# Patient Record
Sex: MALE | Race: WHITE | NOT HISPANIC OR LATINO | Employment: OTHER | URBAN - METROPOLITAN AREA
[De-identification: names, ages, dates, MRNs, and addresses within clinical notes are randomized per-mention and may not be internally consistent; named-entity substitution may affect disease eponyms.]

---

## 2017-06-23 ENCOUNTER — TRANSCRIBE ORDERS (OUTPATIENT)
Dept: PHYSICAL THERAPY | Facility: HOSPITAL | Age: 62
End: 2017-06-23

## 2017-06-23 DIAGNOSIS — L89.200: ICD-10-CM

## 2017-06-23 DIAGNOSIS — S91.309D OPEN WOUND OF HEEL, UNSPECIFIED LATERALITY, SUBSEQUENT ENCOUNTER: ICD-10-CM

## 2017-06-23 DIAGNOSIS — L89.309: Primary | ICD-10-CM

## 2017-06-28 ENCOUNTER — HOSPITAL ENCOUNTER (OUTPATIENT)
Dept: PHYSICAL THERAPY | Facility: HOSPITAL | Age: 62
Setting detail: THERAPIES SERIES
Discharge: HOME | End: 2017-06-28

## 2017-06-28 ENCOUNTER — DOCUMENTATION (OUTPATIENT)
Dept: PHYSICAL THERAPY | Facility: HOSPITAL | Age: 62
End: 2017-06-28

## 2017-06-28 NOTE — TREATMENT PLAN
Pt presented for PT wound care evaluation. Brought pt back to treatment room and discussed reason for visit, etiology of wounds in question, and current treatment. Pt reported he is receiving home health services for wound care, and the wound care nurses order his home supplies for daily dressing changes. Informed the patient that he cannot receive outpatient and home health services at the same time d/t insurance restrictions, so PT could not perform any interventions today. Pt voiced his understanding and declined offer for PT to visualize the wounds and discuss a potential plan with his PCP. Of note, pt reports that he does not really need the home health nurses except for them to order him supplies. Gave patient information about [employer] home delivery system, along with an order form that would need to be completed and signed by a physician in order for the patient to receive supplies at home that would be billed to his insurance. Encouraged pt to discuss this plan with his MD and call back if he wants to utilize his referral after stopping home health services. Pt voiced his understanding. Left a message with Dr. Rankin' nurse about the situation. No PT evaluation or interventions performed. Therefore, no charges will be billed for this encounter. Corrina Bowen, PT 6/28/2017 4:22 PM

## 2017-06-28 NOTE — TREATMENT PLAN
Pt presented for PT wound care evaluation. Brought pt back to treatment room and discussed reason for visit, etiology of wounds in question, and current treatment. Pt reported he is receiving home health services for wound care, and the wound care nurses order his home supplies for daily dressing changes. Informed the patient that he cannot receive outpatient and home health services at the same time d/t insurance restrictions, so PT could not perform any interventions today. Pt voiced his understanding and declined offer for PT to visualize the wounds and discuss a potential plan with his PCP. Of note, pt reports that he does not really need the home health nurses except for them to order him supplies. Gave patient information about [employer] home delivery system, along with an order form that would need to be completed and signed by a physician in order for the patient to receive supplies at home that would be billed to his insurance. Encouraged pt to discuss this plan with his MD and call back if he wants to utilize his referral after stopping home health services. Pt voiced his understanding. Left a message with Dr. Rankin' nurse about the situation. No PT evaluation or interventions performed. Therefore, no charges will be billed for this encounter. Corrina Bowen, PT 6/28/2017 4:22 PM

## 2022-11-18 ENCOUNTER — OFFICE VISIT (OUTPATIENT)
Dept: CARDIAC SURGERY | Facility: CLINIC | Age: 67
End: 2022-11-18

## 2022-11-18 VITALS
SYSTOLIC BLOOD PRESSURE: 158 MMHG | TEMPERATURE: 97.1 F | OXYGEN SATURATION: 98 % | BODY MASS INDEX: 23.03 KG/M2 | WEIGHT: 170 LBS | HEART RATE: 85 BPM | HEIGHT: 72 IN | DIASTOLIC BLOOD PRESSURE: 77 MMHG

## 2022-11-18 DIAGNOSIS — I96 GANGRENE OF RIGHT FOOT: Primary | ICD-10-CM

## 2022-11-18 DIAGNOSIS — E78.5 HYPERLIPIDEMIA, UNSPECIFIED HYPERLIPIDEMIA TYPE: ICD-10-CM

## 2022-11-18 DIAGNOSIS — E11.52 TYPE 2 DIABETES MELLITUS WITH FOOT ULCER AND GANGRENE: ICD-10-CM

## 2022-11-18 DIAGNOSIS — E11.621 TYPE 2 DIABETES MELLITUS WITH FOOT ULCER AND GANGRENE: ICD-10-CM

## 2022-11-18 DIAGNOSIS — I10 ESSENTIAL HYPERTENSION: ICD-10-CM

## 2022-11-18 DIAGNOSIS — I73.9 PERIPHERAL ARTERIAL DISEASE: ICD-10-CM

## 2022-11-18 DIAGNOSIS — I96 GANGRENE OF LEFT FOOT: ICD-10-CM

## 2022-11-18 DIAGNOSIS — L97.509 TYPE 2 DIABETES MELLITUS WITH FOOT ULCER AND GANGRENE: ICD-10-CM

## 2022-11-18 DIAGNOSIS — G83.9 PARALYSIS: ICD-10-CM

## 2022-11-18 DIAGNOSIS — Z72.0 TOBACCO ABUSE: ICD-10-CM

## 2022-11-18 PROCEDURE — 99204 OFFICE O/P NEW MOD 45 MIN: CPT | Performed by: STUDENT IN AN ORGANIZED HEALTH CARE EDUCATION/TRAINING PROGRAM

## 2022-11-18 PROCEDURE — 93922 UPR/L XTREMITY ART 2 LEVELS: CPT | Performed by: STUDENT IN AN ORGANIZED HEALTH CARE EDUCATION/TRAINING PROGRAM

## 2022-11-18 RX ORDER — ZOLPIDEM TARTRATE 10 MG/1
10 TABLET ORAL
COMMUNITY
Start: 2022-11-09 | End: 2023-02-21 | Stop reason: HOSPADM

## 2022-11-18 RX ORDER — ROSUVASTATIN CALCIUM 10 MG/1
10 TABLET, COATED ORAL DAILY
COMMUNITY
Start: 2022-09-06 | End: 2022-12-05 | Stop reason: HOSPADM

## 2022-11-18 RX ORDER — GABAPENTIN 800 MG/1
800 TABLET ORAL 4 TIMES DAILY
Status: ON HOLD | COMMUNITY
Start: 2022-10-21 | End: 2023-02-20 | Stop reason: SDUPTHER

## 2022-11-18 RX ORDER — SITAGLIPTIN 100 MG/1
100 TABLET, FILM COATED ORAL DAILY
COMMUNITY
Start: 2022-11-01

## 2022-11-18 RX ORDER — QUINAPRIL 40 MG/1
40 TABLET ORAL DAILY
COMMUNITY
Start: 2022-09-07 | End: 2023-03-14

## 2022-11-18 RX ORDER — ATORVASTATIN CALCIUM 40 MG/1
40 TABLET, FILM COATED ORAL DAILY
COMMUNITY
Start: 2022-11-02 | End: 2022-12-05 | Stop reason: HOSPADM

## 2022-11-18 RX ORDER — CILOSTAZOL 50 MG/1
50 TABLET ORAL DAILY
COMMUNITY
Start: 2022-11-02 | End: 2022-11-22

## 2022-11-18 RX ORDER — OXYCODONE AND ACETAMINOPHEN 10; 325 MG/1; MG/1
1 TABLET ORAL
Status: ON HOLD | COMMUNITY
Start: 2014-07-24 | End: 2023-02-21 | Stop reason: SDUPTHER

## 2022-11-18 RX ORDER — PANTOPRAZOLE SODIUM 40 MG/1
40 TABLET, DELAYED RELEASE ORAL DAILY
COMMUNITY
Start: 2022-11-02 | End: 2023-01-05

## 2022-11-18 RX ORDER — CLOPIDOGREL BISULFATE 75 MG/1
75 TABLET ORAL DAILY
COMMUNITY
Start: 2022-10-21

## 2022-11-18 NOTE — PROGRESS NOTES
11/18/2022  Patient Information  Severino Vera                                                                                          [address] [address]   1955  'PCP/Referring Physician'  Anuel Rankin MD  [phone]  Trever Ford MD  [phone]  Chief Complaint   Patient presents with   • Consult     Np referred for bilateral foot ulcers.       History of Present Illness: 66-year-old man with history of diabetes, hypertension, hyperlipidemia, peripheral arterial disease status post stent 25 years ago, status post shoulder surgery and chronic arthritis, paraplegia with complete loss of sensation and function below the waist with bladder incontinence requiring chronic bladder catheterization who presents to the cardiovascular surgery clinic today for evaluation of wounds in the bilateral feet.  He reports having these wounds present and worsening over the last several years, and now these have progressed to dry gangrene bilaterally.  He has MRI evidence of osteomyelitis.  He reports that he does not stand or walk at all, and is completely wheelchair-bound.  He also reports that he is completely insensate, and cannot feel any sensation including a burn on his foot which she sustained years ago requiring partial amputations of his right toes.  He also reports chronic and progressively worsening upper extremity weakness and numbness in the fingertips that he believes is due to his neck and/or shoulders.  He reports a family history of a mother with a stroke and breast cancer, and brothers and sisters who are in good health.  He currently lives in [place] with multiple family members and smokes less than 1 pack/day for decades, drinks 4 beers per day, and denies illicit drug use.    Past medical history as above    Past surgical history as above    Family history as above    Social history as above      Current Outpatient Medications:   •  atorvastatin (LIPITOR) 40 MG  tablet, Take 40 mg by mouth Daily., Disp: , Rfl:   •  cilostazol (PLETAL) 50 MG tablet, Take 50 mg by mouth Daily., Disp: , Rfl:   •  clopidogrel (PLAVIX) 75 MG tablet, Take 75 mg by mouth Daily., Disp: , Rfl:   •  gabapentin (NEURONTIN) 800 MG tablet, Take 800 mg by mouth 4 (Four) Times a Day., Disp: , Rfl:   •  Januvia 100 MG tablet, Take 100 mg by mouth Daily., Disp: , Rfl:   •  oxyCODONE-acetaminophen (PERCOCET)  MG per tablet, Take 1 tablet by mouth 5 (Five) Times a Day., Disp: , Rfl:   •  pantoprazole (PROTONIX) 40 MG EC tablet, Take 40 mg by mouth Daily., Disp: , Rfl:   •  quinapril (ACCUPRIL) 40 MG tablet, Take 40 mg by mouth Daily., Disp: , Rfl:   •  rosuvastatin (CRESTOR) 10 MG tablet, Take 10 mg by mouth Daily., Disp: , Rfl:   •  zolpidem (AMBIEN) 10 MG tablet, Take 10 mg by mouth every night at bedtime., Disp: , Rfl:   No Known Allergies  Social History     Socioeconomic History   • Marital status: Unknown     History reviewed. No pertinent family history.  Review of Systems   Constitutional: Negative. Negative for chills, fever, malaise/fatigue, night sweats and weight loss.   HENT: Negative.  Negative for hearing loss, odynophagia and sore throat.    Cardiovascular: Positive for leg swelling. Negative for chest pain, dyspnea on exertion, orthopnea and palpitations.   Respiratory: Negative for cough and hemoptysis.    Endocrine: Negative.  Negative for cold intolerance, heat intolerance, polydipsia, polyphagia and polyuria.   Hematologic/Lymphatic: Negative.  Does not bruise/bleed easily.   Skin: Positive for color change and poor wound healing. Negative for itching and rash.   Musculoskeletal: Positive for arthritis. Negative for joint pain, joint swelling and myalgias.   Gastrointestinal: Positive for diarrhea. Negative for abdominal pain, constipation, hematemesis, hematochezia, melena, nausea and vomiting.   Genitourinary: Negative.  Negative for dysuria, frequency and hematuria.  "  Neurological: Positive for numbness. Negative for focal weakness, headaches and seizures.   Psychiatric/Behavioral: Negative.  Negative for suicidal ideas.   Allergic/Immunologic: Negative.    All other systems reviewed and are negative.    Vitals:    11/18/22 1413   BP: 158/77   BP Location: Left arm   Patient Position: Sitting   Pulse: 85   Temp: 97.1 °F (36.2 °C)   SpO2: 98%   Weight: 77.1 kg (170 lb)   Height: 182.9 cm (72\")      Physical Exam     General no acute distress, pleasant, interactive  Head normocephalic, atraumatic  Eyes clear sclera  ENT no discharge, neck supple  Mouth mucous membranes moist  Cardiac regular rate rhythm, no murmurs rubs gallops  Vascular no carotid bruits bilaterally, palpable brachial and radial pulses bilaterally when arms extended but not when flexed, nonpalpable femoral, popliteal, and pedal pulses.  Pulmonary lungs clear to auscultation bilaterally  Abdomen soft nontender nondistended  Lymphatic no edema bilateral lower extremities  Neurological insensate bilateral lower extremities  Psychological appropriate  Dermatological extensive gangrene of the bilateral feet and lower leg  Musculoskeletal atrophy of disuse at the bilateral lower extremities, no tenderness over the posterior cervical neck, tenderness with palpation of the bilateral shoulders and upper arms    The ROS, past medical history, surgical history, family history, social history and vitals were reviewed by myself and corrected as needed.      Labs/Imaging:  I reviewed his imaging which is notable for osteomyelitis.  I reviewed his segmental pressures and PVRs which are notable for low thigh pressure of 91 on the right side, 107 on the left side, and calf pressures of 94 on the right side and 109 the left side.    Assessment/Plan: 66-year-old man with multiple medical comorbidities including diabetes, peripheral arterial disease, active tobacco abuse, and paraplegia (insensate below the waist) who presents " today with bilateral foot and ankle dry gangrene.  I explained to him and his 2 family members who accompanied him today that his legs can only hurt him and will never help him given the fact that he cannot feel or use them, and thus in this particular setting, it does make sense to proceed with primary above-knee amputations.  The patient and family expressed great gratitude for this, and would like to proceed with surgery soon as possible.  I explained that given the logistics of the timing of this clinic visit as well as impending holiday that I would like to proceed as soon as possible but we would be best served to obtain basic preoperative investigations with labs and EKG.  We do not require anesthesia for simultaneous bilateral above-knee amputations given the fact that he is completely insensate.  He also will require evaluation for his neck and/or shoulders given his progressively worsening upper extremity weakness and numbness of his fingertips, and I will refer him to a orthopedic spine surgeon suggest to his primary care to evaluate for cervical spinal stenosis and or shoulder etiology.    Participated in smoking cessation counseling for greater than 10 minutes with the patient.    I would like to thank you very much for this consultation.    Nemesio Haji M.D., R.P.V.I.  Cardiothoracic and Vascular Surgeon  [facility]

## 2022-11-21 DIAGNOSIS — I96 GANGRENE OF RIGHT FOOT: ICD-10-CM

## 2022-11-21 DIAGNOSIS — I96 GANGRENE OF LEFT FOOT: ICD-10-CM

## 2022-11-21 DIAGNOSIS — I73.9 PERIPHERAL ARTERIAL DISEASE: Primary | ICD-10-CM

## 2022-11-22 ENCOUNTER — PRE-ADMISSION TESTING (OUTPATIENT)
Dept: PREADMISSION TESTING | Facility: HOSPITAL | Age: 67
End: 2022-11-22

## 2022-11-22 ENCOUNTER — PREP FOR SURGERY (OUTPATIENT)
Dept: OTHER | Facility: HOSPITAL | Age: 67
End: 2022-11-22

## 2022-11-22 VITALS — WEIGHT: 170 LBS | HEIGHT: 72 IN | BODY MASS INDEX: 23.03 KG/M2

## 2022-11-22 DIAGNOSIS — I73.9 PERIPHERAL ARTERIAL DISEASE: ICD-10-CM

## 2022-11-22 DIAGNOSIS — I73.9 PERIPHERAL ARTERIAL DISEASE: Primary | ICD-10-CM

## 2022-11-22 LAB
ABO GROUP BLD: NORMAL
ANION GAP SERPL CALCULATED.3IONS-SCNC: 8 MMOL/L (ref 5–15)
BLD GP AB SCN SERPL QL: NEGATIVE
BUN SERPL-MCNC: 9 MG/DL (ref 8–23)
BUN/CREAT SERPL: 20.9 (ref 7–25)
CALCIUM SPEC-SCNC: 9.1 MG/DL (ref 8.6–10.5)
CHLORIDE SERPL-SCNC: 95 MMOL/L (ref 98–107)
CO2 SERPL-SCNC: 30 MMOL/L (ref 22–29)
CREAT SERPL-MCNC: 0.43 MG/DL (ref 0.76–1.27)
DEPRECATED RDW RBC AUTO: 47 FL (ref 37–54)
EGFRCR SERPLBLD CKD-EPI 2021: 117 ML/MIN/1.73
ERYTHROCYTE [DISTWIDTH] IN BLOOD BY AUTOMATED COUNT: 14.6 % (ref 12.3–15.4)
GLUCOSE SERPL-MCNC: 106 MG/DL (ref 65–99)
HBA1C MFR BLD: 5.7 % (ref 4.8–5.6)
HCT VFR BLD AUTO: 38.8 % (ref 37.5–51)
HGB BLD-MCNC: 12.6 G/DL (ref 13–17.7)
INR PPP: 1.01 (ref 0.84–1.13)
MCH RBC QN AUTO: 28.2 PG (ref 26.6–33)
MCHC RBC AUTO-ENTMCNC: 32.5 G/DL (ref 31.5–35.7)
MCV RBC AUTO: 86.8 FL (ref 79–97)
PLATELET # BLD AUTO: 284 10*3/MM3 (ref 140–450)
PMV BLD AUTO: 9.8 FL (ref 6–12)
POTASSIUM SERPL-SCNC: 4 MMOL/L (ref 3.5–5.2)
PROTHROMBIN TIME: 13.2 SECONDS (ref 11.4–14.4)
QT INTERVAL: 390 MS
QTC INTERVAL: 469 MS
RBC # BLD AUTO: 4.47 10*6/MM3 (ref 4.14–5.8)
RH BLD: POSITIVE
SODIUM SERPL-SCNC: 133 MMOL/L (ref 136–145)
T&S EXPIRATION DATE: NORMAL
WBC NRBC COR # BLD: 11.63 10*3/MM3 (ref 3.4–10.8)

## 2022-11-22 PROCEDURE — 85027 COMPLETE CBC AUTOMATED: CPT

## 2022-11-22 PROCEDURE — 86900 BLOOD TYPING SEROLOGIC ABO: CPT

## 2022-11-22 PROCEDURE — 36415 COLL VENOUS BLD VENIPUNCTURE: CPT

## 2022-11-22 PROCEDURE — 93010 ELECTROCARDIOGRAM REPORT: CPT | Performed by: INTERNAL MEDICINE

## 2022-11-22 PROCEDURE — 85610 PROTHROMBIN TIME: CPT

## 2022-11-22 PROCEDURE — 86901 BLOOD TYPING SEROLOGIC RH(D): CPT

## 2022-11-22 PROCEDURE — 86850 RBC ANTIBODY SCREEN: CPT

## 2022-11-22 PROCEDURE — 83036 HEMOGLOBIN GLYCOSYLATED A1C: CPT

## 2022-11-22 PROCEDURE — 93005 ELECTROCARDIOGRAM TRACING: CPT

## 2022-11-22 PROCEDURE — 80048 BASIC METABOLIC PNL TOTAL CA: CPT

## 2022-11-22 RX ORDER — CEFAZOLIN SODIUM 2 G/100ML
2 INJECTION, SOLUTION INTRAVENOUS ONCE
Status: CANCELLED | OUTPATIENT
Start: 2022-11-22 | End: 2022-11-22

## 2022-11-22 RX ORDER — LEVOFLOXACIN 750 MG/1
750 TABLET ORAL DAILY
COMMUNITY
Start: 2022-10-07 | End: 2022-12-05 | Stop reason: HOSPADM

## 2022-11-23 ENCOUNTER — ANESTHESIA EVENT (OUTPATIENT)
Dept: PERIOP | Facility: HOSPITAL | Age: 67
End: 2022-11-23

## 2022-11-24 RX ORDER — SODIUM CHLORIDE 0.9 % (FLUSH) 0.9 %
10 SYRINGE (ML) INJECTION EVERY 12 HOURS SCHEDULED
Status: CANCELLED | OUTPATIENT
Start: 2022-11-24

## 2022-11-24 RX ORDER — FAMOTIDINE 20 MG/1
20 TABLET, FILM COATED ORAL ONCE
Status: CANCELLED | OUTPATIENT
Start: 2022-11-24 | End: 2022-11-24

## 2022-11-24 RX ORDER — FAMOTIDINE 10 MG/ML
20 INJECTION, SOLUTION INTRAVENOUS ONCE
Status: CANCELLED | OUTPATIENT
Start: 2022-11-24 | End: 2022-11-24

## 2022-11-25 ENCOUNTER — ANESTHESIA (OUTPATIENT)
Dept: PERIOP | Facility: HOSPITAL | Age: 67
End: 2022-11-25

## 2022-11-25 ENCOUNTER — HOSPITAL ENCOUNTER (INPATIENT)
Facility: HOSPITAL | Age: 67
LOS: 10 days | Discharge: HOME HEALTH | End: 2022-12-05
Attending: STUDENT IN AN ORGANIZED HEALTH CARE EDUCATION/TRAINING PROGRAM | Admitting: STUDENT IN AN ORGANIZED HEALTH CARE EDUCATION/TRAINING PROGRAM

## 2022-11-25 ENCOUNTER — APPOINTMENT (OUTPATIENT)
Dept: CARDIOLOGY | Facility: HOSPITAL | Age: 67
End: 2022-11-25

## 2022-11-25 DIAGNOSIS — I96 GANGRENE OF RIGHT FOOT: ICD-10-CM

## 2022-11-25 DIAGNOSIS — E43 SEVERE MALNUTRITION: ICD-10-CM

## 2022-11-25 DIAGNOSIS — I96 GANGRENE OF RIGHT FOOT: Primary | ICD-10-CM

## 2022-11-25 DIAGNOSIS — I96 GANGRENE OF LEFT FOOT: ICD-10-CM

## 2022-11-25 DIAGNOSIS — E78.5 HYPERLIPIDEMIA LDL GOAL <70: ICD-10-CM

## 2022-11-25 DIAGNOSIS — I73.9 PERIPHERAL ARTERIAL DISEASE: ICD-10-CM

## 2022-11-25 PROBLEM — M86.9 OSTEOMYELITIS: Status: ACTIVE | Noted: 2022-11-25

## 2022-11-25 LAB
ABO GROUP BLD: NORMAL
ALBUMIN SERPL-MCNC: 2.6 G/DL (ref 3.5–5.2)
ALBUMIN/GLOB SERPL: 0.8 G/DL
ALP SERPL-CCNC: 56 U/L (ref 39–117)
ALT SERPL W P-5'-P-CCNC: 6 U/L (ref 1–41)
ANION GAP SERPL CALCULATED.3IONS-SCNC: 9 MMOL/L (ref 5–15)
AST SERPL-CCNC: 11 U/L (ref 1–40)
BACTERIA UR QL AUTO: ABNORMAL /HPF
BASOPHILS # BLD AUTO: 0.12 10*3/MM3 (ref 0–0.2)
BASOPHILS NFR BLD AUTO: 0.8 % (ref 0–1.5)
BH CV ECHO MEAS - AO MAX PG: 6 MMHG
BH CV ECHO MEAS - AO MEAN PG: 3 MMHG
BH CV ECHO MEAS - AO ROOT DIAM: 3 CM
BH CV ECHO MEAS - AO V2 MAX: 122 CM/SEC
BH CV ECHO MEAS - AO V2 VTI: 29.3 CM
BH CV ECHO MEAS - AVA(I,D): 2.1 CM2
BH CV ECHO MEAS - EDV(CUBED): 97.3 ML
BH CV ECHO MEAS - EDV(MOD-SP2): 66.2 ML
BH CV ECHO MEAS - EDV(MOD-SP4): 98.9 ML
BH CV ECHO MEAS - EF(MOD-BP): 56.6 %
BH CV ECHO MEAS - EF(MOD-SP2): 55.3 %
BH CV ECHO MEAS - EF(MOD-SP4): 56.3 %
BH CV ECHO MEAS - ESV(CUBED): 27 ML
BH CV ECHO MEAS - ESV(MOD-SP2): 29.6 ML
BH CV ECHO MEAS - ESV(MOD-SP4): 43.2 ML
BH CV ECHO MEAS - FS: 34.8 %
BH CV ECHO MEAS - IVS/LVPW: 0.82 CM
BH CV ECHO MEAS - IVSD: 0.9 CM
BH CV ECHO MEAS - LA DIMENSION: 3.5 CM
BH CV ECHO MEAS - LAT PEAK E' VEL: 6.9 CM/SEC
BH CV ECHO MEAS - LV DIASTOLIC VOL/BSA (35-75): 49.7 CM2
BH CV ECHO MEAS - LV MASS(C)D: 158.8 GRAMS
BH CV ECHO MEAS - LV MAX PG: 2.9 MMHG
BH CV ECHO MEAS - LV MEAN PG: 2 MMHG
BH CV ECHO MEAS - LV SYSTOLIC VOL/BSA (12-30): 21.7 CM2
BH CV ECHO MEAS - LV V1 MAX: 85.4 CM/SEC
BH CV ECHO MEAS - LV V1 VTI: 19.6 CM
BH CV ECHO MEAS - LVIDD: 4.6 CM
BH CV ECHO MEAS - LVIDS: 3 CM
BH CV ECHO MEAS - LVOT AREA: 3.1 CM2
BH CV ECHO MEAS - LVOT DIAM: 2 CM
BH CV ECHO MEAS - LVPWD: 1.1 CM
BH CV ECHO MEAS - MED PEAK E' VEL: 5.1 CM/SEC
BH CV ECHO MEAS - MV A MAX VEL: 69.7 CM/SEC
BH CV ECHO MEAS - MV DEC SLOPE: 702 CM/SEC2
BH CV ECHO MEAS - MV DEC TIME: 0.14 MSEC
BH CV ECHO MEAS - MV E MAX VEL: 117 CM/SEC
BH CV ECHO MEAS - MV E/A: 1.68
BH CV ECHO MEAS - MV MAX PG: 6.2 MMHG
BH CV ECHO MEAS - MV MEAN PG: 3 MMHG
BH CV ECHO MEAS - MV P1/2T: 52.2 MSEC
BH CV ECHO MEAS - MV V2 VTI: 23.2 CM
BH CV ECHO MEAS - MVA(P1/2T): 4.2 CM2
BH CV ECHO MEAS - MVA(VTI): 2.7 CM2
BH CV ECHO MEAS - RAP SYSTOLE: 3 MMHG
BH CV ECHO MEAS - RVSP: 25 MMHG
BH CV ECHO MEAS - SI(MOD-SP2): 18.4 ML/M2
BH CV ECHO MEAS - SI(MOD-SP4): 28 ML/M2
BH CV ECHO MEAS - SV(LVOT): 61.6 ML
BH CV ECHO MEAS - SV(MOD-SP2): 36.6 ML
BH CV ECHO MEAS - SV(MOD-SP4): 55.7 ML
BH CV ECHO MEAS - TR MAX PG: 22.3 MMHG
BH CV ECHO MEAS - TR MAX VEL: 236 CM/SEC
BH CV ECHO MEASUREMENTS AVERAGE E/E' RATIO: 19.5
BH CV VAS BP RIGHT ARM: NORMAL MMHG
BH CV XLRA - TDI S': 12.1 CM/SEC
BILIRUB SERPL-MCNC: 0.3 MG/DL (ref 0–1.2)
BILIRUB UR QL STRIP: NEGATIVE
BLD GP AB SCN SERPL QL: NEGATIVE
BUN SERPL-MCNC: 8 MG/DL (ref 8–23)
BUN/CREAT SERPL: 24.2 (ref 7–25)
CALCIUM SPEC-SCNC: 8.3 MG/DL (ref 8.6–10.5)
CHLORIDE SERPL-SCNC: 96 MMOL/L (ref 98–107)
CLARITY UR: CLEAR
CO2 SERPL-SCNC: 28 MMOL/L (ref 22–29)
COLOR UR: YELLOW
CREAT SERPL-MCNC: 0.33 MG/DL (ref 0.76–1.27)
DEPRECATED RDW RBC AUTO: 44.5 FL (ref 37–54)
EGFRCR SERPLBLD CKD-EPI 2021: 126.7 ML/MIN/1.73
EOSINOPHIL # BLD AUTO: 0.76 10*3/MM3 (ref 0–0.4)
EOSINOPHIL NFR BLD AUTO: 5.2 % (ref 0.3–6.2)
ERYTHROCYTE [DISTWIDTH] IN BLOOD BY AUTOMATED COUNT: 14.4 % (ref 12.3–15.4)
GLOBULIN UR ELPH-MCNC: 3.3 GM/DL
GLUCOSE BLDC GLUCOMTR-MCNC: 108 MG/DL (ref 70–130)
GLUCOSE BLDC GLUCOMTR-MCNC: 113 MG/DL (ref 70–130)
GLUCOSE SERPL-MCNC: 119 MG/DL (ref 65–99)
GLUCOSE UR STRIP-MCNC: NEGATIVE MG/DL
HCT VFR BLD AUTO: 32.8 % (ref 37.5–51)
HGB BLD-MCNC: 10.9 G/DL (ref 13–17.7)
HGB UR QL STRIP.AUTO: ABNORMAL
HYALINE CASTS UR QL AUTO: ABNORMAL /LPF
IMM GRANULOCYTES # BLD AUTO: 0.07 10*3/MM3 (ref 0–0.05)
IMM GRANULOCYTES NFR BLD AUTO: 0.5 % (ref 0–0.5)
KETONES UR QL STRIP: ABNORMAL
LEUKOCYTE ESTERASE UR QL STRIP.AUTO: NEGATIVE
LV EF 2D ECHO EST: 55 %
LYMPHOCYTES # BLD AUTO: 1.08 10*3/MM3 (ref 0.7–3.1)
LYMPHOCYTES NFR BLD AUTO: 7.4 % (ref 19.6–45.3)
MAGNESIUM SERPL-MCNC: 1.1 MG/DL (ref 1.6–2.4)
MAXIMAL PREDICTED HEART RATE: 153 BPM
MCH RBC QN AUTO: 28.4 PG (ref 26.6–33)
MCHC RBC AUTO-ENTMCNC: 33.2 G/DL (ref 31.5–35.7)
MCV RBC AUTO: 85.4 FL (ref 79–97)
MONOCYTES # BLD AUTO: 0.71 10*3/MM3 (ref 0.1–0.9)
MONOCYTES NFR BLD AUTO: 4.9 % (ref 5–12)
NEUTROPHILS NFR BLD AUTO: 11.77 10*3/MM3 (ref 1.7–7)
NEUTROPHILS NFR BLD AUTO: 81.2 % (ref 42.7–76)
NITRITE UR QL STRIP: NEGATIVE
NRBC BLD AUTO-RTO: 0 /100 WBC (ref 0–0.2)
PH UR STRIP.AUTO: 6 [PH] (ref 5–8)
PLATELET # BLD AUTO: 264 10*3/MM3 (ref 140–450)
PMV BLD AUTO: 9.9 FL (ref 6–12)
POTASSIUM SERPL-SCNC: 3.9 MMOL/L (ref 3.5–5.2)
PROT SERPL-MCNC: 5.9 G/DL (ref 6–8.5)
PROT UR QL STRIP: ABNORMAL
RBC # BLD AUTO: 3.84 10*6/MM3 (ref 4.14–5.8)
RBC # UR STRIP: ABNORMAL /HPF
REF LAB TEST METHOD: ABNORMAL
RH BLD: POSITIVE
SODIUM SERPL-SCNC: 133 MMOL/L (ref 136–145)
SP GR UR STRIP: 1.02 (ref 1–1.03)
SQUAMOUS #/AREA URNS HPF: ABNORMAL /HPF
STRESS TARGET HR: 130 BPM
T&S EXPIRATION DATE: NORMAL
UROBILINOGEN UR QL STRIP: ABNORMAL
WBC # UR STRIP: ABNORMAL /HPF
WBC NRBC COR # BLD: 14.51 10*3/MM3 (ref 3.4–10.8)

## 2022-11-25 PROCEDURE — 84134 ASSAY OF PREALBUMIN: CPT | Performed by: PHYSICIAN ASSISTANT

## 2022-11-25 PROCEDURE — 25010000002 PHENYLEPHRINE 10 MG/ML SOLUTION 1 ML VIAL

## 2022-11-25 PROCEDURE — 25010000002 CEFAZOLIN IN DEXTROSE 2-4 GM/100ML-% SOLUTION: Performed by: PHYSICIAN ASSISTANT

## 2022-11-25 PROCEDURE — 99222 1ST HOSP IP/OBS MODERATE 55: CPT | Performed by: INTERNAL MEDICINE

## 2022-11-25 PROCEDURE — 25010000002 MAGNESIUM SULFATE 2 GM/50ML SOLUTION: Performed by: INTERNAL MEDICINE

## 2022-11-25 PROCEDURE — 85025 COMPLETE CBC W/AUTO DIFF WBC: CPT | Performed by: PHYSICIAN ASSISTANT

## 2022-11-25 PROCEDURE — 86850 RBC ANTIBODY SCREEN: CPT | Performed by: STUDENT IN AN ORGANIZED HEALTH CARE EDUCATION/TRAINING PROGRAM

## 2022-11-25 PROCEDURE — 86923 COMPATIBILITY TEST ELECTRIC: CPT

## 2022-11-25 PROCEDURE — 93306 TTE W/DOPPLER COMPLETE: CPT

## 2022-11-25 PROCEDURE — 25010000002 PROPOFOL 10 MG/ML EMULSION

## 2022-11-25 PROCEDURE — 86900 BLOOD TYPING SEROLOGIC ABO: CPT | Performed by: STUDENT IN AN ORGANIZED HEALTH CARE EDUCATION/TRAINING PROGRAM

## 2022-11-25 PROCEDURE — 86140 C-REACTIVE PROTEIN: CPT | Performed by: INTERNAL MEDICINE

## 2022-11-25 PROCEDURE — 27590 AMPUTATE LEG AT THIGH: CPT | Performed by: STUDENT IN AN ORGANIZED HEALTH CARE EDUCATION/TRAINING PROGRAM

## 2022-11-25 PROCEDURE — 81001 URINALYSIS AUTO W/SCOPE: CPT | Performed by: PHYSICIAN ASSISTANT

## 2022-11-25 PROCEDURE — 88311 DECALCIFY TISSUE: CPT | Performed by: STUDENT IN AN ORGANIZED HEALTH CARE EDUCATION/TRAINING PROGRAM

## 2022-11-25 PROCEDURE — 25010000002 MAGNESIUM SULFATE PER 500 MG OF MAGNESIUM

## 2022-11-25 PROCEDURE — 82962 GLUCOSE BLOOD TEST: CPT

## 2022-11-25 PROCEDURE — 0Y6C0Z3 DETACHMENT AT RIGHT UPPER LEG, LOW, OPEN APPROACH: ICD-10-PCS | Performed by: STUDENT IN AN ORGANIZED HEALTH CARE EDUCATION/TRAINING PROGRAM

## 2022-11-25 PROCEDURE — 25010000002 PHENYLEPHRINE 10 MG/ML SOLUTION

## 2022-11-25 PROCEDURE — 86901 BLOOD TYPING SEROLOGIC RH(D): CPT | Performed by: STUDENT IN AN ORGANIZED HEALTH CARE EDUCATION/TRAINING PROGRAM

## 2022-11-25 PROCEDURE — 80053 COMPREHEN METABOLIC PANEL: CPT | Performed by: PHYSICIAN ASSISTANT

## 2022-11-25 PROCEDURE — 84100 ASSAY OF PHOSPHORUS: CPT | Performed by: INTERNAL MEDICINE

## 2022-11-25 PROCEDURE — S0260 H&P FOR SURGERY: HCPCS | Performed by: STUDENT IN AN ORGANIZED HEALTH CARE EDUCATION/TRAINING PROGRAM

## 2022-11-25 PROCEDURE — 83735 ASSAY OF MAGNESIUM: CPT

## 2022-11-25 PROCEDURE — 25010000002 SULFUR HEXAFLUORIDE MICROSPH 60.7-25 MG RECONSTITUTED SUSPENSION: Performed by: PHYSICIAN ASSISTANT

## 2022-11-25 PROCEDURE — 93306 TTE W/DOPPLER COMPLETE: CPT | Performed by: INTERNAL MEDICINE

## 2022-11-25 PROCEDURE — L1830 KO IMMOB CANVAS LONG PRE OTS: HCPCS | Performed by: STUDENT IN AN ORGANIZED HEALTH CARE EDUCATION/TRAINING PROGRAM

## 2022-11-25 PROCEDURE — 25010000002 MIDAZOLAM PER 1 MG

## 2022-11-25 PROCEDURE — 88307 TISSUE EXAM BY PATHOLOGIST: CPT | Performed by: STUDENT IN AN ORGANIZED HEALTH CARE EDUCATION/TRAINING PROGRAM

## 2022-11-25 RX ORDER — CEFAZOLIN SODIUM 2 G/100ML
2 INJECTION, SOLUTION INTRAVENOUS EVERY 8 HOURS
Status: COMPLETED | OUTPATIENT
Start: 2022-11-26 | End: 2022-11-26

## 2022-11-25 RX ORDER — HYDROMORPHONE HYDROCHLORIDE 1 MG/ML
0.5 INJECTION, SOLUTION INTRAMUSCULAR; INTRAVENOUS; SUBCUTANEOUS
Status: DISCONTINUED | OUTPATIENT
Start: 2022-11-25 | End: 2022-11-25 | Stop reason: HOSPADM

## 2022-11-25 RX ORDER — MIDAZOLAM HYDROCHLORIDE 1 MG/ML
0.5 INJECTION INTRAMUSCULAR; INTRAVENOUS
Status: DISCONTINUED | OUTPATIENT
Start: 2022-11-25 | End: 2022-11-25 | Stop reason: HOSPADM

## 2022-11-25 RX ORDER — ATORVASTATIN CALCIUM 40 MG/1
40 TABLET, FILM COATED ORAL DAILY
Status: DISCONTINUED | OUTPATIENT
Start: 2022-11-25 | End: 2022-11-26

## 2022-11-25 RX ORDER — ZOLPIDEM TARTRATE 5 MG/1
10 TABLET ORAL NIGHTLY PRN
Status: DISCONTINUED | OUTPATIENT
Start: 2022-11-25 | End: 2022-12-05 | Stop reason: HOSPADM

## 2022-11-25 RX ORDER — MAGNESIUM SULFATE HEPTAHYDRATE 40 MG/ML
2 INJECTION, SOLUTION INTRAVENOUS AS NEEDED
Status: DISCONTINUED | OUTPATIENT
Start: 2022-11-25 | End: 2022-12-05 | Stop reason: HOSPADM

## 2022-11-25 RX ORDER — POTASSIUM CHLORIDE 750 MG/1
40 CAPSULE, EXTENDED RELEASE ORAL AS NEEDED
Status: DISCONTINUED | OUTPATIENT
Start: 2022-11-25 | End: 2022-12-05 | Stop reason: HOSPADM

## 2022-11-25 RX ORDER — ONDANSETRON 2 MG/ML
4 INJECTION INTRAMUSCULAR; INTRAVENOUS EVERY 6 HOURS PRN
Status: DISCONTINUED | OUTPATIENT
Start: 2022-11-25 | End: 2022-12-05 | Stop reason: HOSPADM

## 2022-11-25 RX ORDER — MORPHINE SULFATE 1 MG/ML
2 INJECTION, SOLUTION EPIDURAL; INTRATHECAL; INTRAVENOUS
Status: DISCONTINUED | OUTPATIENT
Start: 2022-11-25 | End: 2022-11-25

## 2022-11-25 RX ORDER — PHENYLEPHRINE HYDROCHLORIDE 10 MG/ML
INJECTION INTRAVENOUS AS NEEDED
Status: DISCONTINUED | OUTPATIENT
Start: 2022-11-25 | End: 2022-11-25 | Stop reason: SURG

## 2022-11-25 RX ORDER — POTASSIUM CHLORIDE 1.5 G/1.77G
40 POWDER, FOR SOLUTION ORAL AS NEEDED
Status: DISCONTINUED | OUTPATIENT
Start: 2022-11-25 | End: 2022-12-05 | Stop reason: HOSPADM

## 2022-11-25 RX ORDER — CEFAZOLIN SODIUM 2 G/100ML
2 INJECTION, SOLUTION INTRAVENOUS ONCE
Status: COMPLETED | OUTPATIENT
Start: 2022-11-25 | End: 2022-11-25

## 2022-11-25 RX ORDER — FENTANYL CITRATE 50 UG/ML
50 INJECTION, SOLUTION INTRAMUSCULAR; INTRAVENOUS
Status: DISCONTINUED | OUTPATIENT
Start: 2022-11-25 | End: 2022-11-25 | Stop reason: HOSPADM

## 2022-11-25 RX ORDER — HEPARIN SODIUM 5000 [USP'U]/ML
5000 INJECTION, SOLUTION INTRAVENOUS; SUBCUTANEOUS EVERY 8 HOURS SCHEDULED
Status: DISCONTINUED | OUTPATIENT
Start: 2022-11-26 | End: 2022-12-05 | Stop reason: HOSPADM

## 2022-11-25 RX ORDER — ACETAMINOPHEN 325 MG/1
650 TABLET ORAL EVERY 4 HOURS PRN
Status: DISCONTINUED | OUTPATIENT
Start: 2022-11-25 | End: 2022-12-05 | Stop reason: HOSPADM

## 2022-11-25 RX ORDER — SODIUM CHLORIDE 9 MG/ML
40 INJECTION, SOLUTION INTRAVENOUS AS NEEDED
Status: DISCONTINUED | OUTPATIENT
Start: 2022-11-25 | End: 2022-11-25 | Stop reason: HOSPADM

## 2022-11-25 RX ORDER — OXYCODONE AND ACETAMINOPHEN 10; 325 MG/1; MG/1
1 TABLET ORAL
Status: DISCONTINUED | OUTPATIENT
Start: 2022-11-25 | End: 2022-12-05 | Stop reason: HOSPADM

## 2022-11-25 RX ORDER — SODIUM CHLORIDE 0.9 % (FLUSH) 0.9 %
10 SYRINGE (ML) INJECTION AS NEEDED
Status: DISCONTINUED | OUTPATIENT
Start: 2022-11-25 | End: 2022-11-25 | Stop reason: HOSPADM

## 2022-11-25 RX ORDER — ONDANSETRON 4 MG/1
4 TABLET, FILM COATED ORAL EVERY 6 HOURS PRN
Status: DISCONTINUED | OUTPATIENT
Start: 2022-11-25 | End: 2022-12-05 | Stop reason: HOSPADM

## 2022-11-25 RX ORDER — AMOXICILLIN 250 MG
2 CAPSULE ORAL 2 TIMES DAILY PRN
Status: DISCONTINUED | OUTPATIENT
Start: 2022-11-25 | End: 2022-12-05 | Stop reason: HOSPADM

## 2022-11-25 RX ORDER — LIDOCAINE HYDROCHLORIDE 10 MG/ML
0.5 INJECTION, SOLUTION EPIDURAL; INFILTRATION; INTRACAUDAL; PERINEURAL ONCE AS NEEDED
Status: DISCONTINUED | OUTPATIENT
Start: 2022-11-25 | End: 2022-11-25 | Stop reason: HOSPADM

## 2022-11-25 RX ORDER — MAGNESIUM SULFATE HEPTAHYDRATE 500 MG/ML
INJECTION, SOLUTION INTRAMUSCULAR; INTRAVENOUS AS NEEDED
Status: DISCONTINUED | OUTPATIENT
Start: 2022-11-25 | End: 2022-11-25 | Stop reason: SURG

## 2022-11-25 RX ORDER — LISINOPRIL 20 MG/1
40 TABLET ORAL
Status: DISCONTINUED | OUTPATIENT
Start: 2022-11-25 | End: 2022-12-05 | Stop reason: HOSPADM

## 2022-11-25 RX ORDER — MIDAZOLAM HYDROCHLORIDE 1 MG/ML
INJECTION INTRAMUSCULAR; INTRAVENOUS AS NEEDED
Status: DISCONTINUED | OUTPATIENT
Start: 2022-11-25 | End: 2022-11-25 | Stop reason: SURG

## 2022-11-25 RX ORDER — MAGNESIUM SULFATE HEPTAHYDRATE 40 MG/ML
4 INJECTION, SOLUTION INTRAVENOUS AS NEEDED
Status: DISCONTINUED | OUTPATIENT
Start: 2022-11-25 | End: 2022-12-05 | Stop reason: HOSPADM

## 2022-11-25 RX ORDER — SODIUM CHLORIDE, SODIUM LACTATE, POTASSIUM CHLORIDE, CALCIUM CHLORIDE 600; 310; 30; 20 MG/100ML; MG/100ML; MG/100ML; MG/100ML
9 INJECTION, SOLUTION INTRAVENOUS CONTINUOUS
Status: DISCONTINUED | OUTPATIENT
Start: 2022-11-25 | End: 2022-12-05 | Stop reason: HOSPADM

## 2022-11-25 RX ORDER — GABAPENTIN 400 MG/1
400 CAPSULE ORAL EVERY 8 HOURS SCHEDULED
Status: DISCONTINUED | OUTPATIENT
Start: 2022-11-25 | End: 2022-12-05 | Stop reason: HOSPADM

## 2022-11-25 RX ORDER — PROPOFOL 10 MG/ML
VIAL (ML) INTRAVENOUS AS NEEDED
Status: DISCONTINUED | OUTPATIENT
Start: 2022-11-25 | End: 2022-11-25 | Stop reason: SURG

## 2022-11-25 RX ORDER — PANTOPRAZOLE SODIUM 40 MG/1
40 TABLET, DELAYED RELEASE ORAL DAILY
Status: DISCONTINUED | OUTPATIENT
Start: 2022-11-25 | End: 2022-12-05 | Stop reason: HOSPADM

## 2022-11-25 RX ORDER — MORPHINE SULFATE 2 MG/ML
2 INJECTION, SOLUTION INTRAMUSCULAR; INTRAVENOUS
Status: DISCONTINUED | OUTPATIENT
Start: 2022-11-25 | End: 2022-12-05 | Stop reason: HOSPADM

## 2022-11-25 RX ADMIN — PHENYLEPHRINE HYDROCHLORIDE 0.2 MCG/KG/MIN: 10 INJECTION INTRAVENOUS at 15:40

## 2022-11-25 RX ADMIN — MAGNESIUM SULFATE HEPTAHYDRATE 2 G: 2 INJECTION, SOLUTION INTRAVENOUS at 20:30

## 2022-11-25 RX ADMIN — PANTOPRAZOLE SODIUM 40 MG: 40 TABLET, DELAYED RELEASE ORAL at 11:29

## 2022-11-25 RX ADMIN — MIDAZOLAM 2 MG: 1 INJECTION INTRAMUSCULAR; INTRAVENOUS at 15:26

## 2022-11-25 RX ADMIN — PROPOFOL 50 MG: 10 INJECTION, EMULSION INTRAVENOUS at 15:33

## 2022-11-25 RX ADMIN — OXYCODONE HYDROCHLORIDE AND ACETAMINOPHEN 1 TABLET: 10; 325 TABLET ORAL at 20:29

## 2022-11-25 RX ADMIN — LISINOPRIL 40 MG: 20 TABLET ORAL at 11:29

## 2022-11-25 RX ADMIN — MAGNESIUM SULFATE HEPTAHYDRATE 2 G: 2 INJECTION, SOLUTION INTRAVENOUS at 22:10

## 2022-11-25 RX ADMIN — PHENYLEPHRINE HYDROCHLORIDE 100 MCG: 10 INJECTION INTRAVENOUS at 15:45

## 2022-11-25 RX ADMIN — PHENYLEPHRINE HYDROCHLORIDE 200 MCG: 10 INJECTION INTRAVENOUS at 15:52

## 2022-11-25 RX ADMIN — CEFAZOLIN SODIUM 2 G: 2 INJECTION, SOLUTION INTRAVENOUS at 15:26

## 2022-11-25 RX ADMIN — PROPOFOL 100 MCG/KG/MIN: 10 INJECTION, EMULSION INTRAVENOUS at 15:26

## 2022-11-25 RX ADMIN — SULFUR HEXAFLUORIDE 5 ML: KIT at 11:34

## 2022-11-25 RX ADMIN — OXYCODONE HYDROCHLORIDE AND ACETAMINOPHEN 1 TABLET: 10; 325 TABLET ORAL at 18:08

## 2022-11-25 RX ADMIN — SODIUM CHLORIDE, POTASSIUM CHLORIDE, SODIUM LACTATE AND CALCIUM CHLORIDE: 600; 310; 30; 20 INJECTION, SOLUTION INTRAVENOUS at 15:18

## 2022-11-25 RX ADMIN — MAGNESIUM SULFATE HEPTAHYDRATE 2 G: 2 INJECTION, SOLUTION INTRAVENOUS at 18:56

## 2022-11-25 RX ADMIN — ATORVASTATIN CALCIUM 40 MG: 40 TABLET, FILM COATED ORAL at 11:29

## 2022-11-25 RX ADMIN — METOPROLOL TARTRATE 12.5 MG: 25 TABLET, FILM COATED ORAL at 13:51

## 2022-11-25 RX ADMIN — CEFAZOLIN SODIUM 2 G: 2 INJECTION, SOLUTION INTRAVENOUS at 23:57

## 2022-11-25 RX ADMIN — MAGNESIUM SULFATE HEPTAHYDRATE 1 G: 500 INJECTION, SOLUTION INTRAMUSCULAR; INTRAVENOUS at 16:15

## 2022-11-25 RX ADMIN — GABAPENTIN 400 MG: 400 CAPSULE ORAL at 20:29

## 2022-11-25 NOTE — ANESTHESIA PREPROCEDURE EVALUATION
Anesthesia Evaluation     Patient summary reviewed and Nursing notes reviewed   no history of anesthetic complications:  NPO Solid Status: > 8 hours  NPO Liquid Status: > 2 hours           Airway   Mallampati: III  TM distance: >3 FB  Neck ROM: full  No difficulty expected  Dental - normal exam     Pulmonary - normal exam   (+) a smoker Current,   Cardiovascular - normal exam    (+) hypertension, PVD, hyperlipidemia,   (-) CHF      Neuro/Psych  (-) seizures, CVA  GI/Hepatic/Renal/Endo    (+)  GERD,  diabetes mellitus,     Musculoskeletal     Abdominal    Substance History      OB/GYN          Other   arthritis,                      Anesthesia Plan    ASA 3     MAC     intravenous induction     Anesthetic plan, risks, benefits, and alternatives have been provided, discussed and informed consent has been obtained with: patient.    Plan discussed with CRNA.        CODE STATUS:

## 2022-11-26 PROBLEM — E78.5 HYPERLIPIDEMIA LDL GOAL <70: Status: ACTIVE | Noted: 2022-11-26

## 2022-11-26 LAB
ANION GAP SERPL CALCULATED.3IONS-SCNC: 6 MMOL/L (ref 5–15)
BASOPHILS # BLD AUTO: 0.07 10*3/MM3 (ref 0–0.2)
BASOPHILS NFR BLD AUTO: 0.5 % (ref 0–1.5)
BUN SERPL-MCNC: 11 MG/DL (ref 8–23)
BUN/CREAT SERPL: 25 (ref 7–25)
CALCIUM SPEC-SCNC: 7.9 MG/DL (ref 8.6–10.5)
CHLORIDE SERPL-SCNC: 97 MMOL/L (ref 98–107)
CO2 SERPL-SCNC: 29 MMOL/L (ref 22–29)
CREAT SERPL-MCNC: 0.44 MG/DL (ref 0.76–1.27)
CRP SERPL-MCNC: 5.23 MG/DL (ref 0–0.5)
DEPRECATED RDW RBC AUTO: 47.4 FL (ref 37–54)
EGFRCR SERPLBLD CKD-EPI 2021: 116.2 ML/MIN/1.73
EOSINOPHIL # BLD AUTO: 0.84 10*3/MM3 (ref 0–0.4)
EOSINOPHIL NFR BLD AUTO: 5.9 % (ref 0.3–6.2)
ERYTHROCYTE [DISTWIDTH] IN BLOOD BY AUTOMATED COUNT: 14.6 % (ref 12.3–15.4)
GLUCOSE SERPL-MCNC: 214 MG/DL (ref 65–99)
HCT VFR BLD AUTO: 32.1 % (ref 37.5–51)
HGB BLD-MCNC: 10.3 G/DL (ref 13–17.7)
IMM GRANULOCYTES # BLD AUTO: 0.08 10*3/MM3 (ref 0–0.05)
IMM GRANULOCYTES NFR BLD AUTO: 0.6 % (ref 0–0.5)
LYMPHOCYTES # BLD AUTO: 0.77 10*3/MM3 (ref 0.7–3.1)
LYMPHOCYTES NFR BLD AUTO: 5.4 % (ref 19.6–45.3)
MAGNESIUM SERPL-MCNC: 1.6 MG/DL (ref 1.6–2.4)
MCH RBC QN AUTO: 28.4 PG (ref 26.6–33)
MCHC RBC AUTO-ENTMCNC: 32.1 G/DL (ref 31.5–35.7)
MCV RBC AUTO: 88.4 FL (ref 79–97)
MONOCYTES # BLD AUTO: 0.79 10*3/MM3 (ref 0.1–0.9)
MONOCYTES NFR BLD AUTO: 5.6 % (ref 5–12)
NEUTROPHILS NFR BLD AUTO: 11.68 10*3/MM3 (ref 1.7–7)
NEUTROPHILS NFR BLD AUTO: 82 % (ref 42.7–76)
NRBC BLD AUTO-RTO: 0 /100 WBC (ref 0–0.2)
PHOSPHATE SERPL-MCNC: 3.5 MG/DL (ref 2.5–4.5)
PLATELET # BLD AUTO: 237 10*3/MM3 (ref 140–450)
PMV BLD AUTO: 10.1 FL (ref 6–12)
POTASSIUM SERPL-SCNC: 4.5 MMOL/L (ref 3.5–5.2)
PREALB SERPL-MCNC: 7 MG/DL (ref 20–40)
RBC # BLD AUTO: 3.63 10*6/MM3 (ref 4.14–5.8)
SODIUM SERPL-SCNC: 132 MMOL/L (ref 136–145)
WBC NRBC COR # BLD: 14.23 10*3/MM3 (ref 3.4–10.8)

## 2022-11-26 PROCEDURE — 99232 SBSQ HOSP IP/OBS MODERATE 35: CPT | Performed by: INTERNAL MEDICINE

## 2022-11-26 PROCEDURE — 25010000002 HEPARIN (PORCINE) PER 1000 UNITS: Performed by: PHYSICIAN ASSISTANT

## 2022-11-26 PROCEDURE — 83735 ASSAY OF MAGNESIUM: CPT | Performed by: STUDENT IN AN ORGANIZED HEALTH CARE EDUCATION/TRAINING PROGRAM

## 2022-11-26 PROCEDURE — 85025 COMPLETE CBC W/AUTO DIFF WBC: CPT | Performed by: PHYSICIAN ASSISTANT

## 2022-11-26 PROCEDURE — 99024 POSTOP FOLLOW-UP VISIT: CPT | Performed by: PHYSICIAN ASSISTANT

## 2022-11-26 PROCEDURE — 0 MAGNESIUM SULFATE 4 GM/100ML SOLUTION: Performed by: INTERNAL MEDICINE

## 2022-11-26 PROCEDURE — 25010000002 CEFAZOLIN IN DEXTROSE 2-4 GM/100ML-% SOLUTION: Performed by: PHYSICIAN ASSISTANT

## 2022-11-26 PROCEDURE — 80048 BASIC METABOLIC PNL TOTAL CA: CPT | Performed by: PHYSICIAN ASSISTANT

## 2022-11-26 RX ORDER — ASPIRIN 81 MG/1
81 TABLET ORAL DAILY
Status: DISCONTINUED | OUTPATIENT
Start: 2022-11-26 | End: 2022-12-05 | Stop reason: HOSPADM

## 2022-11-26 RX ORDER — ROSUVASTATIN CALCIUM 20 MG/1
20 TABLET, COATED ORAL NIGHTLY
Status: DISCONTINUED | OUTPATIENT
Start: 2022-11-26 | End: 2022-12-05 | Stop reason: HOSPADM

## 2022-11-26 RX ADMIN — LINAGLIPTIN 5 MG: 5 TABLET, FILM COATED ORAL at 09:01

## 2022-11-26 RX ADMIN — METOPROLOL TARTRATE 12.5 MG: 25 TABLET, FILM COATED ORAL at 09:01

## 2022-11-26 RX ADMIN — LISINOPRIL 40 MG: 20 TABLET ORAL at 09:01

## 2022-11-26 RX ADMIN — ATORVASTATIN CALCIUM 40 MG: 40 TABLET, FILM COATED ORAL at 09:01

## 2022-11-26 RX ADMIN — MAGNESIUM SULFATE HEPTAHYDRATE 4 G: 40 INJECTION, SOLUTION INTRAVENOUS at 19:55

## 2022-11-26 RX ADMIN — OXYCODONE HYDROCHLORIDE AND ACETAMINOPHEN 1 TABLET: 10; 325 TABLET ORAL at 06:24

## 2022-11-26 RX ADMIN — GABAPENTIN 400 MG: 400 CAPSULE ORAL at 20:01

## 2022-11-26 RX ADMIN — GABAPENTIN 400 MG: 400 CAPSULE ORAL at 05:03

## 2022-11-26 RX ADMIN — GABAPENTIN 400 MG: 400 CAPSULE ORAL at 15:11

## 2022-11-26 RX ADMIN — OXYCODONE HYDROCHLORIDE AND ACETAMINOPHEN 1 TABLET: 10; 325 TABLET ORAL at 20:02

## 2022-11-26 RX ADMIN — ASPIRIN 81 MG: 81 TABLET, COATED ORAL at 11:36

## 2022-11-26 RX ADMIN — HEPARIN SODIUM 5000 UNITS: 5000 INJECTION INTRAVENOUS; SUBCUTANEOUS at 20:01

## 2022-11-26 RX ADMIN — OXYCODONE HYDROCHLORIDE AND ACETAMINOPHEN 1 TABLET: 10; 325 TABLET ORAL at 17:54

## 2022-11-26 RX ADMIN — OXYCODONE HYDROCHLORIDE AND ACETAMINOPHEN 1 TABLET: 10; 325 TABLET ORAL at 15:11

## 2022-11-26 RX ADMIN — CEFAZOLIN SODIUM 2 G: 2 INJECTION, SOLUTION INTRAVENOUS at 09:00

## 2022-11-26 RX ADMIN — HEPARIN SODIUM 5000 UNITS: 5000 INJECTION INTRAVENOUS; SUBCUTANEOUS at 05:03

## 2022-11-26 RX ADMIN — HEPARIN SODIUM 5000 UNITS: 5000 INJECTION INTRAVENOUS; SUBCUTANEOUS at 15:11

## 2022-11-26 RX ADMIN — ROSUVASTATIN 20 MG: 20 TABLET, FILM COATED ORAL at 20:01

## 2022-11-26 RX ADMIN — OXYCODONE HYDROCHLORIDE AND ACETAMINOPHEN 1 TABLET: 10; 325 TABLET ORAL at 11:36

## 2022-11-26 RX ADMIN — PANTOPRAZOLE SODIUM 40 MG: 40 TABLET, DELAYED RELEASE ORAL at 05:03

## 2022-11-26 RX ADMIN — METOPROLOL TARTRATE 12.5 MG: 25 TABLET, FILM COATED ORAL at 20:01

## 2022-11-27 LAB — MAGNESIUM SERPL-MCNC: 2.2 MG/DL (ref 1.6–2.4)

## 2022-11-27 PROCEDURE — 99024 POSTOP FOLLOW-UP VISIT: CPT | Performed by: PHYSICIAN ASSISTANT

## 2022-11-27 PROCEDURE — 25010000002 HEPARIN (PORCINE) PER 1000 UNITS: Performed by: PHYSICIAN ASSISTANT

## 2022-11-27 PROCEDURE — 97162 PT EVAL MOD COMPLEX 30 MIN: CPT

## 2022-11-27 PROCEDURE — 97535 SELF CARE MNGMENT TRAINING: CPT

## 2022-11-27 PROCEDURE — 83735 ASSAY OF MAGNESIUM: CPT | Performed by: STUDENT IN AN ORGANIZED HEALTH CARE EDUCATION/TRAINING PROGRAM

## 2022-11-27 RX ADMIN — METOPROLOL TARTRATE 12.5 MG: 25 TABLET, FILM COATED ORAL at 08:27

## 2022-11-27 RX ADMIN — ASPIRIN 81 MG: 81 TABLET, COATED ORAL at 08:23

## 2022-11-27 RX ADMIN — HEPARIN SODIUM 5000 UNITS: 5000 INJECTION INTRAVENOUS; SUBCUTANEOUS at 20:29

## 2022-11-27 RX ADMIN — OXYCODONE HYDROCHLORIDE AND ACETAMINOPHEN 1 TABLET: 10; 325 TABLET ORAL at 17:22

## 2022-11-27 RX ADMIN — OXYCODONE HYDROCHLORIDE AND ACETAMINOPHEN 1 TABLET: 10; 325 TABLET ORAL at 06:01

## 2022-11-27 RX ADMIN — ROSUVASTATIN 20 MG: 20 TABLET, FILM COATED ORAL at 20:29

## 2022-11-27 RX ADMIN — GABAPENTIN 400 MG: 400 CAPSULE ORAL at 14:15

## 2022-11-27 RX ADMIN — LINAGLIPTIN 5 MG: 5 TABLET, FILM COATED ORAL at 08:24

## 2022-11-27 RX ADMIN — PANTOPRAZOLE SODIUM 40 MG: 40 TABLET, DELAYED RELEASE ORAL at 06:05

## 2022-11-27 RX ADMIN — OXYCODONE HYDROCHLORIDE AND ACETAMINOPHEN 1 TABLET: 10; 325 TABLET ORAL at 12:22

## 2022-11-27 RX ADMIN — HEPARIN SODIUM 5000 UNITS: 5000 INJECTION INTRAVENOUS; SUBCUTANEOUS at 14:15

## 2022-11-27 RX ADMIN — OXYCODONE HYDROCHLORIDE AND ACETAMINOPHEN 1 TABLET: 10; 325 TABLET ORAL at 20:29

## 2022-11-27 RX ADMIN — HEPARIN SODIUM 5000 UNITS: 5000 INJECTION INTRAVENOUS; SUBCUTANEOUS at 06:00

## 2022-11-27 RX ADMIN — LISINOPRIL 40 MG: 20 TABLET ORAL at 08:27

## 2022-11-27 RX ADMIN — GABAPENTIN 400 MG: 400 CAPSULE ORAL at 06:01

## 2022-11-27 RX ADMIN — GABAPENTIN 400 MG: 400 CAPSULE ORAL at 20:29

## 2022-11-28 PROBLEM — E43 SEVERE MALNUTRITION: Status: ACTIVE | Noted: 2022-11-28

## 2022-11-28 LAB
ANION GAP SERPL CALCULATED.3IONS-SCNC: 6 MMOL/L (ref 5–15)
BUN SERPL-MCNC: 23 MG/DL (ref 8–23)
BUN/CREAT SERPL: 46.9 (ref 7–25)
CALCIUM SPEC-SCNC: 8.5 MG/DL (ref 8.6–10.5)
CHLORIDE SERPL-SCNC: 93 MMOL/L (ref 98–107)
CO2 SERPL-SCNC: 30 MMOL/L (ref 22–29)
CREAT SERPL-MCNC: 0.49 MG/DL (ref 0.76–1.27)
DEPRECATED RDW RBC AUTO: 46.3 FL (ref 37–54)
EGFRCR SERPLBLD CKD-EPI 2021: 112.5 ML/MIN/1.73
ERYTHROCYTE [DISTWIDTH] IN BLOOD BY AUTOMATED COUNT: 14.6 % (ref 12.3–15.4)
GLUCOSE SERPL-MCNC: 205 MG/DL (ref 65–99)
HCT VFR BLD AUTO: 29.8 % (ref 37.5–51)
HGB BLD-MCNC: 9.7 G/DL (ref 13–17.7)
MCH RBC QN AUTO: 28.3 PG (ref 26.6–33)
MCHC RBC AUTO-ENTMCNC: 32.6 G/DL (ref 31.5–35.7)
MCV RBC AUTO: 86.9 FL (ref 79–97)
PLATELET # BLD AUTO: 202 10*3/MM3 (ref 140–450)
PMV BLD AUTO: 10.3 FL (ref 6–12)
POTASSIUM SERPL-SCNC: 4.9 MMOL/L (ref 3.5–5.2)
RBC # BLD AUTO: 3.43 10*6/MM3 (ref 4.14–5.8)
SODIUM SERPL-SCNC: 129 MMOL/L (ref 136–145)
WBC NRBC COR # BLD: 13.31 10*3/MM3 (ref 3.4–10.8)

## 2022-11-28 PROCEDURE — 80048 BASIC METABOLIC PNL TOTAL CA: CPT

## 2022-11-28 PROCEDURE — 25010000002 HEPARIN (PORCINE) PER 1000 UNITS: Performed by: PHYSICIAN ASSISTANT

## 2022-11-28 PROCEDURE — 99232 SBSQ HOSP IP/OBS MODERATE 35: CPT | Performed by: INTERNAL MEDICINE

## 2022-11-28 PROCEDURE — 99024 POSTOP FOLLOW-UP VISIT: CPT

## 2022-11-28 PROCEDURE — 85027 COMPLETE CBC AUTOMATED: CPT

## 2022-11-28 PROCEDURE — 97166 OT EVAL MOD COMPLEX 45 MIN: CPT

## 2022-11-28 RX ORDER — METOPROLOL SUCCINATE 25 MG/1
25 TABLET, EXTENDED RELEASE ORAL
Status: DISCONTINUED | OUTPATIENT
Start: 2022-11-28 | End: 2022-11-28

## 2022-11-28 RX ORDER — METOPROLOL SUCCINATE 25 MG/1
25 TABLET, EXTENDED RELEASE ORAL
Qty: 30 TABLET | Refills: 3 | Status: SHIPPED | OUTPATIENT
Start: 2022-11-28

## 2022-11-28 RX ORDER — ROSUVASTATIN CALCIUM 20 MG/1
20 TABLET, COATED ORAL NIGHTLY
Qty: 90 TABLET | Refills: 3 | Status: SHIPPED | OUTPATIENT
Start: 2022-11-28

## 2022-11-28 RX ORDER — ASPIRIN 81 MG/1
81 TABLET ORAL DAILY
Qty: 30 TABLET | Refills: 5 | Status: SHIPPED | OUTPATIENT
Start: 2022-11-29

## 2022-11-28 RX ORDER — POVIDONE-IODINE 10 MG/ML
1 SOLUTION TOPICAL 2 TIMES DAILY
Status: DISCONTINUED | OUTPATIENT
Start: 2022-11-28 | End: 2022-12-05 | Stop reason: HOSPADM

## 2022-11-28 RX ORDER — METOPROLOL SUCCINATE 25 MG/1
25 TABLET, EXTENDED RELEASE ORAL
Status: DISCONTINUED | OUTPATIENT
Start: 2022-11-28 | End: 2022-12-05 | Stop reason: HOSPADM

## 2022-11-28 RX ADMIN — GABAPENTIN 400 MG: 400 CAPSULE ORAL at 22:22

## 2022-11-28 RX ADMIN — OXYCODONE HYDROCHLORIDE AND ACETAMINOPHEN 1 TABLET: 10; 325 TABLET ORAL at 03:10

## 2022-11-28 RX ADMIN — HEPARIN SODIUM 5000 UNITS: 5000 INJECTION INTRAVENOUS; SUBCUTANEOUS at 05:54

## 2022-11-28 RX ADMIN — ROSUVASTATIN 20 MG: 20 TABLET, FILM COATED ORAL at 22:22

## 2022-11-28 RX ADMIN — ASPIRIN 81 MG: 81 TABLET, COATED ORAL at 08:09

## 2022-11-28 RX ADMIN — SILVER SULFADIAZINE 1 APPLICATION: 10 CREAM TOPICAL at 08:11

## 2022-11-28 RX ADMIN — OXYCODONE HYDROCHLORIDE AND ACETAMINOPHEN 1 TABLET: 10; 325 TABLET ORAL at 05:55

## 2022-11-28 RX ADMIN — PANTOPRAZOLE SODIUM 40 MG: 40 TABLET, DELAYED RELEASE ORAL at 05:55

## 2022-11-28 RX ADMIN — POVIDONE-IODINE 1 EACH: 10 SOLUTION TOPICAL at 22:26

## 2022-11-28 RX ADMIN — GABAPENTIN 400 MG: 400 CAPSULE ORAL at 05:55

## 2022-11-28 RX ADMIN — GABAPENTIN 400 MG: 400 CAPSULE ORAL at 16:07

## 2022-11-28 RX ADMIN — OXYCODONE HYDROCHLORIDE AND ACETAMINOPHEN 1 TABLET: 10; 325 TABLET ORAL at 11:10

## 2022-11-28 RX ADMIN — LINAGLIPTIN 5 MG: 5 TABLET, FILM COATED ORAL at 08:09

## 2022-11-28 RX ADMIN — LISINOPRIL 40 MG: 20 TABLET ORAL at 08:09

## 2022-11-28 RX ADMIN — HEPARIN SODIUM 5000 UNITS: 5000 INJECTION INTRAVENOUS; SUBCUTANEOUS at 16:07

## 2022-11-28 RX ADMIN — OXYCODONE HYDROCHLORIDE AND ACETAMINOPHEN 1 TABLET: 10; 325 TABLET ORAL at 22:22

## 2022-11-28 RX ADMIN — METOPROLOL SUCCINATE 25 MG: 25 TABLET, EXTENDED RELEASE ORAL at 22:22

## 2022-11-28 RX ADMIN — METOPROLOL TARTRATE 12.5 MG: 25 TABLET, FILM COATED ORAL at 08:09

## 2022-11-28 RX ADMIN — OXYCODONE HYDROCHLORIDE AND ACETAMINOPHEN 1 TABLET: 10; 325 TABLET ORAL at 16:07

## 2022-11-28 RX ADMIN — HEPARIN SODIUM 5000 UNITS: 5000 INJECTION INTRAVENOUS; SUBCUTANEOUS at 22:21

## 2022-11-29 LAB
ANION GAP SERPL CALCULATED.3IONS-SCNC: 7 MMOL/L (ref 5–15)
BH BB BLOOD EXPIRATION DATE: NORMAL
BH BB BLOOD TYPE BARCODE: 6200
BH BB DISPENSE STATUS: NORMAL
BH BB PRODUCT CODE: NORMAL
BH BB UNIT NUMBER: NORMAL
BUN SERPL-MCNC: 33 MG/DL (ref 8–23)
BUN/CREAT SERPL: 64.7 (ref 7–25)
CALCIUM SPEC-SCNC: 8.3 MG/DL (ref 8.6–10.5)
CHLORIDE SERPL-SCNC: 92 MMOL/L (ref 98–107)
CO2 SERPL-SCNC: 28 MMOL/L (ref 22–29)
CREAT SERPL-MCNC: 0.51 MG/DL (ref 0.76–1.27)
CROSSMATCH INTERPRETATION: NORMAL
CYTO UR: NORMAL
DEPRECATED RDW RBC AUTO: 45.1 FL (ref 37–54)
EGFRCR SERPLBLD CKD-EPI 2021: 111.1 ML/MIN/1.73
ERYTHROCYTE [DISTWIDTH] IN BLOOD BY AUTOMATED COUNT: 14.2 % (ref 12.3–15.4)
GLUCOSE SERPL-MCNC: 193 MG/DL (ref 65–99)
HCT VFR BLD AUTO: 27.5 % (ref 37.5–51)
HGB BLD-MCNC: 8.9 G/DL (ref 13–17.7)
LAB AP CASE REPORT: NORMAL
LAB AP CLINICAL INFORMATION: NORMAL
MCH RBC QN AUTO: 28.1 PG (ref 26.6–33)
MCHC RBC AUTO-ENTMCNC: 32.4 G/DL (ref 31.5–35.7)
MCV RBC AUTO: 86.8 FL (ref 79–97)
PATH REPORT.FINAL DX SPEC: NORMAL
PATH REPORT.GROSS SPEC: NORMAL
PLATELET # BLD AUTO: 209 10*3/MM3 (ref 140–450)
PMV BLD AUTO: 11 FL (ref 6–12)
POTASSIUM SERPL-SCNC: 4.5 MMOL/L (ref 3.5–5.2)
RBC # BLD AUTO: 3.17 10*6/MM3 (ref 4.14–5.8)
SODIUM SERPL-SCNC: 127 MMOL/L (ref 136–145)
UNIT  ABO: NORMAL
UNIT  RH: NORMAL
WBC NRBC COR # BLD: 12.8 10*3/MM3 (ref 3.4–10.8)

## 2022-11-29 PROCEDURE — 85027 COMPLETE CBC AUTOMATED: CPT

## 2022-11-29 PROCEDURE — 80048 BASIC METABOLIC PNL TOTAL CA: CPT

## 2022-11-29 PROCEDURE — 25010000002 HEPARIN (PORCINE) PER 1000 UNITS: Performed by: PHYSICIAN ASSISTANT

## 2022-11-29 PROCEDURE — 97164 PT RE-EVAL EST PLAN CARE: CPT

## 2022-11-29 PROCEDURE — 99024 POSTOP FOLLOW-UP VISIT: CPT

## 2022-11-29 PROCEDURE — 97530 THERAPEUTIC ACTIVITIES: CPT

## 2022-11-29 RX ADMIN — HEPARIN SODIUM 5000 UNITS: 5000 INJECTION INTRAVENOUS; SUBCUTANEOUS at 22:00

## 2022-11-29 RX ADMIN — GABAPENTIN 400 MG: 400 CAPSULE ORAL at 06:05

## 2022-11-29 RX ADMIN — GABAPENTIN 400 MG: 400 CAPSULE ORAL at 16:12

## 2022-11-29 RX ADMIN — OXYCODONE HYDROCHLORIDE AND ACETAMINOPHEN 1 TABLET: 10; 325 TABLET ORAL at 03:22

## 2022-11-29 RX ADMIN — PANTOPRAZOLE SODIUM 40 MG: 40 TABLET, DELAYED RELEASE ORAL at 06:05

## 2022-11-29 RX ADMIN — ROSUVASTATIN 20 MG: 20 TABLET, FILM COATED ORAL at 22:00

## 2022-11-29 RX ADMIN — POVIDONE-IODINE 1 EACH: 10 SOLUTION TOPICAL at 10:04

## 2022-11-29 RX ADMIN — POVIDONE-IODINE 1 EACH: 10 SOLUTION TOPICAL at 22:13

## 2022-11-29 RX ADMIN — HEPARIN SODIUM 5000 UNITS: 5000 INJECTION INTRAVENOUS; SUBCUTANEOUS at 16:12

## 2022-11-29 RX ADMIN — GABAPENTIN 400 MG: 400 CAPSULE ORAL at 22:00

## 2022-11-29 RX ADMIN — OXYCODONE HYDROCHLORIDE AND ACETAMINOPHEN 1 TABLET: 10; 325 TABLET ORAL at 22:00

## 2022-11-29 RX ADMIN — ASPIRIN 81 MG: 81 TABLET, COATED ORAL at 10:02

## 2022-11-29 RX ADMIN — OXYCODONE HYDROCHLORIDE AND ACETAMINOPHEN 1 TABLET: 10; 325 TABLET ORAL at 06:05

## 2022-11-29 RX ADMIN — HEPARIN SODIUM 5000 UNITS: 5000 INJECTION INTRAVENOUS; SUBCUTANEOUS at 06:03

## 2022-11-29 RX ADMIN — OXYCODONE HYDROCHLORIDE AND ACETAMINOPHEN 1 TABLET: 10; 325 TABLET ORAL at 10:02

## 2022-11-29 RX ADMIN — SILVER SULFADIAZINE 1 APPLICATION: 10 CREAM TOPICAL at 12:18

## 2022-11-29 RX ADMIN — LINAGLIPTIN 5 MG: 5 TABLET, FILM COATED ORAL at 10:02

## 2022-11-29 RX ADMIN — OXYCODONE HYDROCHLORIDE AND ACETAMINOPHEN 1 TABLET: 10; 325 TABLET ORAL at 16:11

## 2022-11-30 LAB
ANION GAP SERPL CALCULATED.3IONS-SCNC: 6 MMOL/L (ref 5–15)
BUN SERPL-MCNC: 33 MG/DL (ref 8–23)
BUN/CREAT SERPL: 53.2 (ref 7–25)
CALCIUM SPEC-SCNC: 8.6 MG/DL (ref 8.6–10.5)
CHLORIDE SERPL-SCNC: 92 MMOL/L (ref 98–107)
CO2 SERPL-SCNC: 29 MMOL/L (ref 22–29)
CREAT SERPL-MCNC: 0.62 MG/DL (ref 0.76–1.27)
DEPRECATED RDW RBC AUTO: 45.4 FL (ref 37–54)
EGFRCR SERPLBLD CKD-EPI 2021: 104.8 ML/MIN/1.73
ERYTHROCYTE [DISTWIDTH] IN BLOOD BY AUTOMATED COUNT: 14.3 % (ref 12.3–15.4)
GLUCOSE SERPL-MCNC: 185 MG/DL (ref 65–99)
HCT VFR BLD AUTO: 27.3 % (ref 37.5–51)
HGB BLD-MCNC: 9 G/DL (ref 13–17.7)
MCH RBC QN AUTO: 28.5 PG (ref 26.6–33)
MCHC RBC AUTO-ENTMCNC: 33 G/DL (ref 31.5–35.7)
MCV RBC AUTO: 86.4 FL (ref 79–97)
PLATELET # BLD AUTO: 202 10*3/MM3 (ref 140–450)
PMV BLD AUTO: 10.8 FL (ref 6–12)
POTASSIUM SERPL-SCNC: 5 MMOL/L (ref 3.5–5.2)
RBC # BLD AUTO: 3.16 10*6/MM3 (ref 4.14–5.8)
SODIUM SERPL-SCNC: 127 MMOL/L (ref 136–145)
WBC NRBC COR # BLD: 10.17 10*3/MM3 (ref 3.4–10.8)

## 2022-11-30 PROCEDURE — 97535 SELF CARE MNGMENT TRAINING: CPT

## 2022-11-30 PROCEDURE — 80048 BASIC METABOLIC PNL TOTAL CA: CPT

## 2022-11-30 PROCEDURE — 99024 POSTOP FOLLOW-UP VISIT: CPT

## 2022-11-30 PROCEDURE — 25010000002 HEPARIN (PORCINE) PER 1000 UNITS: Performed by: PHYSICIAN ASSISTANT

## 2022-11-30 PROCEDURE — 85027 COMPLETE CBC AUTOMATED: CPT

## 2022-11-30 RX ADMIN — HEPARIN SODIUM 5000 UNITS: 5000 INJECTION INTRAVENOUS; SUBCUTANEOUS at 13:44

## 2022-11-30 RX ADMIN — HEPARIN SODIUM 5000 UNITS: 5000 INJECTION INTRAVENOUS; SUBCUTANEOUS at 05:57

## 2022-11-30 RX ADMIN — HEPARIN SODIUM 5000 UNITS: 5000 INJECTION INTRAVENOUS; SUBCUTANEOUS at 20:03

## 2022-11-30 RX ADMIN — GABAPENTIN 400 MG: 400 CAPSULE ORAL at 13:44

## 2022-11-30 RX ADMIN — SILVER SULFADIAZINE 1 APPLICATION: 10 CREAM TOPICAL at 08:43

## 2022-11-30 RX ADMIN — OXYCODONE HYDROCHLORIDE AND ACETAMINOPHEN 1 TABLET: 10; 325 TABLET ORAL at 20:04

## 2022-11-30 RX ADMIN — ASPIRIN 81 MG: 81 TABLET, COATED ORAL at 08:44

## 2022-11-30 RX ADMIN — OXYCODONE HYDROCHLORIDE AND ACETAMINOPHEN 1 TABLET: 10; 325 TABLET ORAL at 11:37

## 2022-11-30 RX ADMIN — LINAGLIPTIN 5 MG: 5 TABLET, FILM COATED ORAL at 08:44

## 2022-11-30 RX ADMIN — PANTOPRAZOLE SODIUM 40 MG: 40 TABLET, DELAYED RELEASE ORAL at 05:56

## 2022-11-30 RX ADMIN — GABAPENTIN 400 MG: 400 CAPSULE ORAL at 20:03

## 2022-11-30 RX ADMIN — OXYCODONE HYDROCHLORIDE AND ACETAMINOPHEN 1 TABLET: 10; 325 TABLET ORAL at 03:21

## 2022-11-30 RX ADMIN — POVIDONE-IODINE 1 EACH: 10 SOLUTION TOPICAL at 11:37

## 2022-11-30 RX ADMIN — POVIDONE-IODINE 1 EACH: 10 SOLUTION TOPICAL at 20:09

## 2022-11-30 RX ADMIN — OXYCODONE HYDROCHLORIDE AND ACETAMINOPHEN 1 TABLET: 10; 325 TABLET ORAL at 08:44

## 2022-11-30 RX ADMIN — OXYCODONE HYDROCHLORIDE AND ACETAMINOPHEN 1 TABLET: 10; 325 TABLET ORAL at 16:53

## 2022-11-30 RX ADMIN — ROSUVASTATIN 20 MG: 20 TABLET, FILM COATED ORAL at 20:03

## 2022-11-30 RX ADMIN — GABAPENTIN 400 MG: 400 CAPSULE ORAL at 05:56

## 2022-11-30 NOTE — PROGRESS NOTES
Procedure   Noninvasive vascular studies - Segmental blood pressures and pulse volume recordings of bilateral lower extremities    Date/Time: 2022 1:20 PM  Performed by: Nemesio Haji MD  Authorized by: Nemesio Haji MD               [facility] NONINVASIVE LAB  [address] [place], [place] [address] [phone]            Procedure Performed: Segmental Blood Pressures and Pulse Volume Recordings    Reading Physician:   Nemesio Haji M.D., R.P.V.I.   Ordering physician:   Nemesio Haji MD Study date:   2022       Patient Information    Patient Name    Severino Vera MRN    [id number]  (Age)    1955 (66 y.o.)     Clinical Indication    Chronic paralysis, insensate below the waist, nonambulatory and wheelchair-bound     Interpretation Summary    • Right lower extremity: Blood pressure at the low thigh is 91 mmHg. Waveforms consistent with moderate peripheral arterial disease.  • Left lower extremity: Blood pressure at the low thigh is 107 mmHg. Waveforms consistent with moderate peripheral arterial disease.         Right Lower Extremity Pulse Volume Recordings    Right Measurements Amplitude Waveform Characteristics   Right low thigh   Could not obtain  Monophasic   Right below knee calf  Could not obtain  Monophasic         Left Lower Extremity Pulse Volume Recordings    Left Measurements Amplitude Waveform Characteristics   Left low thigh  Could not obtain Monophasic   Left below knee calf  0.22 Monophasic        Pressure Measurements    Pressures Right (mmHg) Left (mmHg)   Brachial  Not obtained  150   Low thigh  91  107   Below knee calf  94  109     Right low thigh/brachial index = 0.66    Left low thigh/brachial index = 0.71                    Nemesio Haji M.D., R.P.V.I.  Cardiothoracic and Vascular Surgeon  [facility]

## 2022-12-01 LAB
ANION GAP SERPL CALCULATED.3IONS-SCNC: 4 MMOL/L (ref 5–15)
BUN SERPL-MCNC: 26 MG/DL (ref 8–23)
BUN/CREAT SERPL: 59.1 (ref 7–25)
CALCIUM SPEC-SCNC: 8.9 MG/DL (ref 8.6–10.5)
CHLORIDE SERPL-SCNC: 96 MMOL/L (ref 98–107)
CO2 SERPL-SCNC: 33 MMOL/L (ref 22–29)
CREAT SERPL-MCNC: 0.44 MG/DL (ref 0.76–1.27)
DEPRECATED RDW RBC AUTO: 45 FL (ref 37–54)
EGFRCR SERPLBLD CKD-EPI 2021: 116.2 ML/MIN/1.73
ERYTHROCYTE [DISTWIDTH] IN BLOOD BY AUTOMATED COUNT: 14 % (ref 12.3–15.4)
GLUCOSE SERPL-MCNC: 162 MG/DL (ref 65–99)
HCT VFR BLD AUTO: 28.1 % (ref 37.5–51)
HGB BLD-MCNC: 9 G/DL (ref 13–17.7)
MCH RBC QN AUTO: 28 PG (ref 26.6–33)
MCHC RBC AUTO-ENTMCNC: 32 G/DL (ref 31.5–35.7)
MCV RBC AUTO: 87.3 FL (ref 79–97)
PLATELET # BLD AUTO: 261 10*3/MM3 (ref 140–450)
PMV BLD AUTO: 10.9 FL (ref 6–12)
POTASSIUM SERPL-SCNC: 4.6 MMOL/L (ref 3.5–5.2)
RBC # BLD AUTO: 3.22 10*6/MM3 (ref 4.14–5.8)
SODIUM SERPL-SCNC: 133 MMOL/L (ref 136–145)
WBC NRBC COR # BLD: 8.08 10*3/MM3 (ref 3.4–10.8)

## 2022-12-01 PROCEDURE — 85027 COMPLETE CBC AUTOMATED: CPT

## 2022-12-01 PROCEDURE — 25010000002 HEPARIN (PORCINE) PER 1000 UNITS: Performed by: PHYSICIAN ASSISTANT

## 2022-12-01 PROCEDURE — 99024 POSTOP FOLLOW-UP VISIT: CPT

## 2022-12-01 PROCEDURE — 97530 THERAPEUTIC ACTIVITIES: CPT

## 2022-12-01 PROCEDURE — 80048 BASIC METABOLIC PNL TOTAL CA: CPT

## 2022-12-01 RX ADMIN — HEPARIN SODIUM 5000 UNITS: 5000 INJECTION INTRAVENOUS; SUBCUTANEOUS at 21:00

## 2022-12-01 RX ADMIN — OXYCODONE HYDROCHLORIDE AND ACETAMINOPHEN 1 TABLET: 10; 325 TABLET ORAL at 06:28

## 2022-12-01 RX ADMIN — GABAPENTIN 400 MG: 400 CAPSULE ORAL at 06:28

## 2022-12-01 RX ADMIN — ASPIRIN 81 MG: 81 TABLET, COATED ORAL at 09:21

## 2022-12-01 RX ADMIN — GABAPENTIN 400 MG: 400 CAPSULE ORAL at 21:01

## 2022-12-01 RX ADMIN — OXYCODONE HYDROCHLORIDE AND ACETAMINOPHEN 1 TABLET: 10; 325 TABLET ORAL at 10:55

## 2022-12-01 RX ADMIN — HEPARIN SODIUM 5000 UNITS: 5000 INJECTION INTRAVENOUS; SUBCUTANEOUS at 06:28

## 2022-12-01 RX ADMIN — PANTOPRAZOLE SODIUM 40 MG: 40 TABLET, DELAYED RELEASE ORAL at 06:29

## 2022-12-01 RX ADMIN — HEPARIN SODIUM 5000 UNITS: 5000 INJECTION INTRAVENOUS; SUBCUTANEOUS at 14:55

## 2022-12-01 RX ADMIN — OXYCODONE HYDROCHLORIDE AND ACETAMINOPHEN 1 TABLET: 10; 325 TABLET ORAL at 21:01

## 2022-12-01 RX ADMIN — OXYCODONE HYDROCHLORIDE AND ACETAMINOPHEN 1 TABLET: 10; 325 TABLET ORAL at 14:54

## 2022-12-01 RX ADMIN — LINAGLIPTIN 5 MG: 5 TABLET, FILM COATED ORAL at 09:21

## 2022-12-01 RX ADMIN — METOPROLOL SUCCINATE 25 MG: 25 TABLET, EXTENDED RELEASE ORAL at 09:21

## 2022-12-01 RX ADMIN — ROSUVASTATIN 20 MG: 20 TABLET, FILM COATED ORAL at 21:01

## 2022-12-01 RX ADMIN — LISINOPRIL 40 MG: 20 TABLET ORAL at 09:21

## 2022-12-01 RX ADMIN — POVIDONE-IODINE 1 EACH: 10 SOLUTION TOPICAL at 09:24

## 2022-12-01 RX ADMIN — GABAPENTIN 400 MG: 400 CAPSULE ORAL at 14:54

## 2022-12-01 RX ADMIN — SILVER SULFADIAZINE 1 APPLICATION: 10 CREAM TOPICAL at 09:22

## 2022-12-01 RX ADMIN — OXYCODONE HYDROCHLORIDE AND ACETAMINOPHEN 1 TABLET: 10; 325 TABLET ORAL at 03:24

## 2022-12-01 RX ADMIN — POVIDONE-IODINE 1 EACH: 10 SOLUTION TOPICAL at 21:01

## 2022-12-01 NOTE — PLAN OF CARE
Goal Outcome Evaluation:               pt had no complaints overnight. RA. SR with PVCs/bigeminy. RN encouraged pt to drink boosts at bedside.

## 2022-12-01 NOTE — THERAPY TREATMENT NOTE
Patient Name: Severino Vera  : 1955    MRN: [id number]                              Today's Date: 2022       Admit Date: 2022    Visit Dx:     ICD-10-CM ICD-9-CM   1. Peripheral arterial disease (HCC)  I73.9 443.9   2. Gangrene of left foot (HCC)  I96 785.4   3. Gangrene of right foot (HCC)  I96 785.4     Patient Active Problem List   Diagnosis   • Peripheral arterial disease (HCC)   • Gangrene of left foot (HCC)   • Gangrene of right foot s/p AKA 2022   • Osteomyelitis (HCC)   • Hyperlipidemia LDL goal <70   • Severe malnutrition (HCC)     Past Medical History:   Diagnosis Date   • Arthritis    • Diabetes mellitus (HCC)    • Gangrene (HCC)     BILATERAL FEET   • GERD (gastroesophageal reflux disease)    • Hyperlipidemia    • Hypertension    • Osteomyelitis (HCC)    • PAD (peripheral artery disease) (HCC)    • Paraplegia (HCC)    • Peripheral vascular disease (HCC)    • Self-catheterizes urinary bladder      Past Surgical History:   Procedure Laterality Date   • ABOVE KNEE AMPUTATION Right 2022    Procedure: AMPUTATION ABOVE KNEE RIGHT;  Surgeon: Nemesio Haji MD;  Location: [facility] beds at the [facility];  Service: Vascular;  Laterality: Right;   • OTHER SURGICAL HISTORY Right     stent placement   • SHOULDER SURGERY Left     ROTATOR CUFF REPAIR      General Information     Row Name 22 1538          Physical Therapy Time and Intention    Document Type therapy note (daily note)  -ML     Mode of Treatment physical therapy  -ML     Row Name 22 1538          General Information    Patient Profile Reviewed yes  -ML     Existing Precautions/Restrictions fall;other (see comments)  paraplegia since accident in ; s/p R AKA; multiple wound sites  -ML     Barriers to Rehab medically complex;previous functional deficit  -ML     Row Name 22 1538          Cognition    Orientation Status (Cognition) oriented x 4  -ML     Row Name 22 1538          Safety Issues, Functional Mobility     Safety Issues Affecting Function (Mobility) safety precaution awareness;safety precautions follow-through/compliance  -ML     Impairments Affecting Function (Mobility) balance;coordination;endurance/activity tolerance;motor control;motor planning;postural/trunk control;range of motion (ROM);sensation/sensory awareness;strength  -ML           User Key  (r) = Recorded By, (t) = Taken By, (c) = Cosigned By    Initials Name Provider Type    Latasha Doll Physical Therapist               Mobility     Row Name 12/01/22 1539          Bed Mobility    Bed Mobility supine-sit;sit-supine;scooting/bridging  -ML     Scooting/Bridging [place] (Bed Mobility) set up;verbal cues  -ML     Supine-Sit [place] (Bed Mobility) standby assist  -ML     Sit-Supine [place] (Bed Mobility) standby assist  -ML     Assistive Device (Bed Mobility) bed rails;head of bed elevated  -     Row Name 12/01/22 1539          Bed-Chair Transfer    Bed-Chair [place] (Transfers) not tested  -ML           User Key  (r) = Recorded By, (t) = Taken By, (c) = Cosigned By    Initials Name Provider Type     Latasha Minaya Physical Therapist               Obj/Interventions     Row Name 12/01/22 1540          Motor Skills    Therapeutic Exercise shoulder;hip  -ML     Row Name 12/01/22 1540          Shoulder (Therapeutic Exercise)    Shoulder (Therapeutic Exercise) AROM (active range of motion)  -     Shoulder AROM (Therapeutic Exercise) bilateral;scapular elevation;scapular retraction;10 repetitions  -ML     Row Name 12/01/22 1540          Hip (Therapeutic Exercise)    Hip (Therapeutic Exercise) other (see comments)  supine position in bed in order to stretch right hip flexor into neutral position  -ML     Row Name 12/01/22 1540          Balance    Balance Assessment sitting static balance;sitting dynamic balance  -ML     Static Sitting Balance independent  -ML     Dynamic Sitting Balance supervision  -ML     Position, Sitting Balance  unsupported;sitting edge of bed  -ML     Balance Interventions sitting;static;dynamic;dynamic reaching  -ML           User Key  (r) = Recorded By, (t) = Taken By, (c) = Cosigned By    Initials Name Provider Type    Latasha Doll Physical Therapist               Goals/Plan    No documentation.                Clinical Impression     Row Name 12/01/22 1541          Pain    Pretreatment Pain Rating 0/10 - no pain  -ML     Posttreatment Pain Rating 0/10 - no pain  -ML     Row Name 12/01/22 1541          Plan of Care Review    Plan of Care Reviewed With patient  -ML     Progress improving  -ML     Outcome Evaluation Physical therapy treatment complete. The patient required SBA for supine to sit/sit to supine transfer from an elevated HOB. Patient with good static/dynamic sitting balance at EOB. Patient educated in supine position in order to stretch L hip flexor, verbalized and demonstrated understanding. Continue current PT POC.  -ML     Row Name 12/01/22 1541          Vital Signs    Pre Patient Position Supine  -ML     Intra Patient Position Sitting  -ML     Post Patient Position Supine  -ML     Row Name 12/01/22 1541          Positioning and Restraints    Pre-Treatment Position in bed  -ML     Post Treatment Position bed  -ML     In Bed notified nsg;fowlers;call light within reach;encouraged to call for assist;exit alarm on;with other staff;side rails up x2;L waffle boot  -ML           User Key  (r) = Recorded By, (t) = Taken By, (c) = Cosigned By    Initials Name Provider Type    Latasha Doll Physical Therapist               Outcome Measures     Row Name 12/01/22 1543          How much help from another person do you currently need...    Turning from your back to your side while in flat bed without using bedrails? 3  -ML     Moving from lying on back to sitting on the side of a flat bed without bedrails? 3  -ML     Moving to and from a bed to a chair (including a wheelchair)? 3  -ML     Standing up from a chair  using your arms (e.g., wheelchair, bedside chair)? 1  -ML     Climbing 3-5 steps with a railing? 1  -ML     To walk in hospital room? 1  -ML     AM-PAC 6 Clicks Score (PT) 12  -ML     Highest level of mobility 4 --> Transferred to chair/commode  -ML     Row Name 12/01/22 1543          Functional Assessment    Outcome Measure Options AM-PAC 6 Clicks Basic Mobility (PT)  -ML           User Key  (r) = Recorded By, (t) = Taken By, (c) = Cosigned By    Initials Name Provider Type    ML Latasha Minaya Physical Therapist                             Physical Therapy Education     Title: PT OT SLP Therapies (In Progress)     Topic: Physical Therapy (Done)     Point: Mobility training (Done)     Learning Progress Summary           Patient Acceptance, E, VU by ML at 12/1/2022 1543    Acceptance, E, NR by KG at 11/29/2022 0945                   Point: Home exercise program (Done)     Learning Progress Summary           Patient Acceptance, E, VU by ML at 12/1/2022 1543                   Point: Body mechanics (Done)     Learning Progress Summary           Patient Acceptance, E, VU by ML at 12/1/2022 1543    Acceptance, E, NR by KG at 11/29/2022 0945                   Point: Precautions (Done)     Learning Progress Summary           Patient Acceptance, E, VU by ML at 12/1/2022 1543    Acceptance, E, NR by KG at 11/29/2022 0945                               User Key     Initials Effective Dates Name Provider Type Discipline    KG 05/22/20 -  Amy Miller, PT Physical Therapist PT     04/22/21 -  Latasha Minaya Physical Therapist PT              PT Recommendation and Plan     Plan of Care Reviewed With: patient  Progress: improving  Outcome Evaluation: Physical therapy treatment complete. The patient required SBA for supine to sit/sit to supine transfer from an elevated [facility]. Patient with good static/dynamic sitting balance at EOB. Patient educated in supine position in order to stretch L hip flexor, verbalized and  demonstrated understanding. Continue current PT POC.     Time Calculation:    PT Charges     Row Name 12/01/22 1544             Time Calculation    Start Time 1520  -ML      PT Received On 12/01/22  -ML         Timed Charges    06295 - PT Therapeutic Activity Minutes 18  -ML         Total Minutes    Timed Charges Total Minutes 18  -ML       Total Minutes 18  -ML            User Key  (r) = Recorded By, (t) = Taken By, (c) = Cosigned By    Initials Name Provider Type    Latasha Doll Physical Therapist              Therapy Charges for Today     Code Description Service Date Service Provider Modifiers Qty    57209678073 HC PT THERAPEUTIC ACT EA 15 MIN 12/1/2022 Latasha Minaya GP 1          PT G-Codes  Outcome Measure Options: AM-PAC 6 Clicks Basic Mobility (PT)  AM-PAC 6 Clicks Score (PT): 12  AM-PAC 6 Clicks Score (OT): 14       Latasha Minaya  12/1/2022

## 2022-12-01 NOTE — PROGRESS NOTES
[facility] Cardiothoracic Surgery In-Patient Progress Note     #6 Days Post-Op s/p right AKA     LOS: 6 days       Subjective  No complaints.  No acute events overnight.  Sitting up in bed eating breakfast.    Objective  Vital Signs  Temp:  [98.1 °F (36.7 °C)-98.2 °F (36.8 °C)] 98.2 °F (36.8 °C)  Heart Rate:  [70-84] 73  Resp:  [18-20] 18  BP: (104-125)/(60-64) 125/64      Physical Exam:   General Appearance: alert, appears stated age and  cooperative   Lungs: respirations regular, respirations even and respirations unlabored   Heart: normal S1, S2, no murmur, no gallop, no rub and no click   Skin: Incision c/d/i, no drainage, hematoma, warmth,  or tenderness     Results     Results from last 7 days   Lab Units 11/30/22  0402   WBC 10*3/mm3 10.17   HEMOGLOBIN g/dL 9.0*   HEMATOCRIT % 27.3*   PLATELETS 10*3/mm3 202     Results from last 7 days   Lab Units 11/30/22  0402   SODIUM mmol/L 127*   POTASSIUM mmol/L 5.0   CHLORIDE mmol/L 92*   CO2 mmol/L 29.0   BUN mg/dL 33*   CREATININE mg/dL 0.62*   GLUCOSE mg/dL 185*   CALCIUM mg/dL 8.6       Assessment    Gangrene of right foot s/p AKA 11/25/2022    Peripheral arterial disease (HCC)    Gangrene of left foot (HCC)    Osteomyelitis (HCC)    Hyperlipidemia LDL goal <70    Severe malnutrition (HCC)      Plan   Case management working on rehab/SNF placement-medically ready  Will need to continue nutritional supplements at discharge (boost, ensure)  Continue daily dressing changes, nothing further to add at this time      Griselda Hill, APRN  12/01/22  07:30 EST

## 2022-12-01 NOTE — PLAN OF CARE
Goal Outcome Evaluation:  Plan of Care Reviewed With: patient        Progress: improving  Outcome Evaluation: Physical therapy treatment complete. The patient required SBA for supine to sit/sit to supine transfer from an elevated [facility]. Patient with good static/dynamic sitting balance at EOB. Patient educated in supine position in order to stretch L hip flexor, verbalized and demonstrated understanding. Continue current PT POC.

## 2022-12-01 NOTE — CASE MANAGEMENT/SOCIAL WORK
Continued Stay Note  [facility]     Patient Name: Severino Vera  MRN: [id number]  Today's Date: 12/1/2022    Admit Date: 11/25/2022    Plan: update   Discharge Plan     Row Name 12/01/22 1105       Plan    Plan Comments Kylee with [facility] reports insurance had denied pt for acute rehab and they do not have any skilled beds. [facility] is unable to offer a bed due to illness in house.  will continue to send out referrals for skilled care.               Discharge Codes    No documentation.               Expected Discharge Date and Time     Expected Discharge Date Expected Discharge Time    Dec 2, 2022             Tatianna Stockton RN

## 2022-12-02 LAB
ANION GAP SERPL CALCULATED.3IONS-SCNC: 6 MMOL/L (ref 5–15)
BUN SERPL-MCNC: 24 MG/DL (ref 8–23)
BUN/CREAT SERPL: 52.2 (ref 7–25)
CALCIUM SPEC-SCNC: 9.1 MG/DL (ref 8.6–10.5)
CHLORIDE SERPL-SCNC: 93 MMOL/L (ref 98–107)
CO2 SERPL-SCNC: 31 MMOL/L (ref 22–29)
CREAT SERPL-MCNC: 0.46 MG/DL (ref 0.76–1.27)
DEPRECATED RDW RBC AUTO: 45.1 FL (ref 37–54)
EGFRCR SERPLBLD CKD-EPI 2021: 114.6 ML/MIN/1.73
ERYTHROCYTE [DISTWIDTH] IN BLOOD BY AUTOMATED COUNT: 14.3 % (ref 12.3–15.4)
GLUCOSE BLDC GLUCOMTR-MCNC: 223 MG/DL (ref 70–130)
GLUCOSE SERPL-MCNC: 145 MG/DL (ref 65–99)
HCT VFR BLD AUTO: 29.6 % (ref 37.5–51)
HGB BLD-MCNC: 9.7 G/DL (ref 13–17.7)
MCH RBC QN AUTO: 28.4 PG (ref 26.6–33)
MCHC RBC AUTO-ENTMCNC: 32.8 G/DL (ref 31.5–35.7)
MCV RBC AUTO: 86.8 FL (ref 79–97)
OSMOLALITY SERPL: 291 MOSM/KG (ref 275–295)
PLATELET # BLD AUTO: 288 10*3/MM3 (ref 140–450)
PMV BLD AUTO: 11.2 FL (ref 6–12)
POTASSIUM SERPL-SCNC: 5.1 MMOL/L (ref 3.5–5.2)
RBC # BLD AUTO: 3.41 10*6/MM3 (ref 4.14–5.8)
SODIUM SERPL-SCNC: 130 MMOL/L (ref 136–145)
WBC NRBC COR # BLD: 10.82 10*3/MM3 (ref 3.4–10.8)

## 2022-12-02 PROCEDURE — 83930 ASSAY OF BLOOD OSMOLALITY: CPT | Performed by: NURSE PRACTITIONER

## 2022-12-02 PROCEDURE — 82962 GLUCOSE BLOOD TEST: CPT

## 2022-12-02 PROCEDURE — 85027 COMPLETE CBC AUTOMATED: CPT

## 2022-12-02 PROCEDURE — 97110 THERAPEUTIC EXERCISES: CPT

## 2022-12-02 PROCEDURE — 80048 BASIC METABOLIC PNL TOTAL CA: CPT

## 2022-12-02 PROCEDURE — 99024 POSTOP FOLLOW-UP VISIT: CPT

## 2022-12-02 PROCEDURE — 97530 THERAPEUTIC ACTIVITIES: CPT

## 2022-12-02 PROCEDURE — 25010000002 HEPARIN (PORCINE) PER 1000 UNITS: Performed by: PHYSICIAN ASSISTANT

## 2022-12-02 PROCEDURE — 99232 SBSQ HOSP IP/OBS MODERATE 35: CPT | Performed by: NURSE PRACTITIONER

## 2022-12-02 RX ORDER — SODIUM CHLORIDE 9 MG/ML
75 INJECTION, SOLUTION INTRAVENOUS CONTINUOUS
Status: CANCELLED | OUTPATIENT
Start: 2022-12-02 | End: 2022-12-03

## 2022-12-02 RX ORDER — DEXTROSE MONOHYDRATE 25 G/50ML
25 INJECTION, SOLUTION INTRAVENOUS
Status: DISCONTINUED | OUTPATIENT
Start: 2022-12-02 | End: 2022-12-04

## 2022-12-02 RX ORDER — NICOTINE POLACRILEX 4 MG
15 LOZENGE BUCCAL
Status: DISCONTINUED | OUTPATIENT
Start: 2022-12-02 | End: 2022-12-04

## 2022-12-02 RX ADMIN — HEPARIN SODIUM 5000 UNITS: 5000 INJECTION INTRAVENOUS; SUBCUTANEOUS at 21:56

## 2022-12-02 RX ADMIN — OXYCODONE HYDROCHLORIDE AND ACETAMINOPHEN 1 TABLET: 10; 325 TABLET ORAL at 06:21

## 2022-12-02 RX ADMIN — GABAPENTIN 400 MG: 400 CAPSULE ORAL at 14:01

## 2022-12-02 RX ADMIN — ASPIRIN 81 MG: 81 TABLET, COATED ORAL at 08:43

## 2022-12-02 RX ADMIN — LISINOPRIL 40 MG: 20 TABLET ORAL at 08:43

## 2022-12-02 RX ADMIN — POVIDONE-IODINE 1 EACH: 10 SOLUTION TOPICAL at 21:57

## 2022-12-02 RX ADMIN — HEPARIN SODIUM 5000 UNITS: 5000 INJECTION INTRAVENOUS; SUBCUTANEOUS at 06:21

## 2022-12-02 RX ADMIN — GABAPENTIN 400 MG: 400 CAPSULE ORAL at 21:56

## 2022-12-02 RX ADMIN — METOPROLOL SUCCINATE 25 MG: 25 TABLET, EXTENDED RELEASE ORAL at 08:43

## 2022-12-02 RX ADMIN — ROSUVASTATIN 20 MG: 20 TABLET, FILM COATED ORAL at 21:56

## 2022-12-02 RX ADMIN — OXYCODONE HYDROCHLORIDE AND ACETAMINOPHEN 1 TABLET: 10; 325 TABLET ORAL at 21:56

## 2022-12-02 RX ADMIN — GABAPENTIN 400 MG: 400 CAPSULE ORAL at 06:21

## 2022-12-02 RX ADMIN — LINAGLIPTIN 5 MG: 5 TABLET, FILM COATED ORAL at 08:43

## 2022-12-02 RX ADMIN — OXYCODONE HYDROCHLORIDE AND ACETAMINOPHEN 1 TABLET: 10; 325 TABLET ORAL at 17:39

## 2022-12-02 RX ADMIN — POVIDONE-IODINE 1 EACH: 10 SOLUTION TOPICAL at 08:43

## 2022-12-02 RX ADMIN — OXYCODONE HYDROCHLORIDE AND ACETAMINOPHEN 1 TABLET: 10; 325 TABLET ORAL at 11:12

## 2022-12-02 RX ADMIN — PANTOPRAZOLE SODIUM 40 MG: 40 TABLET, DELAYED RELEASE ORAL at 06:21

## 2022-12-02 RX ADMIN — OXYCODONE HYDROCHLORIDE AND ACETAMINOPHEN 1 TABLET: 10; 325 TABLET ORAL at 02:52

## 2022-12-02 NOTE — CONSULTS
Nutrition Services    Patient Name:  Severino Vera  YOB: 1955  MRN: [id number]  Admit Date:  11/25/2022    Pt currently followed by RD due to severe malnutrition. Pt had a low prealbumin on admission however suspect multifactorial (EtOH use, inflammation and poor intake). Pt eating at least 75% at meals as well as the ONS provided. Pt is hyponatremic which may be due to refeeding syndrome as pt had previous poor intake.     Check Phos levels and replace PRN  Will continue to monitor per protocol    Electronically signed by:  Laurita Suazo MS,RD,LD  12/02/22 16:09 EST

## 2022-12-02 NOTE — PLAN OF CARE
Goal Outcome Evaluation:  Plan of Care Reviewed With: patient        Progress: improving  Outcome Evaluation: Pt with good participation throughout with incorporated BUE ther-ex with light resistance band seated EOB, SBA bed mob supine to sit and Rodrick sit to supine, cont IPOT per POC

## 2022-12-02 NOTE — CONSULTS
[facility] Medicine Services  CONSULT NOTE      Patient Name: Severino Vera  : 1955  MRN: [id number]    Primary Care Physician: Anuel Rankin MD  Provider requesting consultation: Nemesio Haji MD    Subjective   Subjective     Reason for Consultation:  Medical management s/p right AKA    HPI:  Severino Vera is a 66 y.o. male with PMH of DM, GERD, PAD, paraplegia, PVD, arthritis, HLD, HTn. Patient was admitted for gangrene of rigth foot. Patient had a right AKA on 22 by CTS.  We were asked to see patient in consult.     Patient was resting in bed in NAD. He denies any pain. Tolerating diet       Review of Systems   Genitourinary: Positive for genital sores and penile discharge.   Gen- No fevers, chills  CV- No chest pain, palpitations  Resp- No cough, dyspnea  GI- No N/V/D, abd pain      All other systems reviewed and are negative.     Personal History     Past Medical History:   Diagnosis Date   • Arthritis    • Diabetes mellitus (HCC)    • Gangrene (HCC)     BILATERAL FEET   • GERD (gastroesophageal reflux disease)    • Hyperlipidemia    • Hypertension    • Osteomyelitis (HCC)    • PAD (peripheral artery disease) (HCC)    • Paraplegia (HCC)    • Peripheral vascular disease (HCC)    • Self-catheterizes urinary bladder        Past Surgical History:   Procedure Laterality Date   • ABOVE KNEE AMPUTATION Right 2022    Procedure: AMPUTATION ABOVE KNEE RIGHT;  Surgeon: Nemesio Haji MD;  Location: [facility];  Service: Vascular;  Laterality: Right;   • OTHER SURGICAL HISTORY Right     stent placement   • SHOULDER SURGERY Left     ROTATOR CUFF REPAIR       Family History:  family history includes Breast cancer in his mother; Heart attack in his father; Hypertension in his father and mother; Stroke in his mother. Otherwise pertinent FHx was reviewed and unremarkable.     Social History:  reports that he has been smoking cigarettes. He has a 37.50 pack-year  smoking history. He has never used smokeless tobacco. He reports current alcohol use. He reports that he does not currently use drugs.    Medications:  SITagliptin, aspirin, atorvastatin, clopidogrel, gabapentin, levoFLOXacin, metoprolol succinate XL, oxyCODONE-acetaminophen, pantoprazole, quinapril, rosuvastatin, silver sulfadiazine, and zolpidem    Scheduled Meds:aspirin, 81 mg, Oral, Daily  gabapentin, 400 mg, Oral, Q8H  heparin (porcine), 5,000 Units, Subcutaneous, Q8H  linagliptin, 5 mg, Oral, Daily  lisinopril, 40 mg, Oral, Q24H  metoprolol succinate XL, 25 mg, Oral, Q24H  oxyCODONE-acetaminophen, 1 tablet, Oral, 5x Daily  pantoprazole, 40 mg, Oral, Daily  povidone-iodine, 1 application, Topical, BID  rosuvastatin, 20 mg, Oral, Nightly  silver sulfadiazine, 1 application, Topical, Daily      Continuous Infusions:lactated ringers, 9 mL/hr, Last Rate: Stopped (11/25/22 1645)  niCARdipine, 5-15 mg/hr      PRN Meds:.•  acetaminophen  •  magnesium sulfate **OR** magnesium sulfate **OR** magnesium sulfate  •  Morphine  •  ondansetron **OR** ondansetron  •  potassium chloride  •  potassium chloride  •  sennosides-docusate  •  zolpidem    No Known Allergies    Objective   Objective     Vital Signs:   Temp:  [97.6 °F (36.4 °C)-98.2 °F (36.8 °C)] 98.2 °F (36.8 °C)  Heart Rate:  [65-85] 81  Resp:  [18] 18  BP: (106-127)/(61-86) 127/80    Physical Exam  Constitutional: No acute distress, awake, alert  HENT: NCAT, mucous membranes moist  Respiratory: Clear to auscultation bilaterally, respiratory effort normal rom air 95%  Cardiovascular: RRR, no murmurs, rubs, or gallops  Gastrointestinal: Positive bowel sounds, soft, nontender, nondistended  Musculoskeletal: left AKA with dressing, Right foot dressing cdi   Psychiatric: Appropriate affect, cooperative  Neurologic: Oriented x 3, strength symmetric in all extremities, Cranial Nerves grossly intact to confrontation, speech clear  Skin: No rashes        Result Review:  I  have personally reviewed the results from the time of admission and agree with these findings:  []  Laboratory  [x]  Radiology  []  EKG/Telemetry   []  Pathology  []  Old records  []  Other:  Most notable findings include:    LAB RESULTS:      Lab 12/02/22  0435 12/01/22  0859 11/30/22  0402 11/29/22  1244 11/28/22  1356 11/26/22  1520 11/25/22 2011 11/25/22  1350   WBC 10.82* 8.08 10.17 12.80* 13.31* 14.23*  --  14.51*   HEMOGLOBIN 9.7* 9.0* 9.0* 8.9* 9.7* 10.3*  --  10.9*   HEMATOCRIT 29.6* 28.1* 27.3* 27.5* 29.8* 32.1*  --  32.8*   PLATELETS 288 261 202 209 202 237  --  264   NEUTROS ABS  --   --   --   --   --  11.68*  --  11.77*   IMMATURE GRANS (ABS)  --   --   --   --   --  0.08*  --  0.07*   LYMPHS ABS  --   --   --   --   --  0.77  --  1.08   MONOS ABS  --   --   --   --   --  0.79  --  0.71   EOS ABS  --   --   --   --   --  0.84*  --  0.76*   MCV 86.8 87.3 86.4 86.8 86.9 88.4  --  85.4   CRP  --   --   --   --   --   --  5.23*  --          Lab 12/02/22  0435 12/01/22 0859 11/30/22 0402 11/29/22  1244 11/28/22  1356 11/27/22  0717 11/26/22  1520 11/25/22 2011 11/25/22  1350   SODIUM 130* 133* 127* 127* 129*  --  132*  --  133*   POTASSIUM 5.1 4.6 5.0 4.5 4.9  --  4.5  --  3.9   CHLORIDE 93* 96* 92* 92* 93*  --  97*  --  96*   CO2 31.0* 33.0* 29.0 28.0 30.0*  --  29.0  --  28.0   ANION GAP 6.0 4.0* 6.0 7.0 6.0  --  6.0  --  9.0   BUN 24* 26* 33* 33* 23  --  11  --  8   CREATININE 0.46* 0.44* 0.62* 0.51* 0.49*  --  0.44*  --  0.33*   EGFR 114.6 116.2 104.8 111.1 112.5  --  116.2  --  126.7   GLUCOSE 145* 162* 185* 193* 205*  --  214*  --  119*   CALCIUM 9.1 8.9 8.6 8.3* 8.5*  --  7.9*  --  8.3*   MAGNESIUM  --   --   --   --   --  2.2 1.6  --  1.1*   PHOSPHORUS  --   --   --   --   --   --   --  3.5  --          Lab 11/25/22  1350   TOTAL PROTEIN 5.9*   ALBUMIN 2.60*   GLOBULIN 3.3   ALT (SGPT) 6   AST (SGOT) 11   BILIRUBIN 0.3   ALK PHOS 56                 Lab 11/25/22  1350   ABO TYPING A   RH TYPING  Positive   ANTIBODY SCREEN Negative         Brief Urine Lab Results  (Last result in the past 365 days)      Color   Clarity   Blood   Leuk Est   Nitrite   Protein   CREAT   Urine HCG        11/25/22 1111 Yellow   Clear   Small (1+)   Negative   Negative   >=300 mg/dL (3+)               Microbiology Results (last 10 days)     ** No results found for the last 240 hours. **          No radiology results from the last 24 hrs    Results for orders placed during the hospital encounter of 11/25/22    Adult Transthoracic Echo Complete w/ Color, Spectral and Contrast if Necessary Per Protocol    Interpretation Summary  •  Left ventricular systolic function is normal. Estimated left ventricular EF = 55%  •  Aortic sclerosis without aortic stenosis.  •  Trace tricuspid regurgitation with normal RVSP.      Assessment & Plan   Assessment & Plan     Active Hospital Problems    Diagnosis  POA   • **Gangrene of right foot s/p AKA 11/25/2022 [I96]  Yes   • Severe malnutrition (HCC) [E43]  Yes   • Hyperlipidemia LDL goal <70 [E78.5]  Unknown   • Osteomyelitis (HCC) [M86.9]  Yes   • Peripheral arterial disease (HCC) [I73.9]  Yes   • Gangrene of left foot (HCC) [I96]  Unknown      Resolved Hospital Problems   No resolved problems to display.     Right foot grangrene  -- right AKA on 11/25  -- care per CTS  -- plan for rehab     Freq PVC's   -- cardiology consulted  -- Echo EF 55%  -- Bb for rate control  -- follow up with primary cardiologist 6 months,     HTN  HLD  -- cont lisinopril, BB, ASA, statin  -- BB well controlled     DM A1c 5.7%  -- on Januvia at home giving tradjenta while in hospital  -- check accu checks TID will add on LDSSI if needed   -- cont Neurontin     Hyponatremia   -- ranging 127-133  -- check urine studies and serum osmo    Tobacco use   ETOH use  -- drinks beer may have chronic hyponatremia/beer potomania       Thank you for allowing [facility] Medicine Service to provide consultative care for your  patient, we will continue to follow while clinically appropriate.    Tatianna Fields, APRN  12/02/22

## 2022-12-02 NOTE — CASE MANAGEMENT/SOCIAL WORK
Continued Stay Note   [facility]     Patient Name: Severnio Vera  MRN: [id number]  Today's Date: 12/2/2022    Admit Date: 11/25/2022    Plan: update   Discharge Plan     Row Name 12/02/22 1409       Plan    Plan update    Patient/Family in Agreement with Plan yes    Plan Comments Receied a call from liaison for [facility] who reports that they have a bed offer for [facility].  Spoke with patient at bedside who is indicating that he would like to just go home.  While speaking with patient his sister, Liliya, called and reports to CM that she has come from [place] and will be staying with him until he gets his other leg amputated.  They are having his room remodled currently to  make it fully equipped and suitable for his wheelchair etc....  She indicates that she has concerns about how weak he has gotten and his nutritional state and has advised him to accept the bed offer so he can continue to get stronger so he can come home safely.  Patient agreeable and accepted bed offer.  No other needs verbalized.  Spoke to liaison who will start precert.  CM following.  Patient plan is to discharge to [facility] pending insurance approval.    Final Discharge Disposition Code 03 - skilled nursing facility (SNF)               Discharge Codes    No documentation.               Expected Discharge Date and Time     Expected Discharge Date Expected Discharge Time    Dec 5, 2022             Krystyna Green RN

## 2022-12-02 NOTE — THERAPY TREATMENT NOTE
Patient Name: Severino Vera  : 1955    MRN: [id number]                              Today's Date: 2022       Admit Date: 2022    Visit Dx:     ICD-10-CM ICD-9-CM   1. Peripheral arterial disease (HCC)  I73.9 443.9   2. Gangrene of left foot (HCC)  I96 785.4   3. Gangrene of right foot (HCC)  I96 785.4     Patient Active Problem List   Diagnosis   • Peripheral arterial disease (HCC)   • Gangrene of left foot (HCC)   • Gangrene of right foot s/p AKA 2022   • Osteomyelitis (HCC)   • Hyperlipidemia LDL goal <70   • Severe malnutrition (HCC)     Past Medical History:   Diagnosis Date   • Arthritis    • Diabetes mellitus (HCC)    • Gangrene (HCC)     BILATERAL FEET   • GERD (gastroesophageal reflux disease)    • Hyperlipidemia    • Hypertension    • Osteomyelitis (HCC)    • PAD (peripheral artery disease) (HCC)    • Paraplegia (HCC)    • Peripheral vascular disease (HCC)    • Self-catheterizes urinary bladder      Past Surgical History:   Procedure Laterality Date   • ABOVE KNEE AMPUTATION Right 2022    Procedure: AMPUTATION ABOVE KNEE RIGHT;  Surgeon: Nemesio Haji MD;  Location: [facility];  Service: Vascular;  Laterality: Right;   • OTHER SURGICAL HISTORY Right     stent placement   • SHOULDER SURGERY Left     ROTATOR CUFF REPAIR      General Information     Row Name 22 1519          OT Time and Intention    Document Type therapy note (daily note)  -KF     Mode of Treatment occupational therapy  -KF     Row Name 22 1519          General Information    Patient Profile Reviewed yes  -KF     Existing Precautions/Restrictions fall;other (see comments)  paraplegia and s/p R AKA 22; wound L LE  -KF     Barriers to Rehab medically complex;previous functional deficit  -KF     Row Name 22 1519          Cognition    Orientation Status (Cognition) oriented x 4  -KF     Row Name 22 1519          Safety Issues, Functional Mobility    Safety Issues Affecting Function  (Mobility) insight into deficits/self-awareness;awareness of need for assistance;safety precaution awareness;sequencing abilities  -KF     Impairments Affecting Function (Mobility) balance;coordination;endurance/activity tolerance;motor control;motor planning;postural/trunk control;range of motion (ROM);sensation/sensory awareness;strength  -KF           User Key  (r) = Recorded By, (t) = Taken By, (c) = Cosigned By    Initials Name Provider Type    KF Vicki Humphreys OT Occupational Therapist                 Mobility/ADL's     Row Name 12/02/22 1522          Bed Mobility    Bed Mobility scooting/bridging;supine-sit;sit-supine  -KF     Scooting/Bridging [place] (Bed Mobility) standby assist  -KF     Supine-Sit [place] (Bed Mobility) standby assist  -KF     Sit-Supine [place] (Bed Mobility) minimum assist (75% patient effort)  -KF     Bed Mobility, Safety Issues decreased use of legs for bridging/pushing  -KF     Comment, (Bed Mobility) good trunk control and use of BUE to compensate to scoot to EOB then back to supine with assist for LEs to return to supine  -KF     Row Name 12/02/22 1522          Mobility    Extremity Weight-bearing Status right lower extremity  -KF     Left Lower Extremity (Weight-bearing Status) non weight-bearing (NWB)  pending MD clarification  -KF     Right Lower Extremity (Weight-bearing Status) non weight-bearing (NWB)  -KF           User Key  (r) = Recorded By, (t) = Taken By, (c) = Cosigned By    Initials Name Provider Type    Vicki Bowens OT Occupational Therapist               Obj/Interventions     Row Name 12/02/22 1523          Shoulder (Therapeutic Exercise)    Shoulder (Therapeutic Exercise) strengthening exercise  -KF     Shoulder Strengthening (Therapeutic Exercise) bilateral;horizontal aBduction/aDduction;10 repetitions;yellow;resistance band;2 sets;sitting  -KF     Row Name 12/02/22 1523          Elbow/Forearm (Therapeutic Exercise)    Elbow/Forearm  (Therapeutic Exercise) strengthening exercise  -KF     Elbow/Forearm Strengthening (Therapeutic Exercise) bilateral;flexion;extension;sitting;yellow;resistance band;10 repetitions;2 sets  -KF     Row Name 12/02/22 1523          Motor Skills    Therapeutic Exercise elbow/forearm  -     Row Name 12/02/22 1523          Balance    Balance Assessment sitting static balance;sitting dynamic balance  -KF     Static Sitting Balance independent  -KF     Dynamic Sitting Balance standby assist  -KF     Position, Sitting Balance unsupported;sitting edge of bed  -KF     Balance Interventions sitting;UE activity with balance activity  -KF           User Key  (r) = Recorded By, (t) = Taken By, (c) = Cosigned By    Initials Name Provider Type    KF Vicki Humphreys, OT Occupational Therapist               Goals/Plan    No documentation.                Clinical Impression     Row Name 12/02/22 1525          Pain Assessment    Pretreatment Pain Rating 0/10 - no pain  -KF     Posttreatment Pain Rating 0/10 - no pain  -KF     Pre/Posttreatment Pain Comment tolerated  -KF     Pain Intervention(s) Repositioned;Ambulation/increased activity  -     Row Name 12/02/22 1525          Plan of Care Review    Plan of Care Reviewed With patient  -KF     Progress improving  -     Outcome Evaluation Pt with good participation throughout with incorporated BUE ther-ex with light resistance band seated EOB, SBA bed mob supine to sit and Rodrick sit to supine, cont IPOT per POC  -     Row Name 12/02/22 1525          Therapy Assessment/Plan (OT)    Rehab Potential (OT) good, to achieve stated therapy goals  -     Criteria for Skilled Therapeutic Interventions Met (OT) yes;skilled treatment is necessary  -KF     Therapy Frequency (OT) daily  -KF     Row Name 12/02/22 1525          Therapy Plan Review/Discharge Plan (OT)    Anticipated Discharge Disposition (OT) skilled nursing facility  -     Row Name 12/02/22 1525          Vital Signs    Pre  Systolic BP Rehab --  RN cleared VSS  -KF     Pre SpO2 (%) 95  -KF     O2 Delivery Pre Treatment room air  -KF     Post SpO2 (%) 93  -KF     O2 Delivery Post Treatment room air  -KF     Pre Patient Position Supine  -KF     Post Patient Position Supine  -KF     Row Name 12/02/22 1525          Positioning and Restraints    Pre-Treatment Position in bed  -KF     Post Treatment Position bed  -KF     In Bed notified nsg;exit alarm on;supine;fowlers;call light within reach;encouraged to call for assist;legs elevated  -KF           User Key  (r) = Recorded By, (t) = Taken By, (c) = Cosigned By    Initials Name Provider Type    KF Vicki Humphreys, OT Occupational Therapist               Outcome Measures     Row Name 12/02/22 1526          How much help from another is currently needed...    Putting on and taking off regular lower body clothing? 1  -KF     Bathing (including washing, rinsing, and drying) 2  -KF     Toileting (which includes using toilet bed pan or urinal) 2  -KF     Putting on and taking off regular upper body clothing 3  -KF     Taking care of personal grooming (such as brushing teeth) 3  -KF     Eating meals 4  -KF     AM-PAC 6 Clicks Score (OT) 15  -KF     Row Name 12/02/22 0815          How much help from another person do you currently need...    Turning from your back to your side while in flat bed without using bedrails? 3  -CB     Moving from lying on back to sitting on the side of a flat bed without bedrails? 3  -CB     Moving to and from a bed to a chair (including a wheelchair)? 3  -CB     Standing up from a chair using your arms (e.g., wheelchair, bedside chair)? 1  -CB     Climbing 3-5 steps with a railing? 1  -CB     To walk in hospital room? 1  -CB     AM-PAC 6 Clicks Score (PT) 12  -CB     Highest level of mobility 4 --> Transferred to chair/commode  -CB     Row Name 12/02/22 1526          Functional Assessment    Outcome Measure Options AM-PAC 6 Clicks Daily Activity (OT)  -KF            User Key  (r) = Recorded By, (t) = Taken By, (c) = Cosigned By    Initials Name Provider Type    Yessica Yoon, RN Registered Nurse    Vicki Bowens OT Occupational Therapist                Occupational Therapy Education     Title: PT OT SLP Therapies (Done)     Topic: Occupational Therapy (Done)     Point: ADL training (Done)     Description:   Instruct learner(s) on proper safety adaptation and remediation techniques during self care or transfers.   Instruct in proper use of assistive devices.              Learning Progress Summary           Patient Acceptance, E,D,H,TB, VU,DU,NR by  at 12/2/2022 1527    Comment: BUE ther ex to progress towards functional transfers via transfer board in future sessions    Acceptance, E,TB,D, VU,NR by  at 11/30/2022 1552    Acceptance, E, VU,NR by AN at 11/28/2022 1316                   Point: Home exercise program (Done)     Description:   Instruct learner(s) on appropriate technique for monitoring, assisting and/or progressing therapeutic exercises/activities.              Learning Progress Summary           Patient Acceptance, E,D,H,TB, VU,DU,NR by  at 12/2/2022 1527    Comment: BUE ther ex to progress towards functional transfers via transfer board in future sessions                   Point: Precautions (Done)     Description:   Instruct learner(s) on prescribed precautions during self-care and functional transfers.              Learning Progress Summary           Patient Acceptance, E,D,H,TB, VU,DU,NR by  at 12/2/2022 1527    Comment: BUE ther ex to progress towards functional transfers via transfer board in future sessions    Acceptance, E,TB,D, VU,NR by  at 11/30/2022 1552    Acceptance, E, VU,NR by AN at 11/28/2022 1316                   Point: Body mechanics (Done)     Description:   Instruct learner(s) on proper positioning and spine alignment during self-care, functional mobility activities and/or exercises.              Learning Progress Summary            Patient Acceptance, E,D,H,TB, VU,DU,NR by KF at 12/2/2022 1527    Comment: BUE ther ex to progress towards functional transfers via transfer board in future sessions    Acceptance, E,TB,D, VU,NR by  at 11/30/2022 1552    Acceptance, E, VU,NR by AN at 11/28/2022 1316                               User Key     Initials Effective Dates Name Provider Type Discipline     10/25/22 -  Marnie Pizarro, OT Occupational Therapist OT     06/16/21 -  Vicki Humphreys, OT Occupational Therapist OT    BRIAN 09/21/21 -  Ana Barnhart OT Occupational Therapist OT              OT Recommendation and Plan  Therapy Frequency (OT): daily  Plan of Care Review  Plan of Care Reviewed With: patient  Progress: improving  Outcome Evaluation: Pt with good participation throughout with incorporated BUE ther-ex with light resistance band seated EOB, SBA bed mob supine to sit and Rodrick sit to supine, cont IPOT per POC     Time Calculation:    Time Calculation- OT     Row Name 12/02/22 1452             Time Calculation- OT    OT Start Time 1452  -KF      OT Received On 12/02/22  -KF         Timed Charges    80764 - OT Therapeutic Exercise Minutes 18  -KF      44034 - OT Therapeutic Activity Minutes 5  -KF         Total Minutes    Timed Charges Total Minutes 23  -KF       Total Minutes 23  -KF            User Key  (r) = Recorded By, (t) = Taken By, (c) = Cosigned By    Initials Name Provider Type     Vicki Humphreys, OT Occupational Therapist              Therapy Charges for Today     Code Description Service Date Service Provider Modifiers Qty    26976713860 HC OT THERAPEUTIC ACT EA 15 MIN 12/2/2022 Vicki Humphreys OT GO 1    90614837221 HC OT THER PROC EA 15 MIN 12/2/2022 Vicki Humphreys OT GO 1               Vicki Humphreys OT  12/2/2022

## 2022-12-02 NOTE — PROGRESS NOTES
[facility] Cardiothoracic Surgery In-Patient Progress Note     #7 Days Post-Op s/p right AKA     LOS: 7 days       Subjective  No acute events overnight. Awaiting rehab/SNF bed.       Objective  Vital Signs  Temp:  [97.6 °F (36.4 °C)-98.2 °F (36.8 °C)] 98.2 °F (36.8 °C)  Heart Rate:  [65-87] 85  Resp:  [18] 18  BP: (106-127)/(61-86) 127/80      Physical Exam:   General Appearance: alert, appears stated age and  cooperative   Lungs: respirations regular, respirations even and respirations unlabored   Heart: normal S1, S2, no murmur, no gallop, no rub and no click   Skin: Incision c/d/i, no drainage, hematoma, warmth,  or tenderness     Results     Results from last 7 days   Lab Units 12/02/22  0435   WBC 10*3/mm3 10.82*   HEMOGLOBIN g/dL 9.7*   HEMATOCRIT % 29.6*   PLATELETS 10*3/mm3 288     Results from last 7 days   Lab Units 12/02/22  0435   SODIUM mmol/L 130*   POTASSIUM mmol/L 5.1   CHLORIDE mmol/L 93*   CO2 mmol/L 31.0*   BUN mg/dL 24*   CREATININE mg/dL 0.46*   GLUCOSE mg/dL 145*   CALCIUM mg/dL 9.1       Assessment    Gangrene of right foot s/p AKA 11/25/2022    Peripheral arterial disease (HCC)    Gangrene of left foot (HCC)    Osteomyelitis (HCC)    Hyperlipidemia LDL goal <70    Severe malnutrition (HCC)      Plan   Case management working on rehab/SNF placement-medically ready. Asking to go home with sister (Liliya) states she can take care of him at home  Will need to continue nutritional supplements at discharge (boost, ensure)  Continue daily dressing changes, nothing further to add at this time      Griselda Hill, JOSÉ MIGUEL  12/02/22  07:29 EST

## 2022-12-03 LAB
ANION GAP SERPL CALCULATED.3IONS-SCNC: 9 MMOL/L (ref 5–15)
BUN SERPL-MCNC: 23 MG/DL (ref 8–23)
BUN/CREAT SERPL: 56.1 (ref 7–25)
CALCIUM SPEC-SCNC: 8.3 MG/DL (ref 8.6–10.5)
CHLORIDE SERPL-SCNC: 92 MMOL/L (ref 98–107)
CO2 SERPL-SCNC: 27 MMOL/L (ref 22–29)
CREAT SERPL-MCNC: 0.41 MG/DL (ref 0.76–1.27)
DEPRECATED RDW RBC AUTO: 45 FL (ref 37–54)
EGFRCR SERPLBLD CKD-EPI 2021: 118.7 ML/MIN/1.73
ERYTHROCYTE [DISTWIDTH] IN BLOOD BY AUTOMATED COUNT: 14.2 % (ref 12.3–15.4)
GLUCOSE BLDC GLUCOMTR-MCNC: 176 MG/DL (ref 70–130)
GLUCOSE BLDC GLUCOMTR-MCNC: 187 MG/DL (ref 70–130)
GLUCOSE BLDC GLUCOMTR-MCNC: 230 MG/DL (ref 70–130)
GLUCOSE SERPL-MCNC: 188 MG/DL (ref 65–99)
HCT VFR BLD AUTO: 28.6 % (ref 37.5–51)
HGB BLD-MCNC: 9.2 G/DL (ref 13–17.7)
MCH RBC QN AUTO: 28 PG (ref 26.6–33)
MCHC RBC AUTO-ENTMCNC: 32.2 G/DL (ref 31.5–35.7)
MCV RBC AUTO: 86.9 FL (ref 79–97)
PHOSPHATE SERPL-MCNC: 4.1 MG/DL (ref 2.5–4.5)
PLATELET # BLD AUTO: 284 10*3/MM3 (ref 140–450)
PMV BLD AUTO: 10.5 FL (ref 6–12)
POTASSIUM SERPL-SCNC: 4.6 MMOL/L (ref 3.5–5.2)
RBC # BLD AUTO: 3.29 10*6/MM3 (ref 4.14–5.8)
SODIUM SERPL-SCNC: 128 MMOL/L (ref 136–145)
WBC NRBC COR # BLD: 11.82 10*3/MM3 (ref 3.4–10.8)

## 2022-12-03 PROCEDURE — 97530 THERAPEUTIC ACTIVITIES: CPT | Performed by: PHYSICAL THERAPIST

## 2022-12-03 PROCEDURE — 82962 GLUCOSE BLOOD TEST: CPT

## 2022-12-03 PROCEDURE — 85027 COMPLETE CBC AUTOMATED: CPT

## 2022-12-03 PROCEDURE — 97110 THERAPEUTIC EXERCISES: CPT

## 2022-12-03 PROCEDURE — 84100 ASSAY OF PHOSPHORUS: CPT

## 2022-12-03 PROCEDURE — 97535 SELF CARE MNGMENT TRAINING: CPT

## 2022-12-03 PROCEDURE — 80048 BASIC METABOLIC PNL TOTAL CA: CPT

## 2022-12-03 PROCEDURE — 25010000002 HEPARIN (PORCINE) PER 1000 UNITS: Performed by: PHYSICIAN ASSISTANT

## 2022-12-03 PROCEDURE — 99024 POSTOP FOLLOW-UP VISIT: CPT | Performed by: PHYSICIAN ASSISTANT

## 2022-12-03 RX ADMIN — GABAPENTIN 400 MG: 400 CAPSULE ORAL at 06:22

## 2022-12-03 RX ADMIN — ROSUVASTATIN 20 MG: 20 TABLET, FILM COATED ORAL at 22:47

## 2022-12-03 RX ADMIN — GABAPENTIN 400 MG: 400 CAPSULE ORAL at 22:47

## 2022-12-03 RX ADMIN — HEPARIN SODIUM 5000 UNITS: 5000 INJECTION INTRAVENOUS; SUBCUTANEOUS at 22:47

## 2022-12-03 RX ADMIN — ASPIRIN 81 MG: 81 TABLET, COATED ORAL at 08:14

## 2022-12-03 RX ADMIN — GABAPENTIN 400 MG: 400 CAPSULE ORAL at 13:55

## 2022-12-03 RX ADMIN — PANTOPRAZOLE SODIUM 40 MG: 40 TABLET, DELAYED RELEASE ORAL at 06:23

## 2022-12-03 RX ADMIN — METOPROLOL SUCCINATE 25 MG: 25 TABLET, EXTENDED RELEASE ORAL at 08:14

## 2022-12-03 RX ADMIN — SILVER SULFADIAZINE 1 APPLICATION: 10 CREAM TOPICAL at 08:14

## 2022-12-03 RX ADMIN — HEPARIN SODIUM 5000 UNITS: 5000 INJECTION INTRAVENOUS; SUBCUTANEOUS at 06:22

## 2022-12-03 RX ADMIN — OXYCODONE HYDROCHLORIDE AND ACETAMINOPHEN 1 TABLET: 10; 325 TABLET ORAL at 02:40

## 2022-12-03 RX ADMIN — OXYCODONE HYDROCHLORIDE AND ACETAMINOPHEN 1 TABLET: 10; 325 TABLET ORAL at 06:22

## 2022-12-03 RX ADMIN — POVIDONE-IODINE 1 EACH: 10 SOLUTION TOPICAL at 08:15

## 2022-12-03 RX ADMIN — OXYCODONE HYDROCHLORIDE AND ACETAMINOPHEN 1 TABLET: 10; 325 TABLET ORAL at 11:25

## 2022-12-03 RX ADMIN — POVIDONE-IODINE 1 EACH: 10 SOLUTION TOPICAL at 22:48

## 2022-12-03 RX ADMIN — LINAGLIPTIN 5 MG: 5 TABLET, FILM COATED ORAL at 08:14

## 2022-12-03 RX ADMIN — OXYCODONE HYDROCHLORIDE AND ACETAMINOPHEN 1 TABLET: 10; 325 TABLET ORAL at 22:47

## 2022-12-03 RX ADMIN — HEPARIN SODIUM 5000 UNITS: 5000 INJECTION INTRAVENOUS; SUBCUTANEOUS at 13:56

## 2022-12-03 RX ADMIN — OXYCODONE HYDROCHLORIDE AND ACETAMINOPHEN 1 TABLET: 10; 325 TABLET ORAL at 16:39

## 2022-12-03 RX ADMIN — LISINOPRIL 40 MG: 20 TABLET ORAL at 08:14

## 2022-12-03 NOTE — THERAPY TREATMENT NOTE
Patient Name: Severino Vera  : 1955    MRN: [id number]                              Today's Date: 12/3/2022       Admit Date: 2022    Visit Dx:     ICD-10-CM ICD-9-CM   1. Peripheral arterial disease (HCC)  I73.9 443.9   2. Gangrene of left foot (HCC)  I96 785.4   3. Gangrene of right foot (HCC)  I96 785.4     Patient Active Problem List   Diagnosis   • Peripheral arterial disease (HCC)   • Gangrene of left foot (HCC)   • Gangrene of right foot s/p AKA 2022   • Osteomyelitis (HCC)   • Hyperlipidemia LDL goal <70   • Severe malnutrition (HCC)     Past Medical History:   Diagnosis Date   • Arthritis    • Diabetes mellitus (HCC)    • Gangrene (HCC)     BILATERAL FEET   • GERD (gastroesophageal reflux disease)    • Hyperlipidemia    • Hypertension    • Osteomyelitis (HCC)    • PAD (peripheral artery disease) (HCC)    • Paraplegia (HCC)    • Peripheral vascular disease (HCC)    • Self-catheterizes urinary bladder      Past Surgical History:   Procedure Laterality Date   • ABOVE KNEE AMPUTATION Right 2022    Procedure: AMPUTATION ABOVE KNEE RIGHT;  Surgeon: Nemesio Haji MD;  Location: [facility];  Service: Vascular;  Laterality: Right;   • OTHER SURGICAL HISTORY Right     stent placement   • SHOULDER SURGERY Left     ROTATOR CUFF REPAIR      General Information     Row Name 2233          OT Time and Intention    Document Type therapy note (daily note)  -TA     Mode of Treatment occupational therapy  -TA     Row Name 22          General Information    Patient Profile Reviewed yes  -TA     Existing Precautions/Restrictions fall  R AKA, L foot wound, paraplegia  -TA     Barriers to Rehab medically complex;previous functional deficit  -TA     Row Name 22          Occupational Profile    Reason for Services/Referral (Occupational Profile) fxl decline from PLOF  -TA     Row Name 22          Cognition    Orientation Status (Cognition) oriented x 4  -TA      Row Name 12/03/22 0933          Safety Issues, Functional Mobility    Safety Issues Affecting Function (Mobility) safety precautions follow-through/compliance  -TA     Impairments Affecting Function (Mobility) endurance/activity tolerance;strength;balance  -TA           User Key  (r) = Recorded By, (t) = Taken By, (c) = Cosigned By    Initials Name Provider Type    Anders Stewart, OT Occupational Therapist                 Mobility/ADL's     Row Name 12/03/22 0933          Bed Mobility    Bed Mobility supine-sit;sit-supine  -TA     Scooting/Bridging Silva (Bed Mobility) standby assist;verbal cues  to scoot up in bed  -TA     Supine-Sit Silva (Bed Mobility) standby assist  -TA     Sit-Supine Silva (Bed Mobility) standby assist  -TA     Assistive Device (Bed Mobility) bed rails  -TA     Row Name 12/03/22 0933          Sit-Stand Transfer    Sit-Stand Silva (Transfers) not tested  -TA     Row Name 12/03/22 0933          Activities of Daily Living    BADL Assessment/Intervention grooming;upper body dressing  -TA     Row Name 12/03/22 0933          Mobility    Extremity Weight-bearing Status right lower extremity  -TA     Right Lower Extremity (Weight-bearing Status) non weight-bearing (NWB)  -TA     Row Name 12/03/22 0933          Grooming Assessment/Training    Silva Level (Grooming) wash face, hands;oral care regimen;hair care, combing/brushing;set up;supervision  -TA     Position (Grooming) edge of bed sitting  -TA     Row Name 12/03/22 0933          Upper Body Dressing Assessment/Training    Silva Level (Upper Body Dressing) doff;[facility]  hospital gown  -TA     Position (Upper Body Dressing) sitting up in bed  -TA           User Key  (r) = Recorded By, (t) = Taken By, (c) = Cosigned By    Initials Name Provider Type    Anders Stewart, OT Occupational Therapist               Obj/Interventions     Row Name 12/03/22 0933          Sensory Assessment (Somatosensory)     Sensory Assessment (Somatosensory) bilateral UE;sensation intact  -TA     Row Name 12/03/22 0933          Vision Assessment/Intervention    Visual Impairment/Limitations WFL  -TA     Row Name 12/03/22 0933          Shoulder (Therapeutic Exercise)    Shoulder AROM (Therapeutic Exercise) bilateral;flexion;extension;horizontal aBduction/aDduction;scapular retraction;sitting;15 repititions  -TA     Shoulder Strengthening (Therapeutic Exercise) resistance band;yellow  yellow Tband  -TA     Row Name 12/03/22 0933          Elbow/Forearm (Therapeutic Exercise)    Elbow/Forearm (Therapeutic Exercise) AROM (active range of motion)  -TA     Elbow/Forearm AROM (Therapeutic Exercise) bilateral;flexion;extension;15 repititions;sitting  -TA     Elbow/Forearm Strengthening (Therapeutic Exercise) resistance band;yellow  -TA     Row Name 12/03/22 0933          Hand (Therapeutic Exercise)    Hand (Therapeutic Exercise) AROM (active range of motion)  -TA     Hand AROM/AAROM (Therapeutic Exercise) bilateral;AROM (active range of motion);finger flexion;finger extension;15 repititions  overhead  squeezes  -TA     Row Name 12/03/22 0933          Motor Skills    Therapeutic Exercise shoulder;elbow/forearm;hand  -TA     Row Name 12/03/22 0933          Balance    Balance Assessment sitting dynamic balance  -TA     Dynamic Sitting Balance supervision  -TA     Position, Sitting Balance sitting edge of bed  -TA     Balance Interventions sitting;dynamic;weight shifting activity;occupation based/functional task;UE activity with balance activity  -TA           User Key  (r) = Recorded By, (t) = Taken By, (c) = Cosigned By    Initials Name Provider Type    TA Anders Santiago OT Occupational Therapist               Goals/Plan     Row Name 12/03/22 0933          Therapy Assessment/Plan (OT)    Planned Therapy Interventions (OT) activity tolerance training;BADL retraining;functional balance retraining;occupation/activity based  interventions;patient/caregiver education/training;ROM/therapeutic exercise;strengthening exercise;transfer/mobility retraining  -TA           User Key  (r) = Recorded By, (t) = Taken By, (c) = Cosigned By    Initials Name Provider Type    Anders Stewart, OT Occupational Therapist               Clinical Impression     Row Name 12/03/22 0933          Pain Assessment    Pretreatment Pain Rating 3/10  -TA     Posttreatment Pain Rating 3/10  -TA     Pain Location - Side/Orientation Right  -TA     Pain Location lower  -TA     Pain Location - extremity  -TA     Pre/Posttreatment Pain Comment Pt tolerated  -TA     Pain Intervention(s) Ambulation/increased activity;Repositioned  -TA     Row Name 12/03/22 0933          Plan of Care Review    Plan of Care Reviewed With patient  -TA     Progress improving  -TA     Outcome Evaluation VSS; Pt completed supine<>sit at EOB with SBA; at EOB pt completed wash face/hands, oral care, comb hair. Also completed 15 reps BUE strengthening ex's 15 reps all ex's with yellow Tband to support fxl I with ADLs. Continue per OT POC. Recommend SNF for rehab at discharge.  -TA     Row Name 12/03/22 0933          Therapy Assessment/Plan (OT)    Patient/Family Therapy Goal Statement (OT) rehab  -TA     Rehab Potential (OT) good, to achieve stated therapy goals  -TA     Criteria for Skilled Therapeutic Interventions Met (OT) yes;skilled treatment is necessary  -TA     Therapy Frequency (OT) daily  -TA     Predicted Duration of Therapy Intervention (OT) 5 days  -TA     Row Name 12/03/22 0933          Therapy Plan Review/Discharge Plan (OT)    Anticipated Discharge Disposition (OT) skilled nursing facility  -TA     Row Name 12/03/22 0933          Vital Signs    Pre Systolic BP Rehab --  VSS; RN cleared pt for tx  -TA     Pre Patient Position Supine  -TA     Intra Patient Position Sitting  -TA     Post Patient Position Supine  -TA     Row Name 12/03/22 0933          Positioning and Restraints     Pre-Treatment Position in bed  -TA     Post Treatment Position bed  -TA     In Bed notified nsg;fowlers;encouraged to call for assist;call light within reach;exit alarm on;L waffle boot;side rails up x2  -TA           User Key  (r) = Recorded By, (t) = Taken By, (c) = Cosigned By    Initials Name Provider Type    Anders Stewart OT Occupational Therapist               Outcome Measures     Row Name 12/03/22 0933          How much help from another is currently needed...    Putting on and taking off regular lower body clothing? 3  -TA     Bathing (including washing, rinsing, and drying) 3  -TA     Toileting (which includes using toilet bed pan or urinal) 3  -TA     Putting on and taking off regular upper body clothing 3  -TA     Taking care of personal grooming (such as brushing teeth) 3  -TA     Eating meals 4  -TA     AM-PAC 6 Clicks Score (OT) 19  -TA     Row Name 12/03/22 0933          Functional Assessment    Outcome Measure Options AM-PAC 6 Clicks Daily Activity (OT)  -TA           User Key  (r) = Recorded By, (t) = Taken By, (c) = Cosigned By    Initials Name Provider Type    Anders Stewart OT Occupational Therapist                Occupational Therapy Education     Title: PT OT SLP Therapies (Done)     Topic: Occupational Therapy (Done)     Point: ADL training (Done)     Description:   Instruct learner(s) on proper safety adaptation and remediation techniques during self care or transfers.   Instruct in proper use of assistive devices.              Learning Progress Summary           Patient Acceptance, E,D,H,TB, VU,DU,NR by KF at 12/2/2022 1527    Comment: BUE ther ex to progress towards functional transfers via transfer board in future sessions    Acceptance, E,TB,D, VU,NR by HM at 11/30/2022 1552    Acceptance, E, VU,NR by AN at 11/28/2022 1316                   Point: Home exercise program (Done)     Description:   Instruct learner(s) on appropriate technique for monitoring, assisting  and/or progressing therapeutic exercises/activities.              Learning Progress Summary           Patient Acceptance, E,D,H,TB, VU,DU,NR by KF at 12/2/2022 1527    Comment: BUE ther ex to progress towards functional transfers via transfer board in future sessions                   Point: Precautions (Done)     Description:   Instruct learner(s) on prescribed precautions during self-care and functional transfers.              Learning Progress Summary           Patient Acceptance, E,D,H,TB, VU,DU,NR by KF at 12/2/2022 1527    Comment: BUE ther ex to progress towards functional transfers via transfer board in future sessions    Acceptance, E,TB,D, VU,NR by  at 11/30/2022 1552    Acceptance, E, VU,NR by AN at 11/28/2022 1316                   Point: Body mechanics (Done)     Description:   Instruct learner(s) on proper positioning and spine alignment during self-care, functional mobility activities and/or exercises.              Learning Progress Summary           Patient Acceptance, E,D,H,TB, VU,DU,NR by KF at 12/2/2022 1527    Comment: BUE ther ex to progress towards functional transfers via transfer board in future sessions    Acceptance, E,TB,D, VU,NR by  at 11/30/2022 1552    Acceptance, E, VU,NR by AN at 11/28/2022 1316                               User Key     Initials Effective Dates Name Provider Type Discipline     10/25/22 -  Marnie Pizarro, OT Occupational Therapist OT    KF 06/16/21 -  Vicki Humphreys, OT Occupational Therapist OT    AN 09/21/21 -  Ana Barnhart OT Occupational Therapist OT              OT Recommendation and Plan  Planned Therapy Interventions (OT): activity tolerance training, BADL retraining, functional balance retraining, occupation/activity based interventions, patient/caregiver education/training, ROM/therapeutic exercise, strengthening exercise, transfer/mobility retraining  Therapy Frequency (OT): daily  Plan of Care Review  Plan of Care Reviewed With:  patient  Progress: improving  Outcome Evaluation: VSS; Pt completed supine<>sit at EOB with SBA; at EOB pt completed wash face/hands, oral care, comb hair. Also completed 15 reps BUE strengthening ex's 15 reps all ex's with yellow Tband to support fxl I with ADLs. Continue per OT POC. Recommend SNF for rehab at discharge.     Time Calculation:    Time Calculation- OT     Row Name 12/03/22 0933             Time Calculation- OT    OT Start Time 0933  ttc 32 minutes  -TA      Total Timed Code Minutes- OT 32 minute(s)  -TA      OT Received On 12/03/22  -TA      OT Goal Re-Cert Due Date 12/08/22  -TA            User Key  (r) = Recorded By, (t) = Taken By, (c) = Cosigned By    Initials Name Provider Type    TA Anders Santiago OT Occupational Therapist              Therapy Charges for Today     Code Description Service Date Service Provider Modifiers Qty    83273654365 HC OT THER PROC EA 15 MIN 12/3/2022 Anders Santiago OT GO 1    [id number] HC OT SELF CARE/MGMT/TRAIN EA 15 MIN 12/3/2022 Anders Santiago OT GO 1               Anders Santiago OT  12/3/2022

## 2022-12-03 NOTE — PLAN OF CARE
Problem: Adult Inpatient Plan of Care  Goal: Plan of Care Review  Recent Flowsheet Documentation  Taken 12/3/2022 0933 by Anders Santiago, OT  Progress: improving  Plan of Care Reviewed With: patient  Outcome Evaluation:   VSS   Pt completed supine<>sit at EOB with SBA   at EOB pt completed wash face/hands, oral care, comb hair. Also completed 15 reps BUE strengthening ex's 15 reps all ex's with yellow Tband to support fxl I with ADLs. Continue per OT POC. Recommend SNF for rehab at discharge.   Goal Outcome Evaluation:  Plan of Care Reviewed With: patient        Progress: improving  Outcome Evaluation: VSS; Pt completed supine<>sit at EOB with SBA; at EOB pt completed wash face/hands, oral care, comb hair. Also completed 15 reps BUE strengthening ex's 15 reps all ex's with yellow Tband to support fxl I with ADLs. Continue per OT POC. Recommend SNF for rehab at discharge.

## 2022-12-03 NOTE — PROGRESS NOTES
"    [facility]  CLINICAL REVIEW NOTE    Patient Name: Severino Vera  : 1955  MRN: [id number]    Date of Admission: 2022  Length of Stay: 8  Attending Service:  CTS    Reviewed hospital course. Labs reviewed and note his slightly lower sodium. 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 Will start fluid restriction and monitor labs.    Patient's labs, charting, and events reviewed.  No active medical management issues to address today, the Hospitalist team will continue to follow daily, be available for any needs, and see or bill for services as needed.      if \"pending\" use the bhesig process      "

## 2022-12-03 NOTE — PROGRESS NOTES
[facility] Cardiothoracic Surgery In-Patient Progress Note     #8 Days Post-Op s/p right AKA     LOS: 8 days       Subjective  No acute events overnight. Awaiting rehab/SNF bed.       Objective  Vital Signs  Temp:  [97 °F (36.1 °C)-98.3 °F (36.8 °C)] 98.3 °F (36.8 °C)  Heart Rate:  [68-81] 81  Resp:  [18] 18  BP: (104-138)/(66-83) 125/71      Physical Exam:   General Appearance: alert, appears stated age and  cooperative   Lungs: respirations regular, respirations even and respirations unlabored   Heart: normal S1, S2, no murmur, no gallop, no rub and no click   Skin: Incision c/d/i, no drainage, hematoma, warmth,  or tenderness     Results     Results from last 7 days   Lab Units 12/03/22  0602   WBC 10*3/mm3 11.82*   HEMOGLOBIN g/dL 9.2*   HEMATOCRIT % 28.6*   PLATELETS 10*3/mm3 284     Results from last 7 days   Lab Units 12/02/22  0435   SODIUM mmol/L 130*   POTASSIUM mmol/L 5.1   CHLORIDE mmol/L 93*   CO2 mmol/L 31.0*   BUN mg/dL 24*   CREATININE mg/dL 0.46*   GLUCOSE mg/dL 145*   CALCIUM mg/dL 9.1       Assessment    Gangrene of right foot s/p AKA 11/25/2022    Peripheral arterial disease (HCC)    Gangrene of left foot (HCC)    Osteomyelitis (HCC)    Hyperlipidemia LDL goal <70    Severe malnutrition (HCC)  Continue  nutrition for severe malnutrition  Wheelchair-bound  Plan   Continue  protection of the right AKA  Case management working on rehab/SNF placement-medically ready. Asking to go home with sister (Liliya) states she can take care of him at home  Will need to continue nutritional supplements at discharge (boost, ensure)  Continue daily dressing changes, nothing further to add at this time      Neville Smith PA-C  12/03/22  07:36 EST

## 2022-12-03 NOTE — PLAN OF CARE
Goal Outcome Evaluation:   Pt alert and oriented x4, vs stable, and on room air. Pt waiting for d/c to SNF. Call light within reach.

## 2022-12-03 NOTE — THERAPY TREATMENT NOTE
Patient Name: Severino Vera  : 1955    MRN: [id number]                              Today's Date: 12/3/2022       Admit Date: 2022    Visit Dx:     ICD-10-CM ICD-9-CM   1. Peripheral arterial disease (HCC)  I73.9 443.9   2. Gangrene of left foot (HCC)  I96 785.4   3. Gangrene of right foot (HCC)  I96 785.4     Patient Active Problem List   Diagnosis   • Peripheral arterial disease (HCC)   • Gangrene of left foot (HCC)   • Gangrene of right foot s/p AKA 2022   • Osteomyelitis (HCC)   • Hyperlipidemia LDL goal <70   • Severe malnutrition (HCC)     Past Medical History:   Diagnosis Date   • Arthritis    • Diabetes mellitus (HCC)    • Gangrene (HCC)     BILATERAL FEET   • GERD (gastroesophageal reflux disease)    • Hyperlipidemia    • Hypertension    • Osteomyelitis (HCC)    • PAD (peripheral artery disease) (HCC)    • Paraplegia (HCC)    • Peripheral vascular disease (HCC)    • Self-catheterizes urinary bladder      Past Surgical History:   Procedure Laterality Date   • ABOVE KNEE AMPUTATION Right 2022    Procedure: AMPUTATION ABOVE KNEE RIGHT;  Surgeon: Nemesio Haji MD;  Location: [facility];  Service: Vascular;  Laterality: Right;   • OTHER SURGICAL HISTORY Right     stent placement   • SHOULDER SURGERY Left     ROTATOR CUFF REPAIR      General Information     Row Name 22 1447          Physical Therapy Time and Intention    Document Type therapy note (daily note)  -LM     Mode of Treatment individual therapy;physical therapy  -LM     Row Name 22 1447          General Information    Patient Profile Reviewed yes  -LM     Existing Precautions/Restrictions fall;other (see comments)  R AKA - ; L Foot Wounds; Paraplegic  -LM     Row Name 22 1447          Cognition    Orientation Status (Cognition) oriented x 4  -LM     Row Name 22 1447          Safety Issues, Functional Mobility    Safety Issues Affecting Function (Mobility) safety precautions  follow-through/compliance  -LM     Impairments Affecting Function (Mobility) endurance/activity tolerance;strength;balance  -LM           User Key  (r) = Recorded By, (t) = Taken By, (c) = Cosigned By    Initials Name Provider Type    Sharon Colin PT Physical Therapist               Mobility     Row Name 12/03/22 1449          Bed Mobility    Rolling Right [place] (Bed Mobility) modified independence  -LM     Scooting/Bridging [place] (Bed Mobility) modified independence  -LM     Supine-Sit [place] (Bed Mobility) modified independence  -LM     Sit-Supine [place] (Bed Mobility) modified independence  -LM     Assistive Device (Bed Mobility) bed rails  -LM     Comment, (Bed Mobility) Pt able to complete a full sit up with trunk from supine.  Pt then used his UE's to bring LEs off the side of the bed.  Pt did well with scooting.  No issues or LOB noted throughout.  Had long discussion with pt re: positioning and lying in sidelying at times to fully offload buttocks.  Pt turned to the R side prior to PT exiting.  PT placed pillow between LEs and made sure RLE was positioned well.  Pillows placed behind pt's back for support.  -     Row Name 12/03/22 1446          Bed-Chair Transfer    Bed-Chair [place] (Transfers) not tested  -LM     Comment, (Bed-Chair Transfer) I offered to bring pt's electric w/c up beside bed and work on transfers.  Despite max encouragement, pt declined stating he won't have a problem doing it so theres no point in doing it right now.  -     Row Name 12/03/22 1448          Gait/Stairs (Locomotion)    Comment, (Gait/Stairs) Non-ambulatory at baseline.  Pt is a paraplegic and now a R above knee amputee.  -LM           User Key  (r) = Recorded By, (t) = Taken By, (c) = Cosigned By    Initials Name Provider Type    Sharon Colin PT Physical Therapist               Obj/Interventions     Row Name 12/03/22 7076          Hip (Therapeutic Exercise)    Hip (Therapeutic  Exercise) PROM (passive range of motion)  -LM     Hip PROM (Therapeutic Exercise) bilateral;flexion;aBduction;aDduction;supine;10 repetitions  Discussed importance of lying completely in supine or in prone at times to stretch hip flexor  -LM     Row Name 12/03/22 1454          Balance    Static Sitting Balance independent  -LM     Position, Sitting Balance unsupported;sitting edge of bed  -LM     Comment, Balance Pt sat EOB ~10 minutes independently.  -LM           User Key  (r) = Recorded By, (t) = Taken By, (c) = Cosigned By    Initials Name Provider Type    LM Sharon Nath, PT Physical Therapist               Goals/Plan    No documentation.                Clinical Impression     Row Name 12/03/22 1455          Pain    Pretreatment Pain Rating 5/10  -LM     Posttreatment Pain Rating 5/10  -LM     Pain Location - neck  -LM     Pain Intervention(s) Repositioned;Ambulation/increased activity  -LM     Row Name 12/03/22 1457          Plan of Care Review    Plan of Care Reviewed With patient  -LM     Progress improving  -LM     Outcome Evaluation Pt progressing well with PT goals.  Pt transferred supine<-->sit modified independently and sat EOB ~10 minutes without issue.  I offered to bring pt's electric w/c up to bedside to work on transfers, but pt declined at this time stating he won't have any issues.  Discussed importance of working on transfers now that pt is an amputee as center of gravity will have changed.  PROM of B hips completed once back in supine.  Also discussed importance of positioning while in bed.  Continue to recommend SNF at d/c.  -LM     Row Name 12/03/22 1452          Vital Signs    Pre Systolic BP Rehab 110  -LM     Pre Treatment Diastolic BP 51  -LM     Pretreatment Heart Rate (beats/min) 80  -LM     Posttreatment Heart Rate (beats/min) 78  -LM     Pre SpO2 (%) 97  -LM     O2 Delivery Pre Treatment room air  -LM     Post SpO2 (%) 97  -LM     O2 Delivery Post Treatment room air  -LM     Pre  Patient Position Supine  -LM     Post Patient Position Supine  -LM     Row Name 12/03/22 1455          Positioning and Restraints    Pre-Treatment Position in bed  -LM     Post Treatment Position bed  -LM     In Bed side lying right;call light within reach;encouraged to call for assist;exit alarm on;pillow between legs;notified nsg  -LM           User Key  (r) = Recorded By, (t) = Taken By, (c) = Cosigned By    Initials Name Provider Type    Sharon Colin, SUSHIL Physical Therapist               Outcome Measures     Row Name 12/03/22 1500 12/03/22 0815       How much help from another person do you currently need...    Turning from your back to your side while in flat bed without using bedrails? 3  -LM 3  -JS    Moving from lying on back to sitting on the side of a flat bed without bedrails? 3  -LM 3  -JS    Moving to and from a bed to a chair (including a wheelchair)? 3  -LM 3  -JS    Standing up from a chair using your arms (e.g., wheelchair, bedside chair)? 1  -LM 1  -JS    Climbing 3-5 steps with a railing? 1  -LM 1  -JS    To walk in hospital room? 1  -LM 1  -JS    AM-PAC 6 Clicks Score (PT) 12  -LM 12  -JS    Highest level of mobility 4 --> Transferred to chair/commode  -LM 4 --> Transferred to chair/commode  -JS    Row Name 12/03/22 1500 12/03/22 0933       Functional Assessment    Outcome Measure Options AM-PAC 6 Clicks Basic Mobility (PT)  -LM AM-PAC 6 Clicks Daily Activity (OT)  -TA          User Key  (r) = Recorded By, (t) = Taken By, (c) = Cosigned By    Initials Name Provider Type    Sharon Colin, PT Physical Therapist    Anders Stewart OT Occupational Therapist    Claudette Skinner, RN Registered Nurse                             Physical Therapy Education     Title: PT OT SLP Therapies (Done)     Topic: Physical Therapy (Done)     Point: Mobility training (Done)     Learning Progress Summary           Patient Acceptance, E, VU by ML at 12/1/2022 1543    Acceptance, E, NR by KG at 11/29/2022  0945                   Point: Home exercise program (Done)     Learning Progress Summary           Patient Acceptance, E, VU by ML at 12/1/2022 1543                   Point: Body mechanics (Done)     Learning Progress Summary           Patient Acceptance, E, VU by ML at 12/1/2022 1543    Acceptance, E, NR by KG at 11/29/2022 0945                   Point: Precautions (Done)     Learning Progress Summary           Patient Acceptance, E, VU by ML at 12/1/2022 1543    Acceptance, E, NR by KG at 11/29/2022 0945                               User Key     Initials Effective Dates Name Provider Type Discipline    KG 05/22/20 -  Amy Miller, PT Physical Therapist PT    ML 04/22/21 -  Latasha Minaya Physical Therapist PT              PT Recommendation and Plan     Plan of Care Reviewed With: patient  Progress: improving  Outcome Evaluation: Pt progressing well with PT goals.  Pt transferred supine<-->sit modified independently and sat EOB ~10 minutes without issue.  I offered to bring pt's electric w/c up to bedside to work on transfers, but pt declined at this time stating he won't have any issues.  Discussed importance of working on transfers now that pt is an amputee as center of gravity will have changed.  PROM of B hips completed once back in supine.  Also discussed importance of positioning while in bed.  Continue to recommend SNF at d/c.     Time Calculation:    PT Charges     Row Name 12/03/22 1501             Time Calculation    Start Time 1420  -LM      PT Received On 12/03/22  -LM      PT Goal Re-Cert Due Date 12/09/22  -LM         Timed Charges    46983 - PT Therapeutic Activity Minutes 24  -LM         Total Minutes    Timed Charges Total Minutes 24  -LM       Total Minutes 24  -LM            User Key  (r) = Recorded By, (t) = Taken By, (c) = Cosigned By    Initials Name Provider Type    Sharon Colin, PT Physical Therapist              Therapy Charges for Today     Code Description Service Date Service  Provider Modifiers Qty    09062578903 HC PT THERAPEUTIC ACT EA 15 MIN 12/3/2022 Sharon Nath, PT GP 2          PT G-Codes  Outcome Measure Options: AM-PAC 6 Clicks Basic Mobility (PT)  AM-PAC 6 Clicks Score (PT): 12  AM-PAC 6 Clicks Score (OT): 19       Sharon Nath PT  12/3/2022

## 2022-12-03 NOTE — PLAN OF CARE
Goal Outcome Evaluation:  Plan of Care Reviewed With: patient        Progress: improving  Outcome Evaluation: Pt progressing well with PT goals.  Pt transferred supine<-->sit modified independently and sat EOB ~10 minutes without issue.  I offered to bring pt's electric w/c up to bedside to work on transfers, but pt declined at this time stating he won't have any issues.  Discussed importance of working on transfers now that pt is an amputee as center of gravity will have changed.  PROM of B hips completed once back in supine.  Also discussed importance of positioning while in bed.  Continue to recommend SNF at d/c.

## 2022-12-04 LAB
ANION GAP SERPL CALCULATED.3IONS-SCNC: 7 MMOL/L (ref 5–15)
BUN SERPL-MCNC: 23 MG/DL (ref 8–23)
BUN/CREAT SERPL: 44.2 (ref 7–25)
CALCIUM SPEC-SCNC: 8.6 MG/DL (ref 8.6–10.5)
CHLORIDE SERPL-SCNC: 96 MMOL/L (ref 98–107)
CO2 SERPL-SCNC: 30 MMOL/L (ref 22–29)
CREAT SERPL-MCNC: 0.52 MG/DL (ref 0.76–1.27)
DEPRECATED RDW RBC AUTO: 46.3 FL (ref 37–54)
EGFRCR SERPLBLD CKD-EPI 2021: 110.5 ML/MIN/1.73
ERYTHROCYTE [DISTWIDTH] IN BLOOD BY AUTOMATED COUNT: 14.5 % (ref 12.3–15.4)
GLUCOSE BLDC GLUCOMTR-MCNC: 154 MG/DL (ref 70–130)
GLUCOSE BLDC GLUCOMTR-MCNC: 223 MG/DL (ref 70–130)
GLUCOSE BLDC GLUCOMTR-MCNC: 227 MG/DL (ref 70–130)
GLUCOSE SERPL-MCNC: 142 MG/DL (ref 65–99)
HCT VFR BLD AUTO: 28.9 % (ref 37.5–51)
HGB BLD-MCNC: 9.3 G/DL (ref 13–17.7)
MCH RBC QN AUTO: 28.2 PG (ref 26.6–33)
MCHC RBC AUTO-ENTMCNC: 32.2 G/DL (ref 31.5–35.7)
MCV RBC AUTO: 87.6 FL (ref 79–97)
PLATELET # BLD AUTO: 317 10*3/MM3 (ref 140–450)
PMV BLD AUTO: 10 FL (ref 6–12)
POTASSIUM SERPL-SCNC: 4.2 MMOL/L (ref 3.5–5.2)
RBC # BLD AUTO: 3.3 10*6/MM3 (ref 4.14–5.8)
SODIUM SERPL-SCNC: 133 MMOL/L (ref 136–145)
WBC NRBC COR # BLD: 11.25 10*3/MM3 (ref 3.4–10.8)

## 2022-12-04 PROCEDURE — 99024 POSTOP FOLLOW-UP VISIT: CPT | Performed by: PHYSICIAN ASSISTANT

## 2022-12-04 PROCEDURE — 99232 SBSQ HOSP IP/OBS MODERATE 35: CPT | Performed by: INTERNAL MEDICINE

## 2022-12-04 PROCEDURE — 25010000002 HEPARIN (PORCINE) PER 1000 UNITS: Performed by: PHYSICIAN ASSISTANT

## 2022-12-04 PROCEDURE — 85027 COMPLETE CBC AUTOMATED: CPT

## 2022-12-04 PROCEDURE — 80048 BASIC METABOLIC PNL TOTAL CA: CPT

## 2022-12-04 PROCEDURE — 63710000001 INSULIN LISPRO (HUMAN) PER 5 UNITS: Performed by: INTERNAL MEDICINE

## 2022-12-04 PROCEDURE — 82962 GLUCOSE BLOOD TEST: CPT

## 2022-12-04 RX ORDER — DEXTROSE MONOHYDRATE 25 G/50ML
25 INJECTION, SOLUTION INTRAVENOUS
Status: DISCONTINUED | OUTPATIENT
Start: 2022-12-04 | End: 2022-12-05 | Stop reason: HOSPADM

## 2022-12-04 RX ORDER — INSULIN LISPRO 100 [IU]/ML
0-7 INJECTION, SOLUTION INTRAVENOUS; SUBCUTANEOUS
Status: DISCONTINUED | OUTPATIENT
Start: 2022-12-04 | End: 2022-12-05 | Stop reason: HOSPADM

## 2022-12-04 RX ORDER — NICOTINE POLACRILEX 4 MG
15 LOZENGE BUCCAL
Status: DISCONTINUED | OUTPATIENT
Start: 2022-12-04 | End: 2022-12-05 | Stop reason: HOSPADM

## 2022-12-04 RX ADMIN — HEPARIN SODIUM 5000 UNITS: 5000 INJECTION INTRAVENOUS; SUBCUTANEOUS at 15:54

## 2022-12-04 RX ADMIN — METOPROLOL SUCCINATE 25 MG: 25 TABLET, EXTENDED RELEASE ORAL at 09:40

## 2022-12-04 RX ADMIN — SILVER SULFADIAZINE 1 APPLICATION: 10 CREAM TOPICAL at 09:40

## 2022-12-04 RX ADMIN — POVIDONE-IODINE 1 EACH: 10 SOLUTION TOPICAL at 22:40

## 2022-12-04 RX ADMIN — HEPARIN SODIUM 5000 UNITS: 5000 INJECTION INTRAVENOUS; SUBCUTANEOUS at 06:34

## 2022-12-04 RX ADMIN — INSULIN LISPRO 3 UNITS: 100 INJECTION, SOLUTION INTRAVENOUS; SUBCUTANEOUS at 17:19

## 2022-12-04 RX ADMIN — OXYCODONE HYDROCHLORIDE AND ACETAMINOPHEN 1 TABLET: 10; 325 TABLET ORAL at 02:45

## 2022-12-04 RX ADMIN — POVIDONE-IODINE 1 EACH: 10 SOLUTION TOPICAL at 09:40

## 2022-12-04 RX ADMIN — ASPIRIN 81 MG: 81 TABLET, COATED ORAL at 09:40

## 2022-12-04 RX ADMIN — GABAPENTIN 400 MG: 400 CAPSULE ORAL at 06:34

## 2022-12-04 RX ADMIN — ZOLPIDEM TARTRATE 10 MG: 5 TABLET ORAL at 22:38

## 2022-12-04 RX ADMIN — GABAPENTIN 400 MG: 400 CAPSULE ORAL at 22:38

## 2022-12-04 RX ADMIN — OXYCODONE HYDROCHLORIDE AND ACETAMINOPHEN 1 TABLET: 10; 325 TABLET ORAL at 06:34

## 2022-12-04 RX ADMIN — PANTOPRAZOLE SODIUM 40 MG: 40 TABLET, DELAYED RELEASE ORAL at 06:34

## 2022-12-04 RX ADMIN — OXYCODONE HYDROCHLORIDE AND ACETAMINOPHEN 1 TABLET: 10; 325 TABLET ORAL at 12:48

## 2022-12-04 RX ADMIN — HEPARIN SODIUM 5000 UNITS: 5000 INJECTION INTRAVENOUS; SUBCUTANEOUS at 22:38

## 2022-12-04 RX ADMIN — LINAGLIPTIN 5 MG: 5 TABLET, FILM COATED ORAL at 09:39

## 2022-12-04 RX ADMIN — GABAPENTIN 400 MG: 400 CAPSULE ORAL at 15:55

## 2022-12-04 RX ADMIN — LISINOPRIL 40 MG: 20 TABLET ORAL at 09:39

## 2022-12-04 RX ADMIN — ROSUVASTATIN 20 MG: 20 TABLET, FILM COATED ORAL at 22:39

## 2022-12-04 RX ADMIN — OXYCODONE HYDROCHLORIDE AND ACETAMINOPHEN 1 TABLET: 10; 325 TABLET ORAL at 22:38

## 2022-12-04 RX ADMIN — OXYCODONE HYDROCHLORIDE AND ACETAMINOPHEN 1 TABLET: 10; 325 TABLET ORAL at 15:55

## 2022-12-04 NOTE — PROGRESS NOTES
[facility] Medicine Services  PROGRESS NOTE    Patient Name: Severino Vera  : 1955  MRN: [id number]    Date of Admission: 2022  Primary Care Physician: Anuel Rankin MD    Subjective   Subjective     CC:  Follow-up for right AKA    HPI:  I have seen and evaluated the patient this morning.  Feeling all right.  Pain is controlled.  No shortness of breath or chest pain    ROS:  General : no fevers, chills  CVS: No chest pain, palpitations  Respiratory: No cough, dyspnea  GI: No N/V/D, abd pain  10 point review of systems is negative except for what is mentioned in HPI    Objective   Objective     Vital Signs:   Temp:  [97.9 °F (36.6 °C)-99 °F (37.2 °C)] 99 °F (37.2 °C)  Heart Rate:  [70-88] 88  Resp:  [16-18] 18  BP: (105-116)/(60-68) 116/68     Physical Exam:  General: Comfortable, not in distress, conversant and cooperative  Head: Atraumatic and normocephalic  Eyes: No Icterus. No pallor  Ears:  Ears appear intact with no abnormalities noted  Throat: No oral lesions, no thrush  Neck: Supple, trachea midline  Lungs: Clear to auscultation bilaterally, equal air entry, no wheezing or crackles  Heart:  Normal S1 and S2, no murmur, no gallop, No JVD, no lower extremity swelling  Abdomen:  Soft, no tenderness, no organomegaly, normal bowel sounds, no organomegaly  Extremities: Right AKA.  Wound is covered with dressing.  No erythema noted or discharge  Skin: No bleeding, bruising or rash, normal skin turgor and elasticity  Neurologic: Cranial nerves appear intact with no evidence of facial asymmetry, normal motor and sensory functions in all 4 extremities  Psych: Alert and oriented x 3, normal mood    Results Reviewed:  LAB RESULTS:      Lab 22  0920 22  0602 22  0435 22  0859 22  0402   WBC 11.25* 11.82* 10.82* 8.08 10.17   HEMOGLOBIN 9.3* 9.2* 9.7* 9.0* 9.0*   HEMATOCRIT 28.9* 28.6* 29.6* 28.1* 27.3*   PLATELETS 317 284 288 261 202   MCV 87.6  Mother informed of d/c instructions, verbalizes understanding.       Husam Doan RN  10/14/22 4267 86.9 86.8 87.3 86.4         Lab 12/04/22  0920 12/03/22  0602 12/02/22  0435 12/01/22  0859 11/30/22  0402   SODIUM 133* 128* 130* 133* 127*   POTASSIUM 4.2 4.6 5.1 4.6 5.0   CHLORIDE 96* 92* 93* 96* 92*   CO2 30.0* 27.0 31.0* 33.0* 29.0   ANION GAP 7.0 9.0 6.0 4.0* 6.0   BUN 23 23 24* 26* 33*   CREATININE 0.52* 0.41* 0.46* 0.44* 0.62*   EGFR 110.5 118.7 114.6 116.2 104.8   GLUCOSE 142* 188* 145* 162* 185*   CALCIUM 8.6 8.3* 9.1 8.9 8.6   PHOSPHORUS  --  4.1  --   --   --                          Brief Urine Lab Results  (Last result in the past 365 days)      Color   Clarity   Blood   Leuk Est   Nitrite   Protein   CREAT   Urine HCG        11/25/22 1111 Yellow   Clear   Small (1+)   Negative   Negative   >=300 mg/dL (3+)                 Microbiology Results Abnormal     None          No radiology results from the last 24 hrs    Results for orders placed during the hospital encounter of 11/25/22    Adult Transthoracic Echo Complete w/ Color, Spectral and Contrast if Necessary Per Protocol    Interpretation Summary  •  Left ventricular systolic function is normal. Estimated left ventricular EF = 55%  •  Aortic sclerosis without aortic stenosis.  •  Trace tricuspid regurgitation with normal RVSP.      I have reviewed the medications:  Scheduled Meds:aspirin, 81 mg, Oral, Daily  gabapentin, 400 mg, Oral, Q8H  heparin (porcine), 5,000 Units, Subcutaneous, Q8H  linagliptin, 5 mg, Oral, Daily  lisinopril, 40 mg, Oral, Q24H  metoprolol succinate XL, 25 mg, Oral, Q24H  oxyCODONE-acetaminophen, 1 tablet, Oral, 5x Daily  pantoprazole, 40 mg, Oral, Daily  povidone-iodine, 1 application, Topical, BID  rosuvastatin, 20 mg, Oral, Nightly  silver sulfadiazine, 1 application, Topical, Daily      Continuous Infusions:lactated ringers, 9 mL/hr, Last Rate: Stopped (11/25/22 1645)  niCARdipine, 5-15 mg/hr      PRN Meds:.•  acetaminophen  •  dextrose  •  dextrose  •  glucagon (human recombinant)  •  magnesium sulfate **OR** magnesium  sulfate **OR** magnesium sulfate  •  Morphine  •  ondansetron **OR** ondansetron  •  potassium chloride  •  potassium chloride  •  sennosides-docusate  •  zolpidem    Assessment & Plan   Assessment & Plan     Active Hospital Problems    Diagnosis  POA   • **Gangrene of right foot s/p AKA 11/25/2022 [I96]  Yes   • Severe malnutrition (HCC) [E43]  Yes   • Hyperlipidemia LDL goal <70 [E78.5]  Unknown   • Osteomyelitis (HCC) [M86.9]  Yes   • Peripheral arterial disease (HCC) [I73.9]  Yes   • Gangrene of left foot (HCC) [I96]  Unknown      Resolved Hospital Problems   No resolved problems to display.        Brief Hospital Course to date:  Severino Vera is a 66 y.o. male with past medical history of severe peripheral arterial disease, essential hypertension, type 2 diabetes, dyslipidemia, paraplegia with urinary incontinence, tobacco use and alcohol use disorders, who presented to the hospital with bilateral foot wounds, and gangrene of the right foot status post above-knee amputation on 11/25/2022    Assessment and plan:  Right foot gangrene status post AKA 11/25/2022  Severe peripheral arterial disease  · Postoperative care per cardiothoracic surgery team  · Continue aspirin  · Awaiting acute rehab placement    Left foot gangrene  · Needs amputation at a later date    Essential hypertension  · Controlled, continue lisinopril and metoprolol    Type 2 diabetes  · A1c 5.7%  · Insulin sliding scale    Dyslipidemia  · Continue rosuvastatin    Paraplegia  Wheelchair-bound  Urine incontinence    Expected Discharge Location and Transportation: Acute rehab  Expected Discharge Date: Per primary team    DVT prophylaxis:  Medical DVT prophylaxis orders are present.     AM-PAC 6 Clicks Score (PT): 12 (12/03/22 1500)    CODE STATUS:   Code Status and Medical Interventions:   Ordered at: 11/25/22 6943     Code Status (Patient has no pulse and is not breathing):    CPR (Attempt to Resuscitate)     Medical Interventions (Patient has  pulse or is breathing):    Full Support     Release to patient:    Routine Release       Gricelda Hernandez MD  12/04/22

## 2022-12-04 NOTE — PROGRESS NOTES
[facility] Cardiothoracic Surgery In-Patient Progress Note     #9 Days Post-Op s/p right AKA     LOS: 9 days       Subjective  No acute events overnight. Awaiting rehab/SNF bed.       Objective  Vital Signs  Temp:  [98 °F (36.7 °C)-98.8 °F (37.1 °C)] 98 °F (36.7 °C)  Heart Rate:  [70-81] 71  Resp:  [16-18] 16  BP: (105-116)/(51-66) 114/63      Physical Exam:   General Appearance: alert, appears stated age and  cooperative   Lungs: respirations regular, respirations even and respirations unlabored   Heart: normal S1, S2, no murmur, no gallop, no rub and no click   Skin: Incision c/d/i, no drainage, hematoma, warmth,  or tenderness     Results     Results from last 7 days   Lab Units 12/03/22  0602   WBC 10*3/mm3 11.82*   HEMOGLOBIN g/dL 9.2*   HEMATOCRIT % 28.6*   PLATELETS 10*3/mm3 284     Results from last 7 days   Lab Units 12/03/22  0602   SODIUM mmol/L 128*   POTASSIUM mmol/L 4.6   CHLORIDE mmol/L 92*   CO2 mmol/L 27.0   BUN mg/dL 23   CREATININE mg/dL 0.41*   GLUCOSE mg/dL 188*   CALCIUM mg/dL 8.3*       Assessment    Gangrene of right foot s/p AKA 11/25/2022    Peripheral arterial disease (HCC)    Gangrene of left foot (HCC)    Osteomyelitis (HCC)    Hyperlipidemia LDL goal <70    Severe malnutrition (HCC)  Continue  nutrition for severe malnutrition  Wheelchair-bound does not want to go to rehab wants to go home  Plan   Continue  protection of the right AKA  Case management working on rehab/SNF placement-medically ready. Asking to go home with sister (Liliya) states she can take care of him at home  Will need to continue nutritional supplements at discharge (boost, ensure)  Continue daily dressing changes, nothing further to add at this time      Neville Smith PA-C  12/04/22  06:28 EST

## 2022-12-05 ENCOUNTER — READMISSION MANAGEMENT (OUTPATIENT)
Dept: CALL CENTER | Facility: HOSPITAL | Age: 67
End: 2022-12-05

## 2022-12-05 VITALS
DIASTOLIC BLOOD PRESSURE: 60 MMHG | RESPIRATION RATE: 18 BRPM | SYSTOLIC BLOOD PRESSURE: 104 MMHG | TEMPERATURE: 99.1 F | OXYGEN SATURATION: 94 % | BODY MASS INDEX: 20.72 KG/M2 | WEIGHT: 153 LBS | HEIGHT: 72 IN | HEART RATE: 86 BPM

## 2022-12-05 LAB
ANION GAP SERPL CALCULATED.3IONS-SCNC: 8 MMOL/L (ref 5–15)
BASOPHILS # BLD AUTO: 0.08 10*3/MM3 (ref 0–0.2)
BASOPHILS NFR BLD AUTO: 0.7 % (ref 0–1.5)
BUN SERPL-MCNC: 24 MG/DL (ref 8–23)
BUN/CREAT SERPL: 63.2 (ref 7–25)
CALCIUM SPEC-SCNC: 8.9 MG/DL (ref 8.6–10.5)
CHLORIDE SERPL-SCNC: 96 MMOL/L (ref 98–107)
CO2 SERPL-SCNC: 30 MMOL/L (ref 22–29)
CREAT SERPL-MCNC: 0.38 MG/DL (ref 0.76–1.27)
DEPRECATED RDW RBC AUTO: 46.1 FL (ref 37–54)
EGFRCR SERPLBLD CKD-EPI 2021: 121.5 ML/MIN/1.73
EOSINOPHIL # BLD AUTO: 1.1 10*3/MM3 (ref 0–0.4)
EOSINOPHIL NFR BLD AUTO: 9 % (ref 0.3–6.2)
ERYTHROCYTE [DISTWIDTH] IN BLOOD BY AUTOMATED COUNT: 14.5 % (ref 12.3–15.4)
GLUCOSE BLDC GLUCOMTR-MCNC: 147 MG/DL (ref 70–130)
GLUCOSE BLDC GLUCOMTR-MCNC: 183 MG/DL (ref 70–130)
GLUCOSE SERPL-MCNC: 145 MG/DL (ref 65–99)
HCT VFR BLD AUTO: 29.1 % (ref 37.5–51)
HGB BLD-MCNC: 9.4 G/DL (ref 13–17.7)
IMM GRANULOCYTES # BLD AUTO: 0.05 10*3/MM3 (ref 0–0.05)
IMM GRANULOCYTES NFR BLD AUTO: 0.4 % (ref 0–0.5)
LYMPHOCYTES # BLD AUTO: 1.29 10*3/MM3 (ref 0.7–3.1)
LYMPHOCYTES NFR BLD AUTO: 10.6 % (ref 19.6–45.3)
MCH RBC QN AUTO: 28 PG (ref 26.6–33)
MCHC RBC AUTO-ENTMCNC: 32.3 G/DL (ref 31.5–35.7)
MCV RBC AUTO: 86.6 FL (ref 79–97)
MONOCYTES # BLD AUTO: 0.81 10*3/MM3 (ref 0.1–0.9)
MONOCYTES NFR BLD AUTO: 6.7 % (ref 5–12)
NEUTROPHILS NFR BLD AUTO: 72.6 % (ref 42.7–76)
NEUTROPHILS NFR BLD AUTO: 8.85 10*3/MM3 (ref 1.7–7)
NRBC BLD AUTO-RTO: 0 /100 WBC (ref 0–0.2)
PLATELET # BLD AUTO: 338 10*3/MM3 (ref 140–450)
PMV BLD AUTO: 9.9 FL (ref 6–12)
POTASSIUM SERPL-SCNC: 4.7 MMOL/L (ref 3.5–5.2)
RBC # BLD AUTO: 3.36 10*6/MM3 (ref 4.14–5.8)
SODIUM SERPL-SCNC: 134 MMOL/L (ref 136–145)
WBC NRBC COR # BLD: 12.18 10*3/MM3 (ref 3.4–10.8)

## 2022-12-05 PROCEDURE — 80048 BASIC METABOLIC PNL TOTAL CA: CPT

## 2022-12-05 PROCEDURE — 85025 COMPLETE CBC W/AUTO DIFF WBC: CPT | Performed by: INTERNAL MEDICINE

## 2022-12-05 PROCEDURE — 25010000002 HEPARIN (PORCINE) PER 1000 UNITS: Performed by: PHYSICIAN ASSISTANT

## 2022-12-05 PROCEDURE — 63710000001 INSULIN LISPRO (HUMAN) PER 5 UNITS: Performed by: INTERNAL MEDICINE

## 2022-12-05 PROCEDURE — 99231 SBSQ HOSP IP/OBS SF/LOW 25: CPT | Performed by: INTERNAL MEDICINE

## 2022-12-05 PROCEDURE — 82962 GLUCOSE BLOOD TEST: CPT

## 2022-12-05 PROCEDURE — 99024 POSTOP FOLLOW-UP VISIT: CPT

## 2022-12-05 PROCEDURE — 97530 THERAPEUTIC ACTIVITIES: CPT

## 2022-12-05 RX ADMIN — INSULIN LISPRO 2 UNITS: 100 INJECTION, SOLUTION INTRAVENOUS; SUBCUTANEOUS at 11:55

## 2022-12-05 RX ADMIN — SILVER SULFADIAZINE 1 APPLICATION: 10 CREAM TOPICAL at 08:29

## 2022-12-05 RX ADMIN — GABAPENTIN 400 MG: 400 CAPSULE ORAL at 06:23

## 2022-12-05 RX ADMIN — POVIDONE-IODINE 1 EACH: 10 SOLUTION TOPICAL at 08:29

## 2022-12-05 RX ADMIN — ASPIRIN 81 MG: 81 TABLET, COATED ORAL at 08:28

## 2022-12-05 RX ADMIN — OXYCODONE HYDROCHLORIDE AND ACETAMINOPHEN 1 TABLET: 10; 325 TABLET ORAL at 06:23

## 2022-12-05 RX ADMIN — HEPARIN SODIUM 5000 UNITS: 5000 INJECTION INTRAVENOUS; SUBCUTANEOUS at 06:23

## 2022-12-05 RX ADMIN — METOPROLOL SUCCINATE 25 MG: 25 TABLET, EXTENDED RELEASE ORAL at 08:28

## 2022-12-05 RX ADMIN — LINAGLIPTIN 5 MG: 5 TABLET, FILM COATED ORAL at 08:28

## 2022-12-05 RX ADMIN — PANTOPRAZOLE SODIUM 40 MG: 40 TABLET, DELAYED RELEASE ORAL at 06:23

## 2022-12-05 RX ADMIN — LISINOPRIL 40 MG: 20 TABLET ORAL at 08:28

## 2022-12-05 RX ADMIN — OXYCODONE HYDROCHLORIDE AND ACETAMINOPHEN 1 TABLET: 10; 325 TABLET ORAL at 11:56

## 2022-12-05 NOTE — CASE MANAGEMENT/SOCIAL WORK
Case Management Discharge Note      Final Note: Narda has orders for discharge.  Received a call from patients sister from [place] who is now staying with him and will stay until after he his other amputaion.  HH orders placed with [facility] choice[facility].  Called [facility] [phone] who is familiar with patient and will be accepting him.  Referral and orders faxed to [phone].  Spoke with patient at bedside regarding discharge plan and advised that [facility] was agreeable to accept him to service and orders have been faxed.  He will need [facility].  No other discharge needs verbalized.  Spoke with  who will call to scheduled patient for transport with [facility] as soon as he is ready for discharge from the hospital.  Patient plan is to discharge home with [facility] today via [facility].         Selected Continued Care - Admitted Since 11/25/2022     Destination    No services have been selected for the patient.              Durable Medical Equipment    No services have been selected for the patient.              Dialysis/Infusion    No services have been selected for the patient.              Home Medical Care Coordination complete.    Service Provider Selected Services Address Phone Fax Patient Preferred    [facility] centralized phone line, [phone] --          Therapy    No services have been selected for the patient.              Community Resources    No services have been selected for the patient.              Community & DME    No services have been selected for the patient.                       Final Discharge Disposition Code: 06 - home with home health care

## 2022-12-05 NOTE — PROGRESS NOTES
[facility] Cardiothoracic Surgery In-Patient Progress Note     #10 Days Post-Op s/p right AKA     LOS: 10 days       Subjective  No complaints, no acute events overnight.  Awaiting rehab bed.      Objective  Vital Signs  Temp:  [97.8 °F (36.6 °C)-99 °F (37.2 °C)] 97.9 °F (36.6 °C)  Heart Rate:  [69-88] 77  Resp:  [16-18] 16  BP: (106-124)/(61-68) 124/61      Physical Exam:   General Appearance: alert, appears stated age and  cooperative   Lungs: respirations regular, respirations even and respirations unlabored   Heart: normal S1, S2, no murmur, no gallop, no rub and no click   Skin: Incision c/d/i, no drainage, hematoma, warmth,  or tenderness     Results     Results from last 7 days   Lab Units 12/05/22  0642   WBC 10*3/mm3 12.18*   HEMOGLOBIN g/dL 9.4*   HEMATOCRIT % 29.1*   PLATELETS 10*3/mm3 338     Results from last 7 days   Lab Units 12/04/22  0920   SODIUM mmol/L 133*   POTASSIUM mmol/L 4.2   CHLORIDE mmol/L 96*   CO2 mmol/L 30.0*   BUN mg/dL 23   CREATININE mg/dL 0.52*   GLUCOSE mg/dL 142*   CALCIUM mg/dL 8.6       Assessment    Gangrene of right foot s/p AKA 11/25/2022    Peripheral arterial disease (HCC)    Gangrene of left foot (HCC)    Osteomyelitis (HCC)    Hyperlipidemia LDL goal <70    Severe malnutrition (HCC)  Continue  nutrition for severe malnutrition    Plan   Continue protection right AKA stump  Case management working on rehab/SNF placement-medically ready  Will need to continue nutritional supplements at discharge (boost, ensure)  Continue daily dressing changes, nothing further to add at this time      Griselda Hill, JOSÉ MIGUEL  12/05/22  07:39 EST

## 2022-12-05 NOTE — CASE MANAGEMENT/SOCIAL WORK
Continued Stay Note  [facility]     Patient Name: Severino Vera  MRN: [id number]  Today's Date: 12/5/2022    Admit Date: 11/25/2022       Discharge Plan     Row Name 12/05/22 1035       Plan    Plan Social work called [facility] [phone] and Mr. Vera is eligible for transportation and they would need to be called before 4 pm to schedule transportation.               Discharge Codes    No documentation.               Expected Discharge Date and Time     Expected Discharge Date Expected Discharge Time    Dec 5, 2022             JENIFER Lambert

## 2022-12-05 NOTE — DISCHARGE PLACEMENT REQUEST
"Annabelle Vera (66 y.o. Male)     Date of Birth   1955    Social Security Number       Address   [address] [place] [address]    Home Phone   [phone]    MRN   [id number]       Congregational   Jain    Marital Status   Single                            Admission Date   11/25/22    Admission Type   Elective    Admitting Provider   Nemesio Haji MD    Attending Provider   Nemesio Haji MD    Department, Room/Bed   [address], S462/1       Discharge Date       Discharge Disposition   Home or Self Care    Discharge Destination                               Attending Provider: Nemesio Haji MD    Allergies: No Known Allergies    Isolation: None   Infection: None   Code Status: CPR    Ht: 182.9 cm (72.01\")   Wt: 69.4 kg (153 lb)    Admission Cmt: None   Principal Problem: Gangrene of right foot s/p AKA 11/25/2022 [I96]                 Active Insurance as of 11/25/2022     Primary Coverage     Payor Plan Insurance Group Employer/Plan Group    AETNA MEDICARE REPLACEMENT AETNA MEDICARE REPLACEMENT [id number]     Payor Plan Address Payor Plan Phone Number Payor Plan Fax Number Effective Dates    [address] [phone]  1/1/2022 - None Entered    [place] [address]       Subscriber Name Subscriber Birth Date Member ID       ANNABELLE VERA 1955 [id number]           Secondary Coverage     Payor Plan Insurance Group Employer/Plan Group    [place]      Payor Plan Address Payor Plan Phone Number Payor Plan Fax Number Effective Dates    [address]   1/1/2014 - None Entered    [facility] [place] [address]       Subscriber Name Subscriber Birth Date Member ID       ANNABELLE VERA 1955 [id number]                 Emergency Contacts      (Rel.) Home Phone Work Phone Mobile Phone    Liliya Vera (Sister) [phone] -- --    STEF CASTRO (Sister) [phone] -- --    Vilma Jarvis -- -- [phone]    "         Insurance Information                AETNA MEDICARE REPLACEMENT/AETNA MEDICARE REPLACEMENT Phone: [phone]    Subscriber: Severino Vera Subscriber#: [id number]    Group#: [id number] Precert#: --        [place] Phone: --    Subscriber: Severino Vera Subscriber#: [id number]    Group#: -- Precert#: --             History & Physical      Katina Desir PA-C at 11/25/22 1118     Attestation signed by Nemesio Haji MD at 11/25/22 1412    I have reviewed this documentation and agree.                  Cardiothoracic Surgery History & Physical           Chief complaint: osteomyelitis    HPI:   Mr. Severino Vera is a 66-year-old man with history of diabetes, hypertension, hyperlipidemia, peripheral arterial disease status post stent 25 years ago, status post shoulder surgery and chronic arthritis, paraplegia with complete loss of sensation and function below the waist with bladder incontinence requiring chronic bladder catheterization who presents to the cardiovascular surgery clinic today for evaluation of wounds in the bilateral feet.  He reports having these wounds present and worsening over the last several years, and now these have progressed to dry gangrene bilaterally.  He has MRI evidence of osteomyelitis.  He reports that he does not stand or walk at all, and is completely wheelchair-bound.  He also reports that he is completely insensate, and cannot feel any sensation including a burn on his foot which she sustained years ago requiring partial amputations of his right toes.  He also reports chronic and progressively worsening upper extremity weakness and numbness in the fingertips that he believes is due to his neck and/or shoulders.  He reports a family history of a mother with a stroke and breast cancer, and brothers and sisters who are in good health.  He currently lives in [place] with multiple family members and smokes less than 1 pack/day  for decades, drinks 4 beers per day, and denies illicit drug use.     Patient has been admitted to telemetry in anticipation for bilateral above knee amputations today with Dr. Haji.  Patient states he has no sensation in either leg. Denies chest pain, shortness of breath. Admits to diarrhea related to recent antibiotic use. States he has no history of cardiac surgery.       Past Medical History:   Diagnosis Date   • Arthritis    • Diabetes mellitus (HCC)    • Gangrene (HCC)     BILATERAL FEET   • GERD (gastroesophageal reflux disease)    • Hyperlipidemia    • Hypertension    • Osteomyelitis (HCC)    • PAD (peripheral artery disease) (HCC)    • Paraplegia (HCC)    • Peripheral vascular disease (HCC)    • Self-catheterizes urinary bladder      Past Surgical History:   Procedure Laterality Date   • OTHER SURGICAL HISTORY Right     stent placement   • SHOULDER SURGERY Left     ROTATOR CUFF REPAIR     Family History   Problem Relation Age of Onset   • Hypertension Mother    • Stroke Mother    • Breast cancer Mother    • Hypertension Father    • Heart attack Father      Social History     Tobacco Use   • Smoking status: Every Day     Packs/day: 0.75     Years: 50.00     Pack years: 37.50     Types: Cigarettes   • Smokeless tobacco: Never   Vaping Use   • Vaping Use: Never used   Substance Use Topics   • Alcohol use: Yes     Comment: 3-4 beers daily   • Drug use: Not Currently       Medications Prior to Admission   Medication Sig Dispense Refill Last Dose   • atorvastatin (LIPITOR) 40 MG tablet Take 40 mg by mouth Daily.   11/24/2022   • clopidogrel (PLAVIX) 75 MG tablet Take 75 mg by mouth Daily.   Past Week   • gabapentin (NEURONTIN) 800 MG tablet Take 800 mg by mouth 4 (Four) Times a Day.   11/24/2022   • Januvia 100 MG tablet Take 100 mg by mouth Daily.   11/24/2022   • levoFLOXacin (LEVAQUIN) 750 MG tablet Take 750 mg by mouth Daily.   Past Week   • oxyCODONE-acetaminophen (PERCOCET)  MG per tablet Take 1  tablet by mouth 5 (Five) Times a Day.   11/24/2022   • pantoprazole (PROTONIX) 40 MG EC tablet Take 40 mg by mouth Daily.   11/24/2022   • quinapril (ACCUPRIL) 40 MG tablet Take 40 mg by mouth Daily.   11/24/2022   • rosuvastatin (CRESTOR) 10 MG tablet Take 10 mg by mouth Daily.   11/24/2022   • silver sulfadiazine (SILVADENE, SSD) 1 % cream Apply 1 application topically to the appropriate area as directed Daily.   11/24/2022   • zolpidem (AMBIEN) 10 MG tablet Take 10 mg by mouth every night at bedtime.   11/24/2022     Allergies:  Patient has no known allergies.    Review of Systems:  A comprehensive review of systems was negative except for:       Gastrointestinal: diarrhea    All other systems were reviewed and were negative.    Vital Signs:  Heart Rate:  [] 100    Physical Exam:  Physical Exam  Vitals and nursing note reviewed.   Constitutional:       Appearance: Normal appearance.   HENT:      Head: Normocephalic and atraumatic.      Mouth/Throat:      Mouth: Mucous membranes are moist.   Eyes:      Pupils: Pupils are equal, round, and reactive to light.   Cardiovascular:      Rate and Rhythm: Normal rate and regular rhythm.   Pulmonary:      Effort: Pulmonary effort is normal.      Breath sounds: Normal breath sounds.   Abdominal:      General: Abdomen is flat. Bowel sounds are normal.      Palpations: Abdomen is soft.   Musculoskeletal:      Comments: Upper legs are atrophic, numerous well healed scars. Lower legs are edematous and erythematous with both feet wrapped with Kerlex c/d/i    Skin:     Capillary Refill: Capillary refill takes 2 to 3 seconds.   Neurological:      Mental Status: He is alert and oriented to person, place, and time.   Psychiatric:         Mood and Affect: Mood normal.         Behavior: Behavior normal.         Labs:  Results from last 7 days   Lab Units 11/22/22  1558   WBC 10*3/mm3 11.63*   HEMOGLOBIN g/dL 12.6*   HEMATOCRIT % 38.8   PLATELETS 10*3/mm3 284     Results from last  7 days   Lab Units 11/22/22  1558   SODIUM mmol/L 133*   POTASSIUM mmol/L 4.0   CHLORIDE mmol/L 95*   CO2 mmol/L 30.0*   BUN mg/dL 9   CREATININE mg/dL 0.43*   GLUCOSE mg/dL 106*   CALCIUM mg/dL 9.1         Coagulation:   INR   Date Value Ref Range Status   11/22/2022 1.01 0.84 - 1.13 Final     Cardiac markers:     ABGs:       Invalid input(s): PO2    Imaging:   Outside imaging films reviewed      Assessment:   Patient Active Problem List   Diagnosis   • Peripheral arterial disease (HCC)   • Gangrene of left foot (HCC)   • Gangrene of right foot (HCC)   • Osteomyelitis (HCC)         Plan:   Patient to be admitted and kept NPO in anticipation of surgery today, pending cardiac clearance  Echo currently in progress, Dr. Coleman to review  If satisfactory then proceed with bilateral above knee amputation  Continue antibiotics      Katina Desir PA-C  11/25/22  11:18 EST            Electronically signed by Nemesio Haji MD at 11/25/22 1412         Current Facility-Administered Medications   Medication Dose Route Frequency Provider Last Rate Last Admin   • acetaminophen (TYLENOL) tablet 650 mg  650 mg Oral Q4H PRN Jose Danielson PA       • aspirin EC tablet 81 mg  81 mg Oral Daily Sathish Acosta IV, MD   81 mg at 12/05/22 0828   • dextrose (D50W) (25 g/50 mL) IV injection 25 g  25 g Intravenous Q15 Min PRN Gricelda Hernandez MD       • dextrose (GLUTOSE) oral gel 15 g  15 g Oral Q15 Min PRN Gricelda Hernandez MD       • gabapentin (NEURONTIN) capsule 400 mg  400 mg Oral Q8H Jose Danielson PA   400 mg at 12/05/22 0623   • glucagon (human recombinant) (GLUCAGEN DIAGNOSTIC) injection 1 mg  1 mg Intramuscular Q15 Min PRN Gricelda Hernandez MD       • heparin (porcine) 5000 UNIT/ML injection 5,000 Units  5,000 Units Subcutaneous Q8H Jose Danielson PA   5,000 Units at 12/05/22 0623   • Insulin Lispro (humaLOG) injection 0-7 Units  0-7 Units Subcutaneous TID AC Gricelda Hernandez MD   2 Units at  12/05/22 1155   • lactated ringers infusion  9 mL/hr Intravenous Continuous Jose Danielson PA   Stopped at 11/25/22 1645   • linagliptin (TRADJENTA) tablet 5 mg  5 mg Oral Daily Jose Danielson PA   5 mg at 12/05/22 0828   • lisinopril (PRINIVIL,ZESTRIL) tablet 40 mg  40 mg Oral Q24H Jose Danielson PA   40 mg at 12/05/22 0828   • Magnesium Sulfate 2 gram Bolus, followed by 8 gram infusion (total Mg dose 10 grams)- Mg less than or equal to 1mg/dL  2 g Intravenous PRN Nina Coleman MD        Or   • Magnesium Sulfate 2 gram / 50mL Infusion (GIVE X 3 BAGS TO EQUAL 6GM TOTAL DOSE) - Mg 1.1 - 1.5 mg/dl  2 g Intravenous PRN Nina Coleman MD 25 mL/hr at 11/25/22 2210 2 g at 11/25/22 2210    Or   • Magnesium Sulfate 4 gram infusion- Mg 1.6-1.9 mg/dL  4 g Intravenous PRN Nina Coleman MD 25 mL/hr at 11/26/22 1955 4 g at 11/26/22 1955   • metoprolol succinate XL (TOPROL-XL) 24 hr tablet 25 mg  25 mg Oral Q24H Nina Coleman MD   25 mg at 12/05/22 0828   • morphine injection 2 mg  2 mg Intravenous Q3H PRN Jose Danielson PA       • niCARdipine (CARDENE) 25mg in 250mL NS infusion  5-15 mg/hr Intravenous Titrated Jose Danielson PA       • ondansetron (ZOFRAN) tablet 4 mg  4 mg Oral Q6H PRN Jose Danielson PA        Or   • ondansetron (ZOFRAN) injection 4 mg  4 mg Intravenous Q6H PRN Jose Danielson PA       • oxyCODONE-acetaminophen (PERCOCET)  MG per tablet 1 tablet  1 tablet Oral 5x Daily Jose Danielson PA   1 tablet at 12/05/22 1156   • pantoprazole (PROTONIX) EC tablet 40 mg  40 mg Oral Daily Jose Danielson PA   40 mg at 12/05/22 0623   • potassium chloride (KLOR-CON) packet 40 mEq  40 mEq Oral PRN Nina Coleman MD       • potassium chloride (MICRO-K) CR capsule 40 mEq  40 mEq Oral PRN Nina Coleman MD       • povidone-iodine 10 % swab 1 each  1 application Topical BID Randall Purcell Jr., MD   1 each at 12/05/22 0829   • rosuvastatin (CRESTOR) tablet 20 mg  20 mg Oral Nightly Sathish Acosta IV, MD   20  mg at 22   • sennosides-docusate (PERICOLACE) 8.6-50 MG per tablet 2 tablet  2 tablet Oral BID PRN Jose Danielson PA       • silver sulfadiazine (SILVADENE, SSD) 1 % cream 1 application  1 application Topical Daily Jose Danielson PA   1 application at 22 0829   • zolpidem (AMBIEN) tablet 10 mg  10 mg Oral Nightly PRN Jose Danielson PA   10 mg at 22        Physician Progress Notes (most recent note)      Gricelda Hernandez MD at 22 0750              [facility] Medicine Services  PROGRESS NOTE    Patient Name: Severino Vera  : 1955  MRN: [id number]    Date of Admission: 2022  Primary Care Physician: Anuel Rankin MD    Subjective   Subjective     CC:  Follow-up for right AKA    HPI:  I have seen and evaluated the patient this morning.  Feeling okay.  Pain is well controlled.  Adamant to go home and declined rehab    ROS:  General : no fevers, chills  CVS: No chest pain, palpitations  Respiratory: No cough, dyspnea  GI: No N/V/D, abd pain  10 point review of systems is negative except for what is mentioned in HPI    Objective   Objective     Vital Signs:   Temp:  [97.8 °F (36.6 °C)-99 °F (37.2 °C)] 97.9 °F (36.6 °C)  Heart Rate:  [69-88] 77  Resp:  [16-18] 16  BP: (106-124)/(61-68) 124/61     Physical Exam:  General: Comfortable, not in distress, conversant and cooperative  Head: Atraumatic and normocephalic  Eyes: No Icterus. No pallor  Ears:  Ears appear intact with no abnormalities noted  Throat: No oral lesions, no thrush  Neck: Supple, trachea midline  Lungs: Clear to auscultation bilaterally, equal air entry, no wheezing or crackles  Heart:  Normal S1 and S2, no murmur, no gallop, No JVD, no lower extremity swelling  Abdomen:  Soft, no tenderness, no organomegaly, normal bowel sounds, no organomegaly  Extremities: Right AKA.  Wound is covered with dressing.  No erythema noted or discharge  Skin: No bleeding, bruising or rash,  normal skin turgor and elasticity  Neurologic: Cranial nerves appear intact with no evidence of facial asymmetry, normal motor and sensory functions in all 4 extremities  Psych: Alert and oriented x 3, normal mood    Results Reviewed:  LAB RESULTS:      Lab 12/05/22  0642 12/04/22  0920 12/03/22  0602 12/02/22  0435 12/01/22  0859   WBC 12.18* 11.25* 11.82* 10.82* 8.08   HEMOGLOBIN 9.4* 9.3* 9.2* 9.7* 9.0*   HEMATOCRIT 29.1* 28.9* 28.6* 29.6* 28.1*   PLATELETS 338 317 284 288 261   NEUTROS ABS 8.85*  --   --   --   --    IMMATURE GRANS (ABS) 0.05  --   --   --   --    LYMPHS ABS 1.29  --   --   --   --    MONOS ABS 0.81  --   --   --   --    EOS ABS 1.10*  --   --   --   --    MCV 86.6 87.6 86.9 86.8 87.3         Lab 12/04/22  0920 12/03/22  0602 12/02/22  0435 12/01/22  0859 11/30/22  0402   SODIUM 133* 128* 130* 133* 127*   POTASSIUM 4.2 4.6 5.1 4.6 5.0   CHLORIDE 96* 92* 93* 96* 92*   CO2 30.0* 27.0 31.0* 33.0* 29.0   ANION GAP 7.0 9.0 6.0 4.0* 6.0   BUN 23 23 24* 26* 33*   CREATININE 0.52* 0.41* 0.46* 0.44* 0.62*   EGFR 110.5 118.7 114.6 116.2 104.8   GLUCOSE 142* 188* 145* 162* 185*   CALCIUM 8.6 8.3* 9.1 8.9 8.6   PHOSPHORUS  --  4.1  --   --   --                          Brief Urine Lab Results  (Last result in the past 365 days)      Color   Clarity   Blood   Leuk Est   Nitrite   Protein   CREAT   Urine HCG        11/25/22 1111 Yellow   Clear   Small (1+)   Negative   Negative   >=300 mg/dL (3+)                 Microbiology Results Abnormal     None          No radiology results from the last 24 hrs    Results for orders placed during the hospital encounter of 11/25/22    Adult Transthoracic Echo Complete w/ Color, Spectral and Contrast if Necessary Per Protocol    Interpretation Summary  •  Left ventricular systolic function is normal. Estimated left ventricular EF = 55%  •  Aortic sclerosis without aortic stenosis.  •  Trace tricuspid regurgitation with normal RVSP.      I have reviewed the  medications:  Scheduled Meds:aspirin, 81 mg, Oral, Daily  gabapentin, 400 mg, Oral, Q8H  heparin (porcine), 5,000 Units, Subcutaneous, Q8H  insulin lispro, 0-7 Units, Subcutaneous, TID AC  linagliptin, 5 mg, Oral, Daily  lisinopril, 40 mg, Oral, Q24H  metoprolol succinate XL, 25 mg, Oral, Q24H  oxyCODONE-acetaminophen, 1 tablet, Oral, 5x Daily  pantoprazole, 40 mg, Oral, Daily  povidone-iodine, 1 application, Topical, BID  rosuvastatin, 20 mg, Oral, Nightly  silver sulfadiazine, 1 application, Topical, Daily      Continuous Infusions:lactated ringers, 9 mL/hr, Last Rate: Stopped (11/25/22 1645)  niCARdipine, 5-15 mg/hr      PRN Meds:.•  acetaminophen  •  dextrose  •  dextrose  •  glucagon (human recombinant)  •  magnesium sulfate **OR** magnesium sulfate **OR** magnesium sulfate  •  Morphine  •  ondansetron **OR** ondansetron  •  potassium chloride  •  potassium chloride  •  sennosides-docusate  •  zolpidem    Assessment & Plan   Assessment & Plan     Active Hospital Problems    Diagnosis  POA   • **Gangrene of right foot s/p AKA 11/25/2022 [I96]  Yes   • Severe malnutrition (HCC) [E43]  Yes   • Hyperlipidemia LDL goal <70 [E78.5]  Unknown   • Osteomyelitis (HCC) [M86.9]  Yes   • Peripheral arterial disease (HCC) [I73.9]  Yes   • Gangrene of left foot (HCC) [I96]  Unknown      Resolved Hospital Problems   No resolved problems to display.        Brief Hospital Course to date:  Severino Vera is a 66 y.o. male with past medical history of severe peripheral arterial disease, essential hypertension, type 2 diabetes, dyslipidemia, paraplegia with urinary incontinence, tobacco use and alcohol use disorders, who presented to the hospital with bilateral foot wounds, and gangrene of the right foot status post above-knee amputation on 11/25/2022    Assessment and plan:  Right foot gangrene status post AKA 11/25/2022  Severe peripheral arterial disease  · Postoperative care per cardiothoracic surgery team  · Continue  aspirin  · Patient declined rehab and wants to go home.   contacted to arrange for transportation    Left foot gangrene  · Needs amputation at a later date    Essential hypertension  · Controlled, continue lisinopril and metoprolol    Type 2 diabetes  · A1c 5.7%  · Insulin sliding scale    Dyslipidemia  · Continue rosuvastatin    Paraplegia  Wheelchair-bound  Urine incontinence    Expected Discharge Location and Transportation: Home  Expected Discharge Date: Per primary team    DVT prophylaxis:  Medical DVT prophylaxis orders are present.     AM-PAC 6 Clicks Score (PT): 12 (22 0806)    CODE STATUS:   Code Status and Medical Interventions:   Ordered at: 22 1730     Code Status (Patient has no pulse and is not breathing):    CPR (Attempt to Resuscitate)     Medical Interventions (Patient has pulse or is breathing):    Full Support     Release to patient:    Routine Release       Gricelda Hernandez MD  22                Electronically signed by Gricelda Hernandez MD at 22 1214          Physical Therapy Notes (most recent note)      Sharon Nath, PT at 22 1420  Version 1 of 1         Patient Name: Severino Vera  : 1955    MRN: [id number]                              Today's Date: 12/3/2022       Admit Date: 2022    Visit Dx:     ICD-10-CM ICD-9-CM   1. Peripheral arterial disease (HCC)  I73.9 443.9   2. Gangrene of left foot (HCC)  I96 785.4   3. Gangrene of right foot (HCC)  I96 785.4     Patient Active Problem List   Diagnosis   • Peripheral arterial disease (HCC)   • Gangrene of left foot (HCC)   • Gangrene of right foot s/p AKA 2022   • Osteomyelitis (HCC)   • Hyperlipidemia LDL goal <70   • Severe malnutrition (HCC)     Past Medical History:   Diagnosis Date   • Arthritis    • Diabetes mellitus (HCC)    • Gangrene (HCC)     BILATERAL FEET   • GERD (gastroesophageal reflux disease)    • Hyperlipidemia    • Hypertension    • Osteomyelitis  (HCC)    • PAD (peripheral artery disease) (HCC)    • Paraplegia (HCC)    • Peripheral vascular disease (HCC)    • Self-catheterizes urinary bladder      Past Surgical History:   Procedure Laterality Date   • ABOVE KNEE AMPUTATION Right 11/25/2022    Procedure: AMPUTATION ABOVE KNEE RIGHT;  Surgeon: Nemesio Haji MD;  Location[facility];  Service: Vascular;  Laterality: Right;   • OTHER SURGICAL HISTORY Right     stent placement   • SHOULDER SURGERY Left     ROTATOR CUFF REPAIR      General Information     Row Name 12/03/22 1447          Physical Therapy Time and Intention    Document Type therapy note (daily note)  -LM     Mode of Treatment individual therapy;physical therapy  -LM     Row Name 12/03/22 1447          General Information    Patient Profile Reviewed yes  -LM     Existing Precautions/Restrictions fall;other (see comments)  R AKA - 11/25; L Foot Wounds; Paraplegic  -LM     Row Name 12/03/22 1447          Cognition    Orientation Status (Cognition) oriented x 4  -LM     Row Name 12/03/22 1447          Safety Issues, Functional Mobility    Safety Issues Affecting Function (Mobility) safety precautions follow-through/compliance  -LM     Impairments Affecting Function (Mobility) endurance/activity tolerance;strength;balance  -LM           User Key  (r) = Recorded By, (t) = Taken By, (c) = Cosigned By    Initials Name Provider Type    LM Sharon Nath PT Physical Therapist               Mobility     Row Name 12/03/22 1449          Bed Mobility    Rolling Right Stephenson (Bed Mobility) modified independence  -LM     Scooting/Bridging Stephenson (Bed Mobility) modified independence  -LM     Supine-Sit Stephenson (Bed Mobility) modified independence  -LM     Sit-Supine Stephenson (Bed Mobility) modified independence  -LM     Assistive Device (Bed Mobility) bed rails  -LM     Comment, (Bed Mobility) Pt able to complete a full sit up with trunk from supine.  Pt then used his UE's to bring LEs off the  side of the bed.  Pt did well with scooting.  No issues or LOB noted throughout.  Had long discussion with pt re: positioning and lying in sidelying at times to fully offload buttocks.  Pt turned to the R side prior to PT exiting.  PT placed pillow between LEs and made sure RLE was positioned well.  Pillows placed behind pt's back for support.  -LM     Row Name 12/03/22 1449          Bed-Chair Transfer    Bed-Chair Dooly (Transfers) not tested  -LM     Comment, (Bed-Chair Transfer) I offered to bring pt's electric w/c up beside bed and work on transfers.  Despite max encouragement, pt declined stating he won't have a problem doing it so theres no point in doing it right now.  -LM     Row Name 12/03/22 1444          Gait/Stairs (Locomotion)    Comment, (Gait/Stairs) Non-ambulatory at baseline.  Pt is a paraplegic and now a R above knee amputee.  -LM           User Key  (r) = Recorded By, (t) = Taken By, (c) = Cosigned By    Initials Name Provider Type    Sharon Colin, SUSHIL Physical Therapist               Obj/Interventions     Row Name 12/03/22 1454          Hip (Therapeutic Exercise)    Hip (Therapeutic Exercise) PROM (passive range of motion)  -LM     Hip PROM (Therapeutic Exercise) bilateral;flexion;aBduction;aDduction;supine;10 repetitions  Discussed importance of lying completely in supine or in prone at times to stretch hip flexor  -LM     Row Name 12/03/22 1457          Balance    Static Sitting Balance independent  -LM     Position, Sitting Balance unsupported;sitting edge of bed  -LM     Comment, Balance Pt sat EOB ~10 minutes independently.  -LM           User Key  (r) = Recorded By, (t) = Taken By, (c) = Cosigned By    Initials Name Provider Type    Sharon Colin, SUSHIL Physical Therapist               Goals/Plan    No documentation.                Clinical Impression     Row Name 12/03/22 4155          Pain    Pretreatment Pain Rating 5/10  -LM     Posttreatment Pain Rating 5/10  -LM     Pain  Location - neck  -LM     Pain Intervention(s) Repositioned;Ambulation/increased activity  -LM     Row Name 12/03/22 1455          Plan of Care Review    Plan of Care Reviewed With patient  -LM     Progress improving  -LM     Outcome Evaluation Pt progressing well with PT goals.  Pt transferred supine<-->sit modified independently and sat EOB ~10 minutes without issue.  I offered to bring pt's electric w/c up to bedside to work on transfers, but pt declined at this time stating he won't have any issues.  Discussed importance of working on transfers now that pt is an amputee as center of gravity will have changed.  PROM of B hips completed once back in supine.  Also discussed importance of positioning while in bed.  Continue to recommend SNF at d/c.  -LM     Row Name 12/03/22 1455          Vital Signs    Pre Systolic BP Rehab 110  -LM     Pre Treatment Diastolic BP 51  -LM     Pretreatment Heart Rate (beats/min) 80  -LM     Posttreatment Heart Rate (beats/min) 78  -LM     Pre SpO2 (%) 97  -LM     O2 Delivery Pre Treatment room air  -LM     Post SpO2 (%) 97  -LM     O2 Delivery Post Treatment room air  -LM     Pre Patient Position Supine  -LM     Post Patient Position Supine  -LM     Row Name 12/03/22 1455          Positioning and Restraints    Pre-Treatment Position in bed  -LM     Post Treatment Position bed  -LM     In Bed side lying right;call light within reach;encouraged to call for assist;exit alarm on;pillow between legs;notified nsg  -LM           User Key  (r) = Recorded By, (t) = Taken By, (c) = Cosigned By    Initials Name Provider Type    LM Sharon Nath, PT Physical Therapist               Outcome Measures     Row Name 12/03/22 1500 12/03/22 0815       How much help from another person do you currently need...    Turning from your back to your side while in flat bed without using bedrails? 3  -LM 3  -JS    Moving from lying on back to sitting on the side of a flat bed without bedrails? 3  -LM 3  -JS     Moving to and from a bed to a chair (including a wheelchair)? 3  -LM 3  -JS    Standing up from a chair using your arms (e.g., wheelchair, bedside chair)? 1  -LM 1  -JS    Climbing 3-5 steps with a railing? 1  -LM 1  -JS    To walk in hospital room? 1  -LM 1  -JS    AM-PAC 6 Clicks Score (PT) 12  -LM 12  -JS    Highest level of mobility 4 --> Transferred to chair/commode  -LM 4 --> Transferred to chair/commode  -JS    Row Name 12/03/22 1500 12/03/22 0933       Functional Assessment    Outcome Measure Options AM-PAC 6 Clicks Basic Mobility (PT)  -LM AM-PAC 6 Clicks Daily Activity (OT)  -TA          User Key  (r) = Recorded By, (t) = Taken By, (c) = Cosigned By    Initials Name Provider Type    LM Sharon Nath, PT Physical Therapist    Anders Stewart, OT Occupational Therapist    Claudette Skinner, RN Registered Nurse                             Physical Therapy Education     Title: PT OT SLP Therapies (Done)     Topic: Physical Therapy (Done)     Point: Mobility training (Done)     Learning Progress Summary           Patient Acceptance, E, VU by ML at 12/1/2022 1543    Acceptance, E, NR by KG at 11/29/2022 0945                   Point: Home exercise program (Done)     Learning Progress Summary           Patient Acceptance, E, VU by ML at 12/1/2022 1543                   Point: Body mechanics (Done)     Learning Progress Summary           Patient Acceptance, E, VU by ML at 12/1/2022 1543    Acceptance, E, NR by KG at 11/29/2022 0945                   Point: Precautions (Done)     Learning Progress Summary           Patient Acceptance, E, VU by ML at 12/1/2022 1543    Acceptance, E, NR by KG at 11/29/2022 0945                               User Key     Initials Effective Dates Name Provider Type Discipline    KG 05/22/20 -  Amy Miller PT Physical Therapist PT     04/22/21 -  Latasha Minaya Physical Therapist PT              PT Recommendation and Plan     Plan of Care Reviewed With: patient  Progress:  improving  Outcome Evaluation: Pt progressing well with PT goals.  Pt transferred supine<-->sit modified independently and sat EOB ~10 minutes without issue.  I offered to bring pt's electric w/c up to bedside to work on transfers, but pt declined at this time stating he won't have any issues.  Discussed importance of working on transfers now that pt is an amputee as center of gravity will have changed.  PROM of B hips completed once back in supine.  Also discussed importance of positioning while in bed.  Continue to recommend SNF at d/c.     Time Calculation:    PT Charges     Row Name 22 1501             Time Calculation    Start Time 1420  -LM      PT Received On 22  -LM      PT Goal Re-Cert Due Date 22  -LM         Timed Charges    26736 - PT Therapeutic Activity Minutes 24  -LM         Total Minutes    Timed Charges Total Minutes 24  -LM       Total Minutes 24  -LM            User Key  (r) = Recorded By, (t) = Taken By, (c) = Cosigned By    Initials Name Provider Type     Sharon Nath, PT Physical Therapist              Therapy Charges for Today     Code Description Service Date Service Provider Modifiers Qty    61344623577 HC PT THERAPEUTIC ACT EA 15 MIN 12/3/2022 Sharon Nath, PT GP 2          PT G-Codes  Outcome Measure Options: AM-PAC 6 Clicks Basic Mobility (PT)  AM-PAC 6 Clicks Score (PT): 12  AM-PAC 6 Clicks Score (OT): 19       Sharon Nath PT  12/3/2022      Electronically signed by Sharon Nath PT at 22 1502          Occupational Therapy Notes (most recent note)      Anders Santiago, OT at 22 0933          Patient Name: Severino Vera  : 1955    MRN: [id number]                              Today's Date: 12/3/2022       Admit Date: 2022    Visit Dx:     ICD-10-CM ICD-9-CM   1. Peripheral arterial disease (HCC)  I73.9 443.9   2. Gangrene of left foot (HCC)  I96 785.4   3. 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   • Osteomyelitis (HCC)   • Hyperlipidemia LDL goal <70   • Severe malnutrition (HCC)     Past Medical History:   Diagnosis Date   • Arthritis    • Diabetes mellitus (HCC)    • Gangrene (HCC)     BILATERAL FEET   • GERD (gastroesophageal reflux disease)    • Hyperlipidemia    • Hypertension    • Osteomyelitis (HCC)    • PAD (peripheral artery disease) (HCC)    • Paraplegia (HCC)    • Peripheral vascular disease (HCC)    • Self-catheterizes urinary bladder      Past Surgical History:   Procedure Laterality Date   • ABOVE KNEE AMPUTATION Right 11/25/2022    Procedure: AMPUTATION ABOVE KNEE RIGHT;  Surgeon: Nemesio Haji MD;  Location: [facility];  Service: Vascular;  Laterality: Right;   • OTHER SURGICAL HISTORY Right     stent placement   • SHOULDER SURGERY Left     ROTATOR CUFF REPAIR      General Information     Row Name 12/03/22 0933          OT Time and Intention    Document Type therapy note (daily note)  -TA     Mode of Treatment occupational therapy  -TA     Row Name 12/03/22 0933          General Information    Patient Profile Reviewed yes  -TA     Existing Precautions/Restrictions fall  R AKA, L foot wound, paraplegia  -TA     Barriers to Rehab medically complex;previous functional deficit  -TA     Row Name 12/03/22 0933          Occupational Profile    Reason for Services/Referral (Occupational Profile) fxl decline from PLOF  -TA     Row Name 12/03/22 0933          Cognition    Orientation Status (Cognition) oriented x 4  -TA     Row Name 12/03/22 0933          Safety Issues, Functional Mobility    Safety Issues Affecting Function (Mobility) safety precautions follow-through/compliance  -TA     Impairments Affecting Function (Mobility) endurance/activity tolerance;strength;balance  -TA           User Key  (r) = Recorded By, (t) = Taken By, (c) = Cosigned By    Initials Name  Provider Type    Anders Stewart, OT Occupational Therapist                 Mobility/ADL's     Row Name 12/03/22 0933          Bed Mobility    Bed Mobility supine-sit;sit-supine  -TA     Scooting/Bridging [place] (Bed Mobility) standby assist;verbal cues  to scoot up in bed  -TA     Supine-Sit [place] (Bed Mobility) standby assist  -TA     Sit-Supine [place] (Bed Mobility) standby assist  -TA     Assistive Device (Bed Mobility) bed rails  -TA     Row Name 12/03/22 0933          Sit-Stand Transfer    Sit-Stand [place] (Transfers) not tested  -TA     Row Name 12/03/22 0933          Activities of Daily Living    BADL Assessment/Intervention grooming;upper body dressing  -TA     Row Name 12/03/22 0933          Mobility    Extremity Weight-bearing Status right lower extremity  -TA     Right Lower Extremity (Weight-bearing Status) non weight-bearing (NWB)  -TA     Row Name 12/03/22 0933          Grooming Assessment/Training    [place] Level (Grooming) wash face, hands;oral care regimen;hair care, combing/brushing;set up;supervision  -TA     Position (Grooming) edge of bed sitting  -TA     Row Name 12/03/22 0933          Upper Body Dressing Assessment/Training    [place] Level (Upper Body Dressing) doff;don  hospital gown  -TA     Position (Upper Body Dressing) sitting up in bed  -TA           User Key  (r) = Recorded By, (t) = Taken By, (c) = Cosigned By    Initials Name Provider Type    Anders Stewart, OT Occupational Therapist               Obj/Interventions     Row Name 12/03/22 0933          Sensory Assessment (Somatosensory)    Sensory Assessment (Somatosensory) bilateral UE;sensation intact  -TA     Row Name 12/03/22 0933          Vision Assessment/Intervention    Visual Impairment/Limitations WFL  -TA     Row Name 12/03/22 0933          Shoulder (Therapeutic Exercise)    Shoulder AROM (Therapeutic Exercise) bilateral;flexion;extension;horizontal  aBduction/aDduction;scapular retraction;sitting;15 repititions  -TA     Shoulder Strengthening (Therapeutic Exercise) resistance band;yellow  yellow Tband  -TA     Row Name 12/03/22 0933          Elbow/Forearm (Therapeutic Exercise)    Elbow/Forearm (Therapeutic Exercise) AROM (active range of motion)  -TA     Elbow/Forearm AROM (Therapeutic Exercise) bilateral;flexion;extension;15 repititions;sitting  -TA     Elbow/Forearm Strengthening (Therapeutic Exercise) resistance band;yellow  -TA     Row Name 12/03/22 0933          Hand (Therapeutic Exercise)    Hand (Therapeutic Exercise) AROM (active range of motion)  -TA     Hand AROM/AAROM (Therapeutic Exercise) bilateral;AROM (active range of motion);finger flexion;finger extension;15 repititions  overhead  squeezes  -TA     Row Name 12/03/22 0933          Motor Skills    Therapeutic Exercise shoulder;elbow/forearm;hand  -TA     Row Name 12/03/22 0933          Balance    Balance Assessment sitting dynamic balance  -TA     Dynamic Sitting Balance supervision  -TA     Position, Sitting Balance sitting edge of bed  -TA     Balance Interventions sitting;dynamic;weight shifting activity;occupation based/functional task;UE activity with balance activity  -TA           User Key  (r) = Recorded By, (t) = Taken By, (c) = Cosigned By    Initials Name Provider Type    Anders Stewart OT Occupational Therapist               Goals/Plan     Row Name 12/03/22 0933          Therapy Assessment/Plan (OT)    Planned Therapy Interventions (OT) activity tolerance training;BADL retraining;functional balance retraining;occupation/activity based interventions;patient/caregiver education/training;ROM/therapeutic exercise;strengthening exercise;transfer/mobility retraining  -TA           User Key  (r) = Recorded By, (t) = Taken By, (c) = Cosigned By    Initials Name Provider Type    Anders Stewart OT Occupational Therapist               Clinical Impression     Row Name  12/03/22 0933          Pain Assessment    Pretreatment Pain Rating 3/10  -TA     Posttreatment Pain Rating 3/10  -TA     Pain Location - Side/Orientation Right  -TA     Pain Location lower  -TA     Pain Location - extremity  -TA     Pre/Posttreatment Pain Comment Pt tolerated  -TA     Pain Intervention(s) Ambulation/increased activity;Repositioned  -TA     Row Name 12/03/22 0933          Plan of Care Review    Plan of Care Reviewed With patient  -TA     Progress improving  -TA     Outcome Evaluation VSS; Pt completed supine<>sit at EOB with SBA; at EOB pt completed wash face/hands, oral care, comb hair. Also completed 15 reps BUE strengthening ex's 15 reps all ex's with yellow Tband to support fxl I with ADLs. Continue per OT POC. Recommend SNF for rehab at discharge.  -TA     Row Name 12/03/22 0933          Therapy Assessment/Plan (OT)    Patient/Family Therapy Goal Statement (OT) rehab  -TA     Rehab Potential (OT) good, to achieve stated therapy goals  -TA     Criteria for Skilled Therapeutic Interventions Met (OT) yes;skilled treatment is necessary  -TA     Therapy Frequency (OT) daily  -TA     Predicted Duration of Therapy Intervention (OT) 5 days  -TA     Row Name 12/03/22 0933          Therapy Plan Review/Discharge Plan (OT)    Anticipated Discharge Disposition (OT) skilled nursing facility  -TA     Row Name 12/03/22 0933          Vital Signs    Pre Systolic BP Rehab --  VSS; RN cleared pt for tx  -TA     Pre Patient Position Supine  -TA     Intra Patient Position Sitting  -TA     Post Patient Position Supine  -TA     Row Name 12/03/22 0933          Positioning and Restraints    Pre-Treatment Position in bed  -TA     Post Treatment Position bed  -TA     In Bed notified nsg;fowlers;encouraged to call for assist;call light within reach;exit alarm on;L waffle boot;side rails up x2  -TA           User Key  (r) = Recorded By, (t) = Taken By, (c) = Cosigned By    Initials Name Provider Type    Anders Stewart  S, OT Occupational Therapist               Outcome Measures     Row Name 12/03/22 0933          How much help from another is currently needed...    Putting on and taking off regular lower body clothing? 3  -TA     Bathing (including washing, rinsing, and drying) 3  -TA     Toileting (which includes using toilet bed pan or urinal) 3  -TA     Putting on and taking off regular upper body clothing 3  -TA     Taking care of personal grooming (such as brushing teeth) 3  -TA     Eating meals 4  -TA     AM-PAC 6 Clicks Score (OT) 19  -TA     Row Name 12/03/22 0933          Functional Assessment    Outcome Measure Options AM-PAC 6 Clicks Daily Activity (OT)  -TA           User Key  (r) = Recorded By, (t) = Taken By, (c) = Cosigned By    Initials Name Provider Type    TA Anders Santiago OT Occupational Therapist                Occupational Therapy Education     Title: PT OT SLP Therapies (Done)     Topic: Occupational Therapy (Done)     Point: ADL training (Done)     Description:   Instruct learner(s) on proper safety adaptation and remediation techniques during self care or transfers.   Instruct in proper use of assistive devices.              Learning Progress Summary           Patient Acceptance, E,D,H,TB, VU,DU,NR by KF at 12/2/2022 1527    Comment: BUE ther ex to progress towards functional transfers via transfer board in future sessions    Acceptance, E,TB,D, VU,NR by HM at 11/30/2022 1552    Acceptance, E, VU,NR by AN at 11/28/2022 1316                   Point: Home exercise program (Done)     Description:   Instruct learner(s) on appropriate technique for monitoring, assisting and/or progressing therapeutic exercises/activities.              Learning Progress Summary           Patient Acceptance, E,D,H,TB, VU,DU,NR by KF at 12/2/2022 1527    Comment: BUE ther ex to progress towards functional transfers via transfer board in future sessions                   Point: Precautions (Done)     Description:   Instruct  learner(s) on prescribed precautions during self-care and functional transfers.              Learning Progress Summary           Patient Acceptance, E,D,H,TB, VU,DU,NR by KF at 12/2/2022 1527    Comment: BUE ther ex to progress towards functional transfers via transfer board in future sessions    Acceptance, E,TB,D, VU,NR by  at 11/30/2022 1552    Acceptance, E, VU,NR by AN at 11/28/2022 1316                   Point: Body mechanics (Done)     Description:   Instruct learner(s) on proper positioning and spine alignment during self-care, functional mobility activities and/or exercises.              Learning Progress Summary           Patient Acceptance, E,D,H,TB, VU,DU,NR by KF at 12/2/2022 1527    Comment: BUE ther ex to progress towards functional transfers via transfer board in future sessions    Acceptance, E,TB,D, VU,NR by  at 11/30/2022 1552    Acceptance, E, VU,NR by AN at 11/28/2022 1316                               User Key     Initials Effective Dates Name Provider Type Discipline     10/25/22 -  Marnie Pizarro, OT Occupational Therapist OT     06/16/21 -  Vicki Humphreys, OT Occupational Therapist OT    AN 09/21/21 -  Ana Barnhart OT Occupational Therapist OT              OT Recommendation and Plan  Planned Therapy Interventions (OT): activity tolerance training, BADL retraining, functional balance retraining, occupation/activity based interventions, patient/caregiver education/training, ROM/therapeutic exercise, strengthening exercise, transfer/mobility retraining  Therapy Frequency (OT): daily  Plan of Care Review  Plan of Care Reviewed With: patient  Progress: improving  Outcome Evaluation: VSS; Pt completed supine<>sit at EOB with SBA; at EOB pt completed wash face/hands, oral care, comb hair. Also completed 15 reps BUE strengthening ex's 15 reps all ex's with yellow Tband to support fxl I with ADLs. Continue per OT POC. Recommend SNF for rehab at discharge.     Time Calculation:     Time Calculation- OT     Row Name 22 0933             Time Calculation- OT    OT Start Time 0933  ttc 32 minutes  -TA      Total Timed Code Minutes- OT 32 minute(s)  -TA      OT Received On 22  -TA      OT Goal Re-Cert Due Date 22  -TA            User Key  (r) = Recorded By, (t) = Taken By, (c) = Cosigned By    Initials Name Provider Type    TA Anders Santiago OT Occupational Therapist              Therapy Charges for Today     Code Description Service Date Service Provider Modifiers Qty    63972519747 HC OT THER PROC EA 15 MIN 12/3/2022 Anders aSntiago OT GO 1    00350332992 HC OT SELF CARE/MGMT/TRAIN EA 15 MIN 12/3/2022 Anders Santiago OT GO 1               Anders Santiago OT  12/3/2022    Electronically signed by Anders Santiago OT at 22 1020            [address] [address]  Phone:  [phone]  Fax:  [phone] Date: Dec 5, 2022      Ambulatory Referral to Home Health     Patient:  Severino Vera MRN:  [id number]   [address] [address] :  1955  SSN:    Phone: [phone] Sex:  M      INSURANCE PAYOR PLAN GROUP # SUBSCRIBER ID   Primary:  Secondary:    [employer]ARE REPLACEMENT  [employer] [facility] [id number]  [id number]    [id number]  [id number]      Referring Provider Information:  CINTHYA SILVERIO Phone: [phone] Fax: [phone]       Referral Information:   # Visits:  999 Referral Type: Home Health [42]   Urgency:  Routine Referral Reason: Specialty Services Required   Start Date: Dec 5, 2022 End Date:  To be determined by Insurer   Diagnosis: Peripheral arterial disease (HCC) (I73.9 [ICD-10-CM] 443.9 [ICD-9-CM])  Gangrene of right foot (HCC) (I96 [ICD-10-CM] 785.4 [ICD-9-CM])  Gangrene of right foot (HCC) (I96 [ICD-10-CM] 785.4 [ICD-9-CM])  Severe malnutrition (HCC) (E43 [ICD-10-CM] 261 [ICD-9-CM])      Refer to Dept:   Refer to Provider:    Refer to Provider Phone:   Refer to Facility:    right AKA stump site paint with betadine swabs BID and cover with 4x4 gauze and secure with kerlix and spandage. Change daily     Left foot and ankle wounds   Wound Care Instructions    Cleanse with normal saline, pat dry thoroughly, apply betadine to periwound skin twice daily.  Cover with roll gauze and stockinette and apply heel boot.                      Wound Locations Left hip incision  Wound Care Instructions    Cleanse with NS, pat dry, gently & loosely fill wound bed with hydrofiber Ag & cover with silicone foam border dressing Q3Days and PRN FOR SOILING OR STRIKETHROUGH  Cleanse  Normal Saline  Intervention          Hydrofiber With Silver  Moistened?           No  Dressing:              Silicone Border Dressing    Coccyx  Wound Care Instructions    Thera Honey, Mepilex      Face to Face Visit Date: 12/5/2022  Follow-up provider for Plan of Care? I treated the patient in an acute care facility and will not continue treatment after discharge.  Follow-up provider: MARIA ELENA LEONARDO [[id number]]  Reason/Clinical Findings: Gangrene of right foot s/p AKA  Describe mobility limitations that make leaving home difficult: weakness, impaired physical mobility, impaired functional mobility  Nursing/Therapeutic Services Requested: Skilled Nursing  Nursing/Therapeutic Services Requested: Physical Therapy  Nursing/Therapeutic Services Requested: Occupational Therapy  Skilled nursing orders: Wound care dressing/changes  Skilled nursing orders: Medication education  Skilled nursing orders: Cardiopulmonary assessments  PT orders: Therapeutic exercise  PT orders: Transfer training  PT orders: Strengthening  PT orders: Home safety assessment  Occupational orders: Activities of daily living  Occupational orders: Energy conservation  Occupational orders: Strengthening  Occupational orders: Home safety assessment     This document serves as a request of services and does not constitute  Insurance authorization or approval of services.  To determine eligibility, please contact the members Insurance carrier to verify and review coverage.     If you have medical questions regarding this request for services. Please contact [address] at [phone] during normal business hours.        Verbal Order Mode: Telephone with readback   Authorizing Provider: Griselda Hill APRN  Authorizing Provider's NPI: [id number]     Order Entered By: Krystyna Green RN 12/5/2022  1:17 PM     Electronically signed by: Griselda Hill APRN 12/5/2022  1:27 PM

## 2022-12-05 NOTE — PLAN OF CARE
Goal Outcome Evaluation:  Plan of Care Reviewed With: patient        Progress: improving  Outcome Evaluation: Pt to d/c home today.  performed t/f bed to motorized WC. , mod-Ax1 to slide hips from bed to WC, pt's arm strength limiting, thus mod-A to assist

## 2022-12-05 NOTE — THERAPY DISCHARGE NOTE
Patient Name: Severino Vera  : 1955    MRN: [id number]                              Today's Date: 2022       Admit Date: 2022    Visit Dx:     ICD-10-CM ICD-9-CM   1. Gangrene of right foot s/p AKA 2022  I96 785.4   2. Peripheral arterial disease (HCC)  I73.9 443.9   3. Gangrene of left foot (HCC)  I96 785.4   4. Gangrene of right foot (HCC)  I96 785.4   5. Severe malnutrition (HCC)  E43 261   6. Hyperlipidemia LDL goal <70  E78.5 272.4     Patient Active Problem List   Diagnosis   • Peripheral arterial disease (HCC)   • Gangrene of left foot (HCC)   • Gangrene of right foot s/p AKA 2022   • Osteomyelitis (HCC)   • Hyperlipidemia LDL goal <70   • Severe malnutrition (HCC)     Past Medical History:   Diagnosis Date   • Arthritis    • Diabetes mellitus (HCC)    • Gangrene (HCC)     BILATERAL FEET   • GERD (gastroesophageal reflux disease)    • Hyperlipidemia    • Hypertension    • Osteomyelitis (HCC)    • PAD (peripheral artery disease) (HCC)    • Paraplegia (HCC)    • Peripheral vascular disease (HCC)    • Self-catheterizes urinary bladder      Past Surgical History:   Procedure Laterality Date   • ABOVE KNEE AMPUTATION Right 2022    Procedure: AMPUTATION ABOVE KNEE RIGHT;  Surgeon: Nemesio Haji MD;  Location: [facility];  Service: Vascular;  Laterality: Right;   • OTHER SURGICAL HISTORY Right     stent placement   • SHOULDER SURGERY Left     ROTATOR CUFF REPAIR      General Information     Row Name 22 1435          Physical Therapy Time and Intention    Document Type discharge treatment  -KG     Mode of Treatment individual therapy;physical therapy  -KG     Row Name 22 1435          General Information    Patient Profile Reviewed yes  -KG     Existing Precautions/Restrictions fall;other (see comments)  R AKA - ; L Foot Wounds; Paraplegic  -KG     Row Name 22 143          Cognition    Orientation Status (Cognition) oriented x 4  -KG     Row Name  12/05/22 1435          Safety Issues, Functional Mobility    Impairments Affecting Function (Mobility) endurance/activity tolerance;strength;balance  -KG           User Key  (r) = Recorded By, (t) = Taken By, (c) = Cosigned By    Initials Name Provider Type    Jennifer Mcmanus Physical Therapist               Mobility     Row Name 12/05/22 1438          Bed Mobility    Bed Mobility supine-sit;sit-supine  -KG     Rolling Left [place] (Bed Mobility) moderate assist (50% patient effort);2 person assist  -KG     Rolling Right [place] (Bed Mobility) modified independence  -KG     Scooting/Bridging [place] (Bed Mobility) modified independence  -KG     Supine-Sit [place] (Bed Mobility) modified independence  -KG     Row Name 12/05/22 1438          Transfers    Comment, (Transfers) performed t/f bed to motorized WC, mod-A to slide, pt used arms to slide trunk onto WC set up parallel to bed  -KG     Row Name 12/05/22 1438          Sit-Stand Transfer    Sit-Stand [place] (Transfers) not tested  -KG     Row Name 12/05/22 1438          Gait/Stairs (Locomotion)    [place] Level (Gait) unable to assess  -KG     Row Name 12/05/22 1438          Mobility    Extremity Weight-bearing Status right lower extremity  -KG     Left Lower Extremity (Weight-bearing Status) non weight-bearing (NWB)  -KG     Right Lower Extremity (Weight-bearing Status) non weight-bearing (NWB)  -KG           User Key  (r) = Recorded By, (t) = Taken By, (c) = Cosigned By    Initials Name Provider Type    Jennifer Mcmanus Physical Therapist               Obj/Interventions     Row Name 12/05/22 1446          Balance    Balance Interventions sitting  -KG           User Key  (r) = Recorded By, (t) = Taken By, (c) = Cosigned By    Initials Name Provider Type    Jennifer Mcmanus Physical Therapist               Goals/Plan     Row Name 12/05/22 1449          Bed Mobility Goal 1 (PT)    Activity/Assistive Device (Bed Mobility  Goal 1, PT) sit to supine;supine to sit  -KG     [place] Level/Cues Needed (Bed Mobility Goal 1, PT) modified independence  -KG     Progress/Outcomes (Bed Mobility Goal 1, PT) goal met  -KG     Row Name 12/05/22 1449          Transfer Goal 1 (PT)    Activity/Assistive Device (Transfer Goal 1, PT) bed-to-chair/chair-to-bed;wheelchair transfer;sliding board  -KG     [place] Level/Cues Needed (Transfer Goal 1, PT) moderate assist (50-74% patient effort)  -KG     Progress/Outcome (Transfer Goal 1, PT) goal met  -KG           User Key  (r) = Recorded By, (t) = Taken By, (c) = Cosigned By    Initials Name Provider Type    Jennifer Mcmanus Physical Therapist               Clinical Impression     Row Name 12/05/22 1447          Pain    Pretreatment Pain Rating 0/10 - no pain  -KG     Posttreatment Pain Rating 0/10 - no pain  -KG     Row Name 12/05/22 1447          Plan of Care Review    Plan of Care Reviewed With patient  -KG     Progress improving  -KG     Outcome Evaluation Pt to d/c home today.  performed t/f bed to motorized WC. , mod-Ax1 to slide hips from bed to WC, pt's arm strength limiting, thus mod-A to assist  -KG     Row Name 12/05/22 1447          Positioning and Restraints    Pre-Treatment Position in bed  -KG     Post Treatment Position wheelchair  -KG     In Wheelchair notified nsg;call light within reach;encouraged to call for assist  -KG           User Key  (r) = Recorded By, (t) = Taken By, (c) = Cosigned By    Initials Name Provider Type    Jennifer Mcmanus Physical Therapist               Outcome Measures     Row Name 12/05/22 1450 12/05/22 0800       How much help from another person do you currently need...    Turning from your back to your side while in flat bed without using bedrails? 4  -KG 3  -FABIAN    Moving from lying on back to sitting on the side of a flat bed without bedrails? 4  -KG 3  -FABIAN    Moving to and from a bed to a chair (including a wheelchair)? 2  -KG 3  -FABIAN     Standing up from a chair using your arms (e.g., wheelchair, bedside chair)? 1  -KG 1  -FABIAN    Climbing 3-5 steps with a railing? 1  -KG 1  -FABIAN    To walk in hospital room? 1  -KG 1  -FABIAN    AM-PAC 6 Clicks Score (PT) 13  -KG 12  -FABIAN    Highest level of mobility 4 --> Transferred to chair/commode  -KG 4 --> Transferred to chair/commode  -FABIAN    Row Name 12/05/22 1450          Functional Assessment    Outcome Measure Options AM-PAC 6 Clicks Basic Mobility (PT)  -KG           User Key  (r) = Recorded By, (t) = Taken By, (c) = Cosigned By    Initials Name Provider Type    Carmella Vasquez RN Registered Nurse    Jennifer Mcmanus Physical Therapist              Physical Therapy Education     Title: PT OT SLP Therapies (Done)     Topic: Physical Therapy (Done)     Point: Mobility training (Done)     Learning Progress Summary           Patient Acceptance, E, VU by ML at 12/1/2022 1543    Acceptance, E, NR by KG at 11/29/2022 0945                   Point: Home exercise program (Done)     Learning Progress Summary           Patient Acceptance, E, VU by ML at 12/1/2022 1543                   Point: Body mechanics (Done)     Learning Progress Summary           Patient Acceptance, E, VU by ML at 12/1/2022 1543    Acceptance, E, NR by KG at 11/29/2022 0945                   Point: Precautions (Done)     Learning Progress Summary           Patient Acceptance, E, VU by ML at 12/1/2022 1543    Acceptance, E, NR by KG at 11/29/2022 0945                               User Key     Initials Effective Dates Name Provider Type Discipline    KG 05/22/20 -  Amy Miller PT Physical Therapist PT     04/22/21 -  Latasha Minaya Physical Therapist PT              PT Recommendation and Plan     Plan of Care Reviewed With: patient  Progress: improving  Outcome Evaluation: Pt to d/c home today.  performed t/f bed to motorized WC. , mod-Ax1 to slide hips from bed to WC, pt's arm strength limiting, thus mod-A to assist     Time  Calculation:    PT Charges     Row Name 12/05/22 1451             Time Calculation    Start Time 1400  -KG      PT Received On 12/05/22  -KG      PT Goal Re-Cert Due Date 12/09/22  -KG         Time Calculation- PT    Total Timed Code Minutes- PT 25 minute(s)  -KG         Timed Charges    55825 - PT Therapeutic Activity Minutes 25  -KG         Total Minutes    Timed Charges Total Minutes 25  -KG       Total Minutes 25  -KG            User Key  (r) = Recorded By, (t) = Taken By, (c) = Cosigned By    Initials Name Provider Type    Jennifer Mcmanus Physical Therapist              Therapy Charges for Today     Code Description Service Date Service Provider Modifiers Qty    61047799242 HC PT THERAPEUTIC ACT EA 15 MIN 12/5/2022 Jennifer Pan GP 2          PT G-Codes  Outcome Measure Options: AM-PAC 6 Clicks Basic Mobility (PT)  AM-PAC 6 Clicks Score (PT): 13  AM-PAC 6 Clicks Score (OT): 19         Jennifer Pan  12/5/2022

## 2022-12-05 NOTE — PROGRESS NOTES
Clinical Nutrition     Patient Name: Severino Vera  YOB: 1955  MRN: [id number]  Date of Encounter: 22 11:14 EST  Admission date: 2022    Reason for Visit   Follow up    EMR reviewed    Yes    Diet Nutrition Related History     Patient reports his appetite/intake is improving. Patient is finishing his Boost+ GC sent TID. Patient denies any N/V/D and his last recorded BM was on 22.    22, previous RD note:  Pt currently followed by RD due to severe malnutrition. Pt had a low prealbumin on admission however suspect multifactorial (EtOH use, inflammation and poor intake). Pt eating at least 75% at meals as well as the ONS provided. Pt is hyponatremic which may be due to refeeding syndrome as pt had previous poor intake.        Current Nutrition Prescription    Diet: Cardiac Diets, Fluid Restriction (240 mL/tray) Diets; Healthy Heart (2-3 Na+); 1500 mL/day; Texture: Regular Texture (IDDSI 7); Fluid Consistency: Thin (IDDSI 0)  Orders Placed This Encounter      Dietary Nutrition Supplements Boost Glucose Control      DIET MESSAGE Please send up 6 boost drinks vanilla and chocolate.    Average Intake from [place]% x 6     Actions:    Follow treatment progress, Care plan reviewed, Advise alternate selection, Interview for preferences, Menu provided, Encourage intake    Monitor Per Protocol    Rissa Botello,   Time Spent: 20 minutes

## 2022-12-05 NOTE — PROGRESS NOTES
[facility] Medicine Services  PROGRESS NOTE    Patient Name: Severino Vera  : 1955  MRN: [id number]    Date of Admission: 2022  Primary Care Physician: Anuel Rankin MD    Subjective   Subjective     CC:  Follow-up for right AKA    HPI:  I have seen and evaluated the patient this morning.  Feeling okay.  Pain is well controlled.  Adamant to go home and declined rehab    ROS:  General : no fevers, chills  CVS: No chest pain, palpitations  Respiratory: No cough, dyspnea  GI: No N/V/D, abd pain  10 point review of systems is negative except for what is mentioned in HPI    Objective   Objective     Vital Signs:   Temp:  [97.8 °F (36.6 °C)-99 °F (37.2 °C)] 97.9 °F (36.6 °C)  Heart Rate:  [69-88] 77  Resp:  [16-18] 16  BP: (106-124)/(61-68) 124/61     Physical Exam:  General: Comfortable, not in distress, conversant and cooperative  Head: Atraumatic and normocephalic  Eyes: No Icterus. No pallor  Ears:  Ears appear intact with no abnormalities noted  Throat: No oral lesions, no thrush  Neck: Supple, trachea midline  Lungs: Clear to auscultation bilaterally, equal air entry, no wheezing or crackles  Heart:  Normal S1 and S2, no murmur, no gallop, No JVD, no lower extremity swelling  Abdomen:  Soft, no tenderness, no organomegaly, normal bowel sounds, no organomegaly  Extremities: Right AKA.  Wound is covered with dressing.  No erythema noted or discharge  Skin: No bleeding, bruising or rash, normal skin turgor and elasticity  Neurologic: Cranial nerves appear intact with no evidence of facial asymmetry, normal motor and sensory functions in all 4 extremities  Psych: Alert and oriented x 3, normal mood    Results Reviewed:  LAB RESULTS:      Lab 22  0642 22  0920 22  0602 22  0435 22  0859   WBC 12.18* 11.25* 11.82* 10.82* 8.08   HEMOGLOBIN 9.4* 9.3* 9.2* 9.7* 9.0*   HEMATOCRIT 29.1* 28.9* 28.6* 29.6* 28.1*   PLATELETS 338 942 284 678 117    NEUTROS ABS 8.85*  --   --   --   --    IMMATURE GRANS (ABS) 0.05  --   --   --   --    LYMPHS ABS 1.29  --   --   --   --    MONOS ABS 0.81  --   --   --   --    EOS ABS 1.10*  --   --   --   --    MCV 86.6 87.6 86.9 86.8 87.3         Lab 12/04/22  0920 12/03/22  0602 12/02/22  0435 12/01/22  0859 11/30/22  0402   SODIUM 133* 128* 130* 133* 127*   POTASSIUM 4.2 4.6 5.1 4.6 5.0   CHLORIDE 96* 92* 93* 96* 92*   CO2 30.0* 27.0 31.0* 33.0* 29.0   ANION GAP 7.0 9.0 6.0 4.0* 6.0   BUN 23 23 24* 26* 33*   CREATININE 0.52* 0.41* 0.46* 0.44* 0.62*   EGFR 110.5 118.7 114.6 116.2 104.8   GLUCOSE 142* 188* 145* 162* 185*   CALCIUM 8.6 8.3* 9.1 8.9 8.6   PHOSPHORUS  --  4.1  --   --   --                          Brief Urine Lab Results  (Last result in the past 365 days)      Color   Clarity   Blood   Leuk Est   Nitrite   Protein   CREAT   Urine HCG        11/25/22 1111 Yellow   Clear   Small (1+)   Negative   Negative   >=300 mg/dL (3+)                 Microbiology Results Abnormal     None          No radiology results from the last 24 hrs    Results for orders placed during the hospital encounter of 11/25/22    Adult Transthoracic Echo Complete w/ Color, Spectral and Contrast if Necessary Per Protocol    Interpretation Summary  •  Left ventricular systolic function is normal. Estimated left ventricular EF = 55%  •  Aortic sclerosis without aortic stenosis.  •  Trace tricuspid regurgitation with normal RVSP.      I have reviewed the medications:  Scheduled Meds:aspirin, 81 mg, Oral, Daily  gabapentin, 400 mg, Oral, Q8H  heparin (porcine), 5,000 Units, Subcutaneous, Q8H  insulin lispro, 0-7 Units, Subcutaneous, TID AC  linagliptin, 5 mg, Oral, Daily  lisinopril, 40 mg, Oral, Q24H  metoprolol succinate XL, 25 mg, Oral, Q24H  oxyCODONE-acetaminophen, 1 tablet, Oral, 5x Daily  pantoprazole, 40 mg, Oral, Daily  povidone-iodine, 1 application, Topical, BID  rosuvastatin, 20 mg, Oral, Nightly  silver sulfadiazine, 1 application,  Topical, Daily      Continuous Infusions:lactated ringers, 9 mL/hr, Last Rate: Stopped (11/25/22 1645)  niCARdipine, 5-15 mg/hr      PRN Meds:.•  acetaminophen  •  dextrose  •  dextrose  •  glucagon (human recombinant)  •  magnesium sulfate **OR** magnesium sulfate **OR** magnesium sulfate  •  Morphine  •  ondansetron **OR** ondansetron  •  potassium chloride  •  potassium chloride  •  sennosides-docusate  •  zolpidem    Assessment & Plan   Assessment & Plan     Active Hospital Problems    Diagnosis  POA   • **Gangrene of right foot s/p AKA 11/25/2022 [I96]  Yes   • Severe malnutrition (HCC) [E43]  Yes   • Hyperlipidemia LDL goal <70 [E78.5]  Unknown   • Osteomyelitis (HCC) [M86.9]  Yes   • Peripheral arterial disease (HCC) [I73.9]  Yes   • Gangrene of left foot (HCC) [I96]  Unknown      Resolved Hospital Problems   No resolved problems to display.        Brief Hospital Course to date:  Severino Vera is a 66 y.o. male with past medical history of severe peripheral arterial disease, essential hypertension, type 2 diabetes, dyslipidemia, paraplegia with urinary incontinence, tobacco use and alcohol use disorders, who presented to the hospital with bilateral foot wounds, and gangrene of the right foot status post above-knee amputation on 11/25/2022    Assessment and plan:  Right foot gangrene status post AKA 11/25/2022  Severe peripheral arterial disease  · Postoperative care per cardiothoracic surgery team  · Continue aspirin  · Patient declined rehab and wants to go home.   contacted to arrange for transportation    Left foot gangrene  · Needs amputation at a later date    Essential hypertension  · Controlled, continue lisinopril and metoprolol    Type 2 diabetes  · A1c 5.7%  · Insulin sliding scale    Dyslipidemia  · Continue rosuvastatin    Paraplegia  Wheelchair-bound  Urine incontinence    Expected Discharge Location and Transportation: Home  Expected Discharge Date: Per primary team    DVT  prophylaxis:  Medical DVT prophylaxis orders are present.     AM-PAC 6 Clicks Score (PT): 12 (12/04/22 0806)    CODE STATUS:   Code Status and Medical Interventions:   Ordered at: 11/25/22 4297     Code Status (Patient has no pulse and is not breathing):    CPR (Attempt to Resuscitate)     Medical Interventions (Patient has pulse or is breathing):    Full Support     Release to patient:    Routine Release       Gricelda Hernandez MD  12/05/22

## 2022-12-05 NOTE — CASE MANAGEMENT/SOCIAL WORK
Continued Stay Note  [facility]     Patient Name: Severino Vera  MRN: [id number]  Today's Date: 12/5/2022    Admit Date: 11/25/2022    Plan: Social work called [employer] and they call the nurse on there way and they will  the patient at the [facility].   Discharge Plan     Row Name 12/05/22 1454       Plan    Plan Social work called [employer] and they call the nurse on there way and they will  the patient at the [facility].    Row Name 12/05/22 1343       Plan    Plan Home with [facility]    Patient/Family in Agreement with Plan yes    Plan Comments Narda has orders for discharge.  Received a call from patients sister from [place] who is now staying with him and will stay until after he his other amputaion.  HH orders placed with patient RIKKI choice, [facility].  Called [facility] [phone] who is familiar with patient and will be accepting him.  Referral and orders faxed to [phone].  Spoke with patient at bedside regarding discharge plan and advised that [facility] was agreeable to accept him to service and orders have been faxed.  He will need [facility].  No other discharge needs verbalized.  Spoke with  who will call to scheduled patient for transport with [facility] as soon as he is ready for discharge from the hospital.  Patient plan is to discharge home with [facility] today via [facility].    Final Discharge Disposition Code 06 - home with home health care    Final Note Narda has orders for discharge.  Received a call from patients sister from [place] who is now staying with him and will stay until after he his other amputaion.  HH orders placed with patient RIKKI choice, [facility].  Called [facility] [phone] who is familiar with patient and will be accepting him.  Referral and orders faxed to [phone].  Spoke with patient at bedside regarding discharge plan and advised that [facility] was  agreeable to accept him to service and orders have been faxed.  He will need [facility] [employer].  No other discharge needs verbalized.  Spoke with SW who will call to scheduled patient for transport with [facility] as soon as he is ready for discharge from the hospital.  Patient plan is to discharge home with [facility] today via [facility].               Discharge Codes    No documentation.               Expected Discharge Date and Time     Expected Discharge Date Expected Discharge Time    Dec 5, 2022             JENIFER Lambert

## 2022-12-05 NOTE — PLAN OF CARE
Goal Outcome Evaluation:  Plan of Care Reviewed With: patient           Outcome Evaluation: Patient has rested well tonight. Vss,Sr,Ra. Pain controlled with oral pain medication. Patient had no other needs. Patient has a bed at [facility] pending insurance approval.

## 2022-12-06 NOTE — OUTREACH NOTE
Prep Survey    Flowsheet Row Responses   [facility] facility patient discharged from? [place]   Is LACE score < 7 ? No   Emergency Room discharge w/ pulse ox? No   Eligibility Readm Mgmt   Discharge diagnosis Amputation above knee right   Does the patient have one of the following disease processes/diagnoses(primary or secondary)? General Surgery   Does the patient have Home health ordered? Yes   What is the Home health agency?  [facility]    Is there a DME ordered? No   Prep survey completed? Yes          LEANDRA CONTRERAS - Registered Nurse

## 2022-12-14 ENCOUNTER — READMISSION MANAGEMENT (OUTPATIENT)
Dept: CALL CENTER | Facility: HOSPITAL | Age: 67
End: 2022-12-14

## 2022-12-14 NOTE — OUTREACH NOTE
General Surgery Week 2 Survey    Flowsheet Row Responses   [facility] patient discharged from? [place]   Does the patient have one of the following disease processes/diagnoses(primary or secondary)? General Surgery   Week 2 attempt successful? Yes   Call start time 1339   Call end time 1343   Discharge diagnosis Amputation above knee right   Person spoke with today (if not patient) and relationship Patient   Meds reviewed with patient/caregiver? Yes   Prescription comments No concerns   Is the patient taking all medications as directed (includes completed medication regime)? Yes   Does the patient have a follow up appointment scheduled with their surgeon? Yes   Has the patient kept scheduled appointments due by today? N/A   Comments Patient has fu on 1/5 cardiothoracic   What is the Home health agency?  [facility]    Has home health visited the patient within 72 hours of discharge? Yes   Home health comments Still coming to the home   Psychosocial issues? No   Did the patient receive a copy of their discharge instructions? Yes   Nursing interventions Reviewed instructions with patient   What is the patient's perception of their health status since discharge? Improving   Nursing interventions Nurse provided patient education   Is the patient /caregiver able to teach back basic post-op care? Keep incision areas clean,dry and protected  [ comming to change bandages]   Is the patient/caregiver able to teach back signs and symptoms of incisional infection? Increased redness, swelling or pain at the incisonal site, Increased drainage or bleeding, Incisional warmth, Pus or odor from incision, Fever  [no current s/s of infection]   Is the patient/caregiver able to teach back steps to recovery at home? Rest and rebuild strength, gradually increase activity   Is the patient/caregiver able to teach back the hierarchy of who to call/visit for symptoms/problems? PCP, Specialist, Home health nurse, Urgent Care, ED, 911  Yes   Week 2 call completed? Yes   Wrap up additional comments Patient states he is doing well he has no concerns at this time. HH coming to the home to assist with dressing          ALLEY COBB - Registered Nurse

## 2023-01-05 ENCOUNTER — OFFICE VISIT (OUTPATIENT)
Dept: CARDIAC SURGERY | Facility: CLINIC | Age: 68
End: 2023-01-05
Payer: MEDICARE

## 2023-01-05 VITALS
DIASTOLIC BLOOD PRESSURE: 60 MMHG | TEMPERATURE: 97.8 F | BODY MASS INDEX: 23.03 KG/M2 | SYSTOLIC BLOOD PRESSURE: 108 MMHG | HEART RATE: 70 BPM | WEIGHT: 170 LBS | HEIGHT: 72 IN | OXYGEN SATURATION: 95 %

## 2023-01-05 DIAGNOSIS — I73.9 PERIPHERAL ARTERIAL DISEASE: ICD-10-CM

## 2023-01-05 DIAGNOSIS — I96 GANGRENE OF LEFT FOOT: ICD-10-CM

## 2023-01-05 DIAGNOSIS — I96 GANGRENE OF RIGHT FOOT: Primary | ICD-10-CM

## 2023-01-05 DIAGNOSIS — T14.8XXD DELAYED WOUND HEALING: Primary | ICD-10-CM

## 2023-01-05 PROCEDURE — 99024 POSTOP FOLLOW-UP VISIT: CPT | Performed by: NURSE PRACTITIONER

## 2023-01-05 RX ORDER — TIZANIDINE 4 MG/1
TABLET ORAL
COMMUNITY
Start: 2023-01-02 | End: 2023-02-21 | Stop reason: HOSPADM

## 2023-01-05 RX ORDER — DULOXETIN HYDROCHLORIDE 60 MG/1
CAPSULE, DELAYED RELEASE ORAL
Status: ON HOLD | COMMUNITY
Start: 2023-01-02 | End: 2023-02-20 | Stop reason: SDUPTHER
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
## 2023-01-25 ENCOUNTER — HOSPITAL ENCOUNTER (INPATIENT)
Facility: HOSPITAL | Age: 68
LOS: 27 days | Discharge: NURSING, REHAB OR LONG-TERM CARE | DRG: 239 | End: 2023-02-21
Attending: EMERGENCY MEDICINE | Admitting: HOSPITALIST
Payer: MEDICARE

## 2023-01-25 DIAGNOSIS — G82.20 PARAPLEGIA, UNSPECIFIED: ICD-10-CM

## 2023-01-25 DIAGNOSIS — F17.200 SMOKER: ICD-10-CM

## 2023-01-25 DIAGNOSIS — G82.20 CHRONIC PARAPLEGIA: ICD-10-CM

## 2023-01-25 DIAGNOSIS — I96 GANGRENE OF LEFT FOOT: ICD-10-CM

## 2023-01-25 DIAGNOSIS — I96 GANGRENE OF LEFT FOOT: Primary | ICD-10-CM

## 2023-01-25 DIAGNOSIS — I73.9 PERIPHERAL ARTERIAL DISEASE: ICD-10-CM

## 2023-01-25 DIAGNOSIS — M46.28 SACRAL OSTEOMYELITIS: ICD-10-CM

## 2023-01-25 DIAGNOSIS — M86.9 OSTEOMYELITIS, UNSPECIFIED SITE, UNSPECIFIED TYPE: ICD-10-CM

## 2023-01-25 DIAGNOSIS — E43 SEVERE MALNUTRITION: ICD-10-CM

## 2023-01-25 DIAGNOSIS — Z86.79 HISTORY OF PERIPHERAL VASCULAR DISEASE: ICD-10-CM

## 2023-01-25 DIAGNOSIS — L89.159 SACRAL DECUBITUS ULCER: ICD-10-CM

## 2023-01-25 DIAGNOSIS — Z86.39 HISTORY OF DIABETES MELLITUS: ICD-10-CM

## 2023-01-25 DIAGNOSIS — L03.116 CELLULITIS OF LEFT FOOT: ICD-10-CM

## 2023-01-25 DIAGNOSIS — G89.4 CHRONIC PAIN SYNDROME: ICD-10-CM

## 2023-01-25 DIAGNOSIS — I96 DRY GANGRENE: Primary | ICD-10-CM

## 2023-01-25 DIAGNOSIS — Z93.3 S/P COLOSTOMY: ICD-10-CM

## 2023-01-25 DIAGNOSIS — L89.154 PRESSURE INJURY OF SACRAL REGION, STAGE 4: ICD-10-CM

## 2023-01-25 PROBLEM — I10 ESSENTIAL (PRIMARY) HYPERTENSION: Status: ACTIVE | Noted: 2022-12-09

## 2023-01-25 PROBLEM — K21.9 GASTRO-ESOPHAGEAL REFLUX DISEASE WITHOUT ESOPHAGITIS: Status: ACTIVE | Noted: 2022-12-09

## 2023-01-25 LAB
ABO GROUP BLD: NORMAL
ALBUMIN SERPL-MCNC: 2.4 G/DL (ref 3.5–5.2)
ALBUMIN/GLOB SERPL: 0.5 G/DL
ALP SERPL-CCNC: 73 U/L (ref 39–117)
ALT SERPL W P-5'-P-CCNC: 10 U/L (ref 1–41)
ANION GAP SERPL CALCULATED.3IONS-SCNC: 9 MMOL/L (ref 5–15)
AST SERPL-CCNC: 16 U/L (ref 1–40)
BASOPHILS # BLD AUTO: 0.11 10*3/MM3 (ref 0–0.2)
BASOPHILS NFR BLD AUTO: 0.7 % (ref 0–1.5)
BILIRUB SERPL-MCNC: 0.2 MG/DL (ref 0–1.2)
BLD GP AB SCN SERPL QL: NEGATIVE
BUN SERPL-MCNC: 13 MG/DL (ref 8–23)
BUN/CREAT SERPL: 26 (ref 7–25)
CALCIUM SPEC-SCNC: 8.7 MG/DL (ref 8.6–10.5)
CHLORIDE SERPL-SCNC: 92 MMOL/L (ref 98–107)
CO2 SERPL-SCNC: 27 MMOL/L (ref 22–29)
CREAT SERPL-MCNC: 0.5 MG/DL (ref 0.76–1.27)
D-LACTATE SERPL-SCNC: 1.3 MMOL/L (ref 0.5–2)
DEPRECATED RDW RBC AUTO: 44.9 FL (ref 37–54)
EGFRCR SERPLBLD CKD-EPI 2021: 111.1 ML/MIN/1.73
EOSINOPHIL # BLD AUTO: 0.73 10*3/MM3 (ref 0–0.4)
EOSINOPHIL NFR BLD AUTO: 4.6 % (ref 0.3–6.2)
ERYTHROCYTE [DISTWIDTH] IN BLOOD BY AUTOMATED COUNT: 14.7 % (ref 12.3–15.4)
GLOBULIN UR ELPH-MCNC: 4.6 GM/DL
GLUCOSE SERPL-MCNC: 161 MG/DL (ref 65–99)
HCT VFR BLD AUTO: 31.4 % (ref 37.5–51)
HGB BLD-MCNC: 10.1 G/DL (ref 13–17.7)
IMM GRANULOCYTES # BLD AUTO: 0.12 10*3/MM3 (ref 0–0.05)
IMM GRANULOCYTES NFR BLD AUTO: 0.7 % (ref 0–0.5)
INR PPP: 1.12 (ref 0.84–1.13)
LYMPHOCYTES # BLD AUTO: 1.11 10*3/MM3 (ref 0.7–3.1)
LYMPHOCYTES NFR BLD AUTO: 6.9 % (ref 19.6–45.3)
MCH RBC QN AUTO: 26.6 PG (ref 26.6–33)
MCHC RBC AUTO-ENTMCNC: 32.2 G/DL (ref 31.5–35.7)
MCV RBC AUTO: 82.8 FL (ref 79–97)
MONOCYTES # BLD AUTO: 1.28 10*3/MM3 (ref 0.1–0.9)
MONOCYTES NFR BLD AUTO: 8 % (ref 5–12)
NEUTROPHILS NFR BLD AUTO: 12.67 10*3/MM3 (ref 1.7–7)
NEUTROPHILS NFR BLD AUTO: 79.1 % (ref 42.7–76)
NRBC BLD AUTO-RTO: 0 /100 WBC (ref 0–0.2)
PLATELET # BLD AUTO: 344 10*3/MM3 (ref 140–450)
PMV BLD AUTO: 8.9 FL (ref 6–12)
POTASSIUM SERPL-SCNC: 4.7 MMOL/L (ref 3.5–5.2)
PROCALCITONIN SERPL-MCNC: 0.05 NG/ML (ref 0–0.25)
PROT SERPL-MCNC: 7 G/DL (ref 6–8.5)
PROTHROMBIN TIME: 14.3 SECONDS (ref 11.4–14.4)
RBC # BLD AUTO: 3.79 10*6/MM3 (ref 4.14–5.8)
RH BLD: POSITIVE
SODIUM SERPL-SCNC: 128 MMOL/L (ref 136–145)
T&S EXPIRATION DATE: NORMAL
WBC NRBC COR # BLD: 16.02 10*3/MM3 (ref 3.4–10.8)

## 2023-01-25 PROCEDURE — 25010000002 VANCOMYCIN 10 G RECONSTITUTED SOLUTION: Performed by: EMERGENCY MEDICINE

## 2023-01-25 PROCEDURE — 86900 BLOOD TYPING SEROLOGIC ABO: CPT | Performed by: EMERGENCY MEDICINE

## 2023-01-25 PROCEDURE — 83036 HEMOGLOBIN GLYCOSYLATED A1C: CPT | Performed by: PHYSICIAN ASSISTANT

## 2023-01-25 PROCEDURE — 86140 C-REACTIVE PROTEIN: CPT | Performed by: PHYSICIAN ASSISTANT

## 2023-01-25 PROCEDURE — 83605 ASSAY OF LACTIC ACID: CPT | Performed by: EMERGENCY MEDICINE

## 2023-01-25 PROCEDURE — 80053 COMPREHEN METABOLIC PANEL: CPT | Performed by: EMERGENCY MEDICINE

## 2023-01-25 PROCEDURE — 36415 COLL VENOUS BLD VENIPUNCTURE: CPT

## 2023-01-25 PROCEDURE — 99285 EMERGENCY DEPT VISIT HI MDM: CPT

## 2023-01-25 PROCEDURE — 86923 COMPATIBILITY TEST ELECTRIC: CPT

## 2023-01-25 PROCEDURE — 84134 ASSAY OF PREALBUMIN: CPT | Performed by: EMERGENCY MEDICINE

## 2023-01-25 PROCEDURE — 25010000002 PIPERACILLIN SOD-TAZOBACTAM PER 1 G: Performed by: EMERGENCY MEDICINE

## 2023-01-25 PROCEDURE — 84145 PROCALCITONIN (PCT): CPT | Performed by: INTERNAL MEDICINE

## 2023-01-25 PROCEDURE — 87040 BLOOD CULTURE FOR BACTERIA: CPT | Performed by: EMERGENCY MEDICINE

## 2023-01-25 PROCEDURE — 86901 BLOOD TYPING SEROLOGIC RH(D): CPT | Performed by: EMERGENCY MEDICINE

## 2023-01-25 PROCEDURE — 84443 ASSAY THYROID STIM HORMONE: CPT | Performed by: PHYSICIAN ASSISTANT

## 2023-01-25 PROCEDURE — 99024 POSTOP FOLLOW-UP VISIT: CPT | Performed by: STUDENT IN AN ORGANIZED HEALTH CARE EDUCATION/TRAINING PROGRAM

## 2023-01-25 PROCEDURE — 86850 RBC ANTIBODY SCREEN: CPT | Performed by: EMERGENCY MEDICINE

## 2023-01-25 PROCEDURE — 85025 COMPLETE CBC W/AUTO DIFF WBC: CPT | Performed by: EMERGENCY MEDICINE

## 2023-01-25 PROCEDURE — 85610 PROTHROMBIN TIME: CPT | Performed by: EMERGENCY MEDICINE

## 2023-01-25 RX ADMIN — TAZOBACTAM SODIUM AND PIPERACILLIN SODIUM 3.38 G: 375; 3 INJECTION, SOLUTION INTRAVENOUS at 21:53

## 2023-01-25 RX ADMIN — VANCOMYCIN HYDROCHLORIDE 1500 MG: 10 INJECTION, POWDER, LYOPHILIZED, FOR SOLUTION INTRAVENOUS at 23:02

## 2023-01-26 ENCOUNTER — HOSPITAL ENCOUNTER (OUTPATIENT)
Facility: HOSPITAL | Age: 68
Setting detail: SURGERY ADMIT
DRG: 239 | End: 2023-01-26
Attending: STUDENT IN AN ORGANIZED HEALTH CARE EDUCATION/TRAINING PROGRAM | Admitting: STUDENT IN AN ORGANIZED HEALTH CARE EDUCATION/TRAINING PROGRAM
Payer: MEDICARE

## 2023-01-26 DIAGNOSIS — T14.8XXD DELAYED WOUND HEALING: ICD-10-CM

## 2023-01-26 PROBLEM — Z72.0 TOBACCO ABUSE: Status: ACTIVE | Noted: 2023-01-26

## 2023-01-26 PROBLEM — E87.1 HYPONATREMIA: Status: ACTIVE | Noted: 2023-01-26

## 2023-01-26 PROBLEM — E11.9 TYPE 2 DIABETES MELLITUS, WITHOUT LONG-TERM CURRENT USE OF INSULIN (HCC): Status: ACTIVE | Noted: 2023-01-26

## 2023-01-26 PROBLEM — D64.9 ANEMIA: Status: ACTIVE | Noted: 2023-01-25

## 2023-01-26 LAB
ALBUMIN SERPL-MCNC: 2.4 G/DL (ref 3.5–5.2)
ALBUMIN/GLOB SERPL: 0.7 G/DL
ALP SERPL-CCNC: 69 U/L (ref 39–117)
ALT SERPL W P-5'-P-CCNC: 8 U/L (ref 1–41)
ANION GAP SERPL CALCULATED.3IONS-SCNC: 10 MMOL/L (ref 5–15)
ANION GAP SERPL CALCULATED.3IONS-SCNC: 10 MMOL/L (ref 5–15)
ANION GAP SERPL CALCULATED.3IONS-SCNC: 8 MMOL/L (ref 5–15)
AST SERPL-CCNC: 14 U/L (ref 1–40)
BACTERIA UR QL AUTO: ABNORMAL /HPF
BILIRUB SERPL-MCNC: 0.3 MG/DL (ref 0–1.2)
BILIRUB UR QL STRIP: NEGATIVE
BUN SERPL-MCNC: 11 MG/DL (ref 8–23)
BUN SERPL-MCNC: 12 MG/DL (ref 8–23)
BUN SERPL-MCNC: 12 MG/DL (ref 8–23)
BUN/CREAT SERPL: 27.9 (ref 7–25)
BUN/CREAT SERPL: 27.9 (ref 7–25)
BUN/CREAT SERPL: 28.2 (ref 7–25)
CALCIUM SPEC-SCNC: 8.2 MG/DL (ref 8.6–10.5)
CALCIUM SPEC-SCNC: 8.3 MG/DL (ref 8.6–10.5)
CALCIUM SPEC-SCNC: 8.3 MG/DL (ref 8.6–10.5)
CHLORIDE SERPL-SCNC: 96 MMOL/L (ref 98–107)
CK SERPL-CCNC: 17 U/L (ref 20–200)
CLARITY UR: CLEAR
CO2 SERPL-SCNC: 26 MMOL/L (ref 22–29)
CO2 SERPL-SCNC: 26 MMOL/L (ref 22–29)
CO2 SERPL-SCNC: 27 MMOL/L (ref 22–29)
COLOR UR: YELLOW
CORTIS SERPL-MCNC: 9.63 MCG/DL
CREAT SERPL-MCNC: 0.39 MG/DL (ref 0.76–1.27)
CREAT SERPL-MCNC: 0.43 MG/DL (ref 0.76–1.27)
CREAT SERPL-MCNC: 0.43 MG/DL (ref 0.76–1.27)
CRP SERPL-MCNC: 15.08 MG/DL (ref 0–0.5)
DEPRECATED RDW RBC AUTO: 44.3 FL (ref 37–54)
EGFRCR SERPLBLD CKD-EPI 2021: 116.3 ML/MIN/1.73
EGFRCR SERPLBLD CKD-EPI 2021: 116.3 ML/MIN/1.73
EGFRCR SERPLBLD CKD-EPI 2021: 119.8 ML/MIN/1.73
ERYTHROCYTE [DISTWIDTH] IN BLOOD BY AUTOMATED COUNT: 14.7 % (ref 12.3–15.4)
ERYTHROCYTE [SEDIMENTATION RATE] IN BLOOD: 95 MM/HR (ref 0–20)
GLOBULIN UR ELPH-MCNC: 3.4 GM/DL
GLUCOSE BLDC GLUCOMTR-MCNC: 117 MG/DL (ref 70–130)
GLUCOSE BLDC GLUCOMTR-MCNC: 137 MG/DL (ref 70–130)
GLUCOSE BLDC GLUCOMTR-MCNC: 172 MG/DL (ref 70–130)
GLUCOSE SERPL-MCNC: 159 MG/DL (ref 65–99)
GLUCOSE SERPL-MCNC: 159 MG/DL (ref 65–99)
GLUCOSE SERPL-MCNC: 197 MG/DL (ref 65–99)
GLUCOSE UR STRIP-MCNC: NEGATIVE MG/DL
HBA1C MFR BLD: 6.3 % (ref 4.8–5.6)
HCT VFR BLD AUTO: 30.4 % (ref 37.5–51)
HGB BLD-MCNC: 10 G/DL (ref 13–17.7)
HGB UR QL STRIP.AUTO: NEGATIVE
HYALINE CASTS UR QL AUTO: ABNORMAL /LPF
KETONES UR QL STRIP: NEGATIVE
LEUKOCYTE ESTERASE UR QL STRIP.AUTO: NEGATIVE
MCH RBC QN AUTO: 27.2 PG (ref 26.6–33)
MCHC RBC AUTO-ENTMCNC: 32.9 G/DL (ref 31.5–35.7)
MCV RBC AUTO: 82.6 FL (ref 79–97)
NITRITE UR QL STRIP: NEGATIVE
OSMOLALITY SERPL: 283 MOSM/KG (ref 275–295)
OSMOLALITY UR: 368 MOSM/KG (ref 300–1100)
PH UR STRIP.AUTO: 7.5 [PH] (ref 5–8)
PLATELET # BLD AUTO: 368 10*3/MM3 (ref 140–450)
PMV BLD AUTO: 9.4 FL (ref 6–12)
POTASSIUM SERPL-SCNC: 4.2 MMOL/L (ref 3.5–5.2)
PREALB SERPL-MCNC: 6.5 MG/DL (ref 20–40)
PROT SERPL-MCNC: 5.8 G/DL (ref 6–8.5)
PROT UR QL STRIP: ABNORMAL
RBC # BLD AUTO: 3.68 10*6/MM3 (ref 4.14–5.8)
RBC # UR STRIP: ABNORMAL /HPF
REF LAB TEST METHOD: ABNORMAL
SODIUM SERPL-SCNC: 131 MMOL/L (ref 136–145)
SODIUM SERPL-SCNC: 132 MMOL/L (ref 136–145)
SODIUM SERPL-SCNC: 132 MMOL/L (ref 136–145)
SODIUM UR-SCNC: 60 MMOL/L
SP GR UR STRIP: 1.01 (ref 1–1.03)
SQUAMOUS #/AREA URNS HPF: ABNORMAL /HPF
TSH SERPL DL<=0.05 MIU/L-ACNC: 0.86 UIU/ML (ref 0.27–4.2)
UROBILINOGEN UR QL STRIP: ABNORMAL
WBC # UR STRIP: ABNORMAL /HPF
WBC NRBC COR # BLD: 15.05 10*3/MM3 (ref 3.4–10.8)

## 2023-01-26 PROCEDURE — 83930 ASSAY OF BLOOD OSMOLALITY: CPT | Performed by: PHYSICIAN ASSISTANT

## 2023-01-26 PROCEDURE — 25010000002 PIPERACILLIN SOD-TAZOBACTAM PER 1 G: Performed by: PHYSICIAN ASSISTANT

## 2023-01-26 PROCEDURE — 80053 COMPREHEN METABOLIC PANEL: CPT | Performed by: PHYSICIAN ASSISTANT

## 2023-01-26 PROCEDURE — 81001 URINALYSIS AUTO W/SCOPE: CPT | Performed by: PHYSICIAN ASSISTANT

## 2023-01-26 PROCEDURE — 99223 1ST HOSP IP/OBS HIGH 75: CPT | Performed by: PHYSICIAN ASSISTANT

## 2023-01-26 PROCEDURE — 82962 GLUCOSE BLOOD TEST: CPT

## 2023-01-26 PROCEDURE — 85652 RBC SED RATE AUTOMATED: CPT | Performed by: PHYSICIAN ASSISTANT

## 2023-01-26 PROCEDURE — 82533 TOTAL CORTISOL: CPT | Performed by: PHYSICIAN ASSISTANT

## 2023-01-26 PROCEDURE — 82550 ASSAY OF CK (CPK): CPT | Performed by: INTERNAL MEDICINE

## 2023-01-26 PROCEDURE — 85027 COMPLETE CBC AUTOMATED: CPT | Performed by: PHYSICIAN ASSISTANT

## 2023-01-26 PROCEDURE — 83935 ASSAY OF URINE OSMOLALITY: CPT | Performed by: PHYSICIAN ASSISTANT

## 2023-01-26 PROCEDURE — 84300 ASSAY OF URINE SODIUM: CPT | Performed by: PHYSICIAN ASSISTANT

## 2023-01-26 PROCEDURE — 25010000002 DAPTOMYCIN PER 1 MG: Performed by: INTERNAL MEDICINE

## 2023-01-26 RX ORDER — DEXTROSE MONOHYDRATE 25 G/50ML
25 INJECTION, SOLUTION INTRAVENOUS
Status: DISCONTINUED | OUTPATIENT
Start: 2023-01-26 | End: 2023-02-21 | Stop reason: HOSPADM

## 2023-01-26 RX ORDER — SODIUM CHLORIDE 0.9 % (FLUSH) 0.9 %
10 SYRINGE (ML) INJECTION EVERY 12 HOURS SCHEDULED
Status: DISCONTINUED | OUTPATIENT
Start: 2023-01-26 | End: 2023-02-21 | Stop reason: HOSPADM

## 2023-01-26 RX ORDER — NICOTINE POLACRILEX 4 MG
15 LOZENGE BUCCAL
Status: DISCONTINUED | OUTPATIENT
Start: 2023-01-26 | End: 2023-02-21 | Stop reason: HOSPADM

## 2023-01-26 RX ORDER — LISINOPRIL 20 MG/1
40 TABLET ORAL
Status: DISCONTINUED | OUTPATIENT
Start: 2023-01-26 | End: 2023-02-21 | Stop reason: HOSPADM

## 2023-01-26 RX ORDER — DULOXETIN HYDROCHLORIDE 60 MG/1
60 CAPSULE, DELAYED RELEASE ORAL DAILY
Status: DISCONTINUED | OUTPATIENT
Start: 2023-01-26 | End: 2023-02-21 | Stop reason: HOSPADM

## 2023-01-26 RX ORDER — FAMOTIDINE 10 MG/ML
20 INJECTION, SOLUTION INTRAVENOUS
Status: CANCELLED | OUTPATIENT
Start: 2023-01-26

## 2023-01-26 RX ORDER — SODIUM CHLORIDE 9 MG/ML
40 INJECTION, SOLUTION INTRAVENOUS AS NEEDED
Status: DISCONTINUED | OUTPATIENT
Start: 2023-01-26 | End: 2023-02-21 | Stop reason: HOSPADM

## 2023-01-26 RX ORDER — INSULIN LISPRO 100 [IU]/ML
0-7 INJECTION, SOLUTION INTRAVENOUS; SUBCUTANEOUS
Status: DISCONTINUED | OUTPATIENT
Start: 2023-01-26 | End: 2023-02-21 | Stop reason: HOSPADM

## 2023-01-26 RX ORDER — ACETAMINOPHEN 325 MG/1
650 TABLET ORAL EVERY 4 HOURS PRN
Status: DISCONTINUED | OUTPATIENT
Start: 2023-01-26 | End: 2023-02-21 | Stop reason: HOSPADM

## 2023-01-26 RX ORDER — CHOLECALCIFEROL (VITAMIN D3) 125 MCG
5 CAPSULE ORAL NIGHTLY PRN
Status: DISCONTINUED | OUTPATIENT
Start: 2023-01-26 | End: 2023-02-21 | Stop reason: HOSPADM

## 2023-01-26 RX ORDER — ROSUVASTATIN CALCIUM 20 MG/1
20 TABLET, COATED ORAL NIGHTLY
Status: DISCONTINUED | OUTPATIENT
Start: 2023-01-26 | End: 2023-02-21 | Stop reason: HOSPADM

## 2023-01-26 RX ORDER — OXYCODONE AND ACETAMINOPHEN 10; 325 MG/1; MG/1
1 TABLET ORAL
Status: DISCONTINUED | OUTPATIENT
Start: 2023-01-26 | End: 2023-02-21 | Stop reason: HOSPADM

## 2023-01-26 RX ORDER — ONDANSETRON 2 MG/ML
4 INJECTION INTRAMUSCULAR; INTRAVENOUS EVERY 6 HOURS PRN
Status: DISCONTINUED | OUTPATIENT
Start: 2023-01-26 | End: 2023-02-21 | Stop reason: HOSPADM

## 2023-01-26 RX ORDER — METOPROLOL SUCCINATE 25 MG/1
25 TABLET, EXTENDED RELEASE ORAL
Status: DISCONTINUED | OUTPATIENT
Start: 2023-01-26 | End: 2023-02-21 | Stop reason: HOSPADM

## 2023-01-26 RX ORDER — GABAPENTIN 400 MG/1
800 CAPSULE ORAL 4 TIMES DAILY
Status: DISCONTINUED | OUTPATIENT
Start: 2023-01-26 | End: 2023-02-21 | Stop reason: HOSPADM

## 2023-01-26 RX ORDER — L.ACID,PARA/B.BIFIDUM/S.THERM 8B CELL
1 CAPSULE ORAL DAILY
Status: DISCONTINUED | OUTPATIENT
Start: 2023-01-26 | End: 2023-02-21 | Stop reason: HOSPADM

## 2023-01-26 RX ORDER — SODIUM CHLORIDE 0.9 % (FLUSH) 0.9 %
10 SYRINGE (ML) INJECTION AS NEEDED
Status: DISCONTINUED | OUTPATIENT
Start: 2023-01-26 | End: 2023-02-21 | Stop reason: HOSPADM

## 2023-01-26 RX ORDER — SODIUM CHLORIDE 9 MG/ML
75 INJECTION, SOLUTION INTRAVENOUS CONTINUOUS
Status: ACTIVE | OUTPATIENT
Start: 2023-01-26 | End: 2023-01-26

## 2023-01-26 RX ADMIN — Medication 5 MG: at 21:14

## 2023-01-26 RX ADMIN — GABAPENTIN 800 MG: 400 CAPSULE ORAL at 21:14

## 2023-01-26 RX ADMIN — TAZOBACTAM SODIUM AND PIPERACILLIN SODIUM 3.38 G: 375; 3 INJECTION, SOLUTION INTRAVENOUS at 12:30

## 2023-01-26 RX ADMIN — OXYCODONE HYDROCHLORIDE AND ACETAMINOPHEN 1 TABLET: 10; 325 TABLET ORAL at 08:49

## 2023-01-26 RX ADMIN — OXYCODONE HYDROCHLORIDE AND ACETAMINOPHEN 1 TABLET: 10; 325 TABLET ORAL at 11:30

## 2023-01-26 RX ADMIN — ROSUVASTATIN CALCIUM 20 MG: 20 TABLET ORAL at 21:13

## 2023-01-26 RX ADMIN — GABAPENTIN 800 MG: 400 CAPSULE ORAL at 11:30

## 2023-01-26 RX ADMIN — Medication 10 ML: at 21:14

## 2023-01-26 RX ADMIN — DAPTOMYCIN 400 MG: 500 INJECTION, POWDER, LYOPHILIZED, FOR SOLUTION INTRAVENOUS at 11:30

## 2023-01-26 RX ADMIN — TAZOBACTAM SODIUM AND PIPERACILLIN SODIUM 3.38 G: 375; 3 INJECTION, SOLUTION INTRAVENOUS at 21:16

## 2023-01-26 RX ADMIN — Medication 10 ML: at 08:51

## 2023-01-26 RX ADMIN — GABAPENTIN 800 MG: 400 CAPSULE ORAL at 08:50

## 2023-01-26 RX ADMIN — LISINOPRIL 40 MG: 20 TABLET ORAL at 08:49

## 2023-01-26 RX ADMIN — OXYCODONE HYDROCHLORIDE AND ACETAMINOPHEN 1 TABLET: 10; 325 TABLET ORAL at 17:15

## 2023-01-26 RX ADMIN — METOPROLOL SUCCINATE 25 MG: 25 TABLET, EXTENDED RELEASE ORAL at 08:50

## 2023-01-26 RX ADMIN — TAZOBACTAM SODIUM AND PIPERACILLIN SODIUM 3.38 G: 375; 3 INJECTION, SOLUTION INTRAVENOUS at 05:45

## 2023-01-26 RX ADMIN — DULOXETINE HYDROCHLORIDE 60 MG: 60 CAPSULE, DELAYED RELEASE ORAL at 08:50

## 2023-01-26 RX ADMIN — Medication 1 CAPSULE: at 08:50

## 2023-01-26 RX ADMIN — SODIUM CHLORIDE 75 ML/HR: 9 INJECTION, SOLUTION INTRAVENOUS at 03:40

## 2023-01-26 RX ADMIN — GABAPENTIN 800 MG: 400 CAPSULE ORAL at 17:15

## 2023-01-26 RX ADMIN — OXYCODONE HYDROCHLORIDE AND ACETAMINOPHEN 1 TABLET: 10; 325 TABLET ORAL at 21:14

## 2023-01-27 ENCOUNTER — ANESTHESIA EVENT (OUTPATIENT)
Dept: PERIOP | Facility: HOSPITAL | Age: 68
DRG: 239 | End: 2023-01-27
Payer: MEDICARE

## 2023-01-27 ENCOUNTER — ANESTHESIA (OUTPATIENT)
Dept: PERIOP | Facility: HOSPITAL | Age: 68
DRG: 239 | End: 2023-01-27
Payer: MEDICARE

## 2023-01-27 LAB
ALBUMIN SERPL-MCNC: 2.2 G/DL (ref 3.5–5.2)
ALBUMIN/GLOB SERPL: 0.7 G/DL
ALP SERPL-CCNC: 68 U/L (ref 39–117)
ALT SERPL W P-5'-P-CCNC: 7 U/L (ref 1–41)
ANION GAP SERPL CALCULATED.3IONS-SCNC: 7 MMOL/L (ref 5–15)
AST SERPL-CCNC: 14 U/L (ref 1–40)
BASOPHILS # BLD AUTO: 0.06 10*3/MM3 (ref 0–0.2)
BASOPHILS NFR BLD AUTO: 0.5 % (ref 0–1.5)
BILIRUB SERPL-MCNC: 0.2 MG/DL (ref 0–1.2)
BUN SERPL-MCNC: 10 MG/DL (ref 8–23)
BUN/CREAT SERPL: 28.6 (ref 7–25)
CALCIUM SPEC-SCNC: 7.8 MG/DL (ref 8.6–10.5)
CHLORIDE SERPL-SCNC: 96 MMOL/L (ref 98–107)
CO2 SERPL-SCNC: 27 MMOL/L (ref 22–29)
CREAT SERPL-MCNC: 0.35 MG/DL (ref 0.76–1.27)
DEPRECATED RDW RBC AUTO: 44.5 FL (ref 37–54)
EGFRCR SERPLBLD CKD-EPI 2021: 123.7 ML/MIN/1.73
EOSINOPHIL # BLD AUTO: 1.22 10*3/MM3 (ref 0–0.4)
EOSINOPHIL NFR BLD AUTO: 9.9 % (ref 0.3–6.2)
ERYTHROCYTE [DISTWIDTH] IN BLOOD BY AUTOMATED COUNT: 14.6 % (ref 12.3–15.4)
GLOBULIN UR ELPH-MCNC: 3.3 GM/DL
GLUCOSE BLDC GLUCOMTR-MCNC: 128 MG/DL (ref 70–130)
GLUCOSE BLDC GLUCOMTR-MCNC: 134 MG/DL (ref 70–130)
GLUCOSE BLDC GLUCOMTR-MCNC: 147 MG/DL (ref 70–130)
GLUCOSE BLDC GLUCOMTR-MCNC: 179 MG/DL (ref 70–130)
GLUCOSE SERPL-MCNC: 142 MG/DL (ref 65–99)
HCT VFR BLD AUTO: 31.9 % (ref 37.5–51)
HGB BLD-MCNC: 10 G/DL (ref 13–17.7)
IMM GRANULOCYTES # BLD AUTO: 0.08 10*3/MM3 (ref 0–0.05)
IMM GRANULOCYTES NFR BLD AUTO: 0.6 % (ref 0–0.5)
LYMPHOCYTES # BLD AUTO: 0.59 10*3/MM3 (ref 0.7–3.1)
LYMPHOCYTES NFR BLD AUTO: 4.8 % (ref 19.6–45.3)
MCH RBC QN AUTO: 26.1 PG (ref 26.6–33)
MCHC RBC AUTO-ENTMCNC: 31.3 G/DL (ref 31.5–35.7)
MCV RBC AUTO: 83.3 FL (ref 79–97)
MONOCYTES # BLD AUTO: 0.6 10*3/MM3 (ref 0.1–0.9)
MONOCYTES NFR BLD AUTO: 4.9 % (ref 5–12)
NEUTROPHILS NFR BLD AUTO: 79.3 % (ref 42.7–76)
NEUTROPHILS NFR BLD AUTO: 9.82 10*3/MM3 (ref 1.7–7)
NRBC BLD AUTO-RTO: 0 /100 WBC (ref 0–0.2)
PLATELET # BLD AUTO: 365 10*3/MM3 (ref 140–450)
PMV BLD AUTO: 9.4 FL (ref 6–12)
POTASSIUM SERPL-SCNC: 4.4 MMOL/L (ref 3.5–5.2)
PREALB SERPL-MCNC: 5.4 MG/DL (ref 20–40)
PROT SERPL-MCNC: 5.5 G/DL (ref 6–8.5)
RBC # BLD AUTO: 3.83 10*6/MM3 (ref 4.14–5.8)
SODIUM SERPL-SCNC: 130 MMOL/L (ref 136–145)
WBC NRBC COR # BLD: 12.37 10*3/MM3 (ref 3.4–10.8)

## 2023-01-27 PROCEDURE — 82962 GLUCOSE BLOOD TEST: CPT

## 2023-01-27 PROCEDURE — 25010000002 DAPTOMYCIN PER 1 MG: Performed by: STUDENT IN AN ORGANIZED HEALTH CARE EDUCATION/TRAINING PROGRAM

## 2023-01-27 PROCEDURE — 36415 COLL VENOUS BLD VENIPUNCTURE: CPT

## 2023-01-27 PROCEDURE — 27590 AMPUTATE LEG AT THIGH: CPT | Performed by: STUDENT IN AN ORGANIZED HEALTH CARE EDUCATION/TRAINING PROGRAM

## 2023-01-27 PROCEDURE — 99024 POSTOP FOLLOW-UP VISIT: CPT | Performed by: PHYSICIAN ASSISTANT

## 2023-01-27 PROCEDURE — 99232 SBSQ HOSP IP/OBS MODERATE 35: CPT | Performed by: INTERNAL MEDICINE

## 2023-01-27 PROCEDURE — 80053 COMPREHEN METABOLIC PANEL: CPT | Performed by: INTERNAL MEDICINE

## 2023-01-27 PROCEDURE — 84134 ASSAY OF PREALBUMIN: CPT

## 2023-01-27 PROCEDURE — 85025 COMPLETE CBC W/AUTO DIFF WBC: CPT | Performed by: INTERNAL MEDICINE

## 2023-01-27 PROCEDURE — 25010000002 PIPERACILLIN SOD-TAZOBACTAM PER 1 G: Performed by: PHYSICIAN ASSISTANT

## 2023-01-27 PROCEDURE — 25010000002 PROPOFOL 1000 MG/ML EMULSION: Performed by: NURSE ANESTHETIST, CERTIFIED REGISTERED

## 2023-01-27 PROCEDURE — 88311 DECALCIFY TISSUE: CPT | Performed by: STUDENT IN AN ORGANIZED HEALTH CARE EDUCATION/TRAINING PROGRAM

## 2023-01-27 PROCEDURE — 63710000001 INSULIN LISPRO (HUMAN) PER 5 UNITS: Performed by: STUDENT IN AN ORGANIZED HEALTH CARE EDUCATION/TRAINING PROGRAM

## 2023-01-27 PROCEDURE — 25010000002 PIPERACILLIN SOD-TAZOBACTAM PER 1 G: Performed by: STUDENT IN AN ORGANIZED HEALTH CARE EDUCATION/TRAINING PROGRAM

## 2023-01-27 PROCEDURE — 88307 TISSUE EXAM BY PATHOLOGIST: CPT | Performed by: STUDENT IN AN ORGANIZED HEALTH CARE EDUCATION/TRAINING PROGRAM

## 2023-01-27 PROCEDURE — 0Y6D0Z2 DETACHMENT AT LEFT UPPER LEG, MID, OPEN APPROACH: ICD-10-PCS | Performed by: STUDENT IN AN ORGANIZED HEALTH CARE EDUCATION/TRAINING PROGRAM

## 2023-01-27 RX ORDER — DOCUSATE SODIUM 100 MG/1
100 CAPSULE, LIQUID FILLED ORAL 2 TIMES DAILY PRN
Status: DISCONTINUED | OUTPATIENT
Start: 2023-01-27 | End: 2023-02-21 | Stop reason: HOSPADM

## 2023-01-27 RX ORDER — BISACODYL 10 MG
10 SUPPOSITORY, RECTAL RECTAL DAILY PRN
Status: DISCONTINUED | OUTPATIENT
Start: 2023-01-27 | End: 2023-02-21 | Stop reason: HOSPADM

## 2023-01-27 RX ORDER — IPRATROPIUM BROMIDE AND ALBUTEROL SULFATE 2.5; .5 MG/3ML; MG/3ML
3 SOLUTION RESPIRATORY (INHALATION) ONCE AS NEEDED
Status: DISCONTINUED | OUTPATIENT
Start: 2023-01-27 | End: 2023-01-27 | Stop reason: HOSPADM

## 2023-01-27 RX ORDER — LIDOCAINE HYDROCHLORIDE 10 MG/ML
INJECTION, SOLUTION EPIDURAL; INFILTRATION; INTRACAUDAL; PERINEURAL AS NEEDED
Status: DISCONTINUED | OUTPATIENT
Start: 2023-01-27 | End: 2023-01-27 | Stop reason: SURG

## 2023-01-27 RX ORDER — SODIUM CHLORIDE, SODIUM LACTATE, POTASSIUM CHLORIDE, CALCIUM CHLORIDE 600; 310; 30; 20 MG/100ML; MG/100ML; MG/100ML; MG/100ML
9 INJECTION, SOLUTION INTRAVENOUS CONTINUOUS PRN
Status: DISCONTINUED | OUTPATIENT
Start: 2023-01-27 | End: 2023-02-21 | Stop reason: HOSPADM

## 2023-01-27 RX ORDER — PHENYLEPHRINE HCL IN 0.9% NACL 1 MG/10 ML
SYRINGE (ML) INTRAVENOUS AS NEEDED
Status: DISCONTINUED | OUTPATIENT
Start: 2023-01-27 | End: 2023-01-27 | Stop reason: SURG

## 2023-01-27 RX ORDER — BISACODYL 5 MG/1
10 TABLET, DELAYED RELEASE ORAL DAILY PRN
Status: DISCONTINUED | OUTPATIENT
Start: 2023-01-27 | End: 2023-02-21 | Stop reason: HOSPADM

## 2023-01-27 RX ORDER — MAGNESIUM HYDROXIDE 1200 MG/15ML
LIQUID ORAL AS NEEDED
Status: DISCONTINUED | OUTPATIENT
Start: 2023-01-27 | End: 2023-01-27 | Stop reason: HOSPADM

## 2023-01-27 RX ORDER — FAMOTIDINE 20 MG/1
20 TABLET, FILM COATED ORAL
Status: COMPLETED | OUTPATIENT
Start: 2023-01-27 | End: 2023-01-27

## 2023-01-27 RX ORDER — DEXMEDETOMIDINE HYDROCHLORIDE 100 UG/ML
INJECTION, SOLUTION INTRAVENOUS AS NEEDED
Status: DISCONTINUED | OUTPATIENT
Start: 2023-01-27 | End: 2023-01-27 | Stop reason: SURG

## 2023-01-27 RX ORDER — HEPARIN SODIUM 5000 [USP'U]/ML
5000 INJECTION, SOLUTION INTRAVENOUS; SUBCUTANEOUS EVERY 12 HOURS SCHEDULED
Status: DISCONTINUED | OUTPATIENT
Start: 2023-01-28 | End: 2023-02-08

## 2023-01-27 RX ORDER — ONDANSETRON 2 MG/ML
4 INJECTION INTRAMUSCULAR; INTRAVENOUS ONCE AS NEEDED
Status: DISCONTINUED | OUTPATIENT
Start: 2023-01-27 | End: 2023-01-27 | Stop reason: HOSPADM

## 2023-01-27 RX ADMIN — Medication 100 MCG: at 10:36

## 2023-01-27 RX ADMIN — OXYCODONE HYDROCHLORIDE AND ACETAMINOPHEN 1 TABLET: 10; 325 TABLET ORAL at 18:17

## 2023-01-27 RX ADMIN — FAMOTIDINE 20 MG: 20 TABLET, FILM COATED ORAL at 08:51

## 2023-01-27 RX ADMIN — LISINOPRIL 40 MG: 20 TABLET ORAL at 12:42

## 2023-01-27 RX ADMIN — TAZOBACTAM SODIUM AND PIPERACILLIN SODIUM 3.38 G: 375; 3 INJECTION, SOLUTION INTRAVENOUS at 12:42

## 2023-01-27 RX ADMIN — Medication 10 ML: at 20:54

## 2023-01-27 RX ADMIN — LIDOCAINE HYDROCHLORIDE 50 MG: 10 INJECTION, SOLUTION EPIDURAL; INFILTRATION; INTRACAUDAL; PERINEURAL at 09:19

## 2023-01-27 RX ADMIN — GABAPENTIN 800 MG: 400 CAPSULE ORAL at 20:54

## 2023-01-27 RX ADMIN — Medication 150 MCG: at 09:49

## 2023-01-27 RX ADMIN — DAPTOMYCIN 400 MG: 500 INJECTION, POWDER, LYOPHILIZED, FOR SOLUTION INTRAVENOUS at 18:07

## 2023-01-27 RX ADMIN — PROPOFOL 100 MCG/KG/MIN: 10 INJECTION, EMULSION INTRAVENOUS at 09:22

## 2023-01-27 RX ADMIN — ACETAMINOPHEN 325MG 650 MG: 325 TABLET ORAL at 12:42

## 2023-01-27 RX ADMIN — TAZOBACTAM SODIUM AND PIPERACILLIN SODIUM 3.38 G: 375; 3 INJECTION, SOLUTION INTRAVENOUS at 20:54

## 2023-01-27 RX ADMIN — OXYCODONE HYDROCHLORIDE AND ACETAMINOPHEN 1 TABLET: 10; 325 TABLET ORAL at 12:43

## 2023-01-27 RX ADMIN — Medication 10 ML: at 12:43

## 2023-01-27 RX ADMIN — INSULIN LISPRO 2 UNITS: 100 INJECTION, SOLUTION INTRAVENOUS; SUBCUTANEOUS at 17:55

## 2023-01-27 RX ADMIN — TAZOBACTAM SODIUM AND PIPERACILLIN SODIUM 3.38 G: 375; 3 INJECTION, SOLUTION INTRAVENOUS at 04:58

## 2023-01-27 RX ADMIN — DEXMEDETOMIDINE HYDROCHLORIDE 4 MCG: 100 INJECTION, SOLUTION INTRAVENOUS at 09:24

## 2023-01-27 RX ADMIN — DEXMEDETOMIDINE HYDROCHLORIDE 4 MCG: 100 INJECTION, SOLUTION INTRAVENOUS at 09:19

## 2023-01-27 RX ADMIN — Medication 10 ML: at 08:52

## 2023-01-27 RX ADMIN — ROSUVASTATIN CALCIUM 20 MG: 20 TABLET ORAL at 20:54

## 2023-01-27 RX ADMIN — GABAPENTIN 800 MG: 400 CAPSULE ORAL at 17:55

## 2023-01-27 RX ADMIN — METOPROLOL SUCCINATE 25 MG: 25 TABLET, EXTENDED RELEASE ORAL at 08:51

## 2023-01-27 RX ADMIN — SODIUM CHLORIDE, POTASSIUM CHLORIDE, SODIUM LACTATE AND CALCIUM CHLORIDE 9 ML/HR: 600; 310; 30; 20 INJECTION, SOLUTION INTRAVENOUS at 08:52

## 2023-01-27 NOTE — ANESTHESIA POSTPROCEDURE EVALUATION
Patient: Severino Vera    Procedure Summary     Date: 01/27/23 [facility] / [facility]:  [facility]    Anesthesia Start: 0919 Anesthesia Stop: 1100    Procedure: AMPUTATION ABOVE KNEE LEFT (Left: Thigh) Diagnosis:       Gangrene of left foot (HCC)      Peripheral arterial disease (HCC)      (Gangrene of left foot (HCC) [I96])      (Peripheral arterial disease (HCC) [I73.9])    Surgeons: Nemesio Haji MD Provider: Joe Gonzalez MD    Anesthesia Type: general ASA Status: 3          Anesthesia Type: general    Vitals  Vitals Value Taken Time   /64 01/27/23 1135   Temp 98.1 °F (36.7 °C) 01/27/23 1120   Pulse 66 01/27/23 1140   Resp 16 01/27/23 1135   SpO2 98 % 01/27/23 1141   Vitals shown include unvalidated device data.        Post Anesthesia Care and Evaluation    Patient location during evaluation: PACU  Patient participation: complete - patient participated  Level of consciousness: awake and alert  Pain management: adequate    Airway patency: patent  Anesthetic complications: No anesthetic complications  PONV Status: none  Cardiovascular status: hemodynamically stable and acceptable  Respiratory status: nonlabored ventilation, acceptable and nasal cannula  Hydration status: acceptable

## 2023-01-27 NOTE — ANESTHESIA PREPROCEDURE EVALUATION
Anesthesia Evaluation     Patient summary reviewed and Nursing notes reviewed                Airway   Mallampati: II  TM distance: >3 FB  Neck ROM: full  No difficulty expected  Dental - normal exam     Pulmonary - normal exam   (+) a smoker Current, COPD,   Cardiovascular - normal exam    (+) hypertension, PVD (s/p R AKA 11/22), hyperlipidemia,     ROS comment:   Echo 11/22: normal EF, no significant valve disease    Neuro/Psych- negative ROS    ROS Comment: paraplegia  GI/Hepatic/Renal/Endo    (+)  GERD,  diabetes mellitus,     Musculoskeletal (-) negative ROS    Abdominal  - normal exam    Bowel sounds: normal.   Substance History - negative use     OB/GYN negative ob/gyn ROS         Other          Other Comment: Anemia HCT 32                Anesthesia Plan    ASA 3     general     intravenous induction     Anesthetic plan, risks, benefits, and alternatives have been provided, discussed and informed consent has been obtained with: patient.    Plan discussed with CRNA.        CODE STATUS:    Level Of Support Discussed With: Patient  Code Status (Patient has no pulse and is not breathing): CPR (Attempt to Resuscitate)  Medical Interventions (Patient has pulse or is breathing): Full Support

## 2023-01-27 NOTE — ANESTHESIA POSTPROCEDURE EVALUATION
Patient: Severino Vera    Procedure Summary     Date: 01/27/23 [facility]  [facility]    Anesthesia Start: 0919 Anesthesia Stop: 1100    Procedure: AMPUTATION ABOVE KNEE LEFT (Left: Thigh) Diagnosis:       Gangrene of left foot (HCC)      Peripheral arterial disease (HCC)      (Gangrene of left foot (HCC) [I96])      (Peripheral arterial disease (HCC) [I73.9])    Surgeons: Nemesio Haji MD Provider: Joe Gonzalez MD    Anesthesia Type: general ASA Status: 3          Anesthesia Type: general    Vitals  Vitals Value Taken Time   /64 01/27/23 1135   Temp 98.1 °F (36.7 °C) 01/27/23 1120   Pulse 63 01/27/23 1135   Resp 16 01/27/23 1135   SpO2 97 % 01/27/23 1135           Post Anesthesia Care and Evaluation    Patient location during evaluation: PACU  Patient participation: complete - patient participated  Level of consciousness: awake and alert  Pain management: adequate    Airway patency: patent  Anesthetic complications: No anesthetic complications  PONV Status: none  Cardiovascular status: hemodynamically stable and acceptable  Respiratory status: nonlabored ventilation, acceptable and nasal cannula  Hydration status: acceptable

## 2023-01-28 LAB
ALBUMIN SERPL-MCNC: 1.9 G/DL (ref 3.5–5.2)
ALBUMIN/GLOB SERPL: 0.5 G/DL
ALP SERPL-CCNC: 59 U/L (ref 39–117)
ALT SERPL W P-5'-P-CCNC: 8 U/L (ref 1–41)
ANION GAP SERPL CALCULATED.3IONS-SCNC: 6 MMOL/L (ref 5–15)
AST SERPL-CCNC: 15 U/L (ref 1–40)
BASOPHILS # BLD AUTO: 0.06 10*3/MM3 (ref 0–0.2)
BASOPHILS NFR BLD AUTO: 0.4 % (ref 0–1.5)
BILIRUB SERPL-MCNC: 0.2 MG/DL (ref 0–1.2)
BUN SERPL-MCNC: 12 MG/DL (ref 8–23)
BUN/CREAT SERPL: 22.6 (ref 7–25)
CALCIUM SPEC-SCNC: 7.6 MG/DL (ref 8.6–10.5)
CHLORIDE SERPL-SCNC: 98 MMOL/L (ref 98–107)
CO2 SERPL-SCNC: 27 MMOL/L (ref 22–29)
CREAT SERPL-MCNC: 0.53 MG/DL (ref 0.76–1.27)
DEPRECATED RDW RBC AUTO: 44.2 FL (ref 37–54)
EGFRCR SERPLBLD CKD-EPI 2021: 109.2 ML/MIN/1.73
EOSINOPHIL # BLD AUTO: 1.62 10*3/MM3 (ref 0–0.4)
EOSINOPHIL NFR BLD AUTO: 9.6 % (ref 0.3–6.2)
ERYTHROCYTE [DISTWIDTH] IN BLOOD BY AUTOMATED COUNT: 14.7 % (ref 12.3–15.4)
GLOBULIN UR ELPH-MCNC: 3.5 GM/DL
GLUCOSE BLDC GLUCOMTR-MCNC: 125 MG/DL (ref 70–130)
GLUCOSE BLDC GLUCOMTR-MCNC: 147 MG/DL (ref 70–130)
GLUCOSE BLDC GLUCOMTR-MCNC: 157 MG/DL (ref 70–130)
GLUCOSE SERPL-MCNC: 114 MG/DL (ref 65–99)
HCT VFR BLD AUTO: 27.2 % (ref 37.5–51)
HGB BLD-MCNC: 8.7 G/DL (ref 13–17.7)
IMM GRANULOCYTES # BLD AUTO: 0.09 10*3/MM3 (ref 0–0.05)
IMM GRANULOCYTES NFR BLD AUTO: 0.5 % (ref 0–0.5)
LYMPHOCYTES # BLD AUTO: 1.05 10*3/MM3 (ref 0.7–3.1)
LYMPHOCYTES NFR BLD AUTO: 6.2 % (ref 19.6–45.3)
MCH RBC QN AUTO: 26.4 PG (ref 26.6–33)
MCHC RBC AUTO-ENTMCNC: 32 G/DL (ref 31.5–35.7)
MCV RBC AUTO: 82.4 FL (ref 79–97)
MONOCYTES # BLD AUTO: 0.87 10*3/MM3 (ref 0.1–0.9)
MONOCYTES NFR BLD AUTO: 5.1 % (ref 5–12)
NEUTROPHILS NFR BLD AUTO: 13.22 10*3/MM3 (ref 1.7–7)
NEUTROPHILS NFR BLD AUTO: 78.2 % (ref 42.7–76)
NRBC BLD AUTO-RTO: 0 /100 WBC (ref 0–0.2)
PLATELET # BLD AUTO: 351 10*3/MM3 (ref 140–450)
PMV BLD AUTO: 9.2 FL (ref 6–12)
POTASSIUM SERPL-SCNC: 4.2 MMOL/L (ref 3.5–5.2)
PROT SERPL-MCNC: 5.4 G/DL (ref 6–8.5)
RBC # BLD AUTO: 3.3 10*6/MM3 (ref 4.14–5.8)
SODIUM SERPL-SCNC: 131 MMOL/L (ref 136–145)
WBC NRBC COR # BLD: 16.91 10*3/MM3 (ref 3.4–10.8)

## 2023-01-28 PROCEDURE — 85025 COMPLETE CBC W/AUTO DIFF WBC: CPT | Performed by: INTERNAL MEDICINE

## 2023-01-28 PROCEDURE — 25010000002 DAPTOMYCIN PER 1 MG

## 2023-01-28 PROCEDURE — 99233 SBSQ HOSP IP/OBS HIGH 50: CPT | Performed by: HOSPITALIST

## 2023-01-28 PROCEDURE — 63710000001 INSULIN LISPRO (HUMAN) PER 5 UNITS: Performed by: STUDENT IN AN ORGANIZED HEALTH CARE EDUCATION/TRAINING PROGRAM

## 2023-01-28 PROCEDURE — 25010000002 HEPARIN (PORCINE) PER 1000 UNITS: Performed by: STUDENT IN AN ORGANIZED HEALTH CARE EDUCATION/TRAINING PROGRAM

## 2023-01-28 PROCEDURE — 80053 COMPREHEN METABOLIC PANEL: CPT | Performed by: INTERNAL MEDICINE

## 2023-01-28 PROCEDURE — 82962 GLUCOSE BLOOD TEST: CPT

## 2023-01-28 PROCEDURE — 97162 PT EVAL MOD COMPLEX 30 MIN: CPT

## 2023-01-28 PROCEDURE — 25010000002 PIPERACILLIN SOD-TAZOBACTAM PER 1 G: Performed by: STUDENT IN AN ORGANIZED HEALTH CARE EDUCATION/TRAINING PROGRAM

## 2023-01-28 PROCEDURE — 97110 THERAPEUTIC EXERCISES: CPT

## 2023-01-28 RX ADMIN — GABAPENTIN 800 MG: 400 CAPSULE ORAL at 21:01

## 2023-01-28 RX ADMIN — TAZOBACTAM SODIUM AND PIPERACILLIN SODIUM 3.38 G: 375; 3 INJECTION, SOLUTION INTRAVENOUS at 04:00

## 2023-01-28 RX ADMIN — METOPROLOL SUCCINATE 25 MG: 25 TABLET, EXTENDED RELEASE ORAL at 09:56

## 2023-01-28 RX ADMIN — TAZOBACTAM SODIUM AND PIPERACILLIN SODIUM 3.38 G: 375; 3 INJECTION, SOLUTION INTRAVENOUS at 12:16

## 2023-01-28 RX ADMIN — GABAPENTIN 800 MG: 400 CAPSULE ORAL at 12:15

## 2023-01-28 RX ADMIN — THIAMINE HCL TAB 100 MG 100 MG: 100 TAB at 09:55

## 2023-01-28 RX ADMIN — DAPTOMYCIN 400 MG: 500 INJECTION, POWDER, LYOPHILIZED, FOR SOLUTION INTRAVENOUS at 17:41

## 2023-01-28 RX ADMIN — LISINOPRIL 40 MG: 20 TABLET ORAL at 09:57

## 2023-01-28 RX ADMIN — DULOXETINE HYDROCHLORIDE 60 MG: 60 CAPSULE, DELAYED RELEASE ORAL at 09:55

## 2023-01-28 RX ADMIN — Medication 1 CAPSULE: at 09:55

## 2023-01-28 RX ADMIN — GABAPENTIN 800 MG: 400 CAPSULE ORAL at 09:55

## 2023-01-28 RX ADMIN — ROSUVASTATIN CALCIUM 20 MG: 20 TABLET ORAL at 21:01

## 2023-01-28 RX ADMIN — INSULIN LISPRO 2 UNITS: 100 INJECTION, SOLUTION INTRAVENOUS; SUBCUTANEOUS at 12:16

## 2023-01-28 RX ADMIN — GABAPENTIN 800 MG: 400 CAPSULE ORAL at 17:41

## 2023-01-28 RX ADMIN — TAZOBACTAM SODIUM AND PIPERACILLIN SODIUM 3.38 G: 375; 3 INJECTION, SOLUTION INTRAVENOUS at 21:01

## 2023-01-28 RX ADMIN — OXYCODONE HYDROCHLORIDE AND ACETAMINOPHEN 1 TABLET: 10; 325 TABLET ORAL at 17:41

## 2023-01-28 RX ADMIN — OXYCODONE HYDROCHLORIDE AND ACETAMINOPHEN 1 TABLET: 10; 325 TABLET ORAL at 12:15

## 2023-01-28 RX ADMIN — HEPARIN SODIUM 5000 UNITS: 5000 INJECTION, SOLUTION INTRAVENOUS; SUBCUTANEOUS at 09:56

## 2023-01-28 RX ADMIN — HEPARIN SODIUM 5000 UNITS: 5000 INJECTION, SOLUTION INTRAVENOUS; SUBCUTANEOUS at 21:01

## 2023-01-29 LAB
ALBUMIN SERPL-MCNC: 1.8 G/DL (ref 3.5–5.2)
ALBUMIN/GLOB SERPL: 0.5 G/DL
ALP SERPL-CCNC: 49 U/L (ref 39–117)
ALT SERPL W P-5'-P-CCNC: 7 U/L (ref 1–41)
ANION GAP SERPL CALCULATED.3IONS-SCNC: 8 MMOL/L (ref 5–15)
AST SERPL-CCNC: 10 U/L (ref 1–40)
BH BB BLOOD EXPIRATION DATE: NORMAL
BH BB BLOOD TYPE BARCODE: 6200
BH BB DISPENSE STATUS: NORMAL
BH BB PRODUCT CODE: NORMAL
BH BB UNIT NUMBER: NORMAL
BILIRUB SERPL-MCNC: 0.2 MG/DL (ref 0–1.2)
BUN SERPL-MCNC: 12 MG/DL (ref 8–23)
BUN/CREAT SERPL: 37.5 (ref 7–25)
CALCIUM SPEC-SCNC: 7.9 MG/DL (ref 8.6–10.5)
CHLORIDE SERPL-SCNC: 96 MMOL/L (ref 98–107)
CO2 SERPL-SCNC: 27 MMOL/L (ref 22–29)
CREAT SERPL-MCNC: 0.32 MG/DL (ref 0.76–1.27)
CROSSMATCH INTERPRETATION: NORMAL
DEPRECATED RDW RBC AUTO: 44.6 FL (ref 37–54)
EGFRCR SERPLBLD CKD-EPI 2021: 127.1 ML/MIN/1.73
ERYTHROCYTE [DISTWIDTH] IN BLOOD BY AUTOMATED COUNT: 14.9 % (ref 12.3–15.4)
GLOBULIN UR ELPH-MCNC: 3.9 GM/DL
GLUCOSE BLDC GLUCOMTR-MCNC: 115 MG/DL (ref 70–130)
GLUCOSE BLDC GLUCOMTR-MCNC: 138 MG/DL (ref 70–130)
GLUCOSE BLDC GLUCOMTR-MCNC: 210 MG/DL (ref 70–130)
GLUCOSE SERPL-MCNC: 113 MG/DL (ref 65–99)
HCT VFR BLD AUTO: 28.9 % (ref 37.5–51)
HGB BLD-MCNC: 9.2 G/DL (ref 13–17.7)
MCH RBC QN AUTO: 26.1 PG (ref 26.6–33)
MCHC RBC AUTO-ENTMCNC: 31.8 G/DL (ref 31.5–35.7)
MCV RBC AUTO: 82.1 FL (ref 79–97)
PLATELET # BLD AUTO: 373 10*3/MM3 (ref 140–450)
PMV BLD AUTO: 9 FL (ref 6–12)
POTASSIUM SERPL-SCNC: 3.9 MMOL/L (ref 3.5–5.2)
PROT SERPL-MCNC: 5.7 G/DL (ref 6–8.5)
RBC # BLD AUTO: 3.52 10*6/MM3 (ref 4.14–5.8)
SODIUM SERPL-SCNC: 131 MMOL/L (ref 136–145)
UNIT  ABO: NORMAL
UNIT  RH: NORMAL
WBC NRBC COR # BLD: 15.89 10*3/MM3 (ref 3.4–10.8)

## 2023-01-29 PROCEDURE — 85027 COMPLETE CBC AUTOMATED: CPT | Performed by: HOSPITALIST

## 2023-01-29 PROCEDURE — 63710000001 INSULIN LISPRO (HUMAN) PER 5 UNITS: Performed by: STUDENT IN AN ORGANIZED HEALTH CARE EDUCATION/TRAINING PROGRAM

## 2023-01-29 PROCEDURE — 80053 COMPREHEN METABOLIC PANEL: CPT | Performed by: HOSPITALIST

## 2023-01-29 PROCEDURE — 25010000002 PIPERACILLIN SOD-TAZOBACTAM PER 1 G: Performed by: STUDENT IN AN ORGANIZED HEALTH CARE EDUCATION/TRAINING PROGRAM

## 2023-01-29 PROCEDURE — 25010000002 DAPTOMYCIN PER 1 MG

## 2023-01-29 PROCEDURE — 25010000002 HEPARIN (PORCINE) PER 1000 UNITS: Performed by: STUDENT IN AN ORGANIZED HEALTH CARE EDUCATION/TRAINING PROGRAM

## 2023-01-29 PROCEDURE — 82962 GLUCOSE BLOOD TEST: CPT

## 2023-01-29 PROCEDURE — 99231 SBSQ HOSP IP/OBS SF/LOW 25: CPT | Performed by: HOSPITALIST

## 2023-01-29 RX ADMIN — METOPROLOL SUCCINATE 25 MG: 25 TABLET, EXTENDED RELEASE ORAL at 08:05

## 2023-01-29 RX ADMIN — OXYCODONE HYDROCHLORIDE AND ACETAMINOPHEN 1 TABLET: 10; 325 TABLET ORAL at 13:41

## 2023-01-29 RX ADMIN — GABAPENTIN 800 MG: 400 CAPSULE ORAL at 21:20

## 2023-01-29 RX ADMIN — INSULIN LISPRO 3 UNITS: 100 INJECTION, SOLUTION INTRAVENOUS; SUBCUTANEOUS at 11:29

## 2023-01-29 RX ADMIN — Medication 10 ML: at 21:21

## 2023-01-29 RX ADMIN — GABAPENTIN 800 MG: 400 CAPSULE ORAL at 08:05

## 2023-01-29 RX ADMIN — BISACODYL 10 MG: 5 TABLET, COATED ORAL at 08:05

## 2023-01-29 RX ADMIN — THIAMINE HCL TAB 100 MG 100 MG: 100 TAB at 08:05

## 2023-01-29 RX ADMIN — OXYCODONE HYDROCHLORIDE AND ACETAMINOPHEN 1 TABLET: 10; 325 TABLET ORAL at 17:54

## 2023-01-29 RX ADMIN — Medication 10 ML: at 08:06

## 2023-01-29 RX ADMIN — ROSUVASTATIN CALCIUM 20 MG: 20 TABLET ORAL at 21:20

## 2023-01-29 RX ADMIN — DAPTOMYCIN 400 MG: 500 INJECTION, POWDER, LYOPHILIZED, FOR SOLUTION INTRAVENOUS at 17:54

## 2023-01-29 RX ADMIN — TAZOBACTAM SODIUM AND PIPERACILLIN SODIUM 3.38 G: 375; 3 INJECTION, SOLUTION INTRAVENOUS at 11:24

## 2023-01-29 RX ADMIN — GABAPENTIN 800 MG: 400 CAPSULE ORAL at 11:24

## 2023-01-29 RX ADMIN — TAZOBACTAM SODIUM AND PIPERACILLIN SODIUM 3.38 G: 375; 3 INJECTION, SOLUTION INTRAVENOUS at 21:20

## 2023-01-29 RX ADMIN — LISINOPRIL 40 MG: 20 TABLET ORAL at 08:05

## 2023-01-29 RX ADMIN — TAZOBACTAM SODIUM AND PIPERACILLIN SODIUM 3.38 G: 375; 3 INJECTION, SOLUTION INTRAVENOUS at 06:33

## 2023-01-29 RX ADMIN — Medication 1 CAPSULE: at 08:06

## 2023-01-29 RX ADMIN — HEPARIN SODIUM 5000 UNITS: 5000 INJECTION, SOLUTION INTRAVENOUS; SUBCUTANEOUS at 08:05

## 2023-01-29 RX ADMIN — OXYCODONE HYDROCHLORIDE AND ACETAMINOPHEN 1 TABLET: 10; 325 TABLET ORAL at 21:20

## 2023-01-29 RX ADMIN — OXYCODONE HYDROCHLORIDE AND ACETAMINOPHEN 1 TABLET: 10; 325 TABLET ORAL at 11:25

## 2023-01-29 RX ADMIN — DULOXETINE HYDROCHLORIDE 60 MG: 60 CAPSULE, DELAYED RELEASE ORAL at 08:05

## 2023-01-29 RX ADMIN — OXYCODONE HYDROCHLORIDE AND ACETAMINOPHEN 1 TABLET: 10; 325 TABLET ORAL at 08:05

## 2023-01-29 RX ADMIN — GABAPENTIN 800 MG: 400 CAPSULE ORAL at 17:54

## 2023-01-29 RX ADMIN — HEPARIN SODIUM 5000 UNITS: 5000 INJECTION, SOLUTION INTRAVENOUS; SUBCUTANEOUS at 21:20

## 2023-01-30 LAB
ALBUMIN SERPL-MCNC: 2 G/DL (ref 3.5–5.2)
ALBUMIN/GLOB SERPL: 0.7 G/DL
ALP SERPL-CCNC: 48 U/L (ref 39–117)
ALT SERPL W P-5'-P-CCNC: 9 U/L (ref 1–41)
ANION GAP SERPL CALCULATED.3IONS-SCNC: 9 MMOL/L (ref 5–15)
AST SERPL-CCNC: 15 U/L (ref 1–40)
BACTERIA SPEC AEROBE CULT: NORMAL
BACTERIA SPEC AEROBE CULT: NORMAL
BILIRUB SERPL-MCNC: 0.2 MG/DL (ref 0–1.2)
BUN SERPL-MCNC: 15 MG/DL (ref 8–23)
BUN/CREAT SERPL: 36.6 (ref 7–25)
CALCIUM SPEC-SCNC: 7.6 MG/DL (ref 8.6–10.5)
CHLORIDE SERPL-SCNC: 93 MMOL/L (ref 98–107)
CO2 SERPL-SCNC: 25 MMOL/L (ref 22–29)
CREAT SERPL-MCNC: 0.41 MG/DL (ref 0.76–1.27)
CYTO UR: NORMAL
DEPRECATED RDW RBC AUTO: 46.4 FL (ref 37–54)
EGFRCR SERPLBLD CKD-EPI 2021: 118 ML/MIN/1.73
ERYTHROCYTE [DISTWIDTH] IN BLOOD BY AUTOMATED COUNT: 15 % (ref 12.3–15.4)
GLOBULIN UR ELPH-MCNC: 2.9 GM/DL
GLUCOSE BLDC GLUCOMTR-MCNC: 130 MG/DL (ref 70–130)
GLUCOSE BLDC GLUCOMTR-MCNC: 151 MG/DL (ref 70–130)
GLUCOSE BLDC GLUCOMTR-MCNC: 176 MG/DL (ref 70–130)
GLUCOSE SERPL-MCNC: 139 MG/DL (ref 65–99)
HCT VFR BLD AUTO: 30.2 % (ref 37.5–51)
HGB BLD-MCNC: 9.5 G/DL (ref 13–17.7)
LAB AP CASE REPORT: NORMAL
LAB AP CLINICAL INFORMATION: NORMAL
MCH RBC QN AUTO: 26.6 PG (ref 26.6–33)
MCHC RBC AUTO-ENTMCNC: 31.5 G/DL (ref 31.5–35.7)
MCV RBC AUTO: 84.6 FL (ref 79–97)
PATH REPORT.FINAL DX SPEC: NORMAL
PATH REPORT.GROSS SPEC: NORMAL
PLATELET # BLD AUTO: 344 10*3/MM3 (ref 140–450)
PMV BLD AUTO: 9.1 FL (ref 6–12)
POTASSIUM SERPL-SCNC: 4.4 MMOL/L (ref 3.5–5.2)
PROT SERPL-MCNC: 4.9 G/DL (ref 6–8.5)
RBC # BLD AUTO: 3.57 10*6/MM3 (ref 4.14–5.8)
SODIUM SERPL-SCNC: 127 MMOL/L (ref 136–145)
WBC NRBC COR # BLD: 14.16 10*3/MM3 (ref 3.4–10.8)

## 2023-01-30 PROCEDURE — 25010000002 PIPERACILLIN SOD-TAZOBACTAM PER 1 G: Performed by: STUDENT IN AN ORGANIZED HEALTH CARE EDUCATION/TRAINING PROGRAM

## 2023-01-30 PROCEDURE — 97110 THERAPEUTIC EXERCISES: CPT

## 2023-01-30 PROCEDURE — 99024 POSTOP FOLLOW-UP VISIT: CPT | Performed by: PHYSICIAN ASSISTANT

## 2023-01-30 PROCEDURE — 85027 COMPLETE CBC AUTOMATED: CPT | Performed by: HOSPITALIST

## 2023-01-30 PROCEDURE — 63710000001 DIPHENHYDRAMINE PER 50 MG: Performed by: HOSPITALIST

## 2023-01-30 PROCEDURE — 97166 OT EVAL MOD COMPLEX 45 MIN: CPT

## 2023-01-30 PROCEDURE — 63710000001 INSULIN LISPRO (HUMAN) PER 5 UNITS: Performed by: STUDENT IN AN ORGANIZED HEALTH CARE EDUCATION/TRAINING PROGRAM

## 2023-01-30 PROCEDURE — 80053 COMPREHEN METABOLIC PANEL: CPT | Performed by: HOSPITALIST

## 2023-01-30 PROCEDURE — 97530 THERAPEUTIC ACTIVITIES: CPT

## 2023-01-30 PROCEDURE — 25010000002 HEPARIN (PORCINE) PER 1000 UNITS: Performed by: STUDENT IN AN ORGANIZED HEALTH CARE EDUCATION/TRAINING PROGRAM

## 2023-01-30 PROCEDURE — 99232 SBSQ HOSP IP/OBS MODERATE 35: CPT | Performed by: HOSPITALIST

## 2023-01-30 PROCEDURE — 82962 GLUCOSE BLOOD TEST: CPT

## 2023-01-30 RX ORDER — DOXYCYCLINE 100 MG/1
100 CAPSULE ORAL EVERY 12 HOURS SCHEDULED
Status: DISCONTINUED | OUTPATIENT
Start: 2023-01-30 | End: 2023-02-02

## 2023-01-30 RX ORDER — AMOXICILLIN AND CLAVULANATE POTASSIUM 875; 125 MG/1; MG/1
1 TABLET, FILM COATED ORAL EVERY 12 HOURS SCHEDULED
Status: DISCONTINUED | OUTPATIENT
Start: 2023-01-30 | End: 2023-02-01

## 2023-01-30 RX ORDER — SODIUM CHLORIDE 1000 MG
1 TABLET, SOLUBLE MISCELLANEOUS 2 TIMES DAILY WITH MEALS
Status: DISCONTINUED | OUTPATIENT
Start: 2023-01-30 | End: 2023-02-14

## 2023-01-30 RX ORDER — DIPHENHYDRAMINE HCL 25 MG
25 CAPSULE ORAL EVERY 6 HOURS PRN
Status: DISCONTINUED | OUTPATIENT
Start: 2023-01-30 | End: 2023-01-31

## 2023-01-30 RX ADMIN — DIPHENHYDRAMINE HYDROCHLORIDE 25 MG: 25 CAPSULE ORAL at 15:18

## 2023-01-30 RX ADMIN — HEPARIN SODIUM 5000 UNITS: 5000 INJECTION, SOLUTION INTRAVENOUS; SUBCUTANEOUS at 21:51

## 2023-01-30 RX ADMIN — Medication 1 CAPSULE: at 08:37

## 2023-01-30 RX ADMIN — OXYCODONE HYDROCHLORIDE AND ACETAMINOPHEN 1 TABLET: 10; 325 TABLET ORAL at 11:20

## 2023-01-30 RX ADMIN — INSULIN LISPRO 2 UNITS: 100 INJECTION, SOLUTION INTRAVENOUS; SUBCUTANEOUS at 11:20

## 2023-01-30 RX ADMIN — DULOXETINE HYDROCHLORIDE 60 MG: 60 CAPSULE, DELAYED RELEASE ORAL at 08:35

## 2023-01-30 RX ADMIN — THIAMINE HCL TAB 100 MG 100 MG: 100 TAB at 08:37

## 2023-01-30 RX ADMIN — OXYCODONE HYDROCHLORIDE AND ACETAMINOPHEN 1 TABLET: 10; 325 TABLET ORAL at 14:15

## 2023-01-30 RX ADMIN — SODIUM CHLORIDE 1 G: 1 TABLET ORAL at 17:04

## 2023-01-30 RX ADMIN — HEPARIN SODIUM 5000 UNITS: 5000 INJECTION, SOLUTION INTRAVENOUS; SUBCUTANEOUS at 08:38

## 2023-01-30 RX ADMIN — DOXYCYCLINE 100 MG: 100 CAPSULE ORAL at 11:48

## 2023-01-30 RX ADMIN — GABAPENTIN 800 MG: 400 CAPSULE ORAL at 17:02

## 2023-01-30 RX ADMIN — LISINOPRIL 40 MG: 20 TABLET ORAL at 08:36

## 2023-01-30 RX ADMIN — INSULIN LISPRO 2 UNITS: 100 INJECTION, SOLUTION INTRAVENOUS; SUBCUTANEOUS at 08:39

## 2023-01-30 RX ADMIN — GABAPENTIN 800 MG: 400 CAPSULE ORAL at 21:51

## 2023-01-30 RX ADMIN — MAGNESIUM HYDROXIDE 10 ML: 2400 SUSPENSION ORAL at 06:40

## 2023-01-30 RX ADMIN — ROSUVASTATIN CALCIUM 20 MG: 20 TABLET ORAL at 21:51

## 2023-01-30 RX ADMIN — DOXYCYCLINE 100 MG: 100 CAPSULE ORAL at 21:51

## 2023-01-30 RX ADMIN — GABAPENTIN 800 MG: 400 CAPSULE ORAL at 08:37

## 2023-01-30 RX ADMIN — METOPROLOL SUCCINATE 25 MG: 25 TABLET, EXTENDED RELEASE ORAL at 08:35

## 2023-01-30 RX ADMIN — OXYCODONE HYDROCHLORIDE AND ACETAMINOPHEN 1 TABLET: 10; 325 TABLET ORAL at 21:51

## 2023-01-30 RX ADMIN — AMOXICILLIN AND CLAVULANATE POTASSIUM 1 TABLET: 875; 125 TABLET, FILM COATED ORAL at 21:51

## 2023-01-30 RX ADMIN — AMOXICILLIN AND CLAVULANATE POTASSIUM 1 TABLET: 875; 125 TABLET, FILM COATED ORAL at 11:48

## 2023-01-30 RX ADMIN — DIPHENHYDRAMINE HYDROCHLORIDE 25 MG: 25 CAPSULE ORAL at 21:50

## 2023-01-30 RX ADMIN — GABAPENTIN 800 MG: 400 CAPSULE ORAL at 11:20

## 2023-01-30 RX ADMIN — OXYCODONE HYDROCHLORIDE AND ACETAMINOPHEN 1 TABLET: 10; 325 TABLET ORAL at 17:02

## 2023-01-30 RX ADMIN — TAZOBACTAM SODIUM AND PIPERACILLIN SODIUM 3.38 G: 375; 3 INJECTION, SOLUTION INTRAVENOUS at 04:58

## 2023-01-30 RX ADMIN — Medication 10 MG: at 21:50

## 2023-01-30 RX ADMIN — DIPHENHYDRAMINE HYDROCHLORIDE 25 MG: 25 CAPSULE ORAL at 09:37

## 2023-01-30 RX ADMIN — OXYCODONE HYDROCHLORIDE AND ACETAMINOPHEN 1 TABLET: 10; 325 TABLET ORAL at 06:40

## 2023-01-31 LAB
ALBUMIN SERPL-MCNC: 2.3 G/DL (ref 3.5–5.2)
ALBUMIN/GLOB SERPL: 0.8 G/DL
ALP SERPL-CCNC: 53 U/L (ref 39–117)
ALT SERPL W P-5'-P-CCNC: 15 U/L (ref 1–41)
ANION GAP SERPL CALCULATED.3IONS-SCNC: 6 MMOL/L (ref 5–15)
AST SERPL-CCNC: 24 U/L (ref 1–40)
BILIRUB SERPL-MCNC: 0.2 MG/DL (ref 0–1.2)
BUN SERPL-MCNC: 20 MG/DL (ref 8–23)
BUN/CREAT SERPL: 50 (ref 7–25)
CALCIUM SPEC-SCNC: 8 MG/DL (ref 8.6–10.5)
CHLORIDE SERPL-SCNC: 93 MMOL/L (ref 98–107)
CO2 SERPL-SCNC: 29 MMOL/L (ref 22–29)
CREAT SERPL-MCNC: 0.4 MG/DL (ref 0.76–1.27)
DEPRECATED RDW RBC AUTO: 44.1 FL (ref 37–54)
DEPRECATED RDW RBC AUTO: 45.4 FL (ref 37–54)
EGFRCR SERPLBLD CKD-EPI 2021: 118.8 ML/MIN/1.73
ERYTHROCYTE [DISTWIDTH] IN BLOOD BY AUTOMATED COUNT: 14.9 % (ref 12.3–15.4)
ERYTHROCYTE [DISTWIDTH] IN BLOOD BY AUTOMATED COUNT: 15 % (ref 12.3–15.4)
GLOBULIN UR ELPH-MCNC: 2.8 GM/DL
GLUCOSE BLDC GLUCOMTR-MCNC: 129 MG/DL (ref 70–130)
GLUCOSE BLDC GLUCOMTR-MCNC: 130 MG/DL (ref 70–130)
GLUCOSE BLDC GLUCOMTR-MCNC: 201 MG/DL (ref 70–130)
GLUCOSE SERPL-MCNC: 88 MG/DL (ref 65–99)
HCT VFR BLD AUTO: 29.1 % (ref 37.5–51)
HCT VFR BLD AUTO: 29.6 % (ref 37.5–51)
HGB BLD-MCNC: 9.3 G/DL (ref 13–17.7)
HGB BLD-MCNC: 9.5 G/DL (ref 13–17.7)
MCH RBC QN AUTO: 26.1 PG (ref 26.6–33)
MCH RBC QN AUTO: 26.9 PG (ref 26.6–33)
MCHC RBC AUTO-ENTMCNC: 31.4 G/DL (ref 31.5–35.7)
MCHC RBC AUTO-ENTMCNC: 32.6 G/DL (ref 31.5–35.7)
MCV RBC AUTO: 82.4 FL (ref 79–97)
MCV RBC AUTO: 82.9 FL (ref 79–97)
PLATELET # BLD AUTO: 383 10*3/MM3 (ref 140–450)
PLATELET # BLD AUTO: 400 10*3/MM3 (ref 140–450)
PMV BLD AUTO: 9.2 FL (ref 6–12)
PMV BLD AUTO: 9.4 FL (ref 6–12)
POTASSIUM SERPL-SCNC: 4.5 MMOL/L (ref 3.5–5.2)
PROT SERPL-MCNC: 5.1 G/DL (ref 6–8.5)
RBC # BLD AUTO: 3.53 10*6/MM3 (ref 4.14–5.8)
RBC # BLD AUTO: 3.57 10*6/MM3 (ref 4.14–5.8)
SODIUM SERPL-SCNC: 128 MMOL/L (ref 136–145)
WBC NRBC COR # BLD: 16.39 10*3/MM3 (ref 3.4–10.8)
WBC NRBC COR # BLD: 17.39 10*3/MM3 (ref 3.4–10.8)

## 2023-01-31 PROCEDURE — 82962 GLUCOSE BLOOD TEST: CPT

## 2023-01-31 PROCEDURE — 99232 SBSQ HOSP IP/OBS MODERATE 35: CPT | Performed by: HOSPITALIST

## 2023-01-31 PROCEDURE — 63710000001 DIPHENHYDRAMINE PER 50 MG: Performed by: HOSPITALIST

## 2023-01-31 PROCEDURE — 85027 COMPLETE CBC AUTOMATED: CPT | Performed by: INTERNAL MEDICINE

## 2023-01-31 PROCEDURE — 63710000001 INSULIN LISPRO (HUMAN) PER 5 UNITS: Performed by: STUDENT IN AN ORGANIZED HEALTH CARE EDUCATION/TRAINING PROGRAM

## 2023-01-31 PROCEDURE — 80053 COMPREHEN METABOLIC PANEL: CPT | Performed by: HOSPITALIST

## 2023-01-31 PROCEDURE — 99024 POSTOP FOLLOW-UP VISIT: CPT | Performed by: PHYSICIAN ASSISTANT

## 2023-01-31 PROCEDURE — 85027 COMPLETE CBC AUTOMATED: CPT | Performed by: HOSPITALIST

## 2023-01-31 PROCEDURE — 25010000002 HEPARIN (PORCINE) PER 1000 UNITS: Performed by: STUDENT IN AN ORGANIZED HEALTH CARE EDUCATION/TRAINING PROGRAM

## 2023-01-31 RX ORDER — DIPHENHYDRAMINE HCL 50 MG
50 CAPSULE ORAL EVERY 6 HOURS PRN
Status: DISCONTINUED | OUTPATIENT
Start: 2023-01-31 | End: 2023-02-21 | Stop reason: HOSPADM

## 2023-01-31 RX ORDER — FAMOTIDINE 20 MG/1
20 TABLET, FILM COATED ORAL
Status: DISCONTINUED | OUTPATIENT
Start: 2023-01-31 | End: 2023-02-21 | Stop reason: HOSPADM

## 2023-01-31 RX ADMIN — FAMOTIDINE 20 MG: 20 TABLET ORAL at 12:14

## 2023-01-31 RX ADMIN — HEPARIN SODIUM 5000 UNITS: 5000 INJECTION, SOLUTION INTRAVENOUS; SUBCUTANEOUS at 21:35

## 2023-01-31 RX ADMIN — GABAPENTIN 800 MG: 400 CAPSULE ORAL at 12:14

## 2023-01-31 RX ADMIN — INSULIN LISPRO 3 UNITS: 100 INJECTION, SOLUTION INTRAVENOUS; SUBCUTANEOUS at 17:41

## 2023-01-31 RX ADMIN — Medication 1 CAPSULE: at 09:30

## 2023-01-31 RX ADMIN — DIPHENHYDRAMINE HYDROCHLORIDE 50 MG: 50 CAPSULE ORAL at 17:44

## 2023-01-31 RX ADMIN — DIPHENHYDRAMINE HYDROCHLORIDE 50 MG: 50 CAPSULE ORAL at 12:14

## 2023-01-31 RX ADMIN — OXYCODONE HYDROCHLORIDE AND ACETAMINOPHEN 1 TABLET: 10; 325 TABLET ORAL at 12:14

## 2023-01-31 RX ADMIN — SODIUM CHLORIDE 1 G: 1 TABLET ORAL at 09:30

## 2023-01-31 RX ADMIN — FAMOTIDINE 20 MG: 20 TABLET ORAL at 17:41

## 2023-01-31 RX ADMIN — GABAPENTIN 800 MG: 400 CAPSULE ORAL at 17:41

## 2023-01-31 RX ADMIN — OXYCODONE HYDROCHLORIDE AND ACETAMINOPHEN 1 TABLET: 10; 325 TABLET ORAL at 06:51

## 2023-01-31 RX ADMIN — HEPARIN SODIUM 5000 UNITS: 5000 INJECTION, SOLUTION INTRAVENOUS; SUBCUTANEOUS at 09:30

## 2023-01-31 RX ADMIN — Medication 10 ML: at 21:35

## 2023-01-31 RX ADMIN — DULOXETINE HYDROCHLORIDE 60 MG: 60 CAPSULE, DELAYED RELEASE ORAL at 09:30

## 2023-01-31 RX ADMIN — OXYCODONE HYDROCHLORIDE AND ACETAMINOPHEN 1 TABLET: 10; 325 TABLET ORAL at 17:41

## 2023-01-31 RX ADMIN — SODIUM CHLORIDE 1 G: 1 TABLET ORAL at 17:41

## 2023-01-31 RX ADMIN — THIAMINE HCL TAB 100 MG 100 MG: 100 TAB at 09:31

## 2023-01-31 RX ADMIN — AMOXICILLIN AND CLAVULANATE POTASSIUM 1 TABLET: 875; 125 TABLET, FILM COATED ORAL at 09:31

## 2023-01-31 RX ADMIN — DOXYCYCLINE 100 MG: 100 CAPSULE ORAL at 21:35

## 2023-01-31 RX ADMIN — OXYCODONE HYDROCHLORIDE AND ACETAMINOPHEN 1 TABLET: 10; 325 TABLET ORAL at 21:34

## 2023-01-31 RX ADMIN — LISINOPRIL 40 MG: 20 TABLET ORAL at 09:30

## 2023-01-31 RX ADMIN — GABAPENTIN 800 MG: 400 CAPSULE ORAL at 09:30

## 2023-01-31 RX ADMIN — AMOXICILLIN AND CLAVULANATE POTASSIUM 1 TABLET: 875; 125 TABLET, FILM COATED ORAL at 21:35

## 2023-01-31 RX ADMIN — DIPHENHYDRAMINE HYDROCHLORIDE 25 MG: 25 CAPSULE ORAL at 06:51

## 2023-01-31 RX ADMIN — METOPROLOL SUCCINATE 25 MG: 25 TABLET, EXTENDED RELEASE ORAL at 09:30

## 2023-01-31 RX ADMIN — GABAPENTIN 800 MG: 400 CAPSULE ORAL at 21:34

## 2023-01-31 RX ADMIN — ROSUVASTATIN CALCIUM 20 MG: 20 TABLET ORAL at 21:34

## 2023-01-31 RX ADMIN — DOXYCYCLINE 100 MG: 100 CAPSULE ORAL at 09:30

## 2023-02-01 LAB
ALBUMIN SERPL-MCNC: 2.2 G/DL (ref 3.5–5.2)
ALBUMIN/GLOB SERPL: 0.7 G/DL
ALP SERPL-CCNC: 60 U/L (ref 39–117)
ALT SERPL W P-5'-P-CCNC: 16 U/L (ref 1–41)
ANION GAP SERPL CALCULATED.3IONS-SCNC: 10 MMOL/L (ref 5–15)
AST SERPL-CCNC: 25 U/L (ref 1–40)
BILIRUB SERPL-MCNC: <0.2 MG/DL (ref 0–1.2)
BUN SERPL-MCNC: 19 MG/DL (ref 8–23)
BUN/CREAT SERPL: 38 (ref 7–25)
CALCIUM SPEC-SCNC: 8.1 MG/DL (ref 8.6–10.5)
CHLORIDE SERPL-SCNC: 97 MMOL/L (ref 98–107)
CO2 SERPL-SCNC: 23 MMOL/L (ref 22–29)
CREAT SERPL-MCNC: 0.5 MG/DL (ref 0.76–1.27)
DEPRECATED RDW RBC AUTO: 45.7 FL (ref 37–54)
EGFRCR SERPLBLD CKD-EPI 2021: 111.1 ML/MIN/1.73
ERYTHROCYTE [DISTWIDTH] IN BLOOD BY AUTOMATED COUNT: 15 % (ref 12.3–15.4)
GLOBULIN UR ELPH-MCNC: 3.3 GM/DL
GLUCOSE BLDC GLUCOMTR-MCNC: 136 MG/DL (ref 70–130)
GLUCOSE BLDC GLUCOMTR-MCNC: 142 MG/DL (ref 70–130)
GLUCOSE BLDC GLUCOMTR-MCNC: 191 MG/DL (ref 70–130)
GLUCOSE SERPL-MCNC: 123 MG/DL (ref 65–99)
HCT VFR BLD AUTO: 31.2 % (ref 37.5–51)
HGB BLD-MCNC: 9.8 G/DL (ref 13–17.7)
MCH RBC QN AUTO: 26.1 PG (ref 26.6–33)
MCHC RBC AUTO-ENTMCNC: 31.4 G/DL (ref 31.5–35.7)
MCV RBC AUTO: 83.2 FL (ref 79–97)
PLATELET # BLD AUTO: 392 10*3/MM3 (ref 140–450)
PMV BLD AUTO: 9.1 FL (ref 6–12)
POTASSIUM SERPL-SCNC: 4.7 MMOL/L (ref 3.5–5.2)
PROT SERPL-MCNC: 5.5 G/DL (ref 6–8.5)
RBC # BLD AUTO: 3.75 10*6/MM3 (ref 4.14–5.8)
SODIUM SERPL-SCNC: 130 MMOL/L (ref 136–145)
WBC NRBC COR # BLD: 17 10*3/MM3 (ref 3.4–10.8)

## 2023-02-01 PROCEDURE — 25010000002 PIPERACILLIN SOD-TAZOBACTAM PER 1 G: Performed by: HOSPITALIST

## 2023-02-01 PROCEDURE — 99232 SBSQ HOSP IP/OBS MODERATE 35: CPT | Performed by: HOSPITALIST

## 2023-02-01 PROCEDURE — 63710000001 DIPHENHYDRAMINE PER 50 MG: Performed by: HOSPITALIST

## 2023-02-01 PROCEDURE — 82550 ASSAY OF CK (CPK): CPT | Performed by: INTERNAL MEDICINE

## 2023-02-01 PROCEDURE — 85027 COMPLETE CBC AUTOMATED: CPT | Performed by: INTERNAL MEDICINE

## 2023-02-01 PROCEDURE — 02HV33Z INSERTION OF INFUSION DEVICE INTO SUPERIOR VENA CAVA, PERCUTANEOUS APPROACH: ICD-10-PCS | Performed by: STUDENT IN AN ORGANIZED HEALTH CARE EDUCATION/TRAINING PROGRAM

## 2023-02-01 PROCEDURE — 80053 COMPREHEN METABOLIC PANEL: CPT | Performed by: INTERNAL MEDICINE

## 2023-02-01 PROCEDURE — 82962 GLUCOSE BLOOD TEST: CPT

## 2023-02-01 PROCEDURE — C1751 CATH, INF, PER/CENT/MIDLINE: HCPCS

## 2023-02-01 PROCEDURE — 25010000002 HEPARIN (PORCINE) PER 1000 UNITS: Performed by: STUDENT IN AN ORGANIZED HEALTH CARE EDUCATION/TRAINING PROGRAM

## 2023-02-01 PROCEDURE — 99024 POSTOP FOLLOW-UP VISIT: CPT | Performed by: PHYSICIAN ASSISTANT

## 2023-02-01 PROCEDURE — 63710000001 INSULIN LISPRO (HUMAN) PER 5 UNITS: Performed by: STUDENT IN AN ORGANIZED HEALTH CARE EDUCATION/TRAINING PROGRAM

## 2023-02-01 PROCEDURE — C1894 INTRO/SHEATH, NON-LASER: HCPCS

## 2023-02-01 PROCEDURE — 97530 THERAPEUTIC ACTIVITIES: CPT

## 2023-02-01 PROCEDURE — 25010000002 METHYLPREDNISOLONE PER 125 MG: Performed by: HOSPITALIST

## 2023-02-01 RX ORDER — METHYLPREDNISOLONE SODIUM SUCCINATE 125 MG/2ML
125 INJECTION, POWDER, LYOPHILIZED, FOR SOLUTION INTRAMUSCULAR; INTRAVENOUS ONCE
Status: COMPLETED | OUTPATIENT
Start: 2023-02-01 | End: 2023-02-01

## 2023-02-01 RX ADMIN — ROSUVASTATIN CALCIUM 20 MG: 20 TABLET ORAL at 20:45

## 2023-02-01 RX ADMIN — SODIUM CHLORIDE 1 G: 1 TABLET ORAL at 18:08

## 2023-02-01 RX ADMIN — AMOXICILLIN AND CLAVULANATE POTASSIUM 1 TABLET: 875; 125 TABLET, FILM COATED ORAL at 09:14

## 2023-02-01 RX ADMIN — INSULIN LISPRO 2 UNITS: 100 INJECTION, SOLUTION INTRAVENOUS; SUBCUTANEOUS at 11:47

## 2023-02-01 RX ADMIN — Medication 10 ML: at 20:46

## 2023-02-01 RX ADMIN — OXYCODONE HYDROCHLORIDE AND ACETAMINOPHEN 1 TABLET: 10; 325 TABLET ORAL at 18:08

## 2023-02-01 RX ADMIN — FAMOTIDINE 20 MG: 20 TABLET ORAL at 18:08

## 2023-02-01 RX ADMIN — FAMOTIDINE 20 MG: 20 TABLET ORAL at 09:14

## 2023-02-01 RX ADMIN — GABAPENTIN 800 MG: 400 CAPSULE ORAL at 11:47

## 2023-02-01 RX ADMIN — METHYLPREDNISOLONE SODIUM SUCCINATE 125 MG: 125 INJECTION, POWDER, FOR SOLUTION INTRAMUSCULAR; INTRAVENOUS at 18:52

## 2023-02-01 RX ADMIN — HEPARIN SODIUM 5000 UNITS: 5000 INJECTION, SOLUTION INTRAVENOUS; SUBCUTANEOUS at 09:14

## 2023-02-01 RX ADMIN — TAZOBACTAM SODIUM AND PIPERACILLIN SODIUM 3.38 G: 375; 3 INJECTION, SOLUTION INTRAVENOUS at 20:45

## 2023-02-01 RX ADMIN — DIPHENHYDRAMINE HYDROCHLORIDE 50 MG: 50 CAPSULE ORAL at 14:51

## 2023-02-01 RX ADMIN — TAZOBACTAM SODIUM AND PIPERACILLIN SODIUM 3.38 G: 375; 3 INJECTION, SOLUTION INTRAVENOUS at 14:51

## 2023-02-01 RX ADMIN — OXYCODONE HYDROCHLORIDE AND ACETAMINOPHEN 1 TABLET: 10; 325 TABLET ORAL at 09:15

## 2023-02-01 RX ADMIN — METOPROLOL SUCCINATE 25 MG: 25 TABLET, EXTENDED RELEASE ORAL at 09:16

## 2023-02-01 RX ADMIN — LISINOPRIL 40 MG: 20 TABLET ORAL at 09:15

## 2023-02-01 RX ADMIN — OXYCODONE HYDROCHLORIDE AND ACETAMINOPHEN 1 TABLET: 10; 325 TABLET ORAL at 14:50

## 2023-02-01 RX ADMIN — Medication 5 MG: at 20:45

## 2023-02-01 RX ADMIN — HEPARIN SODIUM 5000 UNITS: 5000 INJECTION, SOLUTION INTRAVENOUS; SUBCUTANEOUS at 20:45

## 2023-02-01 RX ADMIN — GABAPENTIN 800 MG: 400 CAPSULE ORAL at 20:45

## 2023-02-01 RX ADMIN — DOXYCYCLINE 100 MG: 100 CAPSULE ORAL at 09:14

## 2023-02-01 RX ADMIN — GABAPENTIN 800 MG: 400 CAPSULE ORAL at 09:14

## 2023-02-01 RX ADMIN — DULOXETINE HYDROCHLORIDE 60 MG: 60 CAPSULE, DELAYED RELEASE ORAL at 09:14

## 2023-02-01 RX ADMIN — GABAPENTIN 800 MG: 400 CAPSULE ORAL at 18:08

## 2023-02-01 RX ADMIN — THIAMINE HCL TAB 100 MG 100 MG: 100 TAB at 09:14

## 2023-02-01 RX ADMIN — Medication 1 CAPSULE: at 09:15

## 2023-02-01 RX ADMIN — OXYCODONE HYDROCHLORIDE AND ACETAMINOPHEN 1 TABLET: 10; 325 TABLET ORAL at 11:47

## 2023-02-01 RX ADMIN — SODIUM CHLORIDE 1 G: 1 TABLET ORAL at 09:15

## 2023-02-01 RX ADMIN — DOXYCYCLINE 100 MG: 100 CAPSULE ORAL at 20:45

## 2023-02-02 LAB
ALBUMIN SERPL-MCNC: 2.4 G/DL (ref 3.5–5.2)
ALBUMIN/GLOB SERPL: 0.8 G/DL
ALP SERPL-CCNC: 62 U/L (ref 39–117)
ALT SERPL W P-5'-P-CCNC: 16 U/L (ref 1–41)
ANION GAP SERPL CALCULATED.3IONS-SCNC: 11 MMOL/L (ref 5–15)
AST SERPL-CCNC: 20 U/L (ref 1–40)
BILIRUB SERPL-MCNC: 0.2 MG/DL (ref 0–1.2)
BUN SERPL-MCNC: 23 MG/DL (ref 8–23)
BUN/CREAT SERPL: 46 (ref 7–25)
CALCIUM SPEC-SCNC: 8.1 MG/DL (ref 8.6–10.5)
CHLORIDE SERPL-SCNC: 96 MMOL/L (ref 98–107)
CK SERPL-CCNC: 23 U/L (ref 20–200)
CO2 SERPL-SCNC: 26 MMOL/L (ref 22–29)
CREAT SERPL-MCNC: 0.5 MG/DL (ref 0.76–1.27)
DEPRECATED RDW RBC AUTO: 46.4 FL (ref 37–54)
EGFRCR SERPLBLD CKD-EPI 2021: 111.1 ML/MIN/1.73
ERYTHROCYTE [DISTWIDTH] IN BLOOD BY AUTOMATED COUNT: 15.4 % (ref 12.3–15.4)
GLOBULIN UR ELPH-MCNC: 3.2 GM/DL
GLUCOSE BLDC GLUCOMTR-MCNC: 211 MG/DL (ref 70–130)
GLUCOSE BLDC GLUCOMTR-MCNC: 217 MG/DL (ref 70–130)
GLUCOSE BLDC GLUCOMTR-MCNC: 238 MG/DL (ref 70–130)
GLUCOSE BLDC GLUCOMTR-MCNC: 284 MG/DL (ref 70–130)
GLUCOSE SERPL-MCNC: 244 MG/DL (ref 65–99)
HCT VFR BLD AUTO: 29.8 % (ref 37.5–51)
HGB BLD-MCNC: 9.6 G/DL (ref 13–17.7)
MCH RBC QN AUTO: 26.9 PG (ref 26.6–33)
MCHC RBC AUTO-ENTMCNC: 32.2 G/DL (ref 31.5–35.7)
MCV RBC AUTO: 83.5 FL (ref 79–97)
PLATELET # BLD AUTO: 388 10*3/MM3 (ref 140–450)
PMV BLD AUTO: 9.1 FL (ref 6–12)
POTASSIUM SERPL-SCNC: 4.6 MMOL/L (ref 3.5–5.2)
PROT SERPL-MCNC: 5.6 G/DL (ref 6–8.5)
RBC # BLD AUTO: 3.57 10*6/MM3 (ref 4.14–5.8)
SODIUM SERPL-SCNC: 133 MMOL/L (ref 136–145)
WBC NRBC COR # BLD: 20.87 10*3/MM3 (ref 3.4–10.8)

## 2023-02-02 PROCEDURE — 25010000002 HEPARIN (PORCINE) PER 1000 UNITS: Performed by: STUDENT IN AN ORGANIZED HEALTH CARE EDUCATION/TRAINING PROGRAM

## 2023-02-02 PROCEDURE — 25010000002 PIPERACILLIN SOD-TAZOBACTAM PER 1 G: Performed by: HOSPITALIST

## 2023-02-02 PROCEDURE — 99232 SBSQ HOSP IP/OBS MODERATE 35: CPT | Performed by: HOSPITALIST

## 2023-02-02 PROCEDURE — 25010000002 DAPTOMYCIN PER 1 MG: Performed by: INTERNAL MEDICINE

## 2023-02-02 PROCEDURE — 99024 POSTOP FOLLOW-UP VISIT: CPT | Performed by: PHYSICIAN ASSISTANT

## 2023-02-02 PROCEDURE — 97530 THERAPEUTIC ACTIVITIES: CPT

## 2023-02-02 PROCEDURE — 63710000001 INSULIN LISPRO (HUMAN) PER 5 UNITS: Performed by: STUDENT IN AN ORGANIZED HEALTH CARE EDUCATION/TRAINING PROGRAM

## 2023-02-02 PROCEDURE — 85027 COMPLETE CBC AUTOMATED: CPT | Performed by: HOSPITALIST

## 2023-02-02 PROCEDURE — 80053 COMPREHEN METABOLIC PANEL: CPT | Performed by: HOSPITALIST

## 2023-02-02 PROCEDURE — 82962 GLUCOSE BLOOD TEST: CPT

## 2023-02-02 PROCEDURE — 97535 SELF CARE MNGMENT TRAINING: CPT

## 2023-02-02 RX ORDER — METRONIDAZOLE 500 MG/1
500 TABLET ORAL EVERY 8 HOURS
Status: DISCONTINUED | OUTPATIENT
Start: 2023-02-02 | End: 2023-02-14

## 2023-02-02 RX ORDER — DIPHENHYDRAMINE HYDROCHLORIDE, ZINC ACETATE 2; .1 G/100G; G/100G
1 CREAM TOPICAL 3 TIMES DAILY PRN
Status: DISCONTINUED | OUTPATIENT
Start: 2023-02-02 | End: 2023-02-21 | Stop reason: HOSPADM

## 2023-02-02 RX ADMIN — ROSUVASTATIN CALCIUM 20 MG: 20 TABLET ORAL at 21:27

## 2023-02-02 RX ADMIN — GABAPENTIN 800 MG: 400 CAPSULE ORAL at 10:02

## 2023-02-02 RX ADMIN — METRONIDAZOLE 500 MG: 500 TABLET ORAL at 15:02

## 2023-02-02 RX ADMIN — HEPARIN SODIUM 5000 UNITS: 5000 INJECTION, SOLUTION INTRAVENOUS; SUBCUTANEOUS at 21:27

## 2023-02-02 RX ADMIN — LISINOPRIL 40 MG: 20 TABLET ORAL at 10:04

## 2023-02-02 RX ADMIN — GABAPENTIN 800 MG: 400 CAPSULE ORAL at 18:15

## 2023-02-02 RX ADMIN — GABAPENTIN 800 MG: 400 CAPSULE ORAL at 15:02

## 2023-02-02 RX ADMIN — INSULIN LISPRO 3 UNITS: 100 INJECTION, SOLUTION INTRAVENOUS; SUBCUTANEOUS at 18:15

## 2023-02-02 RX ADMIN — Medication 1 CAPSULE: at 10:01

## 2023-02-02 RX ADMIN — AZTREONAM 2 G: 2 INJECTION, POWDER, LYOPHILIZED, FOR SOLUTION INTRAMUSCULAR; INTRAVENOUS at 21:27

## 2023-02-02 RX ADMIN — Medication 10 ML: at 21:27

## 2023-02-02 RX ADMIN — DAPTOMYCIN 250 MG: 500 INJECTION, POWDER, LYOPHILIZED, FOR SOLUTION INTRAVENOUS at 10:33

## 2023-02-02 RX ADMIN — FAMOTIDINE 20 MG: 20 TABLET ORAL at 10:01

## 2023-02-02 RX ADMIN — Medication 10 ML: at 10:03

## 2023-02-02 RX ADMIN — OXYCODONE HYDROCHLORIDE AND ACETAMINOPHEN 1 TABLET: 10; 325 TABLET ORAL at 15:02

## 2023-02-02 RX ADMIN — HEPARIN SODIUM 5000 UNITS: 5000 INJECTION, SOLUTION INTRAVENOUS; SUBCUTANEOUS at 10:03

## 2023-02-02 RX ADMIN — SODIUM CHLORIDE 1 G: 1 TABLET ORAL at 10:02

## 2023-02-02 RX ADMIN — DULOXETINE HYDROCHLORIDE 60 MG: 60 CAPSULE, DELAYED RELEASE ORAL at 10:02

## 2023-02-02 RX ADMIN — METOPROLOL SUCCINATE 25 MG: 25 TABLET, EXTENDED RELEASE ORAL at 10:01

## 2023-02-02 RX ADMIN — GABAPENTIN 800 MG: 400 CAPSULE ORAL at 21:27

## 2023-02-02 RX ADMIN — TAZOBACTAM SODIUM AND PIPERACILLIN SODIUM 3.38 G: 375; 3 INJECTION, SOLUTION INTRAVENOUS at 04:30

## 2023-02-02 RX ADMIN — AZTREONAM 2 G: 2 INJECTION, POWDER, LYOPHILIZED, FOR SOLUTION INTRAMUSCULAR; INTRAVENOUS at 15:01

## 2023-02-02 RX ADMIN — AZTREONAM 2 G: 2 INJECTION, POWDER, LYOPHILIZED, FOR SOLUTION INTRAMUSCULAR; INTRAVENOUS at 07:08

## 2023-02-02 RX ADMIN — THIAMINE HCL TAB 100 MG 100 MG: 100 TAB at 10:02

## 2023-02-02 RX ADMIN — FAMOTIDINE 20 MG: 20 TABLET ORAL at 18:15

## 2023-02-02 RX ADMIN — INSULIN LISPRO 4 UNITS: 100 INJECTION, SOLUTION INTRAVENOUS; SUBCUTANEOUS at 13:00

## 2023-02-02 RX ADMIN — METRONIDAZOLE 500 MG: 500 TABLET ORAL at 10:01

## 2023-02-02 RX ADMIN — OXYCODONE HYDROCHLORIDE AND ACETAMINOPHEN 1 TABLET: 10; 325 TABLET ORAL at 07:01

## 2023-02-02 RX ADMIN — OXYCODONE HYDROCHLORIDE AND ACETAMINOPHEN 1 TABLET: 10; 325 TABLET ORAL at 22:24

## 2023-02-02 RX ADMIN — INSULIN LISPRO 3 UNITS: 100 INJECTION, SOLUTION INTRAVENOUS; SUBCUTANEOUS at 09:58

## 2023-02-02 RX ADMIN — SODIUM CHLORIDE 1 G: 1 TABLET ORAL at 18:15

## 2023-02-03 LAB
GLUCOSE BLDC GLUCOMTR-MCNC: 144 MG/DL (ref 70–130)
GLUCOSE BLDC GLUCOMTR-MCNC: 171 MG/DL (ref 70–130)
GLUCOSE BLDC GLUCOMTR-MCNC: 226 MG/DL (ref 70–130)

## 2023-02-03 PROCEDURE — 99232 SBSQ HOSP IP/OBS MODERATE 35: CPT | Performed by: HOSPITALIST

## 2023-02-03 PROCEDURE — 97530 THERAPEUTIC ACTIVITIES: CPT

## 2023-02-03 PROCEDURE — 25010000002 DAPTOMYCIN PER 1 MG: Performed by: INTERNAL MEDICINE

## 2023-02-03 PROCEDURE — 25010000002 HEPARIN (PORCINE) PER 1000 UNITS: Performed by: STUDENT IN AN ORGANIZED HEALTH CARE EDUCATION/TRAINING PROGRAM

## 2023-02-03 PROCEDURE — 82962 GLUCOSE BLOOD TEST: CPT

## 2023-02-03 PROCEDURE — 63710000001 DIPHENHYDRAMINE PER 50 MG: Performed by: HOSPITALIST

## 2023-02-03 PROCEDURE — 99024 POSTOP FOLLOW-UP VISIT: CPT | Performed by: PHYSICIAN ASSISTANT

## 2023-02-03 PROCEDURE — 97110 THERAPEUTIC EXERCISES: CPT

## 2023-02-03 PROCEDURE — 63710000001 INSULIN LISPRO (HUMAN) PER 5 UNITS: Performed by: STUDENT IN AN ORGANIZED HEALTH CARE EDUCATION/TRAINING PROGRAM

## 2023-02-03 RX ADMIN — FAMOTIDINE 20 MG: 20 TABLET ORAL at 07:01

## 2023-02-03 RX ADMIN — LISINOPRIL 40 MG: 20 TABLET ORAL at 09:02

## 2023-02-03 RX ADMIN — GABAPENTIN 800 MG: 400 CAPSULE ORAL at 09:02

## 2023-02-03 RX ADMIN — AZTREONAM 2 G: 2 INJECTION, POWDER, LYOPHILIZED, FOR SOLUTION INTRAMUSCULAR; INTRAVENOUS at 10:25

## 2023-02-03 RX ADMIN — FAMOTIDINE 20 MG: 20 TABLET ORAL at 17:28

## 2023-02-03 RX ADMIN — OXYCODONE HYDROCHLORIDE AND ACETAMINOPHEN 1 TABLET: 10; 325 TABLET ORAL at 21:08

## 2023-02-03 RX ADMIN — Medication 10 ML: at 21:08

## 2023-02-03 RX ADMIN — OXYCODONE HYDROCHLORIDE AND ACETAMINOPHEN 1 TABLET: 10; 325 TABLET ORAL at 13:53

## 2023-02-03 RX ADMIN — OXYCODONE HYDROCHLORIDE AND ACETAMINOPHEN 1 TABLET: 10; 325 TABLET ORAL at 10:25

## 2023-02-03 RX ADMIN — METRONIDAZOLE 500 MG: 500 TABLET ORAL at 00:21

## 2023-02-03 RX ADMIN — DAPTOMYCIN 250 MG: 500 INJECTION, POWDER, LYOPHILIZED, FOR SOLUTION INTRAVENOUS at 09:37

## 2023-02-03 RX ADMIN — GABAPENTIN 800 MG: 400 CAPSULE ORAL at 21:08

## 2023-02-03 RX ADMIN — INSULIN LISPRO 2 UNITS: 100 INJECTION, SOLUTION INTRAVENOUS; SUBCUTANEOUS at 17:28

## 2023-02-03 RX ADMIN — SODIUM CHLORIDE 1 G: 1 TABLET ORAL at 17:28

## 2023-02-03 RX ADMIN — METRONIDAZOLE 500 MG: 500 TABLET ORAL at 16:00

## 2023-02-03 RX ADMIN — AZTREONAM 2 G: 2 INJECTION, POWDER, LYOPHILIZED, FOR SOLUTION INTRAMUSCULAR; INTRAVENOUS at 13:53

## 2023-02-03 RX ADMIN — GABAPENTIN 800 MG: 400 CAPSULE ORAL at 17:28

## 2023-02-03 RX ADMIN — DIPHENHYDRAMINE HYDROCHLORIDE, ZINC ACETATE 1 APPLICATION: 2; .1 CREAM TOPICAL at 22:01

## 2023-02-03 RX ADMIN — HEPARIN SODIUM 5000 UNITS: 5000 INJECTION, SOLUTION INTRAVENOUS; SUBCUTANEOUS at 09:04

## 2023-02-03 RX ADMIN — DULOXETINE HYDROCHLORIDE 60 MG: 60 CAPSULE, DELAYED RELEASE ORAL at 10:24

## 2023-02-03 RX ADMIN — OXYCODONE HYDROCHLORIDE AND ACETAMINOPHEN 1 TABLET: 10; 325 TABLET ORAL at 17:28

## 2023-02-03 RX ADMIN — OXYCODONE HYDROCHLORIDE AND ACETAMINOPHEN 1 TABLET: 10; 325 TABLET ORAL at 07:01

## 2023-02-03 RX ADMIN — METRONIDAZOLE 500 MG: 500 TABLET ORAL at 23:45

## 2023-02-03 RX ADMIN — HEPARIN SODIUM 5000 UNITS: 5000 INJECTION, SOLUTION INTRAVENOUS; SUBCUTANEOUS at 21:08

## 2023-02-03 RX ADMIN — Medication 1 CAPSULE: at 09:02

## 2023-02-03 RX ADMIN — DIPHENHYDRAMINE HYDROCHLORIDE 50 MG: 50 CAPSULE ORAL at 00:27

## 2023-02-03 RX ADMIN — AZTREONAM 2 G: 2 INJECTION, POWDER, LYOPHILIZED, FOR SOLUTION INTRAMUSCULAR; INTRAVENOUS at 21:08

## 2023-02-03 RX ADMIN — INSULIN LISPRO 3 UNITS: 100 INJECTION, SOLUTION INTRAVENOUS; SUBCUTANEOUS at 11:59

## 2023-02-03 RX ADMIN — METOPROLOL SUCCINATE 25 MG: 25 TABLET, EXTENDED RELEASE ORAL at 09:02

## 2023-02-03 RX ADMIN — ROSUVASTATIN CALCIUM 20 MG: 20 TABLET ORAL at 21:08

## 2023-02-03 RX ADMIN — METRONIDAZOLE 500 MG: 500 TABLET ORAL at 09:02

## 2023-02-03 RX ADMIN — Medication 10 ML: at 09:04

## 2023-02-03 RX ADMIN — THIAMINE HCL TAB 100 MG 100 MG: 100 TAB at 09:02

## 2023-02-03 RX ADMIN — SODIUM CHLORIDE 1 G: 1 TABLET ORAL at 09:38

## 2023-02-03 RX ADMIN — GABAPENTIN 800 MG: 400 CAPSULE ORAL at 11:58

## 2023-02-04 LAB
ALBUMIN SERPL-MCNC: 2.6 G/DL (ref 3.5–5.2)
ALBUMIN/GLOB SERPL: 0.8 G/DL
ALP SERPL-CCNC: 55 U/L (ref 39–117)
ALT SERPL W P-5'-P-CCNC: 16 U/L (ref 1–41)
ANION GAP SERPL CALCULATED.3IONS-SCNC: 8 MMOL/L (ref 5–15)
AST SERPL-CCNC: 22 U/L (ref 1–40)
BILIRUB SERPL-MCNC: <0.2 MG/DL (ref 0–1.2)
BUN SERPL-MCNC: 17 MG/DL (ref 8–23)
BUN/CREAT SERPL: 51.5 (ref 7–25)
CALCIUM SPEC-SCNC: 8.3 MG/DL (ref 8.6–10.5)
CHLORIDE SERPL-SCNC: 99 MMOL/L (ref 98–107)
CO2 SERPL-SCNC: 25 MMOL/L (ref 22–29)
CREAT SERPL-MCNC: 0.33 MG/DL (ref 0.76–1.27)
DEPRECATED RDW RBC AUTO: 47.4 FL (ref 37–54)
EGFRCR SERPLBLD CKD-EPI 2021: 126 ML/MIN/1.73
ERYTHROCYTE [DISTWIDTH] IN BLOOD BY AUTOMATED COUNT: 16 % (ref 12.3–15.4)
GLOBULIN UR ELPH-MCNC: 3.4 GM/DL
GLUCOSE BLDC GLUCOMTR-MCNC: 148 MG/DL (ref 70–130)
GLUCOSE BLDC GLUCOMTR-MCNC: 177 MG/DL (ref 70–130)
GLUCOSE BLDC GLUCOMTR-MCNC: 219 MG/DL (ref 70–130)
GLUCOSE SERPL-MCNC: 218 MG/DL (ref 65–99)
HCT VFR BLD AUTO: 28.8 % (ref 37.5–51)
HGB BLD-MCNC: 9 G/DL (ref 13–17.7)
MCH RBC QN AUTO: 26.3 PG (ref 26.6–33)
MCHC RBC AUTO-ENTMCNC: 31.3 G/DL (ref 31.5–35.7)
MCV RBC AUTO: 84.2 FL (ref 79–97)
PLATELET # BLD AUTO: 327 10*3/MM3 (ref 140–450)
PMV BLD AUTO: 9.1 FL (ref 6–12)
POTASSIUM SERPL-SCNC: 4.6 MMOL/L (ref 3.5–5.2)
PROT SERPL-MCNC: 6 G/DL (ref 6–8.5)
RBC # BLD AUTO: 3.42 10*6/MM3 (ref 4.14–5.8)
SODIUM SERPL-SCNC: 132 MMOL/L (ref 136–145)
WBC NRBC COR # BLD: 18.83 10*3/MM3 (ref 3.4–10.8)

## 2023-02-04 PROCEDURE — 99232 SBSQ HOSP IP/OBS MODERATE 35: CPT | Performed by: NURSE PRACTITIONER

## 2023-02-04 PROCEDURE — 25010000002 HEPARIN (PORCINE) PER 1000 UNITS: Performed by: STUDENT IN AN ORGANIZED HEALTH CARE EDUCATION/TRAINING PROGRAM

## 2023-02-04 PROCEDURE — 63710000001 INSULIN LISPRO (HUMAN) PER 5 UNITS: Performed by: STUDENT IN AN ORGANIZED HEALTH CARE EDUCATION/TRAINING PROGRAM

## 2023-02-04 PROCEDURE — 85027 COMPLETE CBC AUTOMATED: CPT | Performed by: INTERNAL MEDICINE

## 2023-02-04 PROCEDURE — 99024 POSTOP FOLLOW-UP VISIT: CPT | Performed by: PHYSICIAN ASSISTANT

## 2023-02-04 PROCEDURE — 82962 GLUCOSE BLOOD TEST: CPT

## 2023-02-04 PROCEDURE — 80053 COMPREHEN METABOLIC PANEL: CPT | Performed by: INTERNAL MEDICINE

## 2023-02-04 PROCEDURE — 25010000002 DAPTOMYCIN PER 1 MG: Performed by: INTERNAL MEDICINE

## 2023-02-04 RX ADMIN — DIPHENHYDRAMINE HYDROCHLORIDE, ZINC ACETATE 1 APPLICATION: 2; .1 CREAM TOPICAL at 11:00

## 2023-02-04 RX ADMIN — HEPARIN SODIUM 5000 UNITS: 5000 INJECTION, SOLUTION INTRAVENOUS; SUBCUTANEOUS at 08:05

## 2023-02-04 RX ADMIN — GABAPENTIN 800 MG: 400 CAPSULE ORAL at 17:00

## 2023-02-04 RX ADMIN — OXYCODONE HYDROCHLORIDE AND ACETAMINOPHEN 1 TABLET: 10; 325 TABLET ORAL at 17:00

## 2023-02-04 RX ADMIN — Medication 10 ML: at 21:31

## 2023-02-04 RX ADMIN — OXYCODONE HYDROCHLORIDE AND ACETAMINOPHEN 1 TABLET: 10; 325 TABLET ORAL at 21:29

## 2023-02-04 RX ADMIN — OXYCODONE HYDROCHLORIDE AND ACETAMINOPHEN 1 TABLET: 10; 325 TABLET ORAL at 10:59

## 2023-02-04 RX ADMIN — METOPROLOL SUCCINATE 25 MG: 25 TABLET, EXTENDED RELEASE ORAL at 08:03

## 2023-02-04 RX ADMIN — OXYCODONE HYDROCHLORIDE AND ACETAMINOPHEN 1 TABLET: 10; 325 TABLET ORAL at 13:45

## 2023-02-04 RX ADMIN — GABAPENTIN 800 MG: 400 CAPSULE ORAL at 11:42

## 2023-02-04 RX ADMIN — LISINOPRIL 40 MG: 20 TABLET ORAL at 08:03

## 2023-02-04 RX ADMIN — OXYCODONE HYDROCHLORIDE AND ACETAMINOPHEN 1 TABLET: 10; 325 TABLET ORAL at 06:39

## 2023-02-04 RX ADMIN — AZTREONAM 2 G: 2 INJECTION, POWDER, LYOPHILIZED, FOR SOLUTION INTRAMUSCULAR; INTRAVENOUS at 21:30

## 2023-02-04 RX ADMIN — ROSUVASTATIN CALCIUM 20 MG: 20 TABLET ORAL at 21:29

## 2023-02-04 RX ADMIN — METRONIDAZOLE 500 MG: 500 TABLET ORAL at 08:03

## 2023-02-04 RX ADMIN — FAMOTIDINE 20 MG: 20 TABLET ORAL at 16:50

## 2023-02-04 RX ADMIN — HEPARIN SODIUM 5000 UNITS: 5000 INJECTION, SOLUTION INTRAVENOUS; SUBCUTANEOUS at 21:29

## 2023-02-04 RX ADMIN — GABAPENTIN 800 MG: 400 CAPSULE ORAL at 08:04

## 2023-02-04 RX ADMIN — THIAMINE HCL TAB 100 MG 100 MG: 100 TAB at 08:02

## 2023-02-04 RX ADMIN — INSULIN LISPRO 3 UNITS: 100 INJECTION, SOLUTION INTRAVENOUS; SUBCUTANEOUS at 11:42

## 2023-02-04 RX ADMIN — INSULIN LISPRO 2 UNITS: 100 INJECTION, SOLUTION INTRAVENOUS; SUBCUTANEOUS at 16:50

## 2023-02-04 RX ADMIN — SODIUM CHLORIDE 1 G: 1 TABLET ORAL at 08:03

## 2023-02-04 RX ADMIN — AZTREONAM 2 G: 2 INJECTION, POWDER, LYOPHILIZED, FOR SOLUTION INTRAMUSCULAR; INTRAVENOUS at 06:39

## 2023-02-04 RX ADMIN — FAMOTIDINE 20 MG: 20 TABLET ORAL at 08:03

## 2023-02-04 RX ADMIN — DULOXETINE HYDROCHLORIDE 60 MG: 60 CAPSULE, DELAYED RELEASE ORAL at 08:03

## 2023-02-04 RX ADMIN — GABAPENTIN 800 MG: 400 CAPSULE ORAL at 21:29

## 2023-02-04 RX ADMIN — SODIUM CHLORIDE 1 G: 1 TABLET ORAL at 17:00

## 2023-02-04 RX ADMIN — DAPTOMYCIN 250 MG: 500 INJECTION, POWDER, LYOPHILIZED, FOR SOLUTION INTRAVENOUS at 08:11

## 2023-02-04 RX ADMIN — METRONIDAZOLE 500 MG: 500 TABLET ORAL at 16:51

## 2023-02-04 RX ADMIN — AZTREONAM 2 G: 2 INJECTION, POWDER, LYOPHILIZED, FOR SOLUTION INTRAMUSCULAR; INTRAVENOUS at 13:45

## 2023-02-04 RX ADMIN — Medication 1 CAPSULE: at 08:02

## 2023-02-04 RX ADMIN — Medication 10 ML: at 08:04

## 2023-02-05 LAB
GLUCOSE BLDC GLUCOMTR-MCNC: 112 MG/DL (ref 70–130)
GLUCOSE BLDC GLUCOMTR-MCNC: 174 MG/DL (ref 70–130)
GLUCOSE BLDC GLUCOMTR-MCNC: 175 MG/DL (ref 70–130)

## 2023-02-05 PROCEDURE — 63710000001 INSULIN LISPRO (HUMAN) PER 5 UNITS: Performed by: STUDENT IN AN ORGANIZED HEALTH CARE EDUCATION/TRAINING PROGRAM

## 2023-02-05 PROCEDURE — 25010000002 DAPTOMYCIN PER 1 MG: Performed by: INTERNAL MEDICINE

## 2023-02-05 PROCEDURE — 99232 SBSQ HOSP IP/OBS MODERATE 35: CPT | Performed by: HOSPITALIST

## 2023-02-05 PROCEDURE — 82962 GLUCOSE BLOOD TEST: CPT

## 2023-02-05 PROCEDURE — 99024 POSTOP FOLLOW-UP VISIT: CPT | Performed by: PHYSICIAN ASSISTANT

## 2023-02-05 PROCEDURE — 25010000002 HEPARIN (PORCINE) PER 1000 UNITS: Performed by: STUDENT IN AN ORGANIZED HEALTH CARE EDUCATION/TRAINING PROGRAM

## 2023-02-05 PROCEDURE — 97110 THERAPEUTIC EXERCISES: CPT

## 2023-02-05 RX ADMIN — GABAPENTIN 800 MG: 400 CAPSULE ORAL at 11:25

## 2023-02-05 RX ADMIN — FAMOTIDINE 20 MG: 20 TABLET ORAL at 07:58

## 2023-02-05 RX ADMIN — METOPROLOL SUCCINATE 25 MG: 25 TABLET, EXTENDED RELEASE ORAL at 08:53

## 2023-02-05 RX ADMIN — OXYCODONE HYDROCHLORIDE AND ACETAMINOPHEN 1 TABLET: 10; 325 TABLET ORAL at 14:33

## 2023-02-05 RX ADMIN — GABAPENTIN 800 MG: 400 CAPSULE ORAL at 07:59

## 2023-02-05 RX ADMIN — Medication 1 CAPSULE: at 08:53

## 2023-02-05 RX ADMIN — Medication 10 ML: at 21:20

## 2023-02-05 RX ADMIN — SODIUM CHLORIDE 1 G: 1 TABLET ORAL at 17:09

## 2023-02-05 RX ADMIN — DULOXETINE HYDROCHLORIDE 60 MG: 60 CAPSULE, DELAYED RELEASE ORAL at 08:53

## 2023-02-05 RX ADMIN — OXYCODONE HYDROCHLORIDE AND ACETAMINOPHEN 1 TABLET: 10; 325 TABLET ORAL at 17:08

## 2023-02-05 RX ADMIN — DAPTOMYCIN 250 MG: 500 INJECTION, POWDER, LYOPHILIZED, FOR SOLUTION INTRAVENOUS at 08:53

## 2023-02-05 RX ADMIN — THIAMINE HCL TAB 100 MG 100 MG: 100 TAB at 08:53

## 2023-02-05 RX ADMIN — HEPARIN SODIUM 5000 UNITS: 5000 INJECTION, SOLUTION INTRAVENOUS; SUBCUTANEOUS at 21:20

## 2023-02-05 RX ADMIN — LISINOPRIL 40 MG: 20 TABLET ORAL at 08:53

## 2023-02-05 RX ADMIN — HEPARIN SODIUM 5000 UNITS: 5000 INJECTION, SOLUTION INTRAVENOUS; SUBCUTANEOUS at 08:54

## 2023-02-05 RX ADMIN — FAMOTIDINE 20 MG: 20 TABLET ORAL at 16:44

## 2023-02-05 RX ADMIN — METRONIDAZOLE 500 MG: 500 TABLET ORAL at 07:58

## 2023-02-05 RX ADMIN — SODIUM CHLORIDE 1 G: 1 TABLET ORAL at 07:59

## 2023-02-05 RX ADMIN — OXYCODONE HYDROCHLORIDE AND ACETAMINOPHEN 1 TABLET: 10; 325 TABLET ORAL at 07:58

## 2023-02-05 RX ADMIN — INSULIN LISPRO 2 UNITS: 100 INJECTION, SOLUTION INTRAVENOUS; SUBCUTANEOUS at 12:32

## 2023-02-05 RX ADMIN — AZTREONAM 2 G: 2 INJECTION, POWDER, LYOPHILIZED, FOR SOLUTION INTRAMUSCULAR; INTRAVENOUS at 14:32

## 2023-02-05 RX ADMIN — ROSUVASTATIN CALCIUM 20 MG: 20 TABLET ORAL at 21:20

## 2023-02-05 RX ADMIN — METRONIDAZOLE 500 MG: 500 TABLET ORAL at 16:47

## 2023-02-05 RX ADMIN — OXYCODONE HYDROCHLORIDE AND ACETAMINOPHEN 1 TABLET: 10; 325 TABLET ORAL at 21:19

## 2023-02-05 RX ADMIN — METRONIDAZOLE 500 MG: 500 TABLET ORAL at 00:04

## 2023-02-05 RX ADMIN — OXYCODONE HYDROCHLORIDE AND ACETAMINOPHEN 1 TABLET: 10; 325 TABLET ORAL at 11:25

## 2023-02-05 RX ADMIN — AZTREONAM 2 G: 2 INJECTION, POWDER, LYOPHILIZED, FOR SOLUTION INTRAMUSCULAR; INTRAVENOUS at 07:03

## 2023-02-05 RX ADMIN — Medication 10 ML: at 08:54

## 2023-02-05 RX ADMIN — GABAPENTIN 800 MG: 400 CAPSULE ORAL at 17:08

## 2023-02-05 RX ADMIN — INSULIN LISPRO 2 UNITS: 100 INJECTION, SOLUTION INTRAVENOUS; SUBCUTANEOUS at 16:44

## 2023-02-05 RX ADMIN — AZTREONAM 2 G: 2 INJECTION, POWDER, LYOPHILIZED, FOR SOLUTION INTRAMUSCULAR; INTRAVENOUS at 21:20

## 2023-02-05 RX ADMIN — GABAPENTIN 800 MG: 400 CAPSULE ORAL at 21:19

## 2023-02-06 ENCOUNTER — APPOINTMENT (OUTPATIENT)
Dept: CT IMAGING | Facility: HOSPITAL | Age: 68
DRG: 239 | End: 2023-02-06
Payer: MEDICARE

## 2023-02-06 LAB
ALBUMIN SERPL-MCNC: 2.5 G/DL (ref 3.5–5.2)
ALBUMIN/GLOB SERPL: 0.7 G/DL
ALP SERPL-CCNC: 69 U/L (ref 39–117)
ALT SERPL W P-5'-P-CCNC: 15 U/L (ref 1–41)
ANION GAP SERPL CALCULATED.3IONS-SCNC: 5 MMOL/L (ref 5–15)
AST SERPL-CCNC: 28 U/L (ref 1–40)
BACTERIA UR QL AUTO: NORMAL /HPF
BILIRUB SERPL-MCNC: <0.2 MG/DL (ref 0–1.2)
BILIRUB UR QL STRIP: NEGATIVE
BUN SERPL-MCNC: 10 MG/DL (ref 8–23)
BUN/CREAT SERPL: 30.3 (ref 7–25)
CALCIUM SPEC-SCNC: 8.3 MG/DL (ref 8.6–10.5)
CHLORIDE SERPL-SCNC: 99 MMOL/L (ref 98–107)
CLARITY UR: CLEAR
CO2 SERPL-SCNC: 30 MMOL/L (ref 22–29)
COLOR UR: YELLOW
CREAT SERPL-MCNC: 0.33 MG/DL (ref 0.76–1.27)
DEPRECATED RDW RBC AUTO: 48.9 FL (ref 37–54)
EGFRCR SERPLBLD CKD-EPI 2021: 126 ML/MIN/1.73
ERYTHROCYTE [DISTWIDTH] IN BLOOD BY AUTOMATED COUNT: 16.5 % (ref 12.3–15.4)
GLOBULIN UR ELPH-MCNC: 3.8 GM/DL
GLUCOSE BLDC GLUCOMTR-MCNC: 129 MG/DL (ref 70–130)
GLUCOSE BLDC GLUCOMTR-MCNC: 186 MG/DL (ref 70–130)
GLUCOSE BLDC GLUCOMTR-MCNC: 198 MG/DL (ref 70–130)
GLUCOSE SERPL-MCNC: 121 MG/DL (ref 65–99)
GLUCOSE UR STRIP-MCNC: NEGATIVE MG/DL
HCT VFR BLD AUTO: 30.1 % (ref 37.5–51)
HGB BLD-MCNC: 9.3 G/DL (ref 13–17.7)
HGB UR QL STRIP.AUTO: NEGATIVE
HYALINE CASTS UR QL AUTO: NORMAL /LPF
KETONES UR QL STRIP: NEGATIVE
LEUKOCYTE ESTERASE UR QL STRIP.AUTO: ABNORMAL
MCH RBC QN AUTO: 26.1 PG (ref 26.6–33)
MCHC RBC AUTO-ENTMCNC: 30.9 G/DL (ref 31.5–35.7)
MCV RBC AUTO: 84.3 FL (ref 79–97)
NITRITE UR QL STRIP: NEGATIVE
PH UR STRIP.AUTO: 5.5 [PH] (ref 5–8)
PLATELET # BLD AUTO: 363 10*3/MM3 (ref 140–450)
PMV BLD AUTO: 9.2 FL (ref 6–12)
POTASSIUM SERPL-SCNC: 4.1 MMOL/L (ref 3.5–5.2)
PROT SERPL-MCNC: 6.3 G/DL (ref 6–8.5)
PROT UR QL STRIP: ABNORMAL
RBC # BLD AUTO: 3.57 10*6/MM3 (ref 4.14–5.8)
RBC # UR STRIP: NORMAL /HPF
REF LAB TEST METHOD: NORMAL
SODIUM SERPL-SCNC: 134 MMOL/L (ref 136–145)
SP GR UR STRIP: 1.02 (ref 1–1.03)
SQUAMOUS #/AREA URNS HPF: NORMAL /HPF
UROBILINOGEN UR QL STRIP: ABNORMAL
WBC # UR STRIP: NORMAL /HPF
WBC NRBC COR # BLD: 20.45 10*3/MM3 (ref 3.4–10.8)

## 2023-02-06 PROCEDURE — 81001 URINALYSIS AUTO W/SCOPE: CPT | Performed by: INTERNAL MEDICINE

## 2023-02-06 PROCEDURE — 63710000001 INSULIN LISPRO (HUMAN) PER 5 UNITS: Performed by: STUDENT IN AN ORGANIZED HEALTH CARE EDUCATION/TRAINING PROGRAM

## 2023-02-06 PROCEDURE — 74178 CT ABD&PLV WO CNTR FLWD CNTR: CPT

## 2023-02-06 PROCEDURE — 82962 GLUCOSE BLOOD TEST: CPT

## 2023-02-06 PROCEDURE — 99232 SBSQ HOSP IP/OBS MODERATE 35: CPT | Performed by: HOSPITALIST

## 2023-02-06 PROCEDURE — 85027 COMPLETE CBC AUTOMATED: CPT | Performed by: INTERNAL MEDICINE

## 2023-02-06 PROCEDURE — 87205 SMEAR GRAM STAIN: CPT | Performed by: HOSPITALIST

## 2023-02-06 PROCEDURE — 25010000002 IOPAMIDOL 61 % SOLUTION: Performed by: HOSPITALIST

## 2023-02-06 PROCEDURE — 99024 POSTOP FOLLOW-UP VISIT: CPT | Performed by: PHYSICIAN ASSISTANT

## 2023-02-06 PROCEDURE — 25010000002 HEPARIN (PORCINE) PER 1000 UNITS: Performed by: STUDENT IN AN ORGANIZED HEALTH CARE EDUCATION/TRAINING PROGRAM

## 2023-02-06 PROCEDURE — 97535 SELF CARE MNGMENT TRAINING: CPT

## 2023-02-06 PROCEDURE — 25010000002 MICAFUNGIN SODIUM 100 MG RECONSTITUTED SOLUTION: Performed by: INTERNAL MEDICINE

## 2023-02-06 PROCEDURE — 87070 CULTURE OTHR SPECIMN AEROBIC: CPT | Performed by: HOSPITALIST

## 2023-02-06 PROCEDURE — 80053 COMPREHEN METABOLIC PANEL: CPT | Performed by: INTERNAL MEDICINE

## 2023-02-06 PROCEDURE — 87086 URINE CULTURE/COLONY COUNT: CPT | Performed by: INTERNAL MEDICINE

## 2023-02-06 PROCEDURE — 25010000002 DAPTOMYCIN PER 1 MG: Performed by: INTERNAL MEDICINE

## 2023-02-06 PROCEDURE — 99221 1ST HOSP IP/OBS SF/LOW 40: CPT | Performed by: NURSE PRACTITIONER

## 2023-02-06 RX ORDER — DIAPER,BRIEF,INFANT-TODD,DISP
1 EACH MISCELLANEOUS EVERY 8 HOURS SCHEDULED
Status: DISCONTINUED | OUTPATIENT
Start: 2023-02-06 | End: 2023-02-21 | Stop reason: HOSPADM

## 2023-02-06 RX ORDER — SODIUM CHLORIDE 9 MG/ML
75 INJECTION, SOLUTION INTRAVENOUS CONTINUOUS
Status: ACTIVE | OUTPATIENT
Start: 2023-02-06 | End: 2023-02-07

## 2023-02-06 RX ADMIN — AZTREONAM 2 G: 2 INJECTION, POWDER, LYOPHILIZED, FOR SOLUTION INTRAMUSCULAR; INTRAVENOUS at 05:04

## 2023-02-06 RX ADMIN — ANTI-PRURITIC 1 APPLICATION: 1 CREAM TOPICAL at 17:21

## 2023-02-06 RX ADMIN — FAMOTIDINE 20 MG: 20 TABLET ORAL at 08:44

## 2023-02-06 RX ADMIN — FAMOTIDINE 20 MG: 20 TABLET ORAL at 17:21

## 2023-02-06 RX ADMIN — OXYCODONE HYDROCHLORIDE AND ACETAMINOPHEN 1 TABLET: 10; 325 TABLET ORAL at 17:21

## 2023-02-06 RX ADMIN — GABAPENTIN 800 MG: 400 CAPSULE ORAL at 21:36

## 2023-02-06 RX ADMIN — Medication 1 CAPSULE: at 08:42

## 2023-02-06 RX ADMIN — THIAMINE HCL TAB 100 MG 100 MG: 100 TAB at 08:44

## 2023-02-06 RX ADMIN — INSULIN LISPRO 2 UNITS: 100 INJECTION, SOLUTION INTRAVENOUS; SUBCUTANEOUS at 17:21

## 2023-02-06 RX ADMIN — DAPTOMYCIN 250 MG: 500 INJECTION, POWDER, LYOPHILIZED, FOR SOLUTION INTRAVENOUS at 08:39

## 2023-02-06 RX ADMIN — GABAPENTIN 800 MG: 400 CAPSULE ORAL at 08:44

## 2023-02-06 RX ADMIN — LISINOPRIL 40 MG: 20 TABLET ORAL at 08:43

## 2023-02-06 RX ADMIN — HEPARIN SODIUM 5000 UNITS: 5000 INJECTION, SOLUTION INTRAVENOUS; SUBCUTANEOUS at 21:37

## 2023-02-06 RX ADMIN — SODIUM CHLORIDE 1 G: 1 TABLET ORAL at 08:41

## 2023-02-06 RX ADMIN — OXYCODONE HYDROCHLORIDE AND ACETAMINOPHEN 1 TABLET: 10; 325 TABLET ORAL at 21:36

## 2023-02-06 RX ADMIN — OXYCODONE HYDROCHLORIDE AND ACETAMINOPHEN 1 TABLET: 10; 325 TABLET ORAL at 11:31

## 2023-02-06 RX ADMIN — GABAPENTIN 800 MG: 400 CAPSULE ORAL at 11:31

## 2023-02-06 RX ADMIN — METRONIDAZOLE 500 MG: 500 TABLET ORAL at 08:44

## 2023-02-06 RX ADMIN — GABAPENTIN 800 MG: 400 CAPSULE ORAL at 17:21

## 2023-02-06 RX ADMIN — METRONIDAZOLE 500 MG: 500 TABLET ORAL at 00:51

## 2023-02-06 RX ADMIN — SODIUM CHLORIDE 75 ML/HR: 9 INJECTION, SOLUTION INTRAVENOUS at 15:00

## 2023-02-06 RX ADMIN — AZTREONAM 2 G: 2 INJECTION, POWDER, LYOPHILIZED, FOR SOLUTION INTRAMUSCULAR; INTRAVENOUS at 14:49

## 2023-02-06 RX ADMIN — OXYCODONE HYDROCHLORIDE AND ACETAMINOPHEN 1 TABLET: 10; 325 TABLET ORAL at 08:44

## 2023-02-06 RX ADMIN — ROSUVASTATIN CALCIUM 20 MG: 20 TABLET ORAL at 21:36

## 2023-02-06 RX ADMIN — SODIUM CHLORIDE 1 G: 1 TABLET ORAL at 17:21

## 2023-02-06 RX ADMIN — INSULIN LISPRO 2 UNITS: 100 INJECTION, SOLUTION INTRAVENOUS; SUBCUTANEOUS at 11:37

## 2023-02-06 RX ADMIN — Medication 10 ML: at 21:36

## 2023-02-06 RX ADMIN — IOPAMIDOL 100 ML: 612 INJECTION, SOLUTION INTRAVENOUS at 18:29

## 2023-02-06 RX ADMIN — DULOXETINE HYDROCHLORIDE 60 MG: 60 CAPSULE, DELAYED RELEASE ORAL at 08:43

## 2023-02-06 RX ADMIN — METRONIDAZOLE 500 MG: 500 TABLET ORAL at 17:21

## 2023-02-06 RX ADMIN — AZTREONAM 2 G: 2 INJECTION, POWDER, LYOPHILIZED, FOR SOLUTION INTRAMUSCULAR; INTRAVENOUS at 21:36

## 2023-02-06 RX ADMIN — OXYCODONE HYDROCHLORIDE AND ACETAMINOPHEN 1 TABLET: 10; 325 TABLET ORAL at 14:49

## 2023-02-06 RX ADMIN — METOPROLOL SUCCINATE 25 MG: 25 TABLET, EXTENDED RELEASE ORAL at 08:42

## 2023-02-06 RX ADMIN — MICAFUNGIN 100 MG: 20 INJECTION, POWDER, LYOPHILIZED, FOR SOLUTION INTRAVENOUS at 11:31

## 2023-02-06 RX ADMIN — HEPARIN SODIUM 5000 UNITS: 5000 INJECTION, SOLUTION INTRAVENOUS; SUBCUTANEOUS at 08:45

## 2023-02-07 ENCOUNTER — APPOINTMENT (OUTPATIENT)
Dept: CT IMAGING | Facility: HOSPITAL | Age: 68
DRG: 239 | End: 2023-02-07
Payer: MEDICARE

## 2023-02-07 LAB
ALBUMIN SERPL-MCNC: 2.4 G/DL (ref 3.5–5.2)
ALBUMIN/GLOB SERPL: 0.8 G/DL
ALP SERPL-CCNC: 58 U/L (ref 39–117)
ALT SERPL W P-5'-P-CCNC: 12 U/L (ref 1–41)
ANION GAP SERPL CALCULATED.3IONS-SCNC: 7 MMOL/L (ref 5–15)
AST SERPL-CCNC: 21 U/L (ref 1–40)
BACTERIA SPEC AEROBE CULT: NO GROWTH
BILIRUB SERPL-MCNC: <0.2 MG/DL (ref 0–1.2)
BUN SERPL-MCNC: 11 MG/DL (ref 8–23)
BUN/CREAT SERPL: 34.4 (ref 7–25)
CALCIUM SPEC-SCNC: 7.9 MG/DL (ref 8.6–10.5)
CHLORIDE SERPL-SCNC: 100 MMOL/L (ref 98–107)
CO2 SERPL-SCNC: 28 MMOL/L (ref 22–29)
CREAT SERPL-MCNC: 0.32 MG/DL (ref 0.76–1.27)
DEPRECATED RDW RBC AUTO: 48.8 FL (ref 37–54)
EGFRCR SERPLBLD CKD-EPI 2021: 127.1 ML/MIN/1.73
ERYTHROCYTE [DISTWIDTH] IN BLOOD BY AUTOMATED COUNT: 16.5 % (ref 12.3–15.4)
GLOBULIN UR ELPH-MCNC: 3 GM/DL
GLUCOSE BLDC GLUCOMTR-MCNC: 137 MG/DL (ref 70–130)
GLUCOSE BLDC GLUCOMTR-MCNC: 183 MG/DL (ref 70–130)
GLUCOSE BLDC GLUCOMTR-MCNC: 187 MG/DL (ref 70–130)
GLUCOSE SERPL-MCNC: 131 MG/DL (ref 65–99)
HCT VFR BLD AUTO: 27.4 % (ref 37.5–51)
HGB BLD-MCNC: 8.6 G/DL (ref 13–17.7)
MCH RBC QN AUTO: 26.4 PG (ref 26.6–33)
MCHC RBC AUTO-ENTMCNC: 31.4 G/DL (ref 31.5–35.7)
MCV RBC AUTO: 84 FL (ref 79–97)
PLATELET # BLD AUTO: 320 10*3/MM3 (ref 140–450)
PMV BLD AUTO: 9.1 FL (ref 6–12)
POTASSIUM SERPL-SCNC: 4.3 MMOL/L (ref 3.5–5.2)
PROT SERPL-MCNC: 5.4 G/DL (ref 6–8.5)
RBC # BLD AUTO: 3.26 10*6/MM3 (ref 4.14–5.8)
SODIUM SERPL-SCNC: 135 MMOL/L (ref 136–145)
WBC NRBC COR # BLD: 19.17 10*3/MM3 (ref 3.4–10.8)

## 2023-02-07 PROCEDURE — 82962 GLUCOSE BLOOD TEST: CPT

## 2023-02-07 PROCEDURE — 25010000002 DAPTOMYCIN PER 1 MG: Performed by: INTERNAL MEDICINE

## 2023-02-07 PROCEDURE — 80053 COMPREHEN METABOLIC PANEL: CPT | Performed by: INTERNAL MEDICINE

## 2023-02-07 PROCEDURE — 25010000002 MICAFUNGIN SODIUM 100 MG RECONSTITUTED SOLUTION: Performed by: INTERNAL MEDICINE

## 2023-02-07 PROCEDURE — 99232 SBSQ HOSP IP/OBS MODERATE 35: CPT | Performed by: HOSPITALIST

## 2023-02-07 PROCEDURE — 25010000002 IOPAMIDOL 61 % SOLUTION: Performed by: HOSPITALIST

## 2023-02-07 PROCEDURE — 25010000002 HEPARIN (PORCINE) PER 1000 UNITS: Performed by: STUDENT IN AN ORGANIZED HEALTH CARE EDUCATION/TRAINING PROGRAM

## 2023-02-07 PROCEDURE — 97535 SELF CARE MNGMENT TRAINING: CPT

## 2023-02-07 PROCEDURE — 85027 COMPLETE CBC AUTOMATED: CPT | Performed by: INTERNAL MEDICINE

## 2023-02-07 PROCEDURE — 99024 POSTOP FOLLOW-UP VISIT: CPT | Performed by: PHYSICIAN ASSISTANT

## 2023-02-07 PROCEDURE — 71270 CT THORAX DX C-/C+: CPT

## 2023-02-07 PROCEDURE — 97530 THERAPEUTIC ACTIVITIES: CPT

## 2023-02-07 PROCEDURE — 63710000001 INSULIN LISPRO (HUMAN) PER 5 UNITS: Performed by: STUDENT IN AN ORGANIZED HEALTH CARE EDUCATION/TRAINING PROGRAM

## 2023-02-07 RX ADMIN — METOPROLOL SUCCINATE 25 MG: 25 TABLET, EXTENDED RELEASE ORAL at 09:04

## 2023-02-07 RX ADMIN — OXYCODONE HYDROCHLORIDE AND ACETAMINOPHEN 1 TABLET: 10; 325 TABLET ORAL at 14:45

## 2023-02-07 RX ADMIN — OXYCODONE HYDROCHLORIDE AND ACETAMINOPHEN 1 TABLET: 10; 325 TABLET ORAL at 21:21

## 2023-02-07 RX ADMIN — LISINOPRIL 40 MG: 20 TABLET ORAL at 09:02

## 2023-02-07 RX ADMIN — OXYCODONE HYDROCHLORIDE AND ACETAMINOPHEN 1 TABLET: 10; 325 TABLET ORAL at 06:36

## 2023-02-07 RX ADMIN — Medication 1 CAPSULE: at 09:01

## 2023-02-07 RX ADMIN — GABAPENTIN 800 MG: 400 CAPSULE ORAL at 17:57

## 2023-02-07 RX ADMIN — HEPARIN SODIUM 5000 UNITS: 5000 INJECTION, SOLUTION INTRAVENOUS; SUBCUTANEOUS at 09:02

## 2023-02-07 RX ADMIN — AZTREONAM 2 G: 2 INJECTION, POWDER, LYOPHILIZED, FOR SOLUTION INTRAMUSCULAR; INTRAVENOUS at 21:22

## 2023-02-07 RX ADMIN — OXYCODONE HYDROCHLORIDE AND ACETAMINOPHEN 1 TABLET: 10; 325 TABLET ORAL at 17:57

## 2023-02-07 RX ADMIN — AZTREONAM 2 G: 2 INJECTION, POWDER, LYOPHILIZED, FOR SOLUTION INTRAMUSCULAR; INTRAVENOUS at 06:36

## 2023-02-07 RX ADMIN — SODIUM CHLORIDE 1 G: 1 TABLET ORAL at 17:57

## 2023-02-07 RX ADMIN — ANTI-PRURITIC 1 APPLICATION: 1 CREAM TOPICAL at 06:37

## 2023-02-07 RX ADMIN — FAMOTIDINE 20 MG: 20 TABLET ORAL at 17:57

## 2023-02-07 RX ADMIN — Medication 10 ML: at 21:23

## 2023-02-07 RX ADMIN — Medication 10 ML: at 09:04

## 2023-02-07 RX ADMIN — GABAPENTIN 800 MG: 400 CAPSULE ORAL at 09:02

## 2023-02-07 RX ADMIN — INSULIN LISPRO 2 UNITS: 100 INJECTION, SOLUTION INTRAVENOUS; SUBCUTANEOUS at 11:37

## 2023-02-07 RX ADMIN — FAMOTIDINE 20 MG: 20 TABLET ORAL at 09:01

## 2023-02-07 RX ADMIN — AZTREONAM 2 G: 2 INJECTION, POWDER, LYOPHILIZED, FOR SOLUTION INTRAMUSCULAR; INTRAVENOUS at 14:45

## 2023-02-07 RX ADMIN — INSULIN LISPRO 2 UNITS: 100 INJECTION, SOLUTION INTRAVENOUS; SUBCUTANEOUS at 17:57

## 2023-02-07 RX ADMIN — DAPTOMYCIN 250 MG: 500 INJECTION, POWDER, LYOPHILIZED, FOR SOLUTION INTRAVENOUS at 08:58

## 2023-02-07 RX ADMIN — ANTI-PRURITIC 1 APPLICATION: 1 CREAM TOPICAL at 21:23

## 2023-02-07 RX ADMIN — MICAFUNGIN 100 MG: 20 INJECTION, POWDER, LYOPHILIZED, FOR SOLUTION INTRAVENOUS at 09:04

## 2023-02-07 RX ADMIN — DULOXETINE HYDROCHLORIDE 60 MG: 60 CAPSULE, DELAYED RELEASE ORAL at 09:02

## 2023-02-07 RX ADMIN — SODIUM CHLORIDE 1 G: 1 TABLET ORAL at 09:02

## 2023-02-07 RX ADMIN — GABAPENTIN 800 MG: 400 CAPSULE ORAL at 11:37

## 2023-02-07 RX ADMIN — ROSUVASTATIN CALCIUM 20 MG: 20 TABLET ORAL at 21:21

## 2023-02-07 RX ADMIN — GABAPENTIN 800 MG: 400 CAPSULE ORAL at 21:22

## 2023-02-07 RX ADMIN — METRONIDAZOLE 500 MG: 500 TABLET ORAL at 14:44

## 2023-02-07 RX ADMIN — THIAMINE HCL TAB 100 MG 100 MG: 100 TAB at 09:01

## 2023-02-07 RX ADMIN — HEPARIN SODIUM 5000 UNITS: 5000 INJECTION, SOLUTION INTRAVENOUS; SUBCUTANEOUS at 21:22

## 2023-02-07 RX ADMIN — IOPAMIDOL 100 ML: 612 INJECTION, SOLUTION INTRAVENOUS at 18:36

## 2023-02-07 RX ADMIN — OXYCODONE HYDROCHLORIDE AND ACETAMINOPHEN 1 TABLET: 10; 325 TABLET ORAL at 11:37

## 2023-02-07 RX ADMIN — METRONIDAZOLE 500 MG: 500 TABLET ORAL at 00:36

## 2023-02-07 RX ADMIN — METRONIDAZOLE 500 MG: 500 TABLET ORAL at 09:02

## 2023-02-07 RX ADMIN — ANTI-PRURITIC 1 APPLICATION: 1 CREAM TOPICAL at 14:45

## 2023-02-08 PROBLEM — J90 LOCULATED PLEURAL EFFUSION: Status: ACTIVE | Noted: 2023-02-08

## 2023-02-08 LAB
CK SERPL-CCNC: 29 U/L (ref 20–200)
DEPRECATED RDW RBC AUTO: 51.2 FL (ref 37–54)
ERYTHROCYTE [DISTWIDTH] IN BLOOD BY AUTOMATED COUNT: 17.3 % (ref 12.3–15.4)
GLUCOSE BLDC GLUCOMTR-MCNC: 157 MG/DL (ref 70–130)
GLUCOSE BLDC GLUCOMTR-MCNC: 176 MG/DL (ref 70–130)
GLUCOSE BLDC GLUCOMTR-MCNC: 194 MG/DL (ref 70–130)
GLUCOSE BLDC GLUCOMTR-MCNC: 201 MG/DL (ref 70–130)
HCT VFR BLD AUTO: 29 % (ref 37.5–51)
HGB BLD-MCNC: 9.1 G/DL (ref 13–17.7)
MCH RBC QN AUTO: 26.4 PG (ref 26.6–33)
MCHC RBC AUTO-ENTMCNC: 31.4 G/DL (ref 31.5–35.7)
MCV RBC AUTO: 84.1 FL (ref 79–97)
PLATELET # BLD AUTO: 332 10*3/MM3 (ref 140–450)
PMV BLD AUTO: 9.5 FL (ref 6–12)
RBC # BLD AUTO: 3.45 10*6/MM3 (ref 4.14–5.8)
WBC NRBC COR # BLD: 20.18 10*3/MM3 (ref 3.4–10.8)

## 2023-02-08 PROCEDURE — 25010000002 MICAFUNGIN SODIUM 100 MG RECONSTITUTED SOLUTION: Performed by: INTERNAL MEDICINE

## 2023-02-08 PROCEDURE — 99024 POSTOP FOLLOW-UP VISIT: CPT | Performed by: PHYSICIAN ASSISTANT

## 2023-02-08 PROCEDURE — 85027 COMPLETE CBC AUTOMATED: CPT | Performed by: INTERNAL MEDICINE

## 2023-02-08 PROCEDURE — 25010000002 HEPARIN (PORCINE) PER 1000 UNITS: Performed by: STUDENT IN AN ORGANIZED HEALTH CARE EDUCATION/TRAINING PROGRAM

## 2023-02-08 PROCEDURE — 63710000001 DIPHENHYDRAMINE PER 50 MG: Performed by: HOSPITALIST

## 2023-02-08 PROCEDURE — 82962 GLUCOSE BLOOD TEST: CPT

## 2023-02-08 PROCEDURE — 63710000001 INSULIN LISPRO (HUMAN) PER 5 UNITS: Performed by: STUDENT IN AN ORGANIZED HEALTH CARE EDUCATION/TRAINING PROGRAM

## 2023-02-08 PROCEDURE — 99232 SBSQ HOSP IP/OBS MODERATE 35: CPT | Performed by: HOSPITALIST

## 2023-02-08 PROCEDURE — 82550 ASSAY OF CK (CPK): CPT

## 2023-02-08 PROCEDURE — 63710000001 INSULIN DETEMIR PER 5 UNITS: Performed by: HOSPITALIST

## 2023-02-08 PROCEDURE — 25010000002 DAPTOMYCIN PER 1 MG: Performed by: INTERNAL MEDICINE

## 2023-02-08 RX ORDER — HEPARIN SODIUM 5000 [USP'U]/ML
5000 INJECTION, SOLUTION INTRAVENOUS; SUBCUTANEOUS EVERY 12 HOURS SCHEDULED
Status: DISCONTINUED | OUTPATIENT
Start: 2023-02-09 | End: 2023-02-21 | Stop reason: HOSPADM

## 2023-02-08 RX ADMIN — OXYCODONE HYDROCHLORIDE AND ACETAMINOPHEN 1 TABLET: 10; 325 TABLET ORAL at 21:22

## 2023-02-08 RX ADMIN — Medication 10 ML: at 08:56

## 2023-02-08 RX ADMIN — METRONIDAZOLE 500 MG: 500 TABLET ORAL at 16:50

## 2023-02-08 RX ADMIN — DAPTOMYCIN 250 MG: 500 INJECTION, POWDER, LYOPHILIZED, FOR SOLUTION INTRAVENOUS at 08:50

## 2023-02-08 RX ADMIN — FAMOTIDINE 20 MG: 20 TABLET ORAL at 16:50

## 2023-02-08 RX ADMIN — THIAMINE HCL TAB 100 MG 100 MG: 100 TAB at 09:00

## 2023-02-08 RX ADMIN — HEPARIN SODIUM 5000 UNITS: 5000 INJECTION, SOLUTION INTRAVENOUS; SUBCUTANEOUS at 08:52

## 2023-02-08 RX ADMIN — MICAFUNGIN 100 MG: 20 INJECTION, POWDER, LYOPHILIZED, FOR SOLUTION INTRAVENOUS at 08:44

## 2023-02-08 RX ADMIN — GABAPENTIN 800 MG: 400 CAPSULE ORAL at 08:44

## 2023-02-08 RX ADMIN — INSULIN LISPRO 3 UNITS: 100 INJECTION, SOLUTION INTRAVENOUS; SUBCUTANEOUS at 11:52

## 2023-02-08 RX ADMIN — Medication 1 CAPSULE: at 08:52

## 2023-02-08 RX ADMIN — METOPROLOL SUCCINATE 25 MG: 25 TABLET, EXTENDED RELEASE ORAL at 08:51

## 2023-02-08 RX ADMIN — DIPHENHYDRAMINE HYDROCHLORIDE 50 MG: 50 CAPSULE ORAL at 00:13

## 2023-02-08 RX ADMIN — ANTI-PRURITIC 1 APPLICATION: 1 CREAM TOPICAL at 05:37

## 2023-02-08 RX ADMIN — INSULIN DETEMIR 1 UNITS: 100 INJECTION, SOLUTION SUBCUTANEOUS at 21:23

## 2023-02-08 RX ADMIN — GABAPENTIN 800 MG: 400 CAPSULE ORAL at 11:52

## 2023-02-08 RX ADMIN — DIPHENHYDRAMINE HYDROCHLORIDE 50 MG: 50 CAPSULE ORAL at 22:24

## 2023-02-08 RX ADMIN — OXYCODONE HYDROCHLORIDE AND ACETAMINOPHEN 1 TABLET: 10; 325 TABLET ORAL at 05:40

## 2023-02-08 RX ADMIN — METRONIDAZOLE 500 MG: 500 TABLET ORAL at 23:59

## 2023-02-08 RX ADMIN — Medication 5 MG: at 00:13

## 2023-02-08 RX ADMIN — ROSUVASTATIN CALCIUM 20 MG: 20 TABLET ORAL at 21:22

## 2023-02-08 RX ADMIN — HEPARIN SODIUM 5000 UNITS: 5000 INJECTION, SOLUTION INTRAVENOUS; SUBCUTANEOUS at 21:22

## 2023-02-08 RX ADMIN — OXYCODONE HYDROCHLORIDE AND ACETAMINOPHEN 1 TABLET: 10; 325 TABLET ORAL at 14:39

## 2023-02-08 RX ADMIN — SODIUM CHLORIDE 1 G: 1 TABLET ORAL at 18:47

## 2023-02-08 RX ADMIN — ANTI-PRURITIC 1 APPLICATION: 1 CREAM TOPICAL at 21:34

## 2023-02-08 RX ADMIN — Medication 10 ML: at 21:29

## 2023-02-08 RX ADMIN — OXYCODONE HYDROCHLORIDE AND ACETAMINOPHEN 1 TABLET: 10; 325 TABLET ORAL at 18:47

## 2023-02-08 RX ADMIN — AZTREONAM 2 G: 2 INJECTION, POWDER, LYOPHILIZED, FOR SOLUTION INTRAMUSCULAR; INTRAVENOUS at 05:35

## 2023-02-08 RX ADMIN — GABAPENTIN 800 MG: 400 CAPSULE ORAL at 18:47

## 2023-02-08 RX ADMIN — AZTREONAM 2 G: 2 INJECTION, POWDER, LYOPHILIZED, FOR SOLUTION INTRAMUSCULAR; INTRAVENOUS at 21:29

## 2023-02-08 RX ADMIN — METRONIDAZOLE 500 MG: 500 TABLET ORAL at 00:06

## 2023-02-08 RX ADMIN — OXYCODONE HYDROCHLORIDE AND ACETAMINOPHEN 1 TABLET: 10; 325 TABLET ORAL at 11:52

## 2023-02-08 RX ADMIN — FAMOTIDINE 20 MG: 20 TABLET ORAL at 05:39

## 2023-02-08 RX ADMIN — GABAPENTIN 800 MG: 400 CAPSULE ORAL at 21:22

## 2023-02-08 RX ADMIN — LISINOPRIL 40 MG: 20 TABLET ORAL at 08:52

## 2023-02-08 RX ADMIN — INSULIN LISPRO 2 UNITS: 100 INJECTION, SOLUTION INTRAVENOUS; SUBCUTANEOUS at 08:44

## 2023-02-08 RX ADMIN — AZTREONAM 2 G: 2 INJECTION, POWDER, LYOPHILIZED, FOR SOLUTION INTRAMUSCULAR; INTRAVENOUS at 14:39

## 2023-02-08 RX ADMIN — SODIUM CHLORIDE 1 G: 1 TABLET ORAL at 09:00

## 2023-02-08 RX ADMIN — METRONIDAZOLE 500 MG: 500 TABLET ORAL at 08:51

## 2023-02-08 RX ADMIN — DULOXETINE HYDROCHLORIDE 60 MG: 60 CAPSULE, DELAYED RELEASE ORAL at 08:52

## 2023-02-08 RX ADMIN — INSULIN LISPRO 2 UNITS: 100 INJECTION, SOLUTION INTRAVENOUS; SUBCUTANEOUS at 16:49

## 2023-02-09 ENCOUNTER — APPOINTMENT (OUTPATIENT)
Dept: ULTRASOUND IMAGING | Facility: HOSPITAL | Age: 68
DRG: 239 | End: 2023-02-09
Payer: MEDICARE

## 2023-02-09 ENCOUNTER — ANESTHESIA EVENT (OUTPATIENT)
Dept: PERIOP | Facility: HOSPITAL | Age: 68
DRG: 239 | End: 2023-02-09
Payer: MEDICARE

## 2023-02-09 ENCOUNTER — APPOINTMENT (OUTPATIENT)
Dept: GENERAL RADIOLOGY | Facility: HOSPITAL | Age: 68
DRG: 239 | End: 2023-02-09
Payer: MEDICARE

## 2023-02-09 LAB
APPEARANCE FLD: CLEAR
BACTERIA SPEC AEROBE CULT: NORMAL
COLOR FLD: NORMAL
GLUCOSE BLDC GLUCOMTR-MCNC: 137 MG/DL (ref 70–130)
GLUCOSE BLDC GLUCOMTR-MCNC: 157 MG/DL (ref 70–130)
GLUCOSE BLDC GLUCOMTR-MCNC: 171 MG/DL (ref 70–130)
GLUCOSE BLDC GLUCOMTR-MCNC: 201 MG/DL (ref 70–130)
GRAM STN SPEC: NORMAL
LDH FLD-CCNC: 20 U/L
LYMPHOCYTES NFR FLD MANUAL: 20 %
MACROPHAGE FLUID: 9 %
MONOCYTES NFR FLD: 26 %
NEUTROPHILS NFR FLD MANUAL: 45 %
PROT FLD-MCNC: <1 G/DL
RBC # FLD AUTO: <2000 /MM3
WBC # FLD AUTO: 149 /MM3

## 2023-02-09 PROCEDURE — 0W993ZZ DRAINAGE OF RIGHT PLEURAL CAVITY, PERCUTANEOUS APPROACH: ICD-10-PCS | Performed by: RADIOLOGY

## 2023-02-09 PROCEDURE — 71045 X-RAY EXAM CHEST 1 VIEW: CPT

## 2023-02-09 PROCEDURE — 84157 ASSAY OF PROTEIN OTHER: CPT | Performed by: HOSPITALIST

## 2023-02-09 PROCEDURE — 99024 POSTOP FOLLOW-UP VISIT: CPT | Performed by: STUDENT IN AN ORGANIZED HEALTH CARE EDUCATION/TRAINING PROGRAM

## 2023-02-09 PROCEDURE — 25010000002 HEPARIN (PORCINE) PER 1000 UNITS: Performed by: HOSPITALIST

## 2023-02-09 PROCEDURE — 83615 LACTATE (LD) (LDH) ENZYME: CPT | Performed by: HOSPITALIST

## 2023-02-09 PROCEDURE — 87075 CULTR BACTERIA EXCEPT BLOOD: CPT | Performed by: HOSPITALIST

## 2023-02-09 PROCEDURE — 63710000001 INSULIN LISPRO (HUMAN) PER 5 UNITS: Performed by: STUDENT IN AN ORGANIZED HEALTH CARE EDUCATION/TRAINING PROGRAM

## 2023-02-09 PROCEDURE — C1729 CATH, DRAINAGE: HCPCS

## 2023-02-09 PROCEDURE — 89051 BODY FLUID CELL COUNT: CPT | Performed by: HOSPITALIST

## 2023-02-09 PROCEDURE — 63710000001 INSULIN DETEMIR PER 5 UNITS: Performed by: HOSPITALIST

## 2023-02-09 PROCEDURE — 87070 CULTURE OTHR SPECIMN AEROBIC: CPT | Performed by: HOSPITALIST

## 2023-02-09 PROCEDURE — 82962 GLUCOSE BLOOD TEST: CPT

## 2023-02-09 PROCEDURE — 87205 SMEAR GRAM STAIN: CPT | Performed by: HOSPITALIST

## 2023-02-09 PROCEDURE — 87015 SPECIMEN INFECT AGNT CONCNTJ: CPT | Performed by: HOSPITALIST

## 2023-02-09 PROCEDURE — 99232 SBSQ HOSP IP/OBS MODERATE 35: CPT | Performed by: STUDENT IN AN ORGANIZED HEALTH CARE EDUCATION/TRAINING PROGRAM

## 2023-02-09 PROCEDURE — 76942 ECHO GUIDE FOR BIOPSY: CPT

## 2023-02-09 PROCEDURE — 25010000002 DAPTOMYCIN PER 1 MG: Performed by: INTERNAL MEDICINE

## 2023-02-09 PROCEDURE — 25010000002 MICAFUNGIN SODIUM 100 MG RECONSTITUTED SOLUTION: Performed by: INTERNAL MEDICINE

## 2023-02-09 RX ORDER — FAMOTIDINE 20 MG/1
20 TABLET, FILM COATED ORAL ONCE
Status: CANCELLED | OUTPATIENT
Start: 2023-02-09 | End: 2023-02-09

## 2023-02-09 RX ORDER — FAMOTIDINE 10 MG/ML
20 INJECTION, SOLUTION INTRAVENOUS ONCE
Status: CANCELLED | OUTPATIENT
Start: 2023-02-09 | End: 2023-02-09

## 2023-02-09 RX ORDER — SODIUM CHLORIDE 0.9 % (FLUSH) 0.9 %
10 SYRINGE (ML) INJECTION EVERY 12 HOURS SCHEDULED
Status: CANCELLED | OUTPATIENT
Start: 2023-02-09

## 2023-02-09 RX ORDER — LIDOCAINE HYDROCHLORIDE 10 MG/ML
5 INJECTION, SOLUTION EPIDURAL; INFILTRATION; INTRACAUDAL; PERINEURAL ONCE
Status: COMPLETED | OUTPATIENT
Start: 2023-02-09 | End: 2023-02-09

## 2023-02-09 RX ORDER — SODIUM CHLORIDE 9 MG/ML
40 INJECTION, SOLUTION INTRAVENOUS AS NEEDED
Status: CANCELLED | OUTPATIENT
Start: 2023-02-09

## 2023-02-09 RX ADMIN — Medication 10 ML: at 08:30

## 2023-02-09 RX ADMIN — FAMOTIDINE 20 MG: 20 TABLET ORAL at 06:42

## 2023-02-09 RX ADMIN — INSULIN LISPRO 3 UNITS: 100 INJECTION, SOLUTION INTRAVENOUS; SUBCUTANEOUS at 17:26

## 2023-02-09 RX ADMIN — FAMOTIDINE 20 MG: 20 TABLET ORAL at 17:25

## 2023-02-09 RX ADMIN — SODIUM CHLORIDE 1 G: 1 TABLET ORAL at 08:26

## 2023-02-09 RX ADMIN — HEPARIN SODIUM 5000 UNITS: 5000 INJECTION INTRAVENOUS; SUBCUTANEOUS at 17:25

## 2023-02-09 RX ADMIN — ROSUVASTATIN CALCIUM 20 MG: 20 TABLET ORAL at 20:32

## 2023-02-09 RX ADMIN — GABAPENTIN 800 MG: 400 CAPSULE ORAL at 20:32

## 2023-02-09 RX ADMIN — Medication 10 ML: at 21:58

## 2023-02-09 RX ADMIN — METOPROLOL SUCCINATE 25 MG: 25 TABLET, EXTENDED RELEASE ORAL at 08:18

## 2023-02-09 RX ADMIN — AZTREONAM 2 G: 2 INJECTION, POWDER, LYOPHILIZED, FOR SOLUTION INTRAMUSCULAR; INTRAVENOUS at 15:36

## 2023-02-09 RX ADMIN — DULOXETINE HYDROCHLORIDE 60 MG: 60 CAPSULE, DELAYED RELEASE ORAL at 08:18

## 2023-02-09 RX ADMIN — AZTREONAM 2 G: 2 INJECTION, POWDER, LYOPHILIZED, FOR SOLUTION INTRAMUSCULAR; INTRAVENOUS at 05:44

## 2023-02-09 RX ADMIN — GABAPENTIN 800 MG: 400 CAPSULE ORAL at 08:18

## 2023-02-09 RX ADMIN — INSULIN LISPRO 2 UNITS: 100 INJECTION, SOLUTION INTRAVENOUS; SUBCUTANEOUS at 08:25

## 2023-02-09 RX ADMIN — LISINOPRIL 40 MG: 20 TABLET ORAL at 08:18

## 2023-02-09 RX ADMIN — Medication 1 CAPSULE: at 08:18

## 2023-02-09 RX ADMIN — ANTI-PRURITIC 1 APPLICATION: 1 CREAM TOPICAL at 05:44

## 2023-02-09 RX ADMIN — GABAPENTIN 800 MG: 400 CAPSULE ORAL at 17:25

## 2023-02-09 RX ADMIN — OXYCODONE HYDROCHLORIDE AND ACETAMINOPHEN 1 TABLET: 10; 325 TABLET ORAL at 06:42

## 2023-02-09 RX ADMIN — MICAFUNGIN 100 MG: 20 INJECTION, POWDER, LYOPHILIZED, FOR SOLUTION INTRAVENOUS at 10:58

## 2023-02-09 RX ADMIN — INSULIN DETEMIR 1 UNITS: 100 INJECTION, SOLUTION SUBCUTANEOUS at 20:32

## 2023-02-09 RX ADMIN — OXYCODONE HYDROCHLORIDE AND ACETAMINOPHEN 1 TABLET: 10; 325 TABLET ORAL at 15:38

## 2023-02-09 RX ADMIN — METRONIDAZOLE 500 MG: 500 TABLET ORAL at 15:38

## 2023-02-09 RX ADMIN — THIAMINE HCL TAB 100 MG 100 MG: 100 TAB at 08:18

## 2023-02-09 RX ADMIN — AZTREONAM 2 G: 2 INJECTION, POWDER, LYOPHILIZED, FOR SOLUTION INTRAMUSCULAR; INTRAVENOUS at 21:51

## 2023-02-09 RX ADMIN — OXYCODONE HYDROCHLORIDE AND ACETAMINOPHEN 1 TABLET: 10; 325 TABLET ORAL at 21:51

## 2023-02-09 RX ADMIN — SODIUM CHLORIDE 1 G: 1 TABLET ORAL at 17:34

## 2023-02-09 RX ADMIN — OXYCODONE HYDROCHLORIDE AND ACETAMINOPHEN 1 TABLET: 10; 325 TABLET ORAL at 17:34

## 2023-02-09 RX ADMIN — ANTI-PRURITIC 1 APPLICATION: 1 CREAM TOPICAL at 15:39

## 2023-02-09 RX ADMIN — METRONIDAZOLE 500 MG: 500 TABLET ORAL at 08:18

## 2023-02-09 RX ADMIN — DAPTOMYCIN 250 MG: 500 INJECTION, POWDER, LYOPHILIZED, FOR SOLUTION INTRAVENOUS at 08:26

## 2023-02-09 RX ADMIN — LIDOCAINE HYDROCHLORIDE 5 ML: 10 INJECTION, SOLUTION EPIDURAL; INFILTRATION; INTRACAUDAL; PERINEURAL at 12:55

## 2023-02-09 RX ADMIN — ANTI-PRURITIC 1 APPLICATION: 1 CREAM TOPICAL at 21:54

## 2023-02-10 ENCOUNTER — ANESTHESIA (OUTPATIENT)
Dept: PERIOP | Facility: HOSPITAL | Age: 68
DRG: 239 | End: 2023-02-10
Payer: MEDICARE

## 2023-02-10 LAB
ALBUMIN SERPL-MCNC: 2.2 G/DL (ref 3.5–5.2)
ALBUMIN/GLOB SERPL: 0.6 G/DL
ALP SERPL-CCNC: 58 U/L (ref 39–117)
ALT SERPL W P-5'-P-CCNC: 13 U/L (ref 1–41)
ANION GAP SERPL CALCULATED.3IONS-SCNC: 11 MMOL/L (ref 5–15)
ANISOCYTOSIS BLD QL: ABNORMAL
AST SERPL-CCNC: 24 U/L (ref 1–40)
BASOPHILS # BLD MANUAL: 0.51 10*3/MM3 (ref 0–0.2)
BASOPHILS NFR BLD MANUAL: 3 % (ref 0–1.5)
BILIRUB SERPL-MCNC: <0.2 MG/DL (ref 0–1.2)
BUN SERPL-MCNC: 11 MG/DL (ref 8–23)
BUN/CREAT SERPL: 37.9 (ref 7–25)
BURR CELLS BLD QL SMEAR: ABNORMAL
CALCIUM SPEC-SCNC: 8.3 MG/DL (ref 8.6–10.5)
CHLORIDE SERPL-SCNC: 99 MMOL/L (ref 98–107)
CO2 SERPL-SCNC: 23 MMOL/L (ref 22–29)
CREAT SERPL-MCNC: 0.29 MG/DL (ref 0.76–1.27)
DEPRECATED RDW RBC AUTO: 54.5 FL (ref 37–54)
EGFRCR SERPLBLD CKD-EPI 2021: 131 ML/MIN/1.73
EOSINOPHIL # BLD MANUAL: 3.37 10*3/MM3 (ref 0–0.4)
EOSINOPHIL NFR BLD MANUAL: 20 % (ref 0.3–6.2)
ERYTHROCYTE [DISTWIDTH] IN BLOOD BY AUTOMATED COUNT: 18.3 % (ref 12.3–15.4)
GLOBULIN UR ELPH-MCNC: 3.9 GM/DL
GLUCOSE BLDC GLUCOMTR-MCNC: 112 MG/DL (ref 70–130)
GLUCOSE BLDC GLUCOMTR-MCNC: 120 MG/DL (ref 70–130)
GLUCOSE BLDC GLUCOMTR-MCNC: 123 MG/DL (ref 70–130)
GLUCOSE BLDC GLUCOMTR-MCNC: 146 MG/DL (ref 70–130)
GLUCOSE BLDC GLUCOMTR-MCNC: 157 MG/DL (ref 70–130)
GLUCOSE SERPL-MCNC: 136 MG/DL (ref 65–99)
HCT VFR BLD AUTO: 27.6 % (ref 37.5–51)
HGB BLD-MCNC: 8.7 G/DL (ref 13–17.7)
INR PPP: 1.19 (ref 0.84–1.13)
LYMPHOCYTES # BLD MANUAL: 1.18 10*3/MM3 (ref 0.7–3.1)
LYMPHOCYTES NFR BLD MANUAL: 1 % (ref 5–12)
MCH RBC QN AUTO: 26.9 PG (ref 26.6–33)
MCHC RBC AUTO-ENTMCNC: 31.5 G/DL (ref 31.5–35.7)
MCV RBC AUTO: 85.4 FL (ref 79–97)
MONOCYTES # BLD: 0.17 10*3/MM3 (ref 0.1–0.9)
NEUTROPHILS # BLD AUTO: 11.62 10*3/MM3 (ref 1.7–7)
NEUTROPHILS NFR BLD MANUAL: 69 % (ref 42.7–76)
NRBC SPEC MANUAL: 0 /100 WBC (ref 0–0.2)
PLAT MORPH BLD: NORMAL
PLATELET # BLD AUTO: 334 10*3/MM3 (ref 140–450)
PMV BLD AUTO: 9.5 FL (ref 6–12)
POTASSIUM SERPL-SCNC: 4.2 MMOL/L (ref 3.5–5.2)
PROCALCITONIN SERPL-MCNC: 0.07 NG/ML (ref 0–0.25)
PROT SERPL-MCNC: 6.1 G/DL (ref 6–8.5)
PROTHROMBIN TIME: 15.1 SECONDS (ref 11.4–14.4)
RBC # BLD AUTO: 3.23 10*6/MM3 (ref 4.14–5.8)
SODIUM SERPL-SCNC: 133 MMOL/L (ref 136–145)
VARIANT LYMPHS NFR BLD MANUAL: 7 % (ref 19.6–45.3)
WBC MORPH BLD: NORMAL
WBC NRBC COR # BLD: 16.84 10*3/MM3 (ref 3.4–10.8)

## 2023-02-10 PROCEDURE — 87070 CULTURE OTHR SPECIMN AEROBIC: CPT | Performed by: SURGERY

## 2023-02-10 PROCEDURE — 88304 TISSUE EXAM BY PATHOLOGIST: CPT | Performed by: SURGERY

## 2023-02-10 PROCEDURE — 84145 PROCALCITONIN (PCT): CPT | Performed by: HOSPITALIST

## 2023-02-10 PROCEDURE — 63710000001 INSULIN LISPRO (HUMAN) PER 5 UNITS: Performed by: STUDENT IN AN ORGANIZED HEALTH CARE EDUCATION/TRAINING PROGRAM

## 2023-02-10 PROCEDURE — 80053 COMPREHEN METABOLIC PANEL: CPT | Performed by: HOSPITALIST

## 2023-02-10 PROCEDURE — 88312 SPECIAL STAINS GROUP 1: CPT | Performed by: SURGERY

## 2023-02-10 PROCEDURE — 0JB70ZZ EXCISION OF BACK SUBCUTANEOUS TISSUE AND FASCIA, OPEN APPROACH: ICD-10-PCS | Performed by: SURGERY

## 2023-02-10 PROCEDURE — 87077 CULTURE AEROBIC IDENTIFY: CPT | Performed by: SURGERY

## 2023-02-10 PROCEDURE — 99232 SBSQ HOSP IP/OBS MODERATE 35: CPT | Performed by: STUDENT IN AN ORGANIZED HEALTH CARE EDUCATION/TRAINING PROGRAM

## 2023-02-10 PROCEDURE — 85610 PROTHROMBIN TIME: CPT | Performed by: HOSPITALIST

## 2023-02-10 PROCEDURE — 87075 CULTR BACTERIA EXCEPT BLOOD: CPT | Performed by: SURGERY

## 2023-02-10 PROCEDURE — 85025 COMPLETE CBC W/AUTO DIFF WBC: CPT | Performed by: HOSPITALIST

## 2023-02-10 PROCEDURE — 25010000002 DAPTOMYCIN PER 1 MG: Performed by: INTERNAL MEDICINE

## 2023-02-10 PROCEDURE — 25010000002 PROPOFOL 10 MG/ML EMULSION: Performed by: NURSE ANESTHETIST, CERTIFIED REGISTERED

## 2023-02-10 PROCEDURE — 99024 POSTOP FOLLOW-UP VISIT: CPT | Performed by: PHYSICIAN ASSISTANT

## 2023-02-10 PROCEDURE — 25010000002 HEPARIN (PORCINE) PER 1000 UNITS: Performed by: SURGERY

## 2023-02-10 PROCEDURE — 82962 GLUCOSE BLOOD TEST: CPT

## 2023-02-10 PROCEDURE — 25010000002 MICAFUNGIN SODIUM 100 MG RECONSTITUTED SOLUTION: Performed by: INTERNAL MEDICINE

## 2023-02-10 PROCEDURE — 85007 BL SMEAR W/DIFF WBC COUNT: CPT | Performed by: HOSPITALIST

## 2023-02-10 PROCEDURE — 87205 SMEAR GRAM STAIN: CPT | Performed by: SURGERY

## 2023-02-10 RX ORDER — PHENYLEPHRINE HCL IN 0.9% NACL 1 MG/10 ML
SYRINGE (ML) INTRAVENOUS AS NEEDED
Status: DISCONTINUED | OUTPATIENT
Start: 2023-02-10 | End: 2023-02-10 | Stop reason: SURG

## 2023-02-10 RX ORDER — PROMETHAZINE HYDROCHLORIDE 25 MG/1
25 SUPPOSITORY RECTAL ONCE AS NEEDED
Status: DISCONTINUED | OUTPATIENT
Start: 2023-02-10 | End: 2023-02-10 | Stop reason: HOSPADM

## 2023-02-10 RX ORDER — FENTANYL CITRATE 50 UG/ML
50 INJECTION, SOLUTION INTRAMUSCULAR; INTRAVENOUS
Status: DISCONTINUED | OUTPATIENT
Start: 2023-02-10 | End: 2023-02-10 | Stop reason: HOSPADM

## 2023-02-10 RX ORDER — HYDRALAZINE HYDROCHLORIDE 20 MG/ML
5 INJECTION INTRAMUSCULAR; INTRAVENOUS
Status: DISCONTINUED | OUTPATIENT
Start: 2023-02-10 | End: 2023-02-10 | Stop reason: HOSPADM

## 2023-02-10 RX ORDER — HYDROCODONE BITARTRATE AND ACETAMINOPHEN 5; 325 MG/1; MG/1
1 TABLET ORAL ONCE AS NEEDED
Status: DISCONTINUED | OUTPATIENT
Start: 2023-02-10 | End: 2023-02-10 | Stop reason: HOSPADM

## 2023-02-10 RX ORDER — ONDANSETRON 2 MG/ML
4 INJECTION INTRAMUSCULAR; INTRAVENOUS ONCE AS NEEDED
Status: DISCONTINUED | OUTPATIENT
Start: 2023-02-10 | End: 2023-02-10 | Stop reason: HOSPADM

## 2023-02-10 RX ORDER — LIDOCAINE HYDROCHLORIDE 10 MG/ML
0.5 INJECTION, SOLUTION EPIDURAL; INFILTRATION; INTRACAUDAL; PERINEURAL ONCE AS NEEDED
Status: DISCONTINUED | OUTPATIENT
Start: 2023-02-10 | End: 2023-02-10 | Stop reason: HOSPADM

## 2023-02-10 RX ORDER — SODIUM CHLORIDE 0.9 % (FLUSH) 0.9 %
3-10 SYRINGE (ML) INJECTION AS NEEDED
Status: DISCONTINUED | OUTPATIENT
Start: 2023-02-10 | End: 2023-02-10 | Stop reason: HOSPADM

## 2023-02-10 RX ORDER — ROCURONIUM BROMIDE 10 MG/ML
INJECTION, SOLUTION INTRAVENOUS AS NEEDED
Status: DISCONTINUED | OUTPATIENT
Start: 2023-02-10 | End: 2023-02-10 | Stop reason: SURG

## 2023-02-10 RX ORDER — IPRATROPIUM BROMIDE AND ALBUTEROL SULFATE 2.5; .5 MG/3ML; MG/3ML
3 SOLUTION RESPIRATORY (INHALATION) ONCE AS NEEDED
Status: DISCONTINUED | OUTPATIENT
Start: 2023-02-10 | End: 2023-02-10 | Stop reason: HOSPADM

## 2023-02-10 RX ORDER — BUPIVACAINE HYDROCHLORIDE AND EPINEPHRINE 5; 5 MG/ML; UG/ML
INJECTION, SOLUTION PERINEURAL AS NEEDED
Status: DISCONTINUED | OUTPATIENT
Start: 2023-02-10 | End: 2023-02-10 | Stop reason: HOSPADM

## 2023-02-10 RX ORDER — PROPOFOL 10 MG/ML
VIAL (ML) INTRAVENOUS AS NEEDED
Status: DISCONTINUED | OUTPATIENT
Start: 2023-02-10 | End: 2023-02-10 | Stop reason: SURG

## 2023-02-10 RX ORDER — EPHEDRINE SULFATE 50 MG/ML
INJECTION INTRAVENOUS AS NEEDED
Status: DISCONTINUED | OUTPATIENT
Start: 2023-02-10 | End: 2023-02-10 | Stop reason: SURG

## 2023-02-10 RX ORDER — SODIUM CHLORIDE, SODIUM LACTATE, POTASSIUM CHLORIDE, CALCIUM CHLORIDE 600; 310; 30; 20 MG/100ML; MG/100ML; MG/100ML; MG/100ML
9 INJECTION, SOLUTION INTRAVENOUS CONTINUOUS
Status: DISCONTINUED | OUTPATIENT
Start: 2023-02-10 | End: 2023-02-19

## 2023-02-10 RX ORDER — MAGNESIUM HYDROXIDE 1200 MG/15ML
LIQUID ORAL AS NEEDED
Status: DISCONTINUED | OUTPATIENT
Start: 2023-02-10 | End: 2023-02-10 | Stop reason: HOSPADM

## 2023-02-10 RX ORDER — LIDOCAINE HYDROCHLORIDE 10 MG/ML
INJECTION, SOLUTION EPIDURAL; INFILTRATION; INTRACAUDAL; PERINEURAL AS NEEDED
Status: DISCONTINUED | OUTPATIENT
Start: 2023-02-10 | End: 2023-02-10 | Stop reason: SURG

## 2023-02-10 RX ORDER — SODIUM CHLORIDE 9 MG/ML
40 INJECTION, SOLUTION INTRAVENOUS AS NEEDED
Status: DISCONTINUED | OUTPATIENT
Start: 2023-02-10 | End: 2023-02-10 | Stop reason: HOSPADM

## 2023-02-10 RX ORDER — OXYCODONE HYDROCHLORIDE AND ACETAMINOPHEN 5; 325 MG/1; MG/1
1 TABLET ORAL EVERY 6 HOURS PRN
Status: DISCONTINUED | OUTPATIENT
Start: 2023-02-10 | End: 2023-02-21 | Stop reason: HOSPADM

## 2023-02-10 RX ORDER — SODIUM CHLORIDE 0.9 % (FLUSH) 0.9 %
10 SYRINGE (ML) INJECTION AS NEEDED
Status: DISCONTINUED | OUTPATIENT
Start: 2023-02-10 | End: 2023-02-10 | Stop reason: HOSPADM

## 2023-02-10 RX ORDER — DROPERIDOL 2.5 MG/ML
0.62 INJECTION, SOLUTION INTRAMUSCULAR; INTRAVENOUS
Status: DISCONTINUED | OUTPATIENT
Start: 2023-02-10 | End: 2023-02-10 | Stop reason: HOSPADM

## 2023-02-10 RX ORDER — DROPERIDOL 2.5 MG/ML
0.62 INJECTION, SOLUTION INTRAMUSCULAR; INTRAVENOUS ONCE AS NEEDED
Status: DISCONTINUED | OUTPATIENT
Start: 2023-02-10 | End: 2023-02-10 | Stop reason: HOSPADM

## 2023-02-10 RX ORDER — SODIUM CHLORIDE 0.9 % (FLUSH) 0.9 %
3 SYRINGE (ML) INJECTION EVERY 12 HOURS SCHEDULED
Status: DISCONTINUED | OUTPATIENT
Start: 2023-02-10 | End: 2023-02-10 | Stop reason: HOSPADM

## 2023-02-10 RX ORDER — PROMETHAZINE HYDROCHLORIDE 25 MG/1
25 TABLET ORAL ONCE AS NEEDED
Status: DISCONTINUED | OUTPATIENT
Start: 2023-02-10 | End: 2023-02-10 | Stop reason: HOSPADM

## 2023-02-10 RX ORDER — MIDAZOLAM HYDROCHLORIDE 1 MG/ML
0.5 INJECTION INTRAMUSCULAR; INTRAVENOUS
Status: DISCONTINUED | OUTPATIENT
Start: 2023-02-10 | End: 2023-02-10 | Stop reason: HOSPADM

## 2023-02-10 RX ORDER — LABETALOL HYDROCHLORIDE 5 MG/ML
5 INJECTION, SOLUTION INTRAVENOUS
Status: DISCONTINUED | OUTPATIENT
Start: 2023-02-10 | End: 2023-02-10 | Stop reason: HOSPADM

## 2023-02-10 RX ORDER — NALOXONE HCL 0.4 MG/ML
0.4 VIAL (ML) INJECTION AS NEEDED
Status: DISCONTINUED | OUTPATIENT
Start: 2023-02-10 | End: 2023-02-10 | Stop reason: HOSPADM

## 2023-02-10 RX ORDER — NALOXONE HCL 0.4 MG/ML
0.2 VIAL (ML) INJECTION AS NEEDED
Status: DISCONTINUED | OUTPATIENT
Start: 2023-02-10 | End: 2023-02-21 | Stop reason: HOSPADM

## 2023-02-10 RX ADMIN — Medication 100 MCG: at 15:50

## 2023-02-10 RX ADMIN — METOPROLOL SUCCINATE 25 MG: 25 TABLET, EXTENDED RELEASE ORAL at 09:48

## 2023-02-10 RX ADMIN — SODIUM CHLORIDE 1 G: 1 TABLET ORAL at 17:37

## 2023-02-10 RX ADMIN — ROCURONIUM BROMIDE 40 MG: 10 INJECTION, SOLUTION INTRAVENOUS at 15:07

## 2023-02-10 RX ADMIN — FAMOTIDINE 20 MG: 20 TABLET ORAL at 09:47

## 2023-02-10 RX ADMIN — THIAMINE HCL TAB 100 MG 100 MG: 100 TAB at 09:47

## 2023-02-10 RX ADMIN — EPHEDRINE SULFATE 7.5 MG: 50 INJECTION INTRAVENOUS at 15:41

## 2023-02-10 RX ADMIN — GABAPENTIN 800 MG: 400 CAPSULE ORAL at 11:48

## 2023-02-10 RX ADMIN — METRONIDAZOLE 500 MG: 500 TABLET ORAL at 09:47

## 2023-02-10 RX ADMIN — AZTREONAM 2 G: 2 INJECTION, POWDER, LYOPHILIZED, FOR SOLUTION INTRAMUSCULAR; INTRAVENOUS at 22:19

## 2023-02-10 RX ADMIN — OXYCODONE HYDROCHLORIDE AND ACETAMINOPHEN 1 TABLET: 10; 325 TABLET ORAL at 17:36

## 2023-02-10 RX ADMIN — LIDOCAINE HYDROCHLORIDE 50 MG: 10 INJECTION, SOLUTION EPIDURAL; INFILTRATION; INTRACAUDAL; PERINEURAL at 15:07

## 2023-02-10 RX ADMIN — HEPARIN SODIUM 5000 UNITS: 5000 INJECTION INTRAVENOUS; SUBCUTANEOUS at 21:17

## 2023-02-10 RX ADMIN — OXYCODONE HYDROCHLORIDE AND ACETAMINOPHEN 1 TABLET: 10; 325 TABLET ORAL at 11:48

## 2023-02-10 RX ADMIN — OXYCODONE HYDROCHLORIDE AND ACETAMINOPHEN 1 TABLET: 10; 325 TABLET ORAL at 21:43

## 2023-02-10 RX ADMIN — OXYCODONE HYDROCHLORIDE AND ACETAMINOPHEN 1 TABLET: 10; 325 TABLET ORAL at 09:47

## 2023-02-10 RX ADMIN — EPHEDRINE SULFATE 7.5 MG: 50 INJECTION INTRAVENOUS at 15:48

## 2023-02-10 RX ADMIN — PROPOFOL 100 MG: 10 INJECTION, EMULSION INTRAVENOUS at 15:07

## 2023-02-10 RX ADMIN — INSULIN LISPRO 2 UNITS: 100 INJECTION, SOLUTION INTRAVENOUS; SUBCUTANEOUS at 11:48

## 2023-02-10 RX ADMIN — SODIUM CHLORIDE, POTASSIUM CHLORIDE, SODIUM LACTATE AND CALCIUM CHLORIDE 9 ML/HR: 600; 310; 30; 20 INJECTION, SOLUTION INTRAVENOUS at 13:44

## 2023-02-10 RX ADMIN — GABAPENTIN 800 MG: 400 CAPSULE ORAL at 17:31

## 2023-02-10 RX ADMIN — ANTI-PRURITIC 1 APPLICATION: 1 CREAM TOPICAL at 05:46

## 2023-02-10 RX ADMIN — MICAFUNGIN 100 MG: 20 INJECTION, POWDER, LYOPHILIZED, FOR SOLUTION INTRAVENOUS at 09:49

## 2023-02-10 RX ADMIN — ROSUVASTATIN CALCIUM 20 MG: 20 TABLET ORAL at 21:18

## 2023-02-10 RX ADMIN — AZTREONAM 2 G: 2 INJECTION, POWDER, LYOPHILIZED, FOR SOLUTION INTRAMUSCULAR; INTRAVENOUS at 05:47

## 2023-02-10 RX ADMIN — ANTI-PRURITIC 1 APPLICATION: 1 CREAM TOPICAL at 21:18

## 2023-02-10 RX ADMIN — DAPTOMYCIN 250 MG: 500 INJECTION, POWDER, LYOPHILIZED, FOR SOLUTION INTRAVENOUS at 10:01

## 2023-02-10 RX ADMIN — GABAPENTIN 800 MG: 400 CAPSULE ORAL at 09:48

## 2023-02-10 RX ADMIN — LISINOPRIL 40 MG: 20 TABLET ORAL at 09:48

## 2023-02-10 RX ADMIN — METRONIDAZOLE 500 MG: 500 TABLET ORAL at 00:14

## 2023-02-10 RX ADMIN — GABAPENTIN 800 MG: 400 CAPSULE ORAL at 21:17

## 2023-02-10 RX ADMIN — Medication 10 ML: at 21:18

## 2023-02-10 RX ADMIN — FAMOTIDINE 20 MG: 20 TABLET ORAL at 17:31

## 2023-02-10 RX ADMIN — SODIUM CHLORIDE 1 G: 1 TABLET ORAL at 09:49

## 2023-02-10 RX ADMIN — DULOXETINE HYDROCHLORIDE 60 MG: 60 CAPSULE, DELAYED RELEASE ORAL at 09:47

## 2023-02-10 RX ADMIN — Medication 1 CAPSULE: at 09:47

## 2023-02-10 RX ADMIN — AZTREONAM 2 G: 2 INJECTION, POWDER, LYOPHILIZED, FOR SOLUTION INTRAMUSCULAR; INTRAVENOUS at 14:06

## 2023-02-10 RX ADMIN — Medication 10 ML: at 09:49

## 2023-02-10 RX ADMIN — SUGAMMADEX 200 MG: 100 INJECTION, SOLUTION INTRAVENOUS at 16:00

## 2023-02-10 RX ADMIN — Medication 10 ML: at 13:45

## 2023-02-10 RX ADMIN — METRONIDAZOLE 500 MG: 500 TABLET ORAL at 17:31

## 2023-02-10 NOTE — ANESTHESIA POSTPROCEDURE EVALUATION
Patient: Severino Vera    Procedure Summary     Date: 02/10/23 Room [facility]  [facility]    Anesthesia Start: 1501 Anesthesia Stop: 1635    Procedure: DEBRIDEMENT SACRAL DECUBITUS ULCER (Back) Diagnosis:     Surgeons: Neville Berman MD Provider: Fsoter Rachel MD    Anesthesia Type: general ASA Status: 3          Anesthesia Type: general    Vitals  Vitals Value Taken Time   /70 02/10/23 1655   Temp 97.2 °F (36.2 °C) 02/10/23 1655   Pulse 73 02/10/23 1655   Resp 10 02/10/23 1655   SpO2 96 % 02/10/23 1655           Post Anesthesia Care and Evaluation    Patient location during evaluation: PACU  Patient participation: complete - patient participated  Level of consciousness: awake and alert  Pain management: adequate    Airway patency: patent  Anesthetic complications: No anesthetic complications  PONV Status: none  Cardiovascular status: hemodynamically stable and acceptable  Respiratory status: nonlabored ventilation, acceptable and nasal cannula  Hydration status: acceptable

## 2023-02-10 NOTE — ANESTHESIA PROCEDURE NOTES
Airway  Urgency: elective    Date/Time: 2/10/2023 3:09 PM  Airway not difficult    General Information and Staff    Patient location during procedure: OR  CRNA/CAA: Jere Fierro CRNA    Indications and Patient Condition  Indications for airway management: airway protection    Preoxygenated: yes  MILS not maintained throughout  Mask difficulty assessment: 1 - vent by mask    Final Airway Details  Final airway type: endotracheal airway      Successful airway: ETT  Cuffed: yes   Successful intubation technique: direct laryngoscopy  Endotracheal tube insertion site: oral  Blade: Gutierrez  Blade size: 2  ETT size (mm): 7.5  Cormack-Lehane Classification: grade I - full view of glottis  Placement verified by: chest auscultation and capnometry   Measured from: lips  ETT/EBT  to lips (cm): 20  Number of attempts at approach: 1  Assessment: lips, teeth, and gum same as pre-op and atraumatic intubation    Additional Comments  Negative epigastric sounds, Breath sound equal bilaterally with symmetric chest rise and fall

## 2023-02-10 NOTE — ANESTHESIA PREPROCEDURE EVALUATION
Anesthesia Evaluation     Patient summary reviewed and Nursing notes reviewed   no history of anesthetic complications:  NPO Solid Status: > 8 hours  NPO Liquid Status: > 8 hours           Airway   Mallampati: II  TM distance: >3 FB  Neck ROM: full  No difficulty expected  Dental      Pulmonary - normal exam   (+) pleural effusion,   Cardiovascular - normal exam    (+) hypertension, PVD, hyperlipidemia,       Neuro/Psych  GI/Hepatic/Renal/Endo    (+)  GERD,  diabetes mellitus,     Musculoskeletal     Abdominal    Substance History      OB/GYN          Other   arthritis,                      Anesthesia Plan    ASA 3     general     intravenous induction     Anesthetic plan, risks, benefits, and alternatives have been provided, discussed and informed consent has been obtained with: patient.        CODE STATUS:    Level Of Support Discussed With: Patient  Code Status (Patient has no pulse and is not breathing): CPR (Attempt to Resuscitate)  Medical Interventions (Patient has pulse or is breathing): Full Support

## 2023-02-11 LAB
ANION GAP SERPL CALCULATED.3IONS-SCNC: 8 MMOL/L (ref 5–15)
BUN SERPL-MCNC: 10 MG/DL (ref 8–23)
BUN/CREAT SERPL: 25 (ref 7–25)
CALCIUM SPEC-SCNC: 8.1 MG/DL (ref 8.6–10.5)
CHLORIDE SERPL-SCNC: 102 MMOL/L (ref 98–107)
CO2 SERPL-SCNC: 28 MMOL/L (ref 22–29)
CREAT SERPL-MCNC: 0.4 MG/DL (ref 0.76–1.27)
DEPRECATED RDW RBC AUTO: 54.4 FL (ref 37–54)
EGFRCR SERPLBLD CKD-EPI 2021: 118.8 ML/MIN/1.73
ERYTHROCYTE [DISTWIDTH] IN BLOOD BY AUTOMATED COUNT: 18.2 % (ref 12.3–15.4)
GLUCOSE BLDC GLUCOMTR-MCNC: 150 MG/DL (ref 70–130)
GLUCOSE BLDC GLUCOMTR-MCNC: 181 MG/DL (ref 70–130)
GLUCOSE BLDC GLUCOMTR-MCNC: 194 MG/DL (ref 70–130)
GLUCOSE BLDC GLUCOMTR-MCNC: 222 MG/DL (ref 70–130)
GLUCOSE SERPL-MCNC: 122 MG/DL (ref 65–99)
HCT VFR BLD AUTO: 26.3 % (ref 37.5–51)
HGB BLD-MCNC: 8.4 G/DL (ref 13–17.7)
MCH RBC QN AUTO: 27 PG (ref 26.6–33)
MCHC RBC AUTO-ENTMCNC: 31.9 G/DL (ref 31.5–35.7)
MCV RBC AUTO: 84.6 FL (ref 79–97)
PLATELET # BLD AUTO: 318 10*3/MM3 (ref 140–450)
PMV BLD AUTO: 9.3 FL (ref 6–12)
POTASSIUM SERPL-SCNC: 4.1 MMOL/L (ref 3.5–5.2)
RBC # BLD AUTO: 3.11 10*6/MM3 (ref 4.14–5.8)
SODIUM SERPL-SCNC: 138 MMOL/L (ref 136–145)
WBC NRBC COR # BLD: 15.65 10*3/MM3 (ref 3.4–10.8)

## 2023-02-11 PROCEDURE — 63710000001 INSULIN LISPRO (HUMAN) PER 5 UNITS: Performed by: SURGERY

## 2023-02-11 PROCEDURE — 25010000002 MICAFUNGIN SODIUM 100 MG RECONSTITUTED SOLUTION: Performed by: SURGERY

## 2023-02-11 PROCEDURE — 25010000002 DAPTOMYCIN PER 1 MG: Performed by: SURGERY

## 2023-02-11 PROCEDURE — 80048 BASIC METABOLIC PNL TOTAL CA: CPT | Performed by: SURGERY

## 2023-02-11 PROCEDURE — 99232 SBSQ HOSP IP/OBS MODERATE 35: CPT | Performed by: STUDENT IN AN ORGANIZED HEALTH CARE EDUCATION/TRAINING PROGRAM

## 2023-02-11 PROCEDURE — 85027 COMPLETE CBC AUTOMATED: CPT | Performed by: SURGERY

## 2023-02-11 PROCEDURE — 82962 GLUCOSE BLOOD TEST: CPT

## 2023-02-11 RX ADMIN — GABAPENTIN 800 MG: 400 CAPSULE ORAL at 08:39

## 2023-02-11 RX ADMIN — METRONIDAZOLE 500 MG: 500 TABLET ORAL at 00:25

## 2023-02-11 RX ADMIN — INSULIN LISPRO 2 UNITS: 100 INJECTION, SOLUTION INTRAVENOUS; SUBCUTANEOUS at 08:39

## 2023-02-11 RX ADMIN — GABAPENTIN 800 MG: 400 CAPSULE ORAL at 18:22

## 2023-02-11 RX ADMIN — THIAMINE HCL TAB 100 MG 100 MG: 100 TAB at 08:24

## 2023-02-11 RX ADMIN — ANTI-PRURITIC 1 APPLICATION: 1 CREAM TOPICAL at 14:56

## 2023-02-11 RX ADMIN — OXYCODONE HYDROCHLORIDE AND ACETAMINOPHEN 1 TABLET: 10; 325 TABLET ORAL at 06:04

## 2023-02-11 RX ADMIN — SODIUM CHLORIDE 1 G: 1 TABLET ORAL at 08:24

## 2023-02-11 RX ADMIN — METOPROLOL SUCCINATE 25 MG: 25 TABLET, EXTENDED RELEASE ORAL at 08:24

## 2023-02-11 RX ADMIN — DULOXETINE HYDROCHLORIDE 60 MG: 60 CAPSULE, DELAYED RELEASE ORAL at 08:24

## 2023-02-11 RX ADMIN — INSULIN LISPRO 2 UNITS: 100 INJECTION, SOLUTION INTRAVENOUS; SUBCUTANEOUS at 16:44

## 2023-02-11 RX ADMIN — METRONIDAZOLE 500 MG: 500 TABLET ORAL at 08:24

## 2023-02-11 RX ADMIN — LISINOPRIL 40 MG: 20 TABLET ORAL at 08:22

## 2023-02-11 RX ADMIN — AZTREONAM 2 G: 2 INJECTION, POWDER, LYOPHILIZED, FOR SOLUTION INTRAMUSCULAR; INTRAVENOUS at 05:24

## 2023-02-11 RX ADMIN — FAMOTIDINE 20 MG: 20 TABLET ORAL at 16:45

## 2023-02-11 RX ADMIN — Medication 10 ML: at 08:25

## 2023-02-11 RX ADMIN — INSULIN LISPRO 3 UNITS: 100 INJECTION, SOLUTION INTRAVENOUS; SUBCUTANEOUS at 12:20

## 2023-02-11 RX ADMIN — FAMOTIDINE 20 MG: 20 TABLET ORAL at 08:24

## 2023-02-11 RX ADMIN — OXYCODONE HYDROCHLORIDE AND ACETAMINOPHEN 1 TABLET: 10; 325 TABLET ORAL at 14:56

## 2023-02-11 RX ADMIN — AZTREONAM 2 G: 2 INJECTION, POWDER, LYOPHILIZED, FOR SOLUTION INTRAMUSCULAR; INTRAVENOUS at 20:26

## 2023-02-11 RX ADMIN — OXYCODONE HYDROCHLORIDE AND ACETAMINOPHEN 1 TABLET: 10; 325 TABLET ORAL at 12:20

## 2023-02-11 RX ADMIN — DAPTOMYCIN 250 MG: 500 INJECTION, POWDER, LYOPHILIZED, FOR SOLUTION INTRAVENOUS at 08:22

## 2023-02-11 RX ADMIN — AZTREONAM 2 G: 2 INJECTION, POWDER, LYOPHILIZED, FOR SOLUTION INTRAMUSCULAR; INTRAVENOUS at 14:56

## 2023-02-11 RX ADMIN — Medication 1 CAPSULE: at 08:22

## 2023-02-11 RX ADMIN — OXYCODONE HYDROCHLORIDE AND ACETAMINOPHEN 1 TABLET: 5; 325 TABLET ORAL at 09:11

## 2023-02-11 RX ADMIN — Medication 10 ML: at 20:26

## 2023-02-11 RX ADMIN — ANTI-PRURITIC 1 APPLICATION: 1 CREAM TOPICAL at 20:26

## 2023-02-11 RX ADMIN — METRONIDAZOLE 500 MG: 500 TABLET ORAL at 16:45

## 2023-02-11 RX ADMIN — MICAFUNGIN 100 MG: 20 INJECTION, POWDER, LYOPHILIZED, FOR SOLUTION INTRAVENOUS at 08:22

## 2023-02-11 RX ADMIN — OXYCODONE HYDROCHLORIDE AND ACETAMINOPHEN 1 TABLET: 10; 325 TABLET ORAL at 18:22

## 2023-02-11 RX ADMIN — ANTI-PRURITIC 1 APPLICATION: 1 CREAM TOPICAL at 05:25

## 2023-02-11 RX ADMIN — SODIUM CHLORIDE 1 G: 1 TABLET ORAL at 18:22

## 2023-02-11 RX ADMIN — GABAPENTIN 800 MG: 400 CAPSULE ORAL at 20:25

## 2023-02-11 RX ADMIN — GABAPENTIN 800 MG: 400 CAPSULE ORAL at 12:21

## 2023-02-11 RX ADMIN — OXYCODONE HYDROCHLORIDE AND ACETAMINOPHEN 1 TABLET: 10; 325 TABLET ORAL at 21:19

## 2023-02-11 RX ADMIN — ROSUVASTATIN CALCIUM 20 MG: 20 TABLET ORAL at 20:25

## 2023-02-11 NOTE — ANESTHESIA POSTPROCEDURE EVALUATION
Patient: Severino Vera    Procedure Summary     Date: 02/10/23 Room / [facility]    Anesthesia Start: 1501 Anesthesia Stop: 1635    Procedure: DEBRIDEMENT SACRAL DECUBITUS ULCER (Back) Diagnosis:     Surgeons: Neville Berman MD Provider: Foster Rachel MD    Anesthesia Type: general ASA Status: 3          Anesthesia Type: general    Vitals  Vitals Value Taken Time   /70 02/10/23 1655   Temp 97.2 °F (36.2 °C) 02/10/23 1655   Pulse 71 02/10/23 1656   Resp 10 02/10/23 1655   SpO2 95 % 02/10/23 1657   Vitals shown include unvalidated device data.        Post Anesthesia Care and Evaluation    Patient location during evaluation: PACU  Patient participation: complete - patient participated  Level of consciousness: awake and alert  Pain management: adequate    Airway patency: patent  Anesthetic complications: No anesthetic complications  PONV Status: none  Cardiovascular status: hemodynamically stable and acceptable  Respiratory status: nonlabored ventilation, acceptable and nasal cannula  Hydration status: acceptable

## 2023-02-12 LAB
ABO GROUP BLD: NORMAL
ANISOCYTOSIS BLD QL: ABNORMAL
BACTERIA FLD CULT: NORMAL
BACTERIA SPEC AEROBE CULT: NORMAL
BASOPHILS # BLD MANUAL: 0 10*3/MM3 (ref 0–0.2)
BASOPHILS NFR BLD MANUAL: 0 % (ref 0–1.5)
BLD GP AB SCN SERPL QL: NEGATIVE
CK SERPL-CCNC: 17 U/L (ref 20–200)
DEPRECATED RDW RBC AUTO: 55.1 FL (ref 37–54)
EOSINOPHIL # BLD MANUAL: 4.04 10*3/MM3 (ref 0–0.4)
EOSINOPHIL NFR BLD MANUAL: 29 % (ref 0.3–6.2)
ERYTHROCYTE [DISTWIDTH] IN BLOOD BY AUTOMATED COUNT: 18.5 % (ref 12.3–15.4)
GLUCOSE BLDC GLUCOMTR-MCNC: 119 MG/DL (ref 70–130)
GLUCOSE BLDC GLUCOMTR-MCNC: 161 MG/DL (ref 70–130)
GLUCOSE BLDC GLUCOMTR-MCNC: 186 MG/DL (ref 70–130)
GLUCOSE BLDC GLUCOMTR-MCNC: 257 MG/DL (ref 70–130)
GRAM STN SPEC: NORMAL
HCT VFR BLD AUTO: 24.1 % (ref 37.5–51)
HGB BLD-MCNC: 7.7 G/DL (ref 13–17.7)
LYMPHOCYTES # BLD MANUAL: 1.67 10*3/MM3 (ref 0.7–3.1)
LYMPHOCYTES NFR BLD MANUAL: 3 % (ref 5–12)
MCH RBC QN AUTO: 26.9 PG (ref 26.6–33)
MCHC RBC AUTO-ENTMCNC: 32 G/DL (ref 31.5–35.7)
MCV RBC AUTO: 84.3 FL (ref 79–97)
MONOCYTES # BLD: 0.42 10*3/MM3 (ref 0.1–0.9)
NEUTROPHILS # BLD AUTO: 7.8 10*3/MM3 (ref 1.7–7)
NEUTROPHILS NFR BLD MANUAL: 55 % (ref 42.7–76)
NEUTS BAND NFR BLD MANUAL: 1 % (ref 0–5)
PLAT MORPH BLD: NORMAL
PLATELET # BLD AUTO: 311 10*3/MM3 (ref 140–450)
PMV BLD AUTO: 9.5 FL (ref 6–12)
RBC # BLD AUTO: 2.86 10*6/MM3 (ref 4.14–5.8)
RH BLD: POSITIVE
T&S EXPIRATION DATE: NORMAL
VARIANT LYMPHS NFR BLD MANUAL: 12 % (ref 19.6–45.3)
WBC MORPH BLD: NORMAL
WBC NRBC COR # BLD: 13.92 10*3/MM3 (ref 3.4–10.8)

## 2023-02-12 PROCEDURE — 85007 BL SMEAR W/DIFF WBC COUNT: CPT | Performed by: STUDENT IN AN ORGANIZED HEALTH CARE EDUCATION/TRAINING PROGRAM

## 2023-02-12 PROCEDURE — 82962 GLUCOSE BLOOD TEST: CPT

## 2023-02-12 PROCEDURE — 63710000001 DIPHENHYDRAMINE PER 50 MG: Performed by: SURGERY

## 2023-02-12 PROCEDURE — 25010000002 MICAFUNGIN SODIUM 100 MG RECONSTITUTED SOLUTION: Performed by: SURGERY

## 2023-02-12 PROCEDURE — 63710000001 INSULIN LISPRO (HUMAN) PER 5 UNITS: Performed by: SURGERY

## 2023-02-12 PROCEDURE — 25010000002 HEPARIN (PORCINE) PER 1000 UNITS: Performed by: SURGERY

## 2023-02-12 PROCEDURE — 99232 SBSQ HOSP IP/OBS MODERATE 35: CPT | Performed by: STUDENT IN AN ORGANIZED HEALTH CARE EDUCATION/TRAINING PROGRAM

## 2023-02-12 PROCEDURE — 97110 THERAPEUTIC EXERCISES: CPT

## 2023-02-12 PROCEDURE — 86901 BLOOD TYPING SEROLOGIC RH(D): CPT | Performed by: STUDENT IN AN ORGANIZED HEALTH CARE EDUCATION/TRAINING PROGRAM

## 2023-02-12 PROCEDURE — 85025 COMPLETE CBC W/AUTO DIFF WBC: CPT | Performed by: STUDENT IN AN ORGANIZED HEALTH CARE EDUCATION/TRAINING PROGRAM

## 2023-02-12 PROCEDURE — 86900 BLOOD TYPING SEROLOGIC ABO: CPT | Performed by: STUDENT IN AN ORGANIZED HEALTH CARE EDUCATION/TRAINING PROGRAM

## 2023-02-12 PROCEDURE — 82550 ASSAY OF CK (CPK): CPT | Performed by: STUDENT IN AN ORGANIZED HEALTH CARE EDUCATION/TRAINING PROGRAM

## 2023-02-12 PROCEDURE — 86923 COMPATIBILITY TEST ELECTRIC: CPT

## 2023-02-12 PROCEDURE — 86850 RBC ANTIBODY SCREEN: CPT | Performed by: STUDENT IN AN ORGANIZED HEALTH CARE EDUCATION/TRAINING PROGRAM

## 2023-02-12 PROCEDURE — 25010000002 DAPTOMYCIN PER 1 MG: Performed by: SURGERY

## 2023-02-12 RX ADMIN — OXYCODONE HYDROCHLORIDE AND ACETAMINOPHEN 1 TABLET: 10; 325 TABLET ORAL at 11:15

## 2023-02-12 RX ADMIN — LISINOPRIL 40 MG: 20 TABLET ORAL at 08:47

## 2023-02-12 RX ADMIN — Medication 5 MG: at 00:10

## 2023-02-12 RX ADMIN — AZTREONAM 2 G: 2 INJECTION, POWDER, LYOPHILIZED, FOR SOLUTION INTRAMUSCULAR; INTRAVENOUS at 13:58

## 2023-02-12 RX ADMIN — DIPHENHYDRAMINE HYDROCHLORIDE 50 MG: 50 CAPSULE ORAL at 20:31

## 2023-02-12 RX ADMIN — SODIUM CHLORIDE 1 G: 1 TABLET ORAL at 18:11

## 2023-02-12 RX ADMIN — DULOXETINE HYDROCHLORIDE 60 MG: 60 CAPSULE, DELAYED RELEASE ORAL at 08:47

## 2023-02-12 RX ADMIN — METRONIDAZOLE 500 MG: 500 TABLET ORAL at 23:23

## 2023-02-12 RX ADMIN — POLYETHYLENE GLYCOL 3350, SODIUM SULFATE ANHYDROUS, SODIUM BICARBONATE, SODIUM CHLORIDE, POTASSIUM CHLORIDE 4000 ML: 236; 22.74; 6.74; 5.86; 2.97 POWDER, FOR SOLUTION ORAL at 13:59

## 2023-02-12 RX ADMIN — GABAPENTIN 800 MG: 400 CAPSULE ORAL at 18:11

## 2023-02-12 RX ADMIN — INSULIN LISPRO 2 UNITS: 100 INJECTION, SOLUTION INTRAVENOUS; SUBCUTANEOUS at 08:46

## 2023-02-12 RX ADMIN — OXYCODONE HYDROCHLORIDE AND ACETAMINOPHEN 1 TABLET: 10; 325 TABLET ORAL at 13:58

## 2023-02-12 RX ADMIN — METRONIDAZOLE 500 MG: 500 TABLET ORAL at 08:47

## 2023-02-12 RX ADMIN — ANTI-PRURITIC 1 APPLICATION: 1 CREAM TOPICAL at 05:06

## 2023-02-12 RX ADMIN — DAPTOMYCIN 250 MG: 500 INJECTION, POWDER, LYOPHILIZED, FOR SOLUTION INTRAVENOUS at 08:46

## 2023-02-12 RX ADMIN — METOPROLOL SUCCINATE 25 MG: 25 TABLET, EXTENDED RELEASE ORAL at 08:47

## 2023-02-12 RX ADMIN — MICAFUNGIN 100 MG: 20 INJECTION, POWDER, LYOPHILIZED, FOR SOLUTION INTRAVENOUS at 08:46

## 2023-02-12 RX ADMIN — THIAMINE HCL TAB 100 MG 100 MG: 100 TAB at 08:47

## 2023-02-12 RX ADMIN — FAMOTIDINE 20 MG: 20 TABLET ORAL at 18:12

## 2023-02-12 RX ADMIN — DIPHENHYDRAMINE HYDROCHLORIDE 50 MG: 50 CAPSULE ORAL at 05:29

## 2023-02-12 RX ADMIN — SODIUM CHLORIDE 1 G: 1 TABLET ORAL at 08:47

## 2023-02-12 RX ADMIN — GABAPENTIN 800 MG: 400 CAPSULE ORAL at 11:15

## 2023-02-12 RX ADMIN — OXYCODONE HYDROCHLORIDE AND ACETAMINOPHEN 1 TABLET: 10; 325 TABLET ORAL at 18:12

## 2023-02-12 RX ADMIN — HEPARIN SODIUM 5000 UNITS: 5000 INJECTION INTRAVENOUS; SUBCUTANEOUS at 20:30

## 2023-02-12 RX ADMIN — METRONIDAZOLE 500 MG: 500 TABLET ORAL at 18:11

## 2023-02-12 RX ADMIN — GABAPENTIN 800 MG: 400 CAPSULE ORAL at 08:47

## 2023-02-12 RX ADMIN — INSULIN LISPRO 2 UNITS: 100 INJECTION, SOLUTION INTRAVENOUS; SUBCUTANEOUS at 18:11

## 2023-02-12 RX ADMIN — OXYCODONE HYDROCHLORIDE AND ACETAMINOPHEN 1 TABLET: 10; 325 TABLET ORAL at 23:23

## 2023-02-12 RX ADMIN — Medication 10 ML: at 08:48

## 2023-02-12 RX ADMIN — AZTREONAM 2 G: 2 INJECTION, POWDER, LYOPHILIZED, FOR SOLUTION INTRAMUSCULAR; INTRAVENOUS at 05:06

## 2023-02-12 RX ADMIN — METRONIDAZOLE 500 MG: 500 TABLET ORAL at 00:05

## 2023-02-12 RX ADMIN — Medication 1 CAPSULE: at 08:47

## 2023-02-12 RX ADMIN — ROSUVASTATIN CALCIUM 20 MG: 20 TABLET ORAL at 20:31

## 2023-02-12 RX ADMIN — OXYCODONE HYDROCHLORIDE AND ACETAMINOPHEN 1 TABLET: 10; 325 TABLET ORAL at 07:25

## 2023-02-12 RX ADMIN — FAMOTIDINE 20 MG: 20 TABLET ORAL at 08:47

## 2023-02-12 RX ADMIN — ANTI-PRURITIC 1 APPLICATION: 1 CREAM TOPICAL at 13:59

## 2023-02-12 RX ADMIN — GABAPENTIN 800 MG: 400 CAPSULE ORAL at 20:31

## 2023-02-12 RX ADMIN — INSULIN LISPRO 4 UNITS: 100 INJECTION, SOLUTION INTRAVENOUS; SUBCUTANEOUS at 11:52

## 2023-02-12 RX ADMIN — AZTREONAM 2 G: 2 INJECTION, POWDER, LYOPHILIZED, FOR SOLUTION INTRAMUSCULAR; INTRAVENOUS at 23:24

## 2023-02-13 ENCOUNTER — ANESTHESIA (OUTPATIENT)
Dept: PERIOP | Facility: HOSPITAL | Age: 68
DRG: 239 | End: 2023-02-13
Payer: MEDICARE

## 2023-02-13 ENCOUNTER — ANESTHESIA EVENT CONVERTED (OUTPATIENT)
Dept: ANESTHESIOLOGY | Facility: HOSPITAL | Age: 68
DRG: 239 | End: 2023-02-13
Payer: MEDICARE

## 2023-02-13 ENCOUNTER — ANESTHESIA EVENT (OUTPATIENT)
Dept: PERIOP | Facility: HOSPITAL | Age: 68
DRG: 239 | End: 2023-02-13
Payer: MEDICARE

## 2023-02-13 LAB
ANION GAP SERPL CALCULATED.3IONS-SCNC: 15 MMOL/L (ref 5–15)
BUN SERPL-MCNC: 10 MG/DL (ref 8–23)
BUN/CREAT SERPL: 38.5 (ref 7–25)
CALCIUM SPEC-SCNC: 8 MG/DL (ref 8.6–10.5)
CHLORIDE SERPL-SCNC: 100 MMOL/L (ref 98–107)
CO2 SERPL-SCNC: 21 MMOL/L (ref 22–29)
CREAT SERPL-MCNC: 0.26 MG/DL (ref 0.76–1.27)
DEPRECATED RDW RBC AUTO: 61.7 FL (ref 37–54)
EGFRCR SERPLBLD CKD-EPI 2021: 135.4 ML/MIN/1.73
ERYTHROCYTE [DISTWIDTH] IN BLOOD BY AUTOMATED COUNT: 19.4 % (ref 12.3–15.4)
GLUCOSE BLDC GLUCOMTR-MCNC: 111 MG/DL (ref 70–130)
GLUCOSE BLDC GLUCOMTR-MCNC: 115 MG/DL (ref 70–130)
GLUCOSE BLDC GLUCOMTR-MCNC: 258 MG/DL (ref 70–130)
GLUCOSE BLDC GLUCOMTR-MCNC: 83 MG/DL (ref 70–130)
GLUCOSE BLDC GLUCOMTR-MCNC: 98 MG/DL (ref 70–130)
GLUCOSE SERPL-MCNC: 77 MG/DL (ref 65–99)
HCT VFR BLD AUTO: 27.9 % (ref 37.5–51)
HGB BLD-MCNC: 8.4 G/DL (ref 13–17.7)
MCH RBC QN AUTO: 27 PG (ref 26.6–33)
MCHC RBC AUTO-ENTMCNC: 30.1 G/DL (ref 31.5–35.7)
MCV RBC AUTO: 89.7 FL (ref 79–97)
PLATELET # BLD AUTO: 298 10*3/MM3 (ref 140–450)
PMV BLD AUTO: 9.2 FL (ref 6–12)
POTASSIUM SERPL-SCNC: 3.9 MMOL/L (ref 3.5–5.2)
RBC # BLD AUTO: 3.11 10*6/MM3 (ref 4.14–5.8)
REF LAB TEST METHOD: NORMAL
SODIUM SERPL-SCNC: 136 MMOL/L (ref 136–145)
WBC NRBC COR # BLD: 17.07 10*3/MM3 (ref 3.4–10.8)

## 2023-02-13 PROCEDURE — 88307 TISSUE EXAM BY PATHOLOGIST: CPT | Performed by: SURGERY

## 2023-02-13 PROCEDURE — 86682 HELMINTH ANTIBODY: CPT | Performed by: SURGERY

## 2023-02-13 PROCEDURE — 25010000002 MICAFUNGIN SODIUM 100 MG RECONSTITUTED SOLUTION: Performed by: INTERNAL MEDICINE

## 2023-02-13 PROCEDURE — 87177 OVA AND PARASITES SMEARS: CPT | Performed by: SURGERY

## 2023-02-13 PROCEDURE — 44143 PARTIAL REMOVAL OF COLON: CPT | Performed by: PHYSICIAN ASSISTANT

## 2023-02-13 PROCEDURE — 82962 GLUCOSE BLOOD TEST: CPT

## 2023-02-13 PROCEDURE — 25010000002 FENTANYL CITRATE (PF) 100 MCG/2ML SOLUTION: Performed by: NURSE ANESTHETIST, CERTIFIED REGISTERED

## 2023-02-13 PROCEDURE — 63710000001 DIPHENHYDRAMINE PER 50 MG: Performed by: SURGERY

## 2023-02-13 PROCEDURE — 25010000002 HEPARIN (PORCINE) PER 1000 UNITS: Performed by: SURGERY

## 2023-02-13 PROCEDURE — 25010000002 ONDANSETRON PER 1 MG: Performed by: NURSE ANESTHETIST, CERTIFIED REGISTERED

## 2023-02-13 PROCEDURE — 25010000002 DEXAMETHASONE SODIUM PHOSPHATE 10 MG/ML SOLUTION: Performed by: NURSE ANESTHETIST, CERTIFIED REGISTERED

## 2023-02-13 PROCEDURE — 80048 BASIC METABOLIC PNL TOTAL CA: CPT | Performed by: STUDENT IN AN ORGANIZED HEALTH CARE EDUCATION/TRAINING PROGRAM

## 2023-02-13 PROCEDURE — 0D1N0Z4 BYPASS SIGMOID COLON TO CUTANEOUS, OPEN APPROACH: ICD-10-PCS | Performed by: SURGERY

## 2023-02-13 PROCEDURE — 0DBN0ZZ EXCISION OF SIGMOID COLON, OPEN APPROACH: ICD-10-PCS | Performed by: SURGERY

## 2023-02-13 PROCEDURE — 99232 SBSQ HOSP IP/OBS MODERATE 35: CPT | Performed by: STUDENT IN AN ORGANIZED HEALTH CARE EDUCATION/TRAINING PROGRAM

## 2023-02-13 PROCEDURE — 25010000002 DAPTOMYCIN PER 1 MG: Performed by: SURGERY

## 2023-02-13 PROCEDURE — 87209 SMEAR COMPLEX STAIN: CPT | Performed by: SURGERY

## 2023-02-13 PROCEDURE — 85027 COMPLETE CBC AUTOMATED: CPT | Performed by: SURGERY

## 2023-02-13 PROCEDURE — 25010000002 PROPOFOL 10 MG/ML EMULSION: Performed by: NURSE ANESTHETIST, CERTIFIED REGISTERED

## 2023-02-13 DEVICE — ECHELON CONTOUR GST RELOAD BLUE
Type: IMPLANTABLE DEVICE | Site: COLON | Status: FUNCTIONAL
Brand: ECHELON

## 2023-02-13 DEVICE — ECHELON CONTOUR W/ BLUE RELOAD
Type: IMPLANTABLE DEVICE | Site: COLON | Status: FUNCTIONAL
Brand: ECHELON

## 2023-02-13 RX ORDER — NALOXONE HCL 0.4 MG/ML
0.4 VIAL (ML) INJECTION AS NEEDED
Status: DISCONTINUED | OUTPATIENT
Start: 2023-02-13 | End: 2023-02-13 | Stop reason: HOSPADM

## 2023-02-13 RX ORDER — HYDROCODONE BITARTRATE AND ACETAMINOPHEN 5; 325 MG/1; MG/1
1 TABLET ORAL ONCE AS NEEDED
Status: DISCONTINUED | OUTPATIENT
Start: 2023-02-13 | End: 2023-02-13 | Stop reason: HOSPADM

## 2023-02-13 RX ORDER — SODIUM CHLORIDE 9 MG/ML
40 INJECTION, SOLUTION INTRAVENOUS AS NEEDED
Status: DISCONTINUED | OUTPATIENT
Start: 2023-02-13 | End: 2023-02-13 | Stop reason: HOSPADM

## 2023-02-13 RX ORDER — ROCURONIUM BROMIDE 10 MG/ML
INJECTION, SOLUTION INTRAVENOUS AS NEEDED
Status: DISCONTINUED | OUTPATIENT
Start: 2023-02-13 | End: 2023-02-13 | Stop reason: SURG

## 2023-02-13 RX ORDER — DROPERIDOL 2.5 MG/ML
0.62 INJECTION, SOLUTION INTRAMUSCULAR; INTRAVENOUS ONCE AS NEEDED
Status: DISCONTINUED | OUTPATIENT
Start: 2023-02-13 | End: 2023-02-13 | Stop reason: HOSPADM

## 2023-02-13 RX ORDER — BUPIVACAINE HYDROCHLORIDE 2.5 MG/ML
INJECTION, SOLUTION EPIDURAL; INFILTRATION; INTRACAUDAL
Status: COMPLETED | OUTPATIENT
Start: 2023-02-13 | End: 2023-02-13

## 2023-02-13 RX ORDER — PHENYLEPHRINE HCL IN 0.9% NACL 1 MG/10 ML
SYRINGE (ML) INTRAVENOUS AS NEEDED
Status: DISCONTINUED | OUTPATIENT
Start: 2023-02-13 | End: 2023-02-13 | Stop reason: SURG

## 2023-02-13 RX ORDER — MORPHINE SULFATE 2 MG/ML
2 INJECTION, SOLUTION INTRAMUSCULAR; INTRAVENOUS EVERY 4 HOURS PRN
Status: DISCONTINUED | OUTPATIENT
Start: 2023-02-13 | End: 2023-02-20

## 2023-02-13 RX ORDER — HYDRALAZINE HYDROCHLORIDE 20 MG/ML
5 INJECTION INTRAMUSCULAR; INTRAVENOUS
Status: DISCONTINUED | OUTPATIENT
Start: 2023-02-13 | End: 2023-02-13 | Stop reason: HOSPADM

## 2023-02-13 RX ORDER — SODIUM CHLORIDE, SODIUM LACTATE, POTASSIUM CHLORIDE, CALCIUM CHLORIDE 600; 310; 30; 20 MG/100ML; MG/100ML; MG/100ML; MG/100ML
9 INJECTION, SOLUTION INTRAVENOUS CONTINUOUS PRN
Status: DISCONTINUED | OUTPATIENT
Start: 2023-02-13 | End: 2023-02-21 | Stop reason: HOSPADM

## 2023-02-13 RX ORDER — FENTANYL CITRATE 50 UG/ML
50 INJECTION, SOLUTION INTRAMUSCULAR; INTRAVENOUS
Status: DISCONTINUED | OUTPATIENT
Start: 2023-02-13 | End: 2023-02-13 | Stop reason: HOSPADM

## 2023-02-13 RX ORDER — MIDAZOLAM HYDROCHLORIDE 1 MG/ML
0.5 INJECTION INTRAMUSCULAR; INTRAVENOUS
Status: DISCONTINUED | OUTPATIENT
Start: 2023-02-13 | End: 2023-02-13 | Stop reason: HOSPADM

## 2023-02-13 RX ORDER — FENTANYL CITRATE 50 UG/ML
INJECTION, SOLUTION INTRAMUSCULAR; INTRAVENOUS AS NEEDED
Status: DISCONTINUED | OUTPATIENT
Start: 2023-02-13 | End: 2023-02-13 | Stop reason: SURG

## 2023-02-13 RX ORDER — HYDROCODONE BITARTRATE AND ACETAMINOPHEN 5; 325 MG/1; MG/1
1 TABLET ORAL EVERY 6 HOURS PRN
Status: DISCONTINUED | OUTPATIENT
Start: 2023-02-13 | End: 2023-02-20

## 2023-02-13 RX ORDER — IPRATROPIUM BROMIDE AND ALBUTEROL SULFATE 2.5; .5 MG/3ML; MG/3ML
3 SOLUTION RESPIRATORY (INHALATION) ONCE AS NEEDED
Status: DISCONTINUED | OUTPATIENT
Start: 2023-02-13 | End: 2023-02-13 | Stop reason: HOSPADM

## 2023-02-13 RX ORDER — SODIUM CHLORIDE 0.9 % (FLUSH) 0.9 %
3 SYRINGE (ML) INJECTION EVERY 12 HOURS SCHEDULED
Status: DISCONTINUED | OUTPATIENT
Start: 2023-02-13 | End: 2023-02-13 | Stop reason: HOSPADM

## 2023-02-13 RX ORDER — PROMETHAZINE HYDROCHLORIDE 25 MG/1
25 TABLET ORAL ONCE AS NEEDED
Status: DISCONTINUED | OUTPATIENT
Start: 2023-02-13 | End: 2023-02-13 | Stop reason: HOSPADM

## 2023-02-13 RX ORDER — LIDOCAINE HYDROCHLORIDE 10 MG/ML
INJECTION, SOLUTION EPIDURAL; INFILTRATION; INTRACAUDAL; PERINEURAL AS NEEDED
Status: DISCONTINUED | OUTPATIENT
Start: 2023-02-13 | End: 2023-02-13 | Stop reason: SURG

## 2023-02-13 RX ORDER — LABETALOL HYDROCHLORIDE 5 MG/ML
5 INJECTION, SOLUTION INTRAVENOUS
Status: DISCONTINUED | OUTPATIENT
Start: 2023-02-13 | End: 2023-02-13 | Stop reason: HOSPADM

## 2023-02-13 RX ORDER — PROMETHAZINE HYDROCHLORIDE 25 MG/1
25 SUPPOSITORY RECTAL ONCE AS NEEDED
Status: DISCONTINUED | OUTPATIENT
Start: 2023-02-13 | End: 2023-02-13 | Stop reason: HOSPADM

## 2023-02-13 RX ORDER — DEXAMETHASONE SODIUM PHOSPHATE 10 MG/ML
INJECTION, SOLUTION INTRAMUSCULAR; INTRAVENOUS
Status: COMPLETED | OUTPATIENT
Start: 2023-02-13 | End: 2023-02-13

## 2023-02-13 RX ORDER — PROPOFOL 10 MG/ML
VIAL (ML) INTRAVENOUS AS NEEDED
Status: DISCONTINUED | OUTPATIENT
Start: 2023-02-13 | End: 2023-02-13 | Stop reason: SURG

## 2023-02-13 RX ORDER — SODIUM CHLORIDE 0.9 % (FLUSH) 0.9 %
10 SYRINGE (ML) INJECTION EVERY 12 HOURS SCHEDULED
Status: DISCONTINUED | OUTPATIENT
Start: 2023-02-13 | End: 2023-02-13 | Stop reason: HOSPADM

## 2023-02-13 RX ORDER — FAMOTIDINE 20 MG/1
20 TABLET, FILM COATED ORAL
Status: COMPLETED | OUTPATIENT
Start: 2023-02-13 | End: 2023-02-13

## 2023-02-13 RX ORDER — ONDANSETRON 2 MG/ML
4 INJECTION INTRAMUSCULAR; INTRAVENOUS ONCE AS NEEDED
Status: DISCONTINUED | OUTPATIENT
Start: 2023-02-13 | End: 2023-02-13 | Stop reason: HOSPADM

## 2023-02-13 RX ORDER — SODIUM CHLORIDE 0.9 % (FLUSH) 0.9 %
10 SYRINGE (ML) INJECTION AS NEEDED
Status: DISCONTINUED | OUTPATIENT
Start: 2023-02-13 | End: 2023-02-13 | Stop reason: HOSPADM

## 2023-02-13 RX ORDER — MAGNESIUM HYDROXIDE 1200 MG/15ML
LIQUID ORAL AS NEEDED
Status: DISCONTINUED | OUTPATIENT
Start: 2023-02-13 | End: 2023-02-13 | Stop reason: HOSPADM

## 2023-02-13 RX ORDER — ONDANSETRON 2 MG/ML
INJECTION INTRAMUSCULAR; INTRAVENOUS AS NEEDED
Status: DISCONTINUED | OUTPATIENT
Start: 2023-02-13 | End: 2023-02-13 | Stop reason: SURG

## 2023-02-13 RX ORDER — SODIUM CHLORIDE 0.9 % (FLUSH) 0.9 %
3-10 SYRINGE (ML) INJECTION AS NEEDED
Status: DISCONTINUED | OUTPATIENT
Start: 2023-02-13 | End: 2023-02-13 | Stop reason: HOSPADM

## 2023-02-13 RX ORDER — LIDOCAINE HYDROCHLORIDE 10 MG/ML
0.5 INJECTION, SOLUTION EPIDURAL; INFILTRATION; INTRACAUDAL; PERINEURAL ONCE AS NEEDED
Status: COMPLETED | OUTPATIENT
Start: 2023-02-13 | End: 2023-02-13

## 2023-02-13 RX ORDER — DROPERIDOL 2.5 MG/ML
0.62 INJECTION, SOLUTION INTRAMUSCULAR; INTRAVENOUS
Status: DISCONTINUED | OUTPATIENT
Start: 2023-02-13 | End: 2023-02-13 | Stop reason: HOSPADM

## 2023-02-13 RX ADMIN — LISINOPRIL 40 MG: 20 TABLET ORAL at 15:17

## 2023-02-13 RX ADMIN — METOPROLOL SUCCINATE 25 MG: 25 TABLET, EXTENDED RELEASE ORAL at 15:18

## 2023-02-13 RX ADMIN — ANTI-PRURITIC 1 APPLICATION: 1 CREAM TOPICAL at 05:01

## 2023-02-13 RX ADMIN — LIDOCAINE HYDROCHLORIDE 50 MG: 10 INJECTION, SOLUTION EPIDURAL; INFILTRATION; INTRACAUDAL; PERINEURAL at 10:18

## 2023-02-13 RX ADMIN — BUPIVACAINE HYDROCHLORIDE 60 ML: 2.5 INJECTION, SOLUTION EPIDURAL; INFILTRATION; INTRACAUDAL; PERINEURAL at 10:20

## 2023-02-13 RX ADMIN — METRONIDAZOLE 500 MG: 500 TABLET ORAL at 17:52

## 2023-02-13 RX ADMIN — GABAPENTIN 800 MG: 400 CAPSULE ORAL at 15:17

## 2023-02-13 RX ADMIN — AZTREONAM 2 G: 2 INJECTION, POWDER, LYOPHILIZED, FOR SOLUTION INTRAMUSCULAR; INTRAVENOUS at 15:18

## 2023-02-13 RX ADMIN — SUGAMMADEX 200 MG: 100 INJECTION, SOLUTION INTRAVENOUS at 11:43

## 2023-02-13 RX ADMIN — DAPTOMYCIN 250 MG: 500 INJECTION, POWDER, LYOPHILIZED, FOR SOLUTION INTRAVENOUS at 09:45

## 2023-02-13 RX ADMIN — HYDROCODONE BITARTRATE AND ACETAMINOPHEN 1 TABLET: 5; 325 TABLET ORAL at 15:18

## 2023-02-13 RX ADMIN — DEXAMETHASONE SODIUM PHOSPHATE 4 MG: 10 INJECTION, SOLUTION INTRAMUSCULAR; INTRAVENOUS at 10:20

## 2023-02-13 RX ADMIN — SODIUM CHLORIDE 1 G: 1 TABLET ORAL at 17:52

## 2023-02-13 RX ADMIN — HEPARIN SODIUM 5000 UNITS: 5000 INJECTION INTRAVENOUS; SUBCUTANEOUS at 21:50

## 2023-02-13 RX ADMIN — DIPHENHYDRAMINE HYDROCHLORIDE 50 MG: 50 CAPSULE ORAL at 21:49

## 2023-02-13 RX ADMIN — LIDOCAINE HYDROCHLORIDE 0.5 ML: 10 INJECTION, SOLUTION EPIDURAL; INFILTRATION; INTRACAUDAL; PERINEURAL at 09:38

## 2023-02-13 RX ADMIN — DEXAMETHASONE SODIUM PHOSPHATE 4 MG: 10 INJECTION, SOLUTION INTRAMUSCULAR; INTRAVENOUS at 10:30

## 2023-02-13 RX ADMIN — ANTI-PRURITIC 1 APPLICATION: 1 CREAM TOPICAL at 15:29

## 2023-02-13 RX ADMIN — ANTI-PRURITIC 1 APPLICATION: 1 CREAM TOPICAL at 21:51

## 2023-02-13 RX ADMIN — Medication 10 ML: at 21:51

## 2023-02-13 RX ADMIN — FENTANYL CITRATE 25 MCG: 50 INJECTION, SOLUTION INTRAMUSCULAR; INTRAVENOUS at 11:12

## 2023-02-13 RX ADMIN — ANTI-PRURITIC 1 APPLICATION: 1 CREAM TOPICAL at 00:17

## 2023-02-13 RX ADMIN — PROPOFOL 50 MG: 10 INJECTION, EMULSION INTRAVENOUS at 10:30

## 2023-02-13 RX ADMIN — AZTREONAM 2 G: 2 INJECTION, POWDER, LYOPHILIZED, FOR SOLUTION INTRAMUSCULAR; INTRAVENOUS at 09:50

## 2023-02-13 RX ADMIN — ROCURONIUM BROMIDE 50 MG: 10 INJECTION, SOLUTION INTRAVENOUS at 10:19

## 2023-02-13 RX ADMIN — GABAPENTIN 800 MG: 400 CAPSULE ORAL at 21:49

## 2023-02-13 RX ADMIN — ROSUVASTATIN CALCIUM 20 MG: 20 TABLET ORAL at 21:50

## 2023-02-13 RX ADMIN — SODIUM CHLORIDE, POTASSIUM CHLORIDE, SODIUM LACTATE AND CALCIUM CHLORIDE 9 ML/HR: 600; 310; 30; 20 INJECTION, SOLUTION INTRAVENOUS at 09:38

## 2023-02-13 RX ADMIN — MICAFUNGIN 100 MG: 20 INJECTION, POWDER, LYOPHILIZED, FOR SOLUTION INTRAVENOUS at 10:10

## 2023-02-13 RX ADMIN — FAMOTIDINE 20 MG: 20 TABLET, FILM COATED ORAL at 09:38

## 2023-02-13 RX ADMIN — Medication 200 MCG: at 11:18

## 2023-02-13 RX ADMIN — OXYCODONE HYDROCHLORIDE AND ACETAMINOPHEN 1 TABLET: 10; 325 TABLET ORAL at 21:50

## 2023-02-13 RX ADMIN — FENTANYL CITRATE 50 MCG: 50 INJECTION, SOLUTION INTRAMUSCULAR; INTRAVENOUS at 10:18

## 2023-02-13 RX ADMIN — Medication 5 MG: at 21:50

## 2023-02-13 RX ADMIN — PROPOFOL 150 MG: 10 INJECTION, EMULSION INTRAVENOUS at 10:18

## 2023-02-13 RX ADMIN — FAMOTIDINE 20 MG: 20 TABLET ORAL at 17:52

## 2023-02-13 RX ADMIN — ONDANSETRON 4 MG: 2 INJECTION INTRAMUSCULAR; INTRAVENOUS at 11:13

## 2023-02-13 RX ADMIN — FENTANYL CITRATE 25 MCG: 50 INJECTION, SOLUTION INTRAMUSCULAR; INTRAVENOUS at 10:31

## 2023-02-13 RX ADMIN — Medication 200 MCG: at 11:00

## 2023-02-13 NOTE — ANESTHESIA POSTPROCEDURE EVALUATION
Patient: Severino Vera    Procedure Summary     Date: 02/13/23 Room / Location:  [facility]    Anesthesia Start: 1010 Anesthesia Stop: 1210    Procedure: LAPAROTOMY EXPLORATORY, SIGMOID RESECTION, COLOSTOMY (Abdomen) Diagnosis:     Surgeons: Neville Berman MD Provider: Mian Phillips MD    Anesthesia Type: general with block ASA Status: 3          Anesthesia Type: general with block    Vitals  Vitals Value Taken Time   /66 02/13/23 1210   Temp 98.6 °F (37 °C) 02/13/23 1210   Pulse 92 02/13/23 1210   Resp 16 02/13/23 1210   SpO2 95 % 02/13/23 1210           Post Anesthesia Care and Evaluation    Patient location during evaluation: PACU  Patient participation: waiting for patient participation  Level of consciousness: sleepy but conscious    Airway patency: patent  Anesthetic complications: No anesthetic complications    Cardiovascular status: stable  Respiratory status: spontaneous ventilation and nasal cannula  Hydration status: acceptable  No anesthesia care post op

## 2023-02-13 NOTE — ANESTHESIA PROCEDURE NOTES
Peripheral Block    Pre-sedation assessment completed: 2/13/2023 10:18 AM    Patient reassessed immediately prior to procedure    Patient location during procedure: OR  Start time: 2/13/2023 10:20 AM  Stop time: 2/13/2023 10:23 AM  Reason for block: at surgeon's request and post-op pain management  Performed by  Anesthesiologist: Mian Phillips MD  Preanesthetic Checklist  Completed: patient identified, IV checked, site marked, risks and benefits discussed, surgical consent, monitors and equipment checked, pre-op evaluation and timeout performed  Prep:  Pt Position: supine  Sterile barriers:cap, gloves, mask and washed/disinfected hands  Prep: ChloraPrep  Patient monitoring: blood pressure monitoring, continuous pulse oximetry and EKG  Procedure    Sedation: yes  Performed under: general  Guidance:ultrasound guided    ULTRASOUND INTERPRETATION.  Using ultrasound guidance a 20 G gauge needle was placed in close proximity to the nerve, at which point, under ultrasound guidance anesthetic was injected in the area of the nerve and spread of the anesthesia was seen on ultrasound in close proximity thereto.  There were no abnormalities seen on ultrasound; a digital image was taken; and the patient tolerated the procedure with no complications. Images:still images obtained, printed/placed on chart    Laterality:Bilateral  Block Type:TAP  Injection Technique:single-shot  Needle Type:short-bevel and echogenic  Needle Gauge:20 G  Resistance on Injection: none    Medications Used: dexamethasone sodium phosphate injection - Injection   4 mg - 2/13/2023 10:20:00 AM  bupivacaine PF (MARCAINE) 0.25 % injection - Injection   60 mL - 2/13/2023 10:20:00 AM      Medications  Comment:Block Injection:  LA dose divided between Right and Left block        Post Assessment  Injection Assessment: negative aspiration for heme, incremental injection and no paresthesia on injection  Patient Tolerance:comfortable throughout  "block  Complications:no  Additional Notes    Subcostal TAPs    A high-frequency linear transducer, with sterile cover, was placed sub-xiphoid to identify [place], right and left Rectus Abdominus Muscles (VENANCIO). The transducer was moved either right or left subcostally to identify the VENANCIO and the Transverse Abdominus Muscle (AGUILAR). The insertion site was prepped in sterile fashion and then localized with 2-5 ml of 1% Lidocaine. Using ultrasound-guidance, a 20-gauge B-Sol 4\" Ultraplex 360 non-stimulating echogenic needle was advanced in plane, from medial to lateral, until the tip of the needle was in the fascial plane between the VENANCIO and AGUILAR. 1-3ml of preservative free normal saline was used to hydro-dissect the fascial planes. After the fascial plane was verified, the local anesthetic (LA) was injected. The procedure was repeated on the opposite side for bilateral coverage. Aspiration every 5 ml to prevent intravascular injection. Injection was completed with negative aspiration of blood and negative intravascular injection. Injection pressures were normal with minimal resistance. The subcostal approach to the TAP nerve block ideally anesthetizes the intercostal nerves T6-T9.     Mid-Axillary/Lateral TAPs    A high-frequency linear transducer, with sterile cover, was placed in the midaxillary line between the ASIS and costal margin. The External Oblique Muscle (EOM), Internal Oblique Muscle (IOM), Transverse Abdominus Muscle (AGUILAR), and Peritoneum were identified. The insertion site was prepped in sterile fashion and then localized with 2-5 ml of 1% Lidocaine. Using ultrasound-guidance, a 20-gauge B-Sol 4\" Ultraplex 360 non-stimulating echogenic needle was advanced in plane, from medial to lateral, until the tip of the needle was in the fascial plane between the IOM and AGUILAR. 1-3ml of preservative free normal saline was used to hydro-dissect the fascial planes. After the fascial plane was verified, the local " anesthetic (LA) was injected. The procedure was repeated on the opposite side for bilateral coverage. Aspiration every 5 ml to prevent intravascular injection. Injection was completed with negative aspiration of blood and negative intravascular injection. Injection pressures were normal with minimal resistance. Midaxillary TAPs should reach intercostal nerves T10- T11 and the subcostal nerve T12.
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
## 2023-02-13 NOTE — ANESTHESIA PROCEDURE NOTES
Airway  Urgency: elective    Date/Time: 2/13/2023 10:20 AM  Airway not difficult    General Information and Staff    Patient location during procedure: OR  CRNA/CAA: Belia Tobar CRNA    Indications and Patient Condition  Indications for airway management: airway protection    Preoxygenated: yes  MILS not maintained throughout  Mask difficulty assessment: 1 - vent by mask    Final Airway Details  Final airway type: endotracheal airway      Successful airway: ETT  Cuffed: yes   Successful intubation technique: direct laryngoscopy  Facilitating devices/methods: intubating stylet  Endotracheal tube insertion site: oral  Blade: Cindy  Blade size: 4  ETT size (mm): 7.5  Cormack-Lehane Classification: grade I - full view of glottis  Placement verified by: chest auscultation and capnometry   Measured from: lips  ETT/EBT  to lips (cm): 22  Number of attempts at approach: 1  Assessment: lips, teeth, and gum same as pre-op and atraumatic intubation    Additional Comments  Negative epigastric sounds, Breath sound equal bilaterally with symmetric chest rise and fall

## 2023-02-14 LAB
ANION GAP SERPL CALCULATED.3IONS-SCNC: 9 MMOL/L (ref 5–15)
BACTERIA SPEC ANAEROBE CULT: NORMAL
BUN SERPL-MCNC: 14 MG/DL (ref 8–23)
BUN/CREAT SERPL: 35 (ref 7–25)
CALCIUM SPEC-SCNC: 7.9 MG/DL (ref 8.6–10.5)
CHLORIDE SERPL-SCNC: 101 MMOL/L (ref 98–107)
CO2 SERPL-SCNC: 25 MMOL/L (ref 22–29)
CREAT SERPL-MCNC: 0.4 MG/DL (ref 0.76–1.27)
CYTO UR: NORMAL
DEPRECATED RDW RBC AUTO: 55.8 FL (ref 37–54)
EGFRCR SERPLBLD CKD-EPI 2021: 118.8 ML/MIN/1.73
ERYTHROCYTE [DISTWIDTH] IN BLOOD BY AUTOMATED COUNT: 19 % (ref 12.3–15.4)
GLUCOSE BLDC GLUCOMTR-MCNC: 217 MG/DL (ref 70–130)
GLUCOSE BLDC GLUCOMTR-MCNC: 327 MG/DL (ref 70–130)
GLUCOSE BLDC GLUCOMTR-MCNC: 328 MG/DL (ref 70–130)
GLUCOSE SERPL-MCNC: 208 MG/DL (ref 65–99)
HCT VFR BLD AUTO: 23.7 % (ref 37.5–51)
HGB BLD-MCNC: 7.8 G/DL (ref 13–17.7)
LAB AP CASE REPORT: NORMAL
LAB AP CLINICAL INFORMATION: NORMAL
MCH RBC QN AUTO: 27.4 PG (ref 26.6–33)
MCHC RBC AUTO-ENTMCNC: 32.9 G/DL (ref 31.5–35.7)
MCV RBC AUTO: 83.2 FL (ref 79–97)
PATH REPORT.FINAL DX SPEC: NORMAL
PATH REPORT.GROSS SPEC: NORMAL
PLATELET # BLD AUTO: 317 10*3/MM3 (ref 140–450)
PMV BLD AUTO: 9.5 FL (ref 6–12)
POTASSIUM SERPL-SCNC: 4 MMOL/L (ref 3.5–5.2)
RBC # BLD AUTO: 2.85 10*6/MM3 (ref 4.14–5.8)
SODIUM SERPL-SCNC: 135 MMOL/L (ref 136–145)
WBC NRBC COR # BLD: 15.45 10*3/MM3 (ref 3.4–10.8)

## 2023-02-14 PROCEDURE — 25010000002 MICAFUNGIN SODIUM 100 MG RECONSTITUTED SOLUTION

## 2023-02-14 PROCEDURE — 82962 GLUCOSE BLOOD TEST: CPT

## 2023-02-14 PROCEDURE — 85027 COMPLETE CBC AUTOMATED: CPT | Performed by: STUDENT IN AN ORGANIZED HEALTH CARE EDUCATION/TRAINING PROGRAM

## 2023-02-14 PROCEDURE — 63710000001 INSULIN LISPRO (HUMAN) PER 5 UNITS: Performed by: SURGERY

## 2023-02-14 PROCEDURE — 80048 BASIC METABOLIC PNL TOTAL CA: CPT | Performed by: STUDENT IN AN ORGANIZED HEALTH CARE EDUCATION/TRAINING PROGRAM

## 2023-02-14 PROCEDURE — 25010000002 DAPTOMYCIN PER 1 MG

## 2023-02-14 PROCEDURE — 99232 SBSQ HOSP IP/OBS MODERATE 35: CPT | Performed by: STUDENT IN AN ORGANIZED HEALTH CARE EDUCATION/TRAINING PROGRAM

## 2023-02-14 PROCEDURE — 25010000002 MICAFUNGIN SODIUM 100 MG RECONSTITUTED SOLUTION: Performed by: SURGERY

## 2023-02-14 PROCEDURE — 25010000002 HEPARIN (PORCINE) PER 1000 UNITS: Performed by: SURGERY

## 2023-02-14 PROCEDURE — 63710000001 DIPHENHYDRAMINE PER 50 MG: Performed by: SURGERY

## 2023-02-14 PROCEDURE — 25010000002 DAPTOMYCIN PER 1 MG: Performed by: STUDENT IN AN ORGANIZED HEALTH CARE EDUCATION/TRAINING PROGRAM

## 2023-02-14 RX ORDER — METRONIDAZOLE 500 MG/1
500 TABLET ORAL EVERY 8 HOURS
Status: DISCONTINUED | OUTPATIENT
Start: 2023-02-14 | End: 2023-02-21 | Stop reason: HOSPADM

## 2023-02-14 RX ADMIN — METRONIDAZOLE 500 MG: 500 TABLET ORAL at 23:04

## 2023-02-14 RX ADMIN — HEPARIN SODIUM 5000 UNITS: 5000 INJECTION INTRAVENOUS; SUBCUTANEOUS at 09:15

## 2023-02-14 RX ADMIN — OXYCODONE HYDROCHLORIDE AND ACETAMINOPHEN 1 TABLET: 10; 325 TABLET ORAL at 09:16

## 2023-02-14 RX ADMIN — LISINOPRIL 40 MG: 20 TABLET ORAL at 09:15

## 2023-02-14 RX ADMIN — AZTREONAM 2 G: 2 INJECTION, POWDER, LYOPHILIZED, FOR SOLUTION INTRAMUSCULAR; INTRAVENOUS at 17:37

## 2023-02-14 RX ADMIN — GABAPENTIN 800 MG: 400 CAPSULE ORAL at 12:26

## 2023-02-14 RX ADMIN — OXYCODONE HYDROCHLORIDE AND ACETAMINOPHEN 1 TABLET: 10; 325 TABLET ORAL at 12:26

## 2023-02-14 RX ADMIN — METRONIDAZOLE 500 MG: 500 TABLET ORAL at 00:11

## 2023-02-14 RX ADMIN — AZTREONAM 2 G: 2 INJECTION, POWDER, LYOPHILIZED, FOR SOLUTION INTRAMUSCULAR; INTRAVENOUS at 00:11

## 2023-02-14 RX ADMIN — OXYCODONE HYDROCHLORIDE AND ACETAMINOPHEN 1 TABLET: 10; 325 TABLET ORAL at 17:38

## 2023-02-14 RX ADMIN — ANTI-PRURITIC 1 APPLICATION: 1 CREAM TOPICAL at 09:16

## 2023-02-14 RX ADMIN — MICAFUNGIN 100 MG: 20 INJECTION, POWDER, LYOPHILIZED, FOR SOLUTION INTRAVENOUS at 15:20

## 2023-02-14 RX ADMIN — INSULIN LISPRO 3 UNITS: 100 INJECTION, SOLUTION INTRAVENOUS; SUBCUTANEOUS at 09:18

## 2023-02-14 RX ADMIN — OXYCODONE HYDROCHLORIDE AND ACETAMINOPHEN 1 TABLET: 10; 325 TABLET ORAL at 15:21

## 2023-02-14 RX ADMIN — DULOXETINE HYDROCHLORIDE 60 MG: 60 CAPSULE, DELAYED RELEASE ORAL at 09:16

## 2023-02-14 RX ADMIN — ANTI-PRURITIC 1 APPLICATION: 1 CREAM TOPICAL at 23:03

## 2023-02-14 RX ADMIN — THIAMINE HCL TAB 100 MG 100 MG: 100 TAB at 09:16

## 2023-02-14 RX ADMIN — GABAPENTIN 800 MG: 400 CAPSULE ORAL at 23:04

## 2023-02-14 RX ADMIN — GABAPENTIN 800 MG: 400 CAPSULE ORAL at 17:38

## 2023-02-14 RX ADMIN — SODIUM CHLORIDE 1 G: 1 TABLET ORAL at 09:16

## 2023-02-14 RX ADMIN — AZTREONAM 2 G: 2 INJECTION, POWDER, LYOPHILIZED, FOR SOLUTION INTRAMUSCULAR; INTRAVENOUS at 09:17

## 2023-02-14 RX ADMIN — GABAPENTIN 800 MG: 400 CAPSULE ORAL at 09:15

## 2023-02-14 RX ADMIN — HEPARIN SODIUM 5000 UNITS: 5000 INJECTION INTRAVENOUS; SUBCUTANEOUS at 23:05

## 2023-02-14 RX ADMIN — INSULIN LISPRO 5 UNITS: 100 INJECTION, SOLUTION INTRAVENOUS; SUBCUTANEOUS at 17:38

## 2023-02-14 RX ADMIN — FAMOTIDINE 20 MG: 20 TABLET ORAL at 09:20

## 2023-02-14 RX ADMIN — DAPTOMYCIN 250 MG: 500 INJECTION, POWDER, LYOPHILIZED, FOR SOLUTION INTRAVENOUS at 12:23

## 2023-02-14 RX ADMIN — AZTREONAM 2 G: 2 INJECTION, POWDER, LYOPHILIZED, FOR SOLUTION INTRAMUSCULAR; INTRAVENOUS at 23:04

## 2023-02-14 RX ADMIN — FAMOTIDINE 20 MG: 20 TABLET ORAL at 17:38

## 2023-02-14 RX ADMIN — METOPROLOL SUCCINATE 25 MG: 25 TABLET, EXTENDED RELEASE ORAL at 09:16

## 2023-02-14 RX ADMIN — Medication 10 ML: at 09:21

## 2023-02-14 RX ADMIN — DIPHENHYDRAMINE HYDROCHLORIDE 50 MG: 50 CAPSULE ORAL at 23:04

## 2023-02-14 RX ADMIN — OXYCODONE HYDROCHLORIDE AND ACETAMINOPHEN 1 TABLET: 10; 325 TABLET ORAL at 23:04

## 2023-02-14 RX ADMIN — INSULIN LISPRO 5 UNITS: 100 INJECTION, SOLUTION INTRAVENOUS; SUBCUTANEOUS at 12:26

## 2023-02-14 RX ADMIN — ROSUVASTATIN CALCIUM 20 MG: 20 TABLET ORAL at 23:04

## 2023-02-14 RX ADMIN — METRONIDAZOLE 500 MG: 500 TABLET ORAL at 15:21

## 2023-02-14 RX ADMIN — ANTI-PRURITIC 1 APPLICATION: 1 CREAM TOPICAL at 14:21

## 2023-02-14 RX ADMIN — Medication 10 ML: at 23:05

## 2023-02-14 RX ADMIN — Medication 1 CAPSULE: at 09:15

## 2023-02-15 LAB
ALBUMIN SERPL-MCNC: 2.6 G/DL (ref 3.5–5.2)
ALBUMIN/GLOB SERPL: 0.9 G/DL
ALP SERPL-CCNC: 62 U/L (ref 39–117)
ALT SERPL W P-5'-P-CCNC: 13 U/L (ref 1–41)
ANION GAP SERPL CALCULATED.3IONS-SCNC: 6 MMOL/L (ref 5–15)
AST SERPL-CCNC: 21 U/L (ref 1–40)
BACTERIA SPEC ANAEROBE CULT: NORMAL
BASOPHILS # BLD AUTO: 0.17 10*3/MM3 (ref 0–0.2)
BASOPHILS NFR BLD AUTO: 1.2 % (ref 0–1.5)
BILIRUB SERPL-MCNC: <0.2 MG/DL (ref 0–1.2)
BUN SERPL-MCNC: 18 MG/DL (ref 8–23)
BUN/CREAT SERPL: 62.1 (ref 7–25)
CALCIUM SPEC-SCNC: 7.9 MG/DL (ref 8.6–10.5)
CHLORIDE SERPL-SCNC: 98 MMOL/L (ref 98–107)
CO2 SERPL-SCNC: 30 MMOL/L (ref 22–29)
CREAT SERPL-MCNC: 0.29 MG/DL (ref 0.76–1.27)
CYTO UR: NORMAL
DEPRECATED RDW RBC AUTO: 56.1 FL (ref 37–54)
EGFRCR SERPLBLD CKD-EPI 2021: 131 ML/MIN/1.73
EOSINOPHIL # BLD AUTO: 0.44 10*3/MM3 (ref 0–0.4)
EOSINOPHIL NFR BLD AUTO: 3.1 % (ref 0.3–6.2)
ERYTHROCYTE [DISTWIDTH] IN BLOOD BY AUTOMATED COUNT: 19.1 % (ref 12.3–15.4)
GLOBULIN UR ELPH-MCNC: 2.9 GM/DL
GLUCOSE BLDC GLUCOMTR-MCNC: 194 MG/DL (ref 70–130)
GLUCOSE BLDC GLUCOMTR-MCNC: 195 MG/DL (ref 70–130)
GLUCOSE BLDC GLUCOMTR-MCNC: 241 MG/DL (ref 70–130)
GLUCOSE SERPL-MCNC: 185 MG/DL (ref 65–99)
HCT VFR BLD AUTO: 23.1 % (ref 37.5–51)
HGB BLD-MCNC: 7.4 G/DL (ref 13–17.7)
IMM GRANULOCYTES # BLD AUTO: 0.05 10*3/MM3 (ref 0–0.05)
IMM GRANULOCYTES NFR BLD AUTO: 0.4 % (ref 0–0.5)
LAB AP CASE REPORT: NORMAL
LAB AP CLINICAL INFORMATION: NORMAL
LAB AP SPECIAL STAINS: NORMAL
LYMPHOCYTES # BLD AUTO: 1.77 10*3/MM3 (ref 0.7–3.1)
LYMPHOCYTES NFR BLD AUTO: 12.4 % (ref 19.6–45.3)
MCH RBC QN AUTO: 26.7 PG (ref 26.6–33)
MCHC RBC AUTO-ENTMCNC: 32 G/DL (ref 31.5–35.7)
MCV RBC AUTO: 83.4 FL (ref 79–97)
MONOCYTES # BLD AUTO: 0.9 10*3/MM3 (ref 0.1–0.9)
MONOCYTES NFR BLD AUTO: 6.3 % (ref 5–12)
NEUTROPHILS NFR BLD AUTO: 10.95 10*3/MM3 (ref 1.7–7)
NEUTROPHILS NFR BLD AUTO: 76.6 % (ref 42.7–76)
NRBC BLD AUTO-RTO: 0 /100 WBC (ref 0–0.2)
PATH REPORT.FINAL DX SPEC: NORMAL
PATH REPORT.GROSS SPEC: NORMAL
PLATELET # BLD AUTO: 298 10*3/MM3 (ref 140–450)
PMV BLD AUTO: 9.5 FL (ref 6–12)
POTASSIUM SERPL-SCNC: 4.1 MMOL/L (ref 3.5–5.2)
PROT SERPL-MCNC: 5.5 G/DL (ref 6–8.5)
RBC # BLD AUTO: 2.77 10*6/MM3 (ref 4.14–5.8)
SODIUM SERPL-SCNC: 134 MMOL/L (ref 136–145)
WBC NRBC COR # BLD: 14.28 10*3/MM3 (ref 3.4–10.8)

## 2023-02-15 PROCEDURE — 25010000002 MICAFUNGIN SODIUM 100 MG RECONSTITUTED SOLUTION

## 2023-02-15 PROCEDURE — 25010000002 HEPARIN (PORCINE) PER 1000 UNITS: Performed by: SURGERY

## 2023-02-15 PROCEDURE — 25010000002 DAPTOMYCIN PER 1 MG

## 2023-02-15 PROCEDURE — 63710000001 INSULIN LISPRO (HUMAN) PER 5 UNITS: Performed by: SURGERY

## 2023-02-15 PROCEDURE — 63710000001 INSULIN LISPRO (HUMAN) PER 5 UNITS: Performed by: INTERNAL MEDICINE

## 2023-02-15 PROCEDURE — 97168 OT RE-EVAL EST PLAN CARE: CPT

## 2023-02-15 PROCEDURE — 99232 SBSQ HOSP IP/OBS MODERATE 35: CPT | Performed by: INTERNAL MEDICINE

## 2023-02-15 PROCEDURE — 25010000002 NA FERRIC GLUC CPLX PER 12.5 MG: Performed by: INTERNAL MEDICINE

## 2023-02-15 PROCEDURE — 85025 COMPLETE CBC W/AUTO DIFF WBC: CPT | Performed by: INTERNAL MEDICINE

## 2023-02-15 PROCEDURE — 80053 COMPREHEN METABOLIC PANEL: CPT | Performed by: INTERNAL MEDICINE

## 2023-02-15 PROCEDURE — 82962 GLUCOSE BLOOD TEST: CPT

## 2023-02-15 RX ORDER — DOCUSATE SODIUM 100 MG/1
100 CAPSULE, LIQUID FILLED ORAL 2 TIMES DAILY
Status: DISCONTINUED | OUTPATIENT
Start: 2023-02-15 | End: 2023-02-21 | Stop reason: HOSPADM

## 2023-02-15 RX ORDER — SENNA PLUS 8.6 MG/1
1 TABLET ORAL NIGHTLY
Status: DISCONTINUED | OUTPATIENT
Start: 2023-02-15 | End: 2023-02-21 | Stop reason: HOSPADM

## 2023-02-15 RX ORDER — INSULIN LISPRO 100 [IU]/ML
4 INJECTION, SOLUTION INTRAVENOUS; SUBCUTANEOUS
Status: DISCONTINUED | OUTPATIENT
Start: 2023-02-15 | End: 2023-02-21 | Stop reason: HOSPADM

## 2023-02-15 RX ADMIN — FAMOTIDINE 20 MG: 20 TABLET ORAL at 08:47

## 2023-02-15 RX ADMIN — ANTI-PRURITIC 1 APPLICATION: 1 CREAM TOPICAL at 05:41

## 2023-02-15 RX ADMIN — METOPROLOL SUCCINATE 25 MG: 25 TABLET, EXTENDED RELEASE ORAL at 08:45

## 2023-02-15 RX ADMIN — GABAPENTIN 800 MG: 400 CAPSULE ORAL at 20:32

## 2023-02-15 RX ADMIN — INSULIN LISPRO 3 UNITS: 100 INJECTION, SOLUTION INTRAVENOUS; SUBCUTANEOUS at 11:32

## 2023-02-15 RX ADMIN — GABAPENTIN 800 MG: 400 CAPSULE ORAL at 11:33

## 2023-02-15 RX ADMIN — DULOXETINE HYDROCHLORIDE 60 MG: 60 CAPSULE, DELAYED RELEASE ORAL at 08:44

## 2023-02-15 RX ADMIN — OXYCODONE HYDROCHLORIDE AND ACETAMINOPHEN 1 TABLET: 10; 325 TABLET ORAL at 11:33

## 2023-02-15 RX ADMIN — INSULIN LISPRO 2 UNITS: 100 INJECTION, SOLUTION INTRAVENOUS; SUBCUTANEOUS at 08:42

## 2023-02-15 RX ADMIN — ROSUVASTATIN CALCIUM 20 MG: 20 TABLET ORAL at 20:31

## 2023-02-15 RX ADMIN — HEPARIN SODIUM 5000 UNITS: 5000 INJECTION INTRAVENOUS; SUBCUTANEOUS at 08:43

## 2023-02-15 RX ADMIN — Medication 1 CAPSULE: at 08:43

## 2023-02-15 RX ADMIN — ANTI-PRURITIC 1 APPLICATION: 1 CREAM TOPICAL at 13:49

## 2023-02-15 RX ADMIN — INSULIN LISPRO 4 UNITS: 100 INJECTION, SOLUTION INTRAVENOUS; SUBCUTANEOUS at 08:43

## 2023-02-15 RX ADMIN — OXYCODONE HYDROCHLORIDE AND ACETAMINOPHEN 1 TABLET: 10; 325 TABLET ORAL at 17:03

## 2023-02-15 RX ADMIN — GABAPENTIN 800 MG: 400 CAPSULE ORAL at 08:43

## 2023-02-15 RX ADMIN — SODIUM CHLORIDE 125 MG: 9 INJECTION, SOLUTION INTRAVENOUS at 13:48

## 2023-02-15 RX ADMIN — HEPARIN SODIUM 5000 UNITS: 5000 INJECTION INTRAVENOUS; SUBCUTANEOUS at 20:31

## 2023-02-15 RX ADMIN — FAMOTIDINE 20 MG: 20 TABLET ORAL at 17:04

## 2023-02-15 RX ADMIN — DOCUSATE SODIUM 100 MG: 100 CAPSULE, LIQUID FILLED ORAL at 20:31

## 2023-02-15 RX ADMIN — Medication 10 ML: at 20:32

## 2023-02-15 RX ADMIN — THIAMINE HCL TAB 100 MG 100 MG: 100 TAB at 08:45

## 2023-02-15 RX ADMIN — AZTREONAM 2 G: 2 INJECTION, POWDER, LYOPHILIZED, FOR SOLUTION INTRAMUSCULAR; INTRAVENOUS at 08:47

## 2023-02-15 RX ADMIN — INSULIN LISPRO 2 UNITS: 100 INJECTION, SOLUTION INTRAVENOUS; SUBCUTANEOUS at 17:03

## 2023-02-15 RX ADMIN — AZTREONAM 2 G: 2 INJECTION, POWDER, LYOPHILIZED, FOR SOLUTION INTRAMUSCULAR; INTRAVENOUS at 23:36

## 2023-02-15 RX ADMIN — METRONIDAZOLE 500 MG: 500 TABLET ORAL at 08:43

## 2023-02-15 RX ADMIN — MICAFUNGIN 100 MG: 20 INJECTION, POWDER, LYOPHILIZED, FOR SOLUTION INTRAVENOUS at 12:47

## 2023-02-15 RX ADMIN — DIPHENHYDRAMINE HYDROCHLORIDE, ZINC ACETATE 1 APPLICATION: 2; .1 CREAM TOPICAL at 13:48

## 2023-02-15 RX ADMIN — OXYCODONE HYDROCHLORIDE AND ACETAMINOPHEN 1 TABLET: 10; 325 TABLET ORAL at 13:48

## 2023-02-15 RX ADMIN — INSULIN LISPRO 4 UNITS: 100 INJECTION, SOLUTION INTRAVENOUS; SUBCUTANEOUS at 11:32

## 2023-02-15 RX ADMIN — LISINOPRIL 40 MG: 20 TABLET ORAL at 08:45

## 2023-02-15 RX ADMIN — INSULIN LISPRO 4 UNITS: 100 INJECTION, SOLUTION INTRAVENOUS; SUBCUTANEOUS at 17:04

## 2023-02-15 RX ADMIN — OXYCODONE HYDROCHLORIDE AND ACETAMINOPHEN 1 TABLET: 10; 325 TABLET ORAL at 21:44

## 2023-02-15 RX ADMIN — AZTREONAM 2 G: 2 INJECTION, POWDER, LYOPHILIZED, FOR SOLUTION INTRAMUSCULAR; INTRAVENOUS at 15:23

## 2023-02-15 RX ADMIN — METRONIDAZOLE 500 MG: 500 TABLET ORAL at 15:23

## 2023-02-15 RX ADMIN — METRONIDAZOLE 500 MG: 500 TABLET ORAL at 23:36

## 2023-02-15 RX ADMIN — OXYCODONE HYDROCHLORIDE AND ACETAMINOPHEN 1 TABLET: 10; 325 TABLET ORAL at 08:47

## 2023-02-15 RX ADMIN — Medication 10 ML: at 08:46

## 2023-02-15 RX ADMIN — GABAPENTIN 800 MG: 400 CAPSULE ORAL at 17:04

## 2023-02-15 RX ADMIN — DAPTOMYCIN 250 MG: 500 INJECTION, POWDER, LYOPHILIZED, FOR SOLUTION INTRAVENOUS at 12:07

## 2023-02-15 RX ADMIN — SENNOSIDES 1 TABLET: 8.6 TABLET, FILM COATED ORAL at 21:44

## 2023-02-16 LAB
ANION GAP SERPL CALCULATED.3IONS-SCNC: 4 MMOL/L (ref 5–15)
BH BB BLOOD EXPIRATION DATE: NORMAL
BH BB BLOOD TYPE BARCODE: 6200
BH BB DISPENSE STATUS: NORMAL
BH BB PRODUCT CODE: NORMAL
BH BB UNIT NUMBER: NORMAL
BUN SERPL-MCNC: 11 MG/DL (ref 8–23)
BUN/CREAT SERPL: 45.8 (ref 7–25)
CALCIUM SPEC-SCNC: 7.9 MG/DL (ref 8.6–10.5)
CHLORIDE SERPL-SCNC: 99 MMOL/L (ref 98–107)
CO2 SERPL-SCNC: 31 MMOL/L (ref 22–29)
CREAT SERPL-MCNC: 0.24 MG/DL (ref 0.76–1.27)
CROSSMATCH INTERPRETATION: NORMAL
DEPRECATED RDW RBC AUTO: 59.1 FL (ref 37–54)
EGFRCR SERPLBLD CKD-EPI 2021: 138.7 ML/MIN/1.73
ERYTHROCYTE [DISTWIDTH] IN BLOOD BY AUTOMATED COUNT: 19.2 % (ref 12.3–15.4)
GLUCOSE BLDC GLUCOMTR-MCNC: 110 MG/DL (ref 70–130)
GLUCOSE BLDC GLUCOMTR-MCNC: 124 MG/DL (ref 70–130)
GLUCOSE BLDC GLUCOMTR-MCNC: 132 MG/DL (ref 70–130)
GLUCOSE BLDC GLUCOMTR-MCNC: 133 MG/DL (ref 70–130)
GLUCOSE SERPL-MCNC: 98 MG/DL (ref 65–99)
HCT VFR BLD AUTO: 24.5 % (ref 37.5–51)
HGB BLD-MCNC: 7.7 G/DL (ref 13–17.7)
MCH RBC QN AUTO: 26.7 PG (ref 26.6–33)
MCHC RBC AUTO-ENTMCNC: 31.4 G/DL (ref 31.5–35.7)
MCV RBC AUTO: 85.1 FL (ref 79–97)
PLATELET # BLD AUTO: 328 10*3/MM3 (ref 140–450)
PMV BLD AUTO: 9.9 FL (ref 6–12)
POTASSIUM SERPL-SCNC: 4 MMOL/L (ref 3.5–5.2)
RBC # BLD AUTO: 2.88 10*6/MM3 (ref 4.14–5.8)
SODIUM SERPL-SCNC: 134 MMOL/L (ref 136–145)
STRONGYLOIDES IGG SER QL IA: NEGATIVE
UNIT  ABO: NORMAL
UNIT  RH: NORMAL
WBC NRBC COR # BLD: 12.78 10*3/MM3 (ref 3.4–10.8)

## 2023-02-16 PROCEDURE — 25010000002 HEPARIN (PORCINE) PER 1000 UNITS: Performed by: SURGERY

## 2023-02-16 PROCEDURE — 97164 PT RE-EVAL EST PLAN CARE: CPT

## 2023-02-16 PROCEDURE — 25010000002 CEFEPIME PER 500 MG: Performed by: INTERNAL MEDICINE

## 2023-02-16 PROCEDURE — 25010000002 DAPTOMYCIN PER 1 MG

## 2023-02-16 PROCEDURE — 97110 THERAPEUTIC EXERCISES: CPT

## 2023-02-16 PROCEDURE — 99232 SBSQ HOSP IP/OBS MODERATE 35: CPT | Performed by: INTERNAL MEDICINE

## 2023-02-16 PROCEDURE — 82962 GLUCOSE BLOOD TEST: CPT

## 2023-02-16 PROCEDURE — 80048 BASIC METABOLIC PNL TOTAL CA: CPT | Performed by: INTERNAL MEDICINE

## 2023-02-16 PROCEDURE — 85027 COMPLETE CBC AUTOMATED: CPT | Performed by: INTERNAL MEDICINE

## 2023-02-16 PROCEDURE — 63710000001 INSULIN LISPRO (HUMAN) PER 5 UNITS: Performed by: INTERNAL MEDICINE

## 2023-02-16 RX ORDER — FLUCONAZOLE 200 MG/1
200 TABLET ORAL EVERY 24 HOURS
Status: DISCONTINUED | OUTPATIENT
Start: 2023-02-16 | End: 2023-02-21 | Stop reason: HOSPADM

## 2023-02-16 RX ORDER — OXYCODONE AND ACETAMINOPHEN 10; 325 MG/1; MG/1
1 TABLET ORAL ONCE
Status: COMPLETED | OUTPATIENT
Start: 2023-02-16 | End: 2023-02-16

## 2023-02-16 RX ADMIN — FAMOTIDINE 20 MG: 20 TABLET ORAL at 17:05

## 2023-02-16 RX ADMIN — THIAMINE HCL TAB 100 MG 100 MG: 100 TAB at 08:19

## 2023-02-16 RX ADMIN — HEPARIN SODIUM 5000 UNITS: 5000 INJECTION INTRAVENOUS; SUBCUTANEOUS at 08:18

## 2023-02-16 RX ADMIN — OXYCODONE HYDROCHLORIDE AND ACETAMINOPHEN 1 TABLET: 10; 325 TABLET ORAL at 14:13

## 2023-02-16 RX ADMIN — METOPROLOL SUCCINATE 25 MG: 25 TABLET, EXTENDED RELEASE ORAL at 08:19

## 2023-02-16 RX ADMIN — GABAPENTIN 800 MG: 400 CAPSULE ORAL at 17:05

## 2023-02-16 RX ADMIN — ROSUVASTATIN CALCIUM 20 MG: 20 TABLET ORAL at 20:25

## 2023-02-16 RX ADMIN — AZTREONAM 2 G: 2 INJECTION, POWDER, LYOPHILIZED, FOR SOLUTION INTRAMUSCULAR; INTRAVENOUS at 08:20

## 2023-02-16 RX ADMIN — INSULIN LISPRO 4 UNITS: 100 INJECTION, SOLUTION INTRAVENOUS; SUBCUTANEOUS at 17:04

## 2023-02-16 RX ADMIN — HEPARIN SODIUM 5000 UNITS: 5000 INJECTION INTRAVENOUS; SUBCUTANEOUS at 20:24

## 2023-02-16 RX ADMIN — DAPTOMYCIN 250 MG: 500 INJECTION, POWDER, LYOPHILIZED, FOR SOLUTION INTRAVENOUS at 11:56

## 2023-02-16 RX ADMIN — OXYCODONE HYDROCHLORIDE AND ACETAMINOPHEN 1 TABLET: 10; 325 TABLET ORAL at 21:33

## 2023-02-16 RX ADMIN — DULOXETINE HYDROCHLORIDE 60 MG: 60 CAPSULE, DELAYED RELEASE ORAL at 08:19

## 2023-02-16 RX ADMIN — FAMOTIDINE 20 MG: 20 TABLET ORAL at 08:24

## 2023-02-16 RX ADMIN — SENNOSIDES 1 TABLET: 8.6 TABLET, FILM COATED ORAL at 20:24

## 2023-02-16 RX ADMIN — OXYCODONE HYDROCHLORIDE AND ACETAMINOPHEN 1 TABLET: 10; 325 TABLET ORAL at 17:05

## 2023-02-16 RX ADMIN — INSULIN LISPRO 4 UNITS: 100 INJECTION, SOLUTION INTRAVENOUS; SUBCUTANEOUS at 11:56

## 2023-02-16 RX ADMIN — FLUCONAZOLE 200 MG: 200 TABLET ORAL at 11:56

## 2023-02-16 RX ADMIN — INSULIN LISPRO 4 UNITS: 100 INJECTION, SOLUTION INTRAVENOUS; SUBCUTANEOUS at 08:20

## 2023-02-16 RX ADMIN — GABAPENTIN 800 MG: 400 CAPSULE ORAL at 20:24

## 2023-02-16 RX ADMIN — CEFEPIME 2 G: 2 INJECTION, POWDER, FOR SOLUTION INTRAVENOUS at 13:56

## 2023-02-16 RX ADMIN — DOCUSATE SODIUM 100 MG: 100 CAPSULE, LIQUID FILLED ORAL at 08:19

## 2023-02-16 RX ADMIN — ANTI-PRURITIC 1 APPLICATION: 1 CREAM TOPICAL at 17:05

## 2023-02-16 RX ADMIN — GABAPENTIN 800 MG: 400 CAPSULE ORAL at 08:19

## 2023-02-16 RX ADMIN — LISINOPRIL 40 MG: 20 TABLET ORAL at 08:19

## 2023-02-16 RX ADMIN — GABAPENTIN 800 MG: 400 CAPSULE ORAL at 11:55

## 2023-02-16 RX ADMIN — Medication 10 ML: at 08:20

## 2023-02-16 RX ADMIN — OXYCODONE HYDROCHLORIDE AND ACETAMINOPHEN 1 TABLET: 10; 325 TABLET ORAL at 08:23

## 2023-02-16 RX ADMIN — DOCUSATE SODIUM 100 MG: 100 CAPSULE, LIQUID FILLED ORAL at 20:24

## 2023-02-16 RX ADMIN — Medication 10 ML: at 21:34

## 2023-02-16 RX ADMIN — OXYCODONE HYDROCHLORIDE AND ACETAMINOPHEN 1 TABLET: 10; 325 TABLET ORAL at 11:55

## 2023-02-16 RX ADMIN — Medication 1 CAPSULE: at 08:18

## 2023-02-16 RX ADMIN — METRONIDAZOLE 500 MG: 500 TABLET ORAL at 08:19

## 2023-02-16 RX ADMIN — METRONIDAZOLE 500 MG: 500 TABLET ORAL at 17:04

## 2023-02-16 RX ADMIN — CEFEPIME 2 G: 2 INJECTION, POWDER, FOR SOLUTION INTRAVENOUS at 20:27

## 2023-02-17 LAB
GLUCOSE BLDC GLUCOMTR-MCNC: 123 MG/DL (ref 70–130)
GLUCOSE BLDC GLUCOMTR-MCNC: 158 MG/DL (ref 70–130)
GLUCOSE BLDC GLUCOMTR-MCNC: 170 MG/DL (ref 70–130)

## 2023-02-17 PROCEDURE — 25010000002 DAPTOMYCIN PER 1 MG

## 2023-02-17 PROCEDURE — 25010000002 HEPARIN (PORCINE) PER 1000 UNITS: Performed by: SURGERY

## 2023-02-17 PROCEDURE — 63710000001 DIPHENHYDRAMINE PER 50 MG: Performed by: SURGERY

## 2023-02-17 PROCEDURE — 63710000001 INSULIN LISPRO (HUMAN) PER 5 UNITS: Performed by: INTERNAL MEDICINE

## 2023-02-17 PROCEDURE — 99232 SBSQ HOSP IP/OBS MODERATE 35: CPT | Performed by: NURSE PRACTITIONER

## 2023-02-17 PROCEDURE — 25010000002 CEFEPIME PER 500 MG: Performed by: INTERNAL MEDICINE

## 2023-02-17 PROCEDURE — 82962 GLUCOSE BLOOD TEST: CPT

## 2023-02-17 PROCEDURE — 63710000001 INSULIN LISPRO (HUMAN) PER 5 UNITS: Performed by: SURGERY

## 2023-02-17 RX ADMIN — FLUCONAZOLE 200 MG: 200 TABLET ORAL at 09:25

## 2023-02-17 RX ADMIN — CEFEPIME 2 G: 2 INJECTION, POWDER, FOR SOLUTION INTRAVENOUS at 03:31

## 2023-02-17 RX ADMIN — OXYCODONE HYDROCHLORIDE AND ACETAMINOPHEN 1 TABLET: 10; 325 TABLET ORAL at 06:32

## 2023-02-17 RX ADMIN — METOPROLOL SUCCINATE 25 MG: 25 TABLET, EXTENDED RELEASE ORAL at 09:25

## 2023-02-17 RX ADMIN — Medication 10 ML: at 20:30

## 2023-02-17 RX ADMIN — OXYCODONE HYDROCHLORIDE AND ACETAMINOPHEN 1 TABLET: 10; 325 TABLET ORAL at 18:05

## 2023-02-17 RX ADMIN — Medication 10 ML: at 09:27

## 2023-02-17 RX ADMIN — INSULIN LISPRO 4 UNITS: 100 INJECTION, SOLUTION INTRAVENOUS; SUBCUTANEOUS at 11:18

## 2023-02-17 RX ADMIN — GABAPENTIN 800 MG: 400 CAPSULE ORAL at 11:18

## 2023-02-17 RX ADMIN — DAPTOMYCIN 250 MG: 500 INJECTION, POWDER, LYOPHILIZED, FOR SOLUTION INTRAVENOUS at 11:18

## 2023-02-17 RX ADMIN — METRONIDAZOLE 500 MG: 500 TABLET ORAL at 09:26

## 2023-02-17 RX ADMIN — Medication 1 CAPSULE: at 09:25

## 2023-02-17 RX ADMIN — CEFEPIME 2 G: 2 INJECTION, POWDER, FOR SOLUTION INTRAVENOUS at 11:18

## 2023-02-17 RX ADMIN — DIPHENHYDRAMINE HYDROCHLORIDE 50 MG: 50 CAPSULE ORAL at 18:06

## 2023-02-17 RX ADMIN — INSULIN LISPRO 2 UNITS: 100 INJECTION, SOLUTION INTRAVENOUS; SUBCUTANEOUS at 11:18

## 2023-02-17 RX ADMIN — CEFEPIME 2 G: 2 INJECTION, POWDER, FOR SOLUTION INTRAVENOUS at 20:31

## 2023-02-17 RX ADMIN — METRONIDAZOLE 500 MG: 500 TABLET ORAL at 18:06

## 2023-02-17 RX ADMIN — FAMOTIDINE 20 MG: 20 TABLET ORAL at 09:26

## 2023-02-17 RX ADMIN — DOCUSATE SODIUM 100 MG: 100 CAPSULE, LIQUID FILLED ORAL at 09:26

## 2023-02-17 RX ADMIN — GABAPENTIN 800 MG: 400 CAPSULE ORAL at 18:06

## 2023-02-17 RX ADMIN — OXYCODONE HYDROCHLORIDE AND ACETAMINOPHEN 1 TABLET: 10; 325 TABLET ORAL at 11:18

## 2023-02-17 RX ADMIN — DIPHENHYDRAMINE HYDROCHLORIDE 50 MG: 50 CAPSULE ORAL at 09:48

## 2023-02-17 RX ADMIN — THIAMINE HCL TAB 100 MG 100 MG: 100 TAB at 09:25

## 2023-02-17 RX ADMIN — OXYCODONE HYDROCHLORIDE AND ACETAMINOPHEN 1 TABLET: 10; 325 TABLET ORAL at 20:29

## 2023-02-17 RX ADMIN — ANTI-PRURITIC 1 APPLICATION: 1 CREAM TOPICAL at 13:57

## 2023-02-17 RX ADMIN — DULOXETINE HYDROCHLORIDE 60 MG: 60 CAPSULE, DELAYED RELEASE ORAL at 09:25

## 2023-02-17 RX ADMIN — INSULIN LISPRO 4 UNITS: 100 INJECTION, SOLUTION INTRAVENOUS; SUBCUTANEOUS at 09:24

## 2023-02-17 RX ADMIN — GABAPENTIN 800 MG: 400 CAPSULE ORAL at 09:25

## 2023-02-17 RX ADMIN — HEPARIN SODIUM 5000 UNITS: 5000 INJECTION INTRAVENOUS; SUBCUTANEOUS at 20:30

## 2023-02-17 RX ADMIN — LISINOPRIL 40 MG: 20 TABLET ORAL at 09:26

## 2023-02-17 RX ADMIN — METRONIDAZOLE 500 MG: 500 TABLET ORAL at 00:49

## 2023-02-17 RX ADMIN — INSULIN LISPRO 4 UNITS: 100 INJECTION, SOLUTION INTRAVENOUS; SUBCUTANEOUS at 18:06

## 2023-02-17 RX ADMIN — HEPARIN SODIUM 5000 UNITS: 5000 INJECTION INTRAVENOUS; SUBCUTANEOUS at 09:25

## 2023-02-17 RX ADMIN — FAMOTIDINE 20 MG: 20 TABLET ORAL at 18:06

## 2023-02-17 RX ADMIN — INSULIN LISPRO 2 UNITS: 100 INJECTION, SOLUTION INTRAVENOUS; SUBCUTANEOUS at 09:24

## 2023-02-17 RX ADMIN — GABAPENTIN 800 MG: 400 CAPSULE ORAL at 20:29

## 2023-02-17 RX ADMIN — OXYCODONE HYDROCHLORIDE AND ACETAMINOPHEN 1 TABLET: 10; 325 TABLET ORAL at 13:57

## 2023-02-17 RX ADMIN — ANTI-PRURITIC 1 APPLICATION: 1 CREAM TOPICAL at 09:27

## 2023-02-17 RX ADMIN — ROSUVASTATIN CALCIUM 20 MG: 20 TABLET ORAL at 20:29

## 2023-02-18 LAB
ANION GAP SERPL CALCULATED.3IONS-SCNC: 7 MMOL/L (ref 5–15)
BUN SERPL-MCNC: 8 MG/DL (ref 8–23)
BUN/CREAT SERPL: 47.1 (ref 7–25)
CALCIUM SPEC-SCNC: 8.1 MG/DL (ref 8.6–10.5)
CHLORIDE SERPL-SCNC: 96 MMOL/L (ref 98–107)
CO2 SERPL-SCNC: 29 MMOL/L (ref 22–29)
CREAT SERPL-MCNC: 0.17 MG/DL (ref 0.76–1.27)
DEPRECATED RDW RBC AUTO: 57.2 FL (ref 37–54)
EGFRCR SERPLBLD CKD-EPI 2021: 153.9 ML/MIN/1.73
ERYTHROCYTE [DISTWIDTH] IN BLOOD BY AUTOMATED COUNT: 19.6 % (ref 12.3–15.4)
GLUCOSE BLDC GLUCOMTR-MCNC: 110 MG/DL (ref 70–130)
GLUCOSE BLDC GLUCOMTR-MCNC: 117 MG/DL (ref 70–130)
GLUCOSE BLDC GLUCOMTR-MCNC: 149 MG/DL (ref 70–130)
GLUCOSE SERPL-MCNC: 106 MG/DL (ref 65–99)
HCT VFR BLD AUTO: 24.5 % (ref 37.5–51)
HGB BLD-MCNC: 8 G/DL (ref 13–17.7)
MCH RBC QN AUTO: 27.1 PG (ref 26.6–33)
MCHC RBC AUTO-ENTMCNC: 32.7 G/DL (ref 31.5–35.7)
MCV RBC AUTO: 83.1 FL (ref 79–97)
PLATELET # BLD AUTO: 306 10*3/MM3 (ref 140–450)
PMV BLD AUTO: 9.7 FL (ref 6–12)
POTASSIUM SERPL-SCNC: 4 MMOL/L (ref 3.5–5.2)
RBC # BLD AUTO: 2.95 10*6/MM3 (ref 4.14–5.8)
SODIUM SERPL-SCNC: 132 MMOL/L (ref 136–145)
WBC NRBC COR # BLD: 13.1 10*3/MM3 (ref 3.4–10.8)

## 2023-02-18 PROCEDURE — 25010000002 HEPARIN (PORCINE) PER 1000 UNITS: Performed by: SURGERY

## 2023-02-18 PROCEDURE — 63710000001 INSULIN LISPRO (HUMAN) PER 5 UNITS: Performed by: INTERNAL MEDICINE

## 2023-02-18 PROCEDURE — 99232 SBSQ HOSP IP/OBS MODERATE 35: CPT | Performed by: NURSE PRACTITIONER

## 2023-02-18 PROCEDURE — 97530 THERAPEUTIC ACTIVITIES: CPT

## 2023-02-18 PROCEDURE — 85027 COMPLETE CBC AUTOMATED: CPT | Performed by: INTERNAL MEDICINE

## 2023-02-18 PROCEDURE — 25010000002 CEFEPIME PER 500 MG: Performed by: INTERNAL MEDICINE

## 2023-02-18 PROCEDURE — 97110 THERAPEUTIC EXERCISES: CPT

## 2023-02-18 PROCEDURE — 80048 BASIC METABOLIC PNL TOTAL CA: CPT | Performed by: INTERNAL MEDICINE

## 2023-02-18 PROCEDURE — 82962 GLUCOSE BLOOD TEST: CPT

## 2023-02-18 PROCEDURE — 25010000002 DAPTOMYCIN PER 1 MG

## 2023-02-18 RX ADMIN — INSULIN LISPRO 4 UNITS: 100 INJECTION, SOLUTION INTRAVENOUS; SUBCUTANEOUS at 18:34

## 2023-02-18 RX ADMIN — CEFEPIME 2 G: 2 INJECTION, POWDER, FOR SOLUTION INTRAVENOUS at 04:52

## 2023-02-18 RX ADMIN — GABAPENTIN 800 MG: 400 CAPSULE ORAL at 09:04

## 2023-02-18 RX ADMIN — OXYCODONE HYDROCHLORIDE AND ACETAMINOPHEN 1 TABLET: 10; 325 TABLET ORAL at 20:59

## 2023-02-18 RX ADMIN — THIAMINE HCL TAB 100 MG 100 MG: 100 TAB at 09:05

## 2023-02-18 RX ADMIN — HEPARIN SODIUM 5000 UNITS: 5000 INJECTION INTRAVENOUS; SUBCUTANEOUS at 09:04

## 2023-02-18 RX ADMIN — METRONIDAZOLE 500 MG: 500 TABLET ORAL at 14:08

## 2023-02-18 RX ADMIN — LISINOPRIL 40 MG: 20 TABLET ORAL at 09:04

## 2023-02-18 RX ADMIN — CEFEPIME 2 G: 2 INJECTION, POWDER, FOR SOLUTION INTRAVENOUS at 11:45

## 2023-02-18 RX ADMIN — METOPROLOL SUCCINATE 25 MG: 25 TABLET, EXTENDED RELEASE ORAL at 09:05

## 2023-02-18 RX ADMIN — OXYCODONE HYDROCHLORIDE AND ACETAMINOPHEN 1 TABLET: 10; 325 TABLET ORAL at 09:04

## 2023-02-18 RX ADMIN — HEPARIN SODIUM 5000 UNITS: 5000 INJECTION INTRAVENOUS; SUBCUTANEOUS at 20:17

## 2023-02-18 RX ADMIN — ANTI-PRURITIC 1 APPLICATION: 1 CREAM TOPICAL at 05:18

## 2023-02-18 RX ADMIN — OXYCODONE HYDROCHLORIDE AND ACETAMINOPHEN 1 TABLET: 10; 325 TABLET ORAL at 14:05

## 2023-02-18 RX ADMIN — CEFEPIME 2 G: 2 INJECTION, POWDER, FOR SOLUTION INTRAVENOUS at 20:19

## 2023-02-18 RX ADMIN — INSULIN LISPRO 4 UNITS: 100 INJECTION, SOLUTION INTRAVENOUS; SUBCUTANEOUS at 09:03

## 2023-02-18 RX ADMIN — GABAPENTIN 800 MG: 400 CAPSULE ORAL at 20:17

## 2023-02-18 RX ADMIN — Medication 10 ML: at 09:06

## 2023-02-18 RX ADMIN — METRONIDAZOLE 500 MG: 500 TABLET ORAL at 20:55

## 2023-02-18 RX ADMIN — ANTI-PRURITIC 1 APPLICATION: 1 CREAM TOPICAL at 20:19

## 2023-02-18 RX ADMIN — OXYCODONE HYDROCHLORIDE AND ACETAMINOPHEN 1 TABLET: 10; 325 TABLET ORAL at 11:45

## 2023-02-18 RX ADMIN — INSULIN LISPRO 4 UNITS: 100 INJECTION, SOLUTION INTRAVENOUS; SUBCUTANEOUS at 11:46

## 2023-02-18 RX ADMIN — Medication 1 CAPSULE: at 09:05

## 2023-02-18 RX ADMIN — METRONIDAZOLE 500 MG: 500 TABLET ORAL at 04:52

## 2023-02-18 RX ADMIN — DULOXETINE HYDROCHLORIDE 60 MG: 60 CAPSULE, DELAYED RELEASE ORAL at 09:04

## 2023-02-18 RX ADMIN — FLUCONAZOLE 200 MG: 200 TABLET ORAL at 14:42

## 2023-02-18 RX ADMIN — ANTI-PRURITIC 1 APPLICATION: 1 CREAM TOPICAL at 14:05

## 2023-02-18 RX ADMIN — FAMOTIDINE 20 MG: 20 TABLET ORAL at 18:34

## 2023-02-18 RX ADMIN — ROSUVASTATIN CALCIUM 20 MG: 20 TABLET ORAL at 20:17

## 2023-02-18 RX ADMIN — DAPTOMYCIN 250 MG: 500 INJECTION, POWDER, LYOPHILIZED, FOR SOLUTION INTRAVENOUS at 11:45

## 2023-02-18 RX ADMIN — GABAPENTIN 800 MG: 400 CAPSULE ORAL at 11:46

## 2023-02-18 RX ADMIN — DOCUSATE SODIUM 100 MG: 100 CAPSULE, LIQUID FILLED ORAL at 09:04

## 2023-02-18 RX ADMIN — OXYCODONE HYDROCHLORIDE AND ACETAMINOPHEN 1 TABLET: 10; 325 TABLET ORAL at 18:34

## 2023-02-18 RX ADMIN — FAMOTIDINE 20 MG: 20 TABLET ORAL at 09:05

## 2023-02-18 RX ADMIN — Medication 10 ML: at 20:17

## 2023-02-18 RX ADMIN — GABAPENTIN 800 MG: 400 CAPSULE ORAL at 18:34

## 2023-02-19 LAB
ANION GAP SERPL CALCULATED.3IONS-SCNC: 5 MMOL/L (ref 5–15)
ANISOCYTOSIS BLD QL: NORMAL
BASOPHILS # BLD AUTO: 0.1 10*3/MM3 (ref 0–0.2)
BASOPHILS NFR BLD AUTO: 0.8 % (ref 0–1.5)
BUN SERPL-MCNC: 10 MG/DL (ref 8–23)
BUN/CREAT SERPL: 29.4 (ref 7–25)
CALCIUM SPEC-SCNC: 8.1 MG/DL (ref 8.6–10.5)
CHLORIDE SERPL-SCNC: 94 MMOL/L (ref 98–107)
CO2 SERPL-SCNC: 30 MMOL/L (ref 22–29)
CREAT SERPL-MCNC: 0.34 MG/DL (ref 0.76–1.27)
DEPRECATED RDW RBC AUTO: 60 FL (ref 37–54)
EGFRCR SERPLBLD CKD-EPI 2021: 124.8 ML/MIN/1.73
EOSINOPHIL # BLD AUTO: 3.22 10*3/MM3 (ref 0–0.4)
EOSINOPHIL NFR BLD AUTO: 26.6 % (ref 0.3–6.2)
ERYTHROCYTE [DISTWIDTH] IN BLOOD BY AUTOMATED COUNT: 19.9 % (ref 12.3–15.4)
FERRITIN SERPL-MCNC: 197.9 NG/ML (ref 30–400)
FOLATE SERPL-MCNC: 11.8 NG/ML (ref 4.78–24.2)
GLUCOSE BLDC GLUCOMTR-MCNC: 118 MG/DL (ref 70–130)
GLUCOSE BLDC GLUCOMTR-MCNC: 119 MG/DL (ref 70–130)
GLUCOSE BLDC GLUCOMTR-MCNC: 121 MG/DL (ref 70–130)
GLUCOSE SERPL-MCNC: 100 MG/DL (ref 65–99)
HCT VFR BLD AUTO: 23.4 % (ref 37.5–51)
HGB BLD-MCNC: 7.4 G/DL (ref 13–17.7)
HYPOCHROMIA BLD QL: NORMAL
IMM GRANULOCYTES # BLD AUTO: 0.04 10*3/MM3 (ref 0–0.05)
IMM GRANULOCYTES NFR BLD AUTO: 0.3 % (ref 0–0.5)
IRON 24H UR-MRATE: 22 MCG/DL (ref 59–158)
IRON SATN MFR SERPL: 12 % (ref 20–50)
LYMPHOCYTES # BLD AUTO: 1.5 10*3/MM3 (ref 0.7–3.1)
LYMPHOCYTES NFR BLD AUTO: 12.4 % (ref 19.6–45.3)
MCH RBC QN AUTO: 26.7 PG (ref 26.6–33)
MCHC RBC AUTO-ENTMCNC: 31.6 G/DL (ref 31.5–35.7)
MCV RBC AUTO: 84.5 FL (ref 79–97)
MONOCYTES # BLD AUTO: 0.91 10*3/MM3 (ref 0.1–0.9)
MONOCYTES NFR BLD AUTO: 7.5 % (ref 5–12)
NEUTROPHILS NFR BLD AUTO: 52.4 % (ref 42.7–76)
NEUTROPHILS NFR BLD AUTO: 6.34 10*3/MM3 (ref 1.7–7)
NRBC BLD AUTO-RTO: 0 /100 WBC (ref 0–0.2)
O+P SPEC MICRO: NORMAL
O+P STL TRI STN: NORMAL
OSMOLALITY SERPL: 283 MOSM/KG (ref 275–295)
OSMOLALITY UR: 477 MOSM/KG (ref 300–1100)
PLAT MORPH BLD: NORMAL
PLATELET # BLD AUTO: 291 10*3/MM3 (ref 140–450)
PMV BLD AUTO: 9.5 FL (ref 6–12)
POTASSIUM SERPL-SCNC: 4.1 MMOL/L (ref 3.5–5.2)
RBC # BLD AUTO: 2.77 10*6/MM3 (ref 4.14–5.8)
SODIUM SERPL-SCNC: 129 MMOL/L (ref 136–145)
SODIUM SERPL-SCNC: 133 MMOL/L (ref 136–145)
SODIUM UR-SCNC: 146 MMOL/L
TIBC SERPL-MCNC: 179 MCG/DL (ref 298–536)
TRANSFERRIN SERPL-MCNC: 120 MG/DL (ref 200–360)
VIT B12 BLD-MCNC: 169 PG/ML (ref 211–946)
WBC MORPH BLD: NORMAL
WBC NRBC COR # BLD: 12.11 10*3/MM3 (ref 3.4–10.8)

## 2023-02-19 PROCEDURE — 85025 COMPLETE CBC W/AUTO DIFF WBC: CPT | Performed by: NURSE PRACTITIONER

## 2023-02-19 PROCEDURE — 25010000002 HEPARIN (PORCINE) PER 1000 UNITS: Performed by: SURGERY

## 2023-02-19 PROCEDURE — 82746 ASSAY OF FOLIC ACID SERUM: CPT | Performed by: NURSE PRACTITIONER

## 2023-02-19 PROCEDURE — 82607 VITAMIN B-12: CPT | Performed by: NURSE PRACTITIONER

## 2023-02-19 PROCEDURE — 82728 ASSAY OF FERRITIN: CPT | Performed by: NURSE PRACTITIONER

## 2023-02-19 PROCEDURE — 83935 ASSAY OF URINE OSMOLALITY: CPT | Performed by: NURSE PRACTITIONER

## 2023-02-19 PROCEDURE — 63710000001 INSULIN LISPRO (HUMAN) PER 5 UNITS: Performed by: INTERNAL MEDICINE

## 2023-02-19 PROCEDURE — 25010000002 DAPTOMYCIN PER 1 MG

## 2023-02-19 PROCEDURE — 84466 ASSAY OF TRANSFERRIN: CPT | Performed by: NURSE PRACTITIONER

## 2023-02-19 PROCEDURE — 82962 GLUCOSE BLOOD TEST: CPT

## 2023-02-19 PROCEDURE — 80048 BASIC METABOLIC PNL TOTAL CA: CPT | Performed by: NURSE PRACTITIONER

## 2023-02-19 PROCEDURE — 85007 BL SMEAR W/DIFF WBC COUNT: CPT | Performed by: NURSE PRACTITIONER

## 2023-02-19 PROCEDURE — 84295 ASSAY OF SERUM SODIUM: CPT | Performed by: NURSE PRACTITIONER

## 2023-02-19 PROCEDURE — 83540 ASSAY OF IRON: CPT | Performed by: NURSE PRACTITIONER

## 2023-02-19 PROCEDURE — 25010000002 CEFEPIME PER 500 MG: Performed by: INTERNAL MEDICINE

## 2023-02-19 PROCEDURE — 99232 SBSQ HOSP IP/OBS MODERATE 35: CPT | Performed by: NURSE PRACTITIONER

## 2023-02-19 PROCEDURE — 83930 ASSAY OF BLOOD OSMOLALITY: CPT | Performed by: NURSE PRACTITIONER

## 2023-02-19 PROCEDURE — 25010000002 NA FERRIC GLUC CPLX PER 12.5 MG: Performed by: NURSE PRACTITIONER

## 2023-02-19 PROCEDURE — 84300 ASSAY OF URINE SODIUM: CPT | Performed by: NURSE PRACTITIONER

## 2023-02-19 RX ORDER — SODIUM CHLORIDE 9 MG/ML
50 INJECTION, SOLUTION INTRAVENOUS CONTINUOUS
Status: DISCONTINUED | OUTPATIENT
Start: 2023-02-19 | End: 2023-02-20

## 2023-02-19 RX ORDER — LANOLIN ALCOHOL/MO/W.PET/CERES
1000 CREAM (GRAM) TOPICAL DAILY
Status: DISCONTINUED | OUTPATIENT
Start: 2023-02-19 | End: 2023-02-21 | Stop reason: HOSPADM

## 2023-02-19 RX ADMIN — INSULIN LISPRO 4 UNITS: 100 INJECTION, SOLUTION INTRAVENOUS; SUBCUTANEOUS at 11:56

## 2023-02-19 RX ADMIN — GABAPENTIN 800 MG: 400 CAPSULE ORAL at 17:47

## 2023-02-19 RX ADMIN — OXYCODONE HYDROCHLORIDE AND ACETAMINOPHEN 1 TABLET: 10; 325 TABLET ORAL at 17:47

## 2023-02-19 RX ADMIN — AZTREONAM 2 G: 2 INJECTION, POWDER, LYOPHILIZED, FOR SOLUTION INTRAMUSCULAR; INTRAVENOUS at 17:46

## 2023-02-19 RX ADMIN — OXYCODONE HYDROCHLORIDE AND ACETAMINOPHEN 1 TABLET: 10; 325 TABLET ORAL at 08:33

## 2023-02-19 RX ADMIN — GABAPENTIN 800 MG: 400 CAPSULE ORAL at 22:18

## 2023-02-19 RX ADMIN — METOPROLOL SUCCINATE 25 MG: 25 TABLET, EXTENDED RELEASE ORAL at 08:33

## 2023-02-19 RX ADMIN — METRONIDAZOLE 500 MG: 500 TABLET ORAL at 05:53

## 2023-02-19 RX ADMIN — Medication 1 CAPSULE: at 08:32

## 2023-02-19 RX ADMIN — ANTI-PRURITIC 1 APPLICATION: 1 CREAM TOPICAL at 05:51

## 2023-02-19 RX ADMIN — GABAPENTIN 800 MG: 400 CAPSULE ORAL at 08:32

## 2023-02-19 RX ADMIN — ANTI-PRURITIC 1 APPLICATION: 1 CREAM TOPICAL at 14:26

## 2023-02-19 RX ADMIN — GABAPENTIN 800 MG: 400 CAPSULE ORAL at 11:56

## 2023-02-19 RX ADMIN — AZTREONAM 2 G: 2 INJECTION, POWDER, LYOPHILIZED, FOR SOLUTION INTRAMUSCULAR; INTRAVENOUS at 22:20

## 2023-02-19 RX ADMIN — DOCUSATE SODIUM 100 MG: 100 CAPSULE, LIQUID FILLED ORAL at 22:17

## 2023-02-19 RX ADMIN — OXYCODONE HYDROCHLORIDE AND ACETAMINOPHEN 1 TABLET: 10; 325 TABLET ORAL at 22:19

## 2023-02-19 RX ADMIN — INSULIN LISPRO 4 UNITS: 100 INJECTION, SOLUTION INTRAVENOUS; SUBCUTANEOUS at 17:47

## 2023-02-19 RX ADMIN — METRONIDAZOLE 500 MG: 500 TABLET ORAL at 22:19

## 2023-02-19 RX ADMIN — Medication 5 MG: at 22:19

## 2023-02-19 RX ADMIN — Medication 10 ML: at 08:34

## 2023-02-19 RX ADMIN — OXYCODONE HYDROCHLORIDE AND ACETAMINOPHEN 1 TABLET: 10; 325 TABLET ORAL at 14:23

## 2023-02-19 RX ADMIN — ANTI-PRURITIC 1 APPLICATION: 1 CREAM TOPICAL at 22:19

## 2023-02-19 RX ADMIN — Medication 10 ML: at 22:20

## 2023-02-19 RX ADMIN — DAPTOMYCIN 250 MG: 500 INJECTION, POWDER, LYOPHILIZED, FOR SOLUTION INTRAVENOUS at 11:57

## 2023-02-19 RX ADMIN — HEPARIN SODIUM 5000 UNITS: 5000 INJECTION INTRAVENOUS; SUBCUTANEOUS at 22:20

## 2023-02-19 RX ADMIN — THIAMINE HCL TAB 100 MG 100 MG: 100 TAB at 08:33

## 2023-02-19 RX ADMIN — SODIUM CHLORIDE 50 ML/HR: 9 INJECTION, SOLUTION INTRAVENOUS at 10:13

## 2023-02-19 RX ADMIN — CEFEPIME 2 G: 2 INJECTION, POWDER, FOR SOLUTION INTRAVENOUS at 11:57

## 2023-02-19 RX ADMIN — LISINOPRIL 40 MG: 20 TABLET ORAL at 08:34

## 2023-02-19 RX ADMIN — DULOXETINE HYDROCHLORIDE 60 MG: 60 CAPSULE, DELAYED RELEASE ORAL at 08:33

## 2023-02-19 RX ADMIN — METRONIDAZOLE 500 MG: 500 TABLET ORAL at 17:46

## 2023-02-19 RX ADMIN — FLUCONAZOLE 200 MG: 200 TABLET ORAL at 10:12

## 2023-02-19 RX ADMIN — ROSUVASTATIN CALCIUM 20 MG: 20 TABLET ORAL at 22:19

## 2023-02-19 RX ADMIN — FAMOTIDINE 20 MG: 20 TABLET ORAL at 17:47

## 2023-02-19 RX ADMIN — CYANOCOBALAMIN TAB 1000 MCG 1000 MCG: 1000 TAB at 14:23

## 2023-02-19 RX ADMIN — SODIUM CHLORIDE 125 MG: 9 INJECTION, SOLUTION INTRAVENOUS at 14:24

## 2023-02-19 RX ADMIN — INSULIN LISPRO 4 UNITS: 100 INJECTION, SOLUTION INTRAVENOUS; SUBCUTANEOUS at 08:33

## 2023-02-19 RX ADMIN — DOCUSATE SODIUM 100 MG: 100 CAPSULE, LIQUID FILLED ORAL at 08:34

## 2023-02-19 RX ADMIN — SENNOSIDES 1 TABLET: 8.6 TABLET, FILM COATED ORAL at 22:17

## 2023-02-19 RX ADMIN — FAMOTIDINE 20 MG: 20 TABLET ORAL at 08:33

## 2023-02-19 RX ADMIN — CEFEPIME 2 G: 2 INJECTION, POWDER, FOR SOLUTION INTRAVENOUS at 05:51

## 2023-02-19 RX ADMIN — OXYCODONE HYDROCHLORIDE AND ACETAMINOPHEN 1 TABLET: 10; 325 TABLET ORAL at 11:56

## 2023-02-19 RX ADMIN — HEPARIN SODIUM 5000 UNITS: 5000 INJECTION INTRAVENOUS; SUBCUTANEOUS at 08:32

## 2023-02-20 PROBLEM — N31.9 NEUROGENIC BLADDER: Status: ACTIVE | Noted: 2023-02-20

## 2023-02-20 PROBLEM — N47.8 REDUNDANT FORESKIN: Status: ACTIVE | Noted: 2023-02-20

## 2023-02-20 PROBLEM — M46.28 SACRAL OSTEOMYELITIS: Status: ACTIVE | Noted: 2023-02-20

## 2023-02-20 PROBLEM — Z93.3 S/P COLOSTOMY (HCC): Status: ACTIVE | Noted: 2023-02-20

## 2023-02-20 PROBLEM — L89.154 PRESSURE INJURY OF SACRAL REGION, STAGE 4: Status: ACTIVE | Noted: 2023-02-20

## 2023-02-20 LAB
ANION GAP SERPL CALCULATED.3IONS-SCNC: 5 MMOL/L (ref 5–15)
BASOPHILS # BLD AUTO: 0.09 10*3/MM3 (ref 0–0.2)
BASOPHILS NFR BLD AUTO: 0.9 % (ref 0–1.5)
BUN SERPL-MCNC: 9 MG/DL (ref 8–23)
BUN/CREAT SERPL: 37.5 (ref 7–25)
CALCIUM SPEC-SCNC: 8 MG/DL (ref 8.6–10.5)
CHLORIDE SERPL-SCNC: 96 MMOL/L (ref 98–107)
CK SERPL-CCNC: 12 U/L (ref 20–200)
CO2 SERPL-SCNC: 31 MMOL/L (ref 22–29)
CREAT SERPL-MCNC: 0.24 MG/DL (ref 0.76–1.27)
DEPRECATED RDW RBC AUTO: 61 FL (ref 37–54)
EGFRCR SERPLBLD CKD-EPI 2021: 138.7 ML/MIN/1.73
EOSINOPHIL # BLD AUTO: 3.25 10*3/MM3 (ref 0–0.4)
EOSINOPHIL NFR BLD AUTO: 30.9 % (ref 0.3–6.2)
ERYTHROCYTE [DISTWIDTH] IN BLOOD BY AUTOMATED COUNT: 19.9 % (ref 12.3–15.4)
GLUCOSE BLDC GLUCOMTR-MCNC: 161 MG/DL (ref 70–130)
GLUCOSE BLDC GLUCOMTR-MCNC: 177 MG/DL (ref 70–130)
GLUCOSE BLDC GLUCOMTR-MCNC: 98 MG/DL (ref 70–130)
GLUCOSE SERPL-MCNC: 96 MG/DL (ref 65–99)
HCT VFR BLD AUTO: 24.4 % (ref 37.5–51)
HGB BLD-MCNC: 7.7 G/DL (ref 13–17.7)
IMM GRANULOCYTES # BLD AUTO: 0.04 10*3/MM3 (ref 0–0.05)
IMM GRANULOCYTES NFR BLD AUTO: 0.4 % (ref 0–0.5)
LYMPHOCYTES # BLD AUTO: 1.22 10*3/MM3 (ref 0.7–3.1)
LYMPHOCYTES NFR BLD AUTO: 11.6 % (ref 19.6–45.3)
MCH RBC QN AUTO: 26.7 PG (ref 26.6–33)
MCHC RBC AUTO-ENTMCNC: 31.6 G/DL (ref 31.5–35.7)
MCV RBC AUTO: 84.7 FL (ref 79–97)
MONOCYTES # BLD AUTO: 0.67 10*3/MM3 (ref 0.1–0.9)
MONOCYTES NFR BLD AUTO: 6.4 % (ref 5–12)
NEUTROPHILS NFR BLD AUTO: 49.8 % (ref 42.7–76)
NEUTROPHILS NFR BLD AUTO: 5.25 10*3/MM3 (ref 1.7–7)
NRBC BLD AUTO-RTO: 0 /100 WBC (ref 0–0.2)
PLATELET # BLD AUTO: 284 10*3/MM3 (ref 140–450)
PMV BLD AUTO: 9.4 FL (ref 6–12)
POTASSIUM SERPL-SCNC: 4.2 MMOL/L (ref 3.5–5.2)
RBC # BLD AUTO: 2.88 10*6/MM3 (ref 4.14–5.8)
SODIUM SERPL-SCNC: 132 MMOL/L (ref 136–145)
WBC NRBC COR # BLD: 10.52 10*3/MM3 (ref 3.4–10.8)

## 2023-02-20 PROCEDURE — 25010000002 DAPTOMYCIN PER 1 MG

## 2023-02-20 PROCEDURE — 80048 BASIC METABOLIC PNL TOTAL CA: CPT | Performed by: NURSE PRACTITIONER

## 2023-02-20 PROCEDURE — 25010000002 NA FERRIC GLUC CPLX PER 12.5 MG: Performed by: NURSE PRACTITIONER

## 2023-02-20 PROCEDURE — 82550 ASSAY OF CK (CPK): CPT | Performed by: PHARMACIST

## 2023-02-20 PROCEDURE — 63710000001 INSULIN LISPRO (HUMAN) PER 5 UNITS: Performed by: INTERNAL MEDICINE

## 2023-02-20 PROCEDURE — 99232 SBSQ HOSP IP/OBS MODERATE 35: CPT | Performed by: PHYSICIAN ASSISTANT

## 2023-02-20 PROCEDURE — 82962 GLUCOSE BLOOD TEST: CPT

## 2023-02-20 PROCEDURE — 25010000002 HEPARIN (PORCINE) PER 1000 UNITS: Performed by: SURGERY

## 2023-02-20 PROCEDURE — 63710000001 INSULIN LISPRO (HUMAN) PER 5 UNITS: Performed by: SURGERY

## 2023-02-20 PROCEDURE — 85025 COMPLETE CBC W/AUTO DIFF WBC: CPT | Performed by: NURSE PRACTITIONER

## 2023-02-20 RX ORDER — DULOXETIN HYDROCHLORIDE 60 MG/1
60 CAPSULE, DELAYED RELEASE ORAL DAILY
Start: 2023-02-20

## 2023-02-20 RX ORDER — FERROUS SULFATE 324(65)MG
324 TABLET, DELAYED RELEASE (ENTERIC COATED) ORAL
Qty: 30 TABLET
Start: 2023-02-20

## 2023-02-20 RX ORDER — SODIUM CHLORIDE 1000 MG
1 TABLET, SOLUBLE MISCELLANEOUS DAILY
Start: 2023-02-21

## 2023-02-20 RX ORDER — SENNA PLUS 8.6 MG/1
2 TABLET ORAL NIGHTLY
Start: 2023-02-20

## 2023-02-20 RX ORDER — GABAPENTIN 800 MG/1
800 TABLET ORAL 4 TIMES DAILY
Qty: 12 TABLET | Refills: 0 | Status: SHIPPED | OUTPATIENT
Start: 2023-02-20 | End: 2023-02-21 | Stop reason: SDUPTHER

## 2023-02-20 RX ORDER — NYSTATIN 100000 [USP'U]/G
POWDER TOPICAL EVERY 12 HOURS SCHEDULED
Status: DISCONTINUED | OUTPATIENT
Start: 2023-02-20 | End: 2023-02-21 | Stop reason: HOSPADM

## 2023-02-20 RX ORDER — SODIUM CHLORIDE 9 MG/ML
125 INJECTION, SOLUTION INTRAVENOUS CONTINUOUS
Status: ACTIVE | OUTPATIENT
Start: 2023-02-20 | End: 2023-02-21

## 2023-02-20 RX ORDER — L.ACID,PARA/B.BIFIDUM/S.THERM 8B CELL
1 CAPSULE ORAL DAILY
Start: 2023-02-21

## 2023-02-20 RX ORDER — METRONIDAZOLE 500 MG/1
500 TABLET ORAL EVERY 8 HOURS
Qty: 63 TABLET | Refills: 0 | Status: ON HOLD | OUTPATIENT
Start: 2023-02-20 | End: 2023-03-14

## 2023-02-20 RX ORDER — LANOLIN ALCOHOL/MO/W.PET/CERES
100 CREAM (GRAM) TOPICAL DAILY
Start: 2023-02-21

## 2023-02-20 RX ORDER — FLUCONAZOLE 200 MG/1
200 TABLET ORAL EVERY 24 HOURS
Refills: 0 | Status: ON HOLD
Start: 2023-02-21 | End: 2023-03-14

## 2023-02-20 RX ORDER — POLYETHYLENE GLYCOL 3350 17 G/17G
17 POWDER, FOR SOLUTION ORAL DAILY
Start: 2023-02-20

## 2023-02-20 RX ORDER — ASCORBIC ACID 500 MG
500 TABLET ORAL DAILY
Start: 2023-02-20

## 2023-02-20 RX ORDER — SODIUM CHLORIDE 1000 MG
1 TABLET, SOLUBLE MISCELLANEOUS DAILY
Status: DISCONTINUED | OUTPATIENT
Start: 2023-02-20 | End: 2023-02-21 | Stop reason: HOSPADM

## 2023-02-20 RX ORDER — NYSTATIN 100000 [USP'U]/G
POWDER TOPICAL EVERY 12 HOURS SCHEDULED
Start: 2023-02-20 | End: 2023-03-24 | Stop reason: HOSPADM

## 2023-02-20 RX ADMIN — METRONIDAZOLE 500 MG: 500 TABLET ORAL at 21:22

## 2023-02-20 RX ADMIN — GABAPENTIN 800 MG: 400 CAPSULE ORAL at 18:21

## 2023-02-20 RX ADMIN — DAPTOMYCIN 250 MG: 500 INJECTION, POWDER, LYOPHILIZED, FOR SOLUTION INTRAVENOUS at 12:19

## 2023-02-20 RX ADMIN — THIAMINE HCL TAB 100 MG 100 MG: 100 TAB at 09:20

## 2023-02-20 RX ADMIN — Medication 10 ML: at 09:23

## 2023-02-20 RX ADMIN — OXYCODONE HYDROCHLORIDE AND ACETAMINOPHEN 1 TABLET: 10; 325 TABLET ORAL at 09:31

## 2023-02-20 RX ADMIN — GABAPENTIN 800 MG: 400 CAPSULE ORAL at 21:22

## 2023-02-20 RX ADMIN — METRONIDAZOLE 500 MG: 500 TABLET ORAL at 09:21

## 2023-02-20 RX ADMIN — Medication 10 ML: at 21:22

## 2023-02-20 RX ADMIN — OXYCODONE HYDROCHLORIDE AND ACETAMINOPHEN 1 TABLET: 10; 325 TABLET ORAL at 18:21

## 2023-02-20 RX ADMIN — CYANOCOBALAMIN TAB 1000 MCG 1000 MCG: 1000 TAB at 09:20

## 2023-02-20 RX ADMIN — GABAPENTIN 800 MG: 400 CAPSULE ORAL at 09:20

## 2023-02-20 RX ADMIN — SODIUM CHLORIDE 125 ML/HR: 9 INJECTION, SOLUTION INTRAVENOUS at 16:16

## 2023-02-20 RX ADMIN — METOPROLOL SUCCINATE 25 MG: 25 TABLET, EXTENDED RELEASE ORAL at 09:31

## 2023-02-20 RX ADMIN — INSULIN LISPRO 4 UNITS: 100 INJECTION, SOLUTION INTRAVENOUS; SUBCUTANEOUS at 12:19

## 2023-02-20 RX ADMIN — SODIUM CHLORIDE 125 ML/HR: 9 INJECTION, SOLUTION INTRAVENOUS at 22:28

## 2023-02-20 RX ADMIN — Medication 1 CAPSULE: at 09:20

## 2023-02-20 RX ADMIN — ANTI-PRURITIC 1 APPLICATION: 1 CREAM TOPICAL at 21:23

## 2023-02-20 RX ADMIN — ANTI-PRURITIC 1 APPLICATION: 1 CREAM TOPICAL at 06:30

## 2023-02-20 RX ADMIN — AZTREONAM 2 G: 2 INJECTION, POWDER, LYOPHILIZED, FOR SOLUTION INTRAMUSCULAR; INTRAVENOUS at 09:18

## 2023-02-20 RX ADMIN — GABAPENTIN 800 MG: 400 CAPSULE ORAL at 12:18

## 2023-02-20 RX ADMIN — LISINOPRIL 40 MG: 20 TABLET ORAL at 09:20

## 2023-02-20 RX ADMIN — FAMOTIDINE 20 MG: 20 TABLET ORAL at 18:21

## 2023-02-20 RX ADMIN — HEPARIN SODIUM 5000 UNITS: 5000 INJECTION INTRAVENOUS; SUBCUTANEOUS at 21:21

## 2023-02-20 RX ADMIN — OXYCODONE HYDROCHLORIDE AND ACETAMINOPHEN 1 TABLET: 10; 325 TABLET ORAL at 14:29

## 2023-02-20 RX ADMIN — METRONIDAZOLE 500 MG: 500 TABLET ORAL at 14:29

## 2023-02-20 RX ADMIN — DOCUSATE SODIUM 100 MG: 100 CAPSULE, LIQUID FILLED ORAL at 09:20

## 2023-02-20 RX ADMIN — HEPARIN SODIUM 5000 UNITS: 5000 INJECTION INTRAVENOUS; SUBCUTANEOUS at 09:20

## 2023-02-20 RX ADMIN — OXYCODONE HYDROCHLORIDE AND ACETAMINOPHEN 1 TABLET: 10; 325 TABLET ORAL at 21:22

## 2023-02-20 RX ADMIN — DULOXETINE HYDROCHLORIDE 60 MG: 60 CAPSULE, DELAYED RELEASE ORAL at 09:20

## 2023-02-20 RX ADMIN — AZTREONAM 2 G: 2 INJECTION, POWDER, LYOPHILIZED, FOR SOLUTION INTRAMUSCULAR; INTRAVENOUS at 22:28

## 2023-02-20 RX ADMIN — ROSUVASTATIN CALCIUM 20 MG: 20 TABLET ORAL at 21:30

## 2023-02-20 RX ADMIN — INSULIN LISPRO 2 UNITS: 100 INJECTION, SOLUTION INTRAVENOUS; SUBCUTANEOUS at 12:18

## 2023-02-20 RX ADMIN — FLUCONAZOLE 200 MG: 200 TABLET ORAL at 12:19

## 2023-02-20 RX ADMIN — FAMOTIDINE 20 MG: 20 TABLET ORAL at 09:20

## 2023-02-20 RX ADMIN — DOCUSATE SODIUM 100 MG: 100 CAPSULE, LIQUID FILLED ORAL at 21:21

## 2023-02-20 RX ADMIN — NYSTATIN: 100000 POWDER TOPICAL at 21:20

## 2023-02-20 RX ADMIN — OXYCODONE HYDROCHLORIDE AND ACETAMINOPHEN 1 TABLET: 10; 325 TABLET ORAL at 12:18

## 2023-02-20 RX ADMIN — SODIUM CHLORIDE 1 G: 1 TABLET ORAL at 09:19

## 2023-02-20 RX ADMIN — INSULIN LISPRO 2 UNITS: 100 INJECTION, SOLUTION INTRAVENOUS; SUBCUTANEOUS at 18:21

## 2023-02-20 RX ADMIN — SODIUM CHLORIDE 125 MG: 9 INJECTION, SOLUTION INTRAVENOUS at 09:18

## 2023-02-20 RX ADMIN — NYSTATIN: 100000 POWDER TOPICAL at 12:18

## 2023-02-20 RX ADMIN — INSULIN LISPRO 4 UNITS: 100 INJECTION, SOLUTION INTRAVENOUS; SUBCUTANEOUS at 18:22

## 2023-02-20 RX ADMIN — SENNOSIDES 1 TABLET: 8.6 TABLET, FILM COATED ORAL at 21:30

## 2023-02-20 RX ADMIN — AZTREONAM 2 G: 2 INJECTION, POWDER, LYOPHILIZED, FOR SOLUTION INTRAMUSCULAR; INTRAVENOUS at 14:29

## 2023-02-21 VITALS
SYSTOLIC BLOOD PRESSURE: 119 MMHG | BODY MASS INDEX: 19.64 KG/M2 | OXYGEN SATURATION: 96 % | HEART RATE: 83 BPM | DIASTOLIC BLOOD PRESSURE: 70 MMHG | WEIGHT: 145 LBS | RESPIRATION RATE: 16 BRPM | TEMPERATURE: 98 F | HEIGHT: 72 IN

## 2023-02-21 LAB
ANION GAP SERPL CALCULATED.3IONS-SCNC: 13 MMOL/L (ref 5–15)
BUN SERPL-MCNC: 12 MG/DL (ref 8–23)
BUN/CREAT SERPL: 44.4 (ref 7–25)
CALCIUM SPEC-SCNC: 7.5 MG/DL (ref 8.6–10.5)
CHLORIDE SERPL-SCNC: 98 MMOL/L (ref 98–107)
CO2 SERPL-SCNC: 22 MMOL/L (ref 22–29)
CREAT SERPL-MCNC: 0.27 MG/DL (ref 0.76–1.27)
EGFRCR SERPLBLD CKD-EPI 2021: 133.8 ML/MIN/1.73
GLUCOSE BLDC GLUCOMTR-MCNC: 123 MG/DL (ref 70–130)
GLUCOSE BLDC GLUCOMTR-MCNC: 93 MG/DL (ref 70–130)
GLUCOSE SERPL-MCNC: 68 MG/DL (ref 65–99)
POTASSIUM SERPL-SCNC: 4 MMOL/L (ref 3.5–5.2)
SODIUM SERPL-SCNC: 133 MMOL/L (ref 136–145)

## 2023-02-21 PROCEDURE — 25010000002 HEPARIN (PORCINE) PER 1000 UNITS: Performed by: SURGERY

## 2023-02-21 PROCEDURE — 63710000001 INSULIN LISPRO (HUMAN) PER 5 UNITS: Performed by: INTERNAL MEDICINE

## 2023-02-21 PROCEDURE — 82962 GLUCOSE BLOOD TEST: CPT

## 2023-02-21 PROCEDURE — 25010000002 NA FERRIC GLUC CPLX PER 12.5 MG: Performed by: PHYSICIAN ASSISTANT

## 2023-02-21 PROCEDURE — 80048 BASIC METABOLIC PNL TOTAL CA: CPT | Performed by: PHYSICIAN ASSISTANT

## 2023-02-21 PROCEDURE — 99239 HOSP IP/OBS DSCHRG MGMT >30: CPT | Performed by: PHYSICIAN ASSISTANT

## 2023-02-21 PROCEDURE — 25010000002 DAPTOMYCIN PER 1 MG

## 2023-02-21 RX ORDER — DIPHENHYDRAMINE HYDROCHLORIDE, ZINC ACETATE 2; .1 G/100G; G/100G
1 CREAM TOPICAL 3 TIMES DAILY PRN
Start: 2023-02-21 | End: 2023-03-14

## 2023-02-21 RX ORDER — OXYCODONE AND ACETAMINOPHEN 10; 325 MG/1; MG/1
1 TABLET ORAL
Qty: 15 TABLET | Refills: 0 | Status: SHIPPED | OUTPATIENT
Start: 2023-02-21 | End: 2023-02-24 | Stop reason: SDUPTHER

## 2023-02-21 RX ORDER — DIAPER,BRIEF,INFANT-TODD,DISP
1 EACH MISCELLANEOUS 3 TIMES DAILY PRN
Start: 2023-02-21 | End: 2023-03-14

## 2023-02-21 RX ORDER — GABAPENTIN 800 MG/1
800 TABLET ORAL 4 TIMES DAILY
Qty: 12 TABLET | Refills: 0 | Status: SHIPPED | OUTPATIENT
Start: 2023-02-21

## 2023-02-21 RX ADMIN — DOCUSATE SODIUM 100 MG: 100 CAPSULE, LIQUID FILLED ORAL at 09:21

## 2023-02-21 RX ADMIN — GABAPENTIN 800 MG: 400 CAPSULE ORAL at 09:22

## 2023-02-21 RX ADMIN — Medication 10 ML: at 09:24

## 2023-02-21 RX ADMIN — OXYCODONE HYDROCHLORIDE AND ACETAMINOPHEN 1 TABLET: 10; 325 TABLET ORAL at 09:21

## 2023-02-21 RX ADMIN — NYSTATIN: 100000 POWDER TOPICAL at 09:25

## 2023-02-21 RX ADMIN — DULOXETINE HYDROCHLORIDE 60 MG: 60 CAPSULE, DELAYED RELEASE ORAL at 09:22

## 2023-02-21 RX ADMIN — FLUCONAZOLE 200 MG: 200 TABLET ORAL at 09:40

## 2023-02-21 RX ADMIN — METRONIDAZOLE 500 MG: 500 TABLET ORAL at 09:21

## 2023-02-21 RX ADMIN — Medication 1 CAPSULE: at 09:22

## 2023-02-21 RX ADMIN — AZTREONAM 2 G: 2 INJECTION, POWDER, LYOPHILIZED, FOR SOLUTION INTRAMUSCULAR; INTRAVENOUS at 06:31

## 2023-02-21 RX ADMIN — THIAMINE HCL TAB 100 MG 100 MG: 100 TAB at 09:39

## 2023-02-21 RX ADMIN — ANTI-PRURITIC 1 APPLICATION: 1 CREAM TOPICAL at 06:31

## 2023-02-21 RX ADMIN — LISINOPRIL 40 MG: 20 TABLET ORAL at 09:21

## 2023-02-21 RX ADMIN — FAMOTIDINE 20 MG: 20 TABLET ORAL at 09:22

## 2023-02-21 RX ADMIN — GABAPENTIN 800 MG: 400 CAPSULE ORAL at 12:19

## 2023-02-21 RX ADMIN — HEPARIN SODIUM 5000 UNITS: 5000 INJECTION INTRAVENOUS; SUBCUTANEOUS at 09:21

## 2023-02-21 RX ADMIN — CYANOCOBALAMIN TAB 1000 MCG 1000 MCG: 1000 TAB at 09:22

## 2023-02-21 RX ADMIN — INSULIN LISPRO 4 UNITS: 100 INJECTION, SOLUTION INTRAVENOUS; SUBCUTANEOUS at 12:19

## 2023-02-21 RX ADMIN — INSULIN LISPRO 4 UNITS: 100 INJECTION, SOLUTION INTRAVENOUS; SUBCUTANEOUS at 09:22

## 2023-02-21 RX ADMIN — SODIUM CHLORIDE 1 G: 1 TABLET ORAL at 09:23

## 2023-02-21 RX ADMIN — METOPROLOL SUCCINATE 25 MG: 25 TABLET, EXTENDED RELEASE ORAL at 09:22

## 2023-02-21 RX ADMIN — OXYCODONE HYDROCHLORIDE AND ACETAMINOPHEN 1 TABLET: 10; 325 TABLET ORAL at 12:19

## 2023-02-21 RX ADMIN — SODIUM CHLORIDE 250 MG: 9 INJECTION, SOLUTION INTRAVENOUS at 09:22

## 2023-02-21 RX ADMIN — DAPTOMYCIN 250 MG: 500 INJECTION, POWDER, LYOPHILIZED, FOR SOLUTION INTRAVENOUS at 12:19

## 2023-02-23 ENCOUNTER — NURSING HOME (OUTPATIENT)
Dept: INTERNAL MEDICINE | Facility: CLINIC | Age: 68
End: 2023-02-23
Payer: MEDICARE

## 2023-02-23 VITALS
DIASTOLIC BLOOD PRESSURE: 58 MMHG | RESPIRATION RATE: 18 BRPM | SYSTOLIC BLOOD PRESSURE: 128 MMHG | WEIGHT: 145 LBS | TEMPERATURE: 97.5 F | HEART RATE: 77 BPM | BODY MASS INDEX: 19.67 KG/M2 | OXYGEN SATURATION: 93 %

## 2023-02-23 DIAGNOSIS — D50.9 IRON DEFICIENCY ANEMIA, UNSPECIFIED IRON DEFICIENCY ANEMIA TYPE: ICD-10-CM

## 2023-02-23 DIAGNOSIS — I73.9 PERIPHERAL ARTERIAL DISEASE: ICD-10-CM

## 2023-02-23 DIAGNOSIS — I10 ESSENTIAL (PRIMARY) HYPERTENSION: ICD-10-CM

## 2023-02-23 DIAGNOSIS — L97.509 TYPE 2 DIABETES MELLITUS WITH FOOT ULCER, WITHOUT LONG-TERM CURRENT USE OF INSULIN: ICD-10-CM

## 2023-02-23 DIAGNOSIS — E11.621 TYPE 2 DIABETES MELLITUS WITH FOOT ULCER, WITHOUT LONG-TERM CURRENT USE OF INSULIN: ICD-10-CM

## 2023-02-23 DIAGNOSIS — Z93.3 S/P COLOSTOMY: ICD-10-CM

## 2023-02-23 DIAGNOSIS — G82.20 PARAPLEGIA, UNSPECIFIED: ICD-10-CM

## 2023-02-23 DIAGNOSIS — I96 GANGRENE OF LEFT FOOT: Primary | ICD-10-CM

## 2023-02-23 DIAGNOSIS — M86.9 OSTEOMYELITIS OF LEFT FOOT, UNSPECIFIED TYPE: ICD-10-CM

## 2023-02-23 DIAGNOSIS — E43 SEVERE MALNUTRITION: ICD-10-CM

## 2023-02-23 PROCEDURE — 99306 1ST NF CARE HIGH MDM 50: CPT | Performed by: INTERNAL MEDICINE

## 2023-02-24 DIAGNOSIS — M86.9 OSTEOMYELITIS, UNSPECIFIED SITE, UNSPECIFIED TYPE: ICD-10-CM

## 2023-02-24 DIAGNOSIS — G89.4 CHRONIC PAIN SYNDROME: ICD-10-CM

## 2023-02-24 RX ORDER — OXYCODONE AND ACETAMINOPHEN 10; 325 MG/1; MG/1
1 TABLET ORAL
Qty: 75 TABLET | Refills: 0 | Status: SHIPPED | OUTPATIENT
Start: 2023-02-24

## 2023-02-24 NOTE — TELEPHONE ENCOUNTER
DERRICK REQUESTING MED REFILL FOR OXYCODONE-ACETAMINOPHEN 5-325 MG.    DIRECTIONS: OXYCODONE-ACETAMINOPHEN 5-325 MG 1 TAB PO FIVE TIMES A DAY.
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
## 2023-03-09 ENCOUNTER — OFFICE VISIT (OUTPATIENT)
Dept: CARDIAC SURGERY | Facility: CLINIC | Age: 68
End: 2023-03-09
Payer: MEDICARE

## 2023-03-09 VITALS
HEART RATE: 74 BPM | OXYGEN SATURATION: 97 % | SYSTOLIC BLOOD PRESSURE: 140 MMHG | TEMPERATURE: 96.3 F | DIASTOLIC BLOOD PRESSURE: 80 MMHG

## 2023-03-09 DIAGNOSIS — I96 GANGRENE OF LEFT FOOT: Primary | ICD-10-CM

## 2023-03-09 DIAGNOSIS — I96 GANGRENE OF RIGHT FOOT: ICD-10-CM

## 2023-03-09 PROCEDURE — 99024 POSTOP FOLLOW-UP VISIT: CPT | Performed by: STUDENT IN AN ORGANIZED HEALTH CARE EDUCATION/TRAINING PROGRAM

## 2023-03-09 PROCEDURE — 3079F DIAST BP 80-89 MM HG: CPT | Performed by: STUDENT IN AN ORGANIZED HEALTH CARE EDUCATION/TRAINING PROGRAM

## 2023-03-09 PROCEDURE — 3077F SYST BP >= 140 MM HG: CPT | Performed by: STUDENT IN AN ORGANIZED HEALTH CARE EDUCATION/TRAINING PROGRAM

## 2023-03-09 RX ORDER — LISINOPRIL 40 MG/1
40 TABLET ORAL DAILY
COMMUNITY

## 2023-03-09 RX ORDER — MULTIPLE VITAMINS W/ MINERALS TAB 9MG-400MCG
1 TAB ORAL DAILY
COMMUNITY

## 2023-03-09 RX ORDER — SACCHAROMYCES BOULARDII 250 MG
250 CAPSULE ORAL DAILY
COMMUNITY

## 2023-03-09 NOTE — PROGRESS NOTES
03/09/2023  Patient Information  Severino Vera                                                                                          [address] [facility] [address]   1955  'PCP/Referring Physician'  Anuel Rankin MD  [phone]  No ref. provider found    Chief Complaint   Patient presents with   • Post-op Follow-up     Hosp D/C Left AKA 1/27/23       History of Present Illness: 67-year-old man with history of lower extremity paralysis with history of right above-knee amputation for osteomyelitis and gangrene in November 2022 who presents today for routine follow-up after left above-knee amputation January 27, 2023, uncomplicated.  The patient postoperatively developed pressure ulcers of the sacrum that were managed by general surgery and infectious disease.  The patient reports recovering well in rehab now without issue.      Patient Active Problem List   Diagnosis   • Peripheral arterial disease (HCC)   • Gangrene of left foot s/p L AKA 1/27/2023 (HCC)   • Gangrene of right foot s/p AKA 11/25/2022   • Osteomyelitis ([facility])   • Hyperlipidemia LDL goal <70   • Severe malnutrition ([facility])   • Anemia   • Gastro-esophageal reflux disease without esophagitis   • Essential (primary) hypertension   • Paraplegia, unspecified ([facility])   • Type 2 diabetes mellitus, without long-term current use of insulin ([facility])   • Tobacco abuse   • Hyponatremia   • Loculated pleural effusion   • Sacral osteomyelitis ([facility])   • S/P diverting colostomy creation 2/13/2023 ([facility])   • Pressure injury of sacral region, stage 4 ([facility])   • Redundant foreskin   • Neurogenic bladder     Past Medical History:   Diagnosis Date   • Arthritis    • Diabetes mellitus (HCC)    • Gangrene ([facility])     BILATERAL FEET   • GERD (gastroesophageal reflux disease)    • Hyperlipidemia    • Hypertension    • Osteomyelitis (HCC)    • PAD (peripheral artery disease) ([facility])    • Paraplegia ([facility])    • Peripheral vascular disease ([facility])    •  Self-catheterizes urinary bladder      Past Surgical History:   Procedure Laterality Date   • ABOVE KNEE AMPUTATION Right 11/25/2022    Procedure: AMPUTATION ABOVE KNEE RIGHT;  Surgeon: Nemesio Haji MD;  Location:  [facility];  Service: Vascular;  Laterality: Right;   • ABOVE KNEE AMPUTATION Left 1/27/2023    Procedure: AMPUTATION ABOVE KNEE LEFT;  Surgeon: Nemesio Haji MD;  Location:  [facility];  Service: Vascular;  Laterality: Left;   • EXPLORATORY LAPAROTOMY N/A 2/13/2023    Procedure: LAPAROTOMY EXPLORATORY, SIGMOID RESECTION, COLOSTOMY;  Surgeon: Neville Berman MD;  Location:  [facility];  Service: General;  Laterality: N/A;   • OTHER SURGICAL HISTORY Right     stent placement - right groin   • PILONIDAL CYSTECTOMY N/A 2/10/2023    Procedure: DEBRIDEMENT SACRAL DECUBITUS ULCER;  Surgeon: Neville Berman MD;  Location:  [facility];  Service: General;  Laterality: N/A;   • SHOULDER SURGERY Left     ROTATOR CUFF REPAIR       Current Outpatient Medications:   •  aspirin 81 MG EC tablet, Take 1 tablet by mouth Daily., Disp: 30 tablet, Rfl: 5  •  aztreonam (AZACTAM) 2 g/100 mL 0.9% NS (mbp), Infuse 100 mL into a venous catheter Every 8 (Eight) Hours for 32 days. Through at least 3/24/2023  Indications: Infection of the Skin and/or Soft Tissue, Disp: 3900 mL, Rfl: 0  •  clopidogrel (PLAVIX) 75 MG tablet, Take 1 tablet by mouth Daily., Disp: , Rfl:   •  DAPTOmycin 250 mg in sodium chloride 0.9 % 50 mL, Infuse 250 mg into a venous catheter Daily for 31 days. Through at least 3/24/2023  Indications: Infection of the Skin and/or Soft Tissue, Disp: , Rfl:   •  DULoxetine (CYMBALTA) 60 MG capsule, Take 1 capsule by mouth Daily., Disp: , Rfl:   •  ferrous sulfate 324 MG tablet delayed-release, Take 1 tablet by mouth Daily With Breakfast., Disp: 30 tablet, Rfl:   •  fluconazole (DIFLUCAN) 200 MG tablet, Take 1 tablet by mouth Daily for 21 days. Through 3/14/23 (or as directed by ID)  Indications: Infection of  the Skin and/or Soft Tissue, Disp: , Rfl: 0  •  gabapentin (NEURONTIN) 800 MG tablet, Take 1 tablet by mouth 4 (Four) Times a Day., Disp: 12 tablet, Rfl: 0  •  Januvia 100 MG tablet, Take 1 tablet by mouth Daily., Disp: , Rfl:   •  lisinopril (PRINIVIL,ZESTRIL) 40 MG tablet, Take 1 tablet by mouth Daily., Disp: , Rfl:   •  metoprolol succinate XL (TOPROL-XL) 25 MG 24 hr tablet, Take 1 tablet by mouth Daily., Disp: 30 tablet, Rfl: 3  •  metroNIDAZOLE (FLAGYL) 500 MG tablet, Take 1 tablet by mouth Every 8 (Eight) Hours for 21 days. Through 3/14/23 (or as directed by ID)  Indications: Bone and/or Joint Infection, Infection of the Skin and/or Soft Tissue, Disp: 63 tablet, Rfl: 0  •  multivitamin with minerals (MULTIVITAMIN ADULT PO), Take 1 tablet by mouth Daily., Disp: , Rfl:   •  NON FORMULARY, ProMod Protein Supplement, Disp: , Rfl:   •  nystatin (MYCOSTATIN) 353334 UNIT/GM powder, Apply  topically to the appropriate area as directed Every 12 (Twelve) Hours., Disp: , Rfl:   •  oxyCODONE-acetaminophen (PERCOCET)  MG per tablet, Take 1 tablet by mouth 5 (Five) Times a Day., Disp: 75 tablet, Rfl: 0  •  polyethylene glycol (MIRALAX) 17 g packet, Take 17 g by mouth Daily. Hold for loose bowel movements, Disp: , Rfl:   •  rosuvastatin (CRESTOR) 20 MG tablet, Take 1 tablet by mouth Every Night., Disp: 90 tablet, Rfl: 3  •  saccharomyces boulardii (FLORASTOR) 250 MG capsule, Take 1 capsule by mouth Daily., Disp: , Rfl:   •  senna (SENOKOT) 8.6 MG tablet, Take 2 tablets by mouth Every Night., Disp: , Rfl:   •  sodium chloride 1 g tablet, Take 1 tablet by mouth Daily., Disp: , Rfl:   •  thiamine (VITAMIN B1) 100 MG tablet, Take 1 tablet by mouth Daily., Disp: , Rfl:   •  vitamin B-12 (VITAMIN B-12) 1000 MCG tablet, Take 1 tablet by mouth Daily., Disp: , Rfl:   •  vitamin C (ASCORBIC ACID) 500 MG tablet, Take 1 tablet by mouth Daily., Disp: , Rfl:   •  diphenhydrAMINE-zinc acetate 2-0.1 % cream, Apply 1 application  topically to the appropriate area as directed 3 (Three) Times a Day As Needed for Rash or Itching. (Patient not taking: Reported on 3/9/2023), Disp: , Rfl:   •  hydrocortisone 1 % cream, Apply 1 application topically to the appropriate area as directed 3 (Three) Times a Day As Needed for Irritation or Rash. (Patient not taking: Reported on 3/9/2023), Disp: , Rfl:   •  lactobacillus acidophilus (RISAQUAD) capsule capsule, Take 1 capsule by mouth Daily., Disp: , Rfl:   •  quinapril (ACCUPRIL) 40 MG tablet, Take 40 mg by mouth Daily. (Patient not taking: Reported on 3/9/2023), Disp: , Rfl:   Allergies   Allergen Reactions   • Cefepime Itching     Increased eosinophilia while on cefepime   • Doxycycline Other (See Comments)     Doxycycline stopped at same time as Zosyn when patient developed rash, unclear if also caused rash.    • Zosyn [Piperacillin Sod-Tazobactam So] Rash     Social History     Socioeconomic History   • Marital status: Single   • Number of children: 0   Tobacco Use   • Smoking status: Former     Packs/day: 0.75     Years: 50.00     Pack years: 37.50     Types: Cigarettes     Quit date: 2023     Years since quittin.1   • Smokeless tobacco: Never   Vaping Use   • Vaping Use: Never used   Substance and Sexual Activity   • Alcohol use: Yes     Comment: 3-4 beers daily   • Drug use: Not Currently   • Sexual activity: Defer     Family History   Problem Relation Age of Onset   • Hypertension Mother    • Stroke Mother    • Breast cancer Mother    • Hypertension Father    • Heart attack Father      Review of Systems   Constitutional: Negative for chills, fever, malaise/fatigue, night sweats and weight loss.   HENT: Negative for hearing loss, odynophagia and sore throat.    Cardiovascular: Negative for chest pain, dyspnea on exertion, leg swelling, orthopnea and palpitations.   Respiratory: Negative for cough and hemoptysis.    Endocrine: Negative for cold intolerance, heat intolerance, polydipsia,  polyphagia and polyuria.   Hematologic/Lymphatic: Bruises/bleeds easily.   Skin: Negative for itching and rash.   Musculoskeletal: Positive for arthritis, back pain and joint pain. Negative for joint swelling and myalgias.   Gastrointestinal: Positive for abdominal pain. Negative for constipation, diarrhea, hematemesis, hematochezia, melena, nausea and vomiting.   Genitourinary: Negative for dysuria, frequency and hematuria.   Neurological: Negative for focal weakness, headaches, numbness and seizures.   Psychiatric/Behavioral: Positive for depression. Negative for suicidal ideas. The patient is nervous/anxious.    All other systems reviewed and are negative.    Vitals:    03/09/23 0838   BP: 140/80   BP Location: Left arm   Patient Position: Sitting   Pulse: 74   Temp: 96.3 °F (35.7 °C)   SpO2: 97%      Physical Exam  General no acute distress, pleasant, interactive  Head normocephalic, atraumatic  Eyes clear sclerae  ENT no discharge, neck supple  Mouth mucous membranes moist  Cardiac regular rate rhythm, no murmurs rubs gallops  Vascular warm well perfused x4 extremities  Pulmonary lungs clear to auscultation bilaterally  Abdomen soft nontender nondistended  Lymphatic no edema bilateral lower extremities  Neurological paralyzed below the waist, bladder catheter in place  Psychological appropriate  Dermatological right above-knee amputation site well-healed.  Left above-knee amputation site healing well, staples removed and Steri-Strips placed, no sign of infection  Musculoskeletal normal tone and bulk      The ROS, past medical history, surgical history, family history, social history and vitals were reviewed by myself and corrected as needed.      Assessment/Plan: 67-year-old man with history of lower extremity paralysis and bilateral osteomyelitis and gangrene now status post bilateral above-knee amputations, progressing as expected.  Follow-up as needed.    Patient Active Problem List   Diagnosis   •  Peripheral arterial disease (HCC)   • Gangrene of left foot s/p L AKA 1/27/2023 (HCC)   • Gangrene of right foot s/p AKA 11/25/2022   • Osteomyelitis (HCC)   • Hyperlipidemia LDL goal <70   • Severe malnutrition (HCC)   • Anemia   • Gastro-esophageal reflux disease without esophagitis   • Essential (primary) hypertension   • Paraplegia, unspecified (HCC)   • Type 2 diabetes mellitus, without long-term current use of insulin (HCC)   • Tobacco abuse   • Hyponatremia   • Loculated pleural effusion   • Sacral osteomyelitis (HCC)   • S/P diverting colostomy creation 2/13/2023 (HCC)   • Pressure injury of sacral region, stage 4 (HCC)   • Redundant foreskin   • Neurogenic bladder       I would like to thank you for the opportunity to participate in the care of this patient.    Nemesio Haji M.D., R.P.V.I.  Cardiothoracic and Vascular Surgeon  [facility]

## 2023-03-13 ENCOUNTER — HOSPITAL ENCOUNTER (OUTPATIENT)
Facility: HOSPITAL | Age: 68
Setting detail: OBSERVATION
Discharge: HOME HEALTH | End: 2023-03-24
Attending: EMERGENCY MEDICINE | Admitting: HOSPITALIST
Payer: MEDICARE

## 2023-03-13 ENCOUNTER — APPOINTMENT (OUTPATIENT)
Dept: CT IMAGING | Facility: HOSPITAL | Age: 68
End: 2023-03-13
Payer: MEDICARE

## 2023-03-13 DIAGNOSIS — Z93.3 S/P COLOSTOMY: ICD-10-CM

## 2023-03-13 DIAGNOSIS — L97.509 TYPE 2 DIABETES MELLITUS WITH FOOT ULCER, WITHOUT LONG-TERM CURRENT USE OF INSULIN: ICD-10-CM

## 2023-03-13 DIAGNOSIS — N31.9 NEUROGENIC BLADDER: ICD-10-CM

## 2023-03-13 DIAGNOSIS — J90 LOCULATED PLEURAL EFFUSION: ICD-10-CM

## 2023-03-13 DIAGNOSIS — S31.000D SACRAL WOUND, SUBSEQUENT ENCOUNTER: ICD-10-CM

## 2023-03-13 DIAGNOSIS — M86.29 SUBACUTE OSTEOMYELITIS OF MULTIPLE SITES: Primary | ICD-10-CM

## 2023-03-13 DIAGNOSIS — I10 ESSENTIAL (PRIMARY) HYPERTENSION: ICD-10-CM

## 2023-03-13 DIAGNOSIS — I73.9 PERIPHERAL ARTERIAL DISEASE: ICD-10-CM

## 2023-03-13 DIAGNOSIS — L89.154 PRESSURE INJURY OF SACRAL REGION, STAGE 4: ICD-10-CM

## 2023-03-13 DIAGNOSIS — E43 SEVERE MALNUTRITION: ICD-10-CM

## 2023-03-13 DIAGNOSIS — E83.51 HYPOCALCEMIA: ICD-10-CM

## 2023-03-13 DIAGNOSIS — G82.20 PARAPLEGIA, UNSPECIFIED: ICD-10-CM

## 2023-03-13 DIAGNOSIS — M46.28 SACRAL OSTEOMYELITIS: ICD-10-CM

## 2023-03-13 DIAGNOSIS — E11.621 TYPE 2 DIABETES MELLITUS WITH FOOT ULCER, WITHOUT LONG-TERM CURRENT USE OF INSULIN: ICD-10-CM

## 2023-03-13 DIAGNOSIS — M86.9 OSTEOMYELITIS OF LEFT FOOT, UNSPECIFIED TYPE: ICD-10-CM

## 2023-03-13 DIAGNOSIS — E87.6 HYPOKALEMIA: ICD-10-CM

## 2023-03-13 PROCEDURE — 72192 CT PELVIS W/O DYE: CPT

## 2023-03-13 PROCEDURE — 85025 COMPLETE CBC W/AUTO DIFF WBC: CPT

## 2023-03-13 PROCEDURE — 83605 ASSAY OF LACTIC ACID: CPT

## 2023-03-13 PROCEDURE — 85652 RBC SED RATE AUTOMATED: CPT

## 2023-03-13 PROCEDURE — P9612 CATHETERIZE FOR URINE SPEC: HCPCS

## 2023-03-13 PROCEDURE — 87040 BLOOD CULTURE FOR BACTERIA: CPT

## 2023-03-13 PROCEDURE — 99285 EMERGENCY DEPT VISIT HI MDM: CPT

## 2023-03-13 PROCEDURE — 83036 HEMOGLOBIN GLYCOSYLATED A1C: CPT | Performed by: INTERNAL MEDICINE

## 2023-03-13 RX ORDER — SODIUM CHLORIDE 0.9 % (FLUSH) 0.9 %
10 SYRINGE (ML) INJECTION AS NEEDED
Status: DISCONTINUED | OUTPATIENT
Start: 2023-03-13 | End: 2023-03-24 | Stop reason: HOSPADM

## 2023-03-14 ENCOUNTER — APPOINTMENT (OUTPATIENT)
Dept: GENERAL RADIOLOGY | Facility: HOSPITAL | Age: 68
End: 2023-03-14
Payer: MEDICARE

## 2023-03-14 PROBLEM — E83.51 HYPOCALCEMIA: Status: ACTIVE | Noted: 2023-03-14

## 2023-03-14 PROBLEM — E87.6 HYPOKALEMIA: Status: ACTIVE | Noted: 2023-03-14

## 2023-03-14 PROBLEM — E88.09 HYPOALBUMINEMIA: Status: ACTIVE | Noted: 2023-03-14

## 2023-03-14 LAB
ALBUMIN SERPL-MCNC: 1.5 G/DL (ref 3.5–5.2)
ALBUMIN/GLOB SERPL: 0.7 G/DL
ALP SERPL-CCNC: 68 U/L (ref 39–117)
ALT SERPL W P-5'-P-CCNC: 10 U/L (ref 1–41)
ANION GAP SERPL CALCULATED.3IONS-SCNC: 4 MMOL/L (ref 5–15)
ANION GAP SERPL CALCULATED.3IONS-SCNC: 8 MMOL/L (ref 5–15)
ANION GAP SERPL CALCULATED.3IONS-SCNC: 9 MMOL/L (ref 5–15)
AST SERPL-CCNC: 18 U/L (ref 1–40)
BACTERIA UR QL AUTO: ABNORMAL /HPF
BASOPHILS # BLD AUTO: 0.09 10*3/MM3 (ref 0–0.2)
BASOPHILS # BLD AUTO: 0.14 10*3/MM3 (ref 0–0.2)
BASOPHILS NFR BLD AUTO: 0.6 % (ref 0–1.5)
BASOPHILS NFR BLD AUTO: 1 % (ref 0–1.5)
BILIRUB SERPL-MCNC: <0.2 MG/DL (ref 0–1.2)
BILIRUB UR QL STRIP: NEGATIVE
BUN SERPL-MCNC: 11 MG/DL (ref 8–23)
BUN SERPL-MCNC: 12 MG/DL (ref 8–23)
BUN SERPL-MCNC: 8 MG/DL (ref 8–23)
BUN/CREAT SERPL: 31.4 (ref 7–25)
BUN/CREAT SERPL: 37.5 (ref 7–25)
BUN/CREAT SERPL: 47.1 (ref 7–25)
CA-I SERPL ISE-MCNC: 1.01 MMOL/L (ref 1.12–1.32)
CALCIUM SPEC-SCNC: 5.2 MG/DL (ref 8.6–10.5)
CALCIUM SPEC-SCNC: 8.5 MG/DL (ref 8.6–10.5)
CALCIUM SPEC-SCNC: 8.7 MG/DL (ref 8.6–10.5)
CHLORIDE SERPL-SCNC: 111 MMOL/L (ref 98–107)
CHLORIDE SERPL-SCNC: 96 MMOL/L (ref 98–107)
CHLORIDE SERPL-SCNC: 98 MMOL/L (ref 98–107)
CK SERPL-CCNC: 7 U/L (ref 20–200)
CLARITY UR: CLEAR
CO2 SERPL-SCNC: 22 MMOL/L (ref 22–29)
CO2 SERPL-SCNC: 29 MMOL/L (ref 22–29)
CO2 SERPL-SCNC: 29 MMOL/L (ref 22–29)
COLOR UR: YELLOW
CREAT SERPL-MCNC: 0.17 MG/DL (ref 0.76–1.27)
CREAT SERPL-MCNC: 0.32 MG/DL (ref 0.76–1.27)
CREAT SERPL-MCNC: 0.35 MG/DL (ref 0.76–1.27)
CRP SERPL-MCNC: 1.9 MG/DL (ref 0–0.5)
D-LACTATE SERPL-SCNC: 1.3 MMOL/L (ref 0.5–2)
DEPRECATED RDW RBC AUTO: 55.6 FL (ref 37–54)
DEPRECATED RDW RBC AUTO: 56 FL (ref 37–54)
EGFRCR SERPLBLD CKD-EPI 2021: 123.7 ML/MIN/1.73
EGFRCR SERPLBLD CKD-EPI 2021: 127.1 ML/MIN/1.73
EGFRCR SERPLBLD CKD-EPI 2021: 153.9 ML/MIN/1.73
EOSINOPHIL # BLD AUTO: 1.47 10*3/MM3 (ref 0–0.4)
EOSINOPHIL # BLD AUTO: 1.64 10*3/MM3 (ref 0–0.4)
EOSINOPHIL NFR BLD AUTO: 11.4 % (ref 0.3–6.2)
EOSINOPHIL NFR BLD AUTO: 9.1 % (ref 0.3–6.2)
ERYTHROCYTE [DISTWIDTH] IN BLOOD BY AUTOMATED COUNT: 18.4 % (ref 12.3–15.4)
ERYTHROCYTE [DISTWIDTH] IN BLOOD BY AUTOMATED COUNT: 18.6 % (ref 12.3–15.4)
ERYTHROCYTE [SEDIMENTATION RATE] IN BLOOD: >130 MM/HR (ref 0–20)
GLOBULIN UR ELPH-MCNC: 2.2 GM/DL
GLUCOSE BLDC GLUCOMTR-MCNC: 110 MG/DL (ref 70–130)
GLUCOSE BLDC GLUCOMTR-MCNC: 124 MG/DL (ref 70–130)
GLUCOSE BLDC GLUCOMTR-MCNC: 141 MG/DL (ref 70–130)
GLUCOSE SERPL-MCNC: 119 MG/DL (ref 65–99)
GLUCOSE SERPL-MCNC: 154 MG/DL (ref 65–99)
GLUCOSE SERPL-MCNC: 169 MG/DL (ref 65–99)
GLUCOSE UR STRIP-MCNC: NEGATIVE MG/DL
HBA1C MFR BLD: 5.2 % (ref 4.8–5.6)
HCT VFR BLD AUTO: 36.1 % (ref 37.5–51)
HCT VFR BLD AUTO: 38.7 % (ref 37.5–51)
HGB BLD-MCNC: 11.4 G/DL (ref 13–17.7)
HGB BLD-MCNC: 12.2 G/DL (ref 13–17.7)
HGB UR QL STRIP.AUTO: ABNORMAL
HOLD SPECIMEN: NORMAL
HYALINE CASTS UR QL AUTO: ABNORMAL /LPF
IMM GRANULOCYTES # BLD AUTO: 0.05 10*3/MM3 (ref 0–0.05)
IMM GRANULOCYTES # BLD AUTO: 0.07 10*3/MM3 (ref 0–0.05)
IMM GRANULOCYTES NFR BLD AUTO: 0.3 % (ref 0–0.5)
IMM GRANULOCYTES NFR BLD AUTO: 0.4 % (ref 0–0.5)
KETONES UR QL STRIP: NEGATIVE
LEUKOCYTE ESTERASE UR QL STRIP.AUTO: ABNORMAL
LYMPHOCYTES # BLD AUTO: 0.97 10*3/MM3 (ref 0.7–3.1)
LYMPHOCYTES # BLD AUTO: 1.36 10*3/MM3 (ref 0.7–3.1)
LYMPHOCYTES NFR BLD AUTO: 6 % (ref 19.6–45.3)
LYMPHOCYTES NFR BLD AUTO: 9.5 % (ref 19.6–45.3)
MAGNESIUM SERPL-MCNC: 1.2 MG/DL (ref 1.6–2.4)
MCH RBC QN AUTO: 26.3 PG (ref 26.6–33)
MCH RBC QN AUTO: 26.5 PG (ref 26.6–33)
MCHC RBC AUTO-ENTMCNC: 31.5 G/DL (ref 31.5–35.7)
MCHC RBC AUTO-ENTMCNC: 31.6 G/DL (ref 31.5–35.7)
MCV RBC AUTO: 83.6 FL (ref 79–97)
MCV RBC AUTO: 84 FL (ref 79–97)
MONOCYTES # BLD AUTO: 0.57 10*3/MM3 (ref 0.1–0.9)
MONOCYTES # BLD AUTO: 0.76 10*3/MM3 (ref 0.1–0.9)
MONOCYTES NFR BLD AUTO: 3.5 % (ref 5–12)
MONOCYTES NFR BLD AUTO: 5.3 % (ref 5–12)
NEUTROPHILS NFR BLD AUTO: 10.42 10*3/MM3 (ref 1.7–7)
NEUTROPHILS NFR BLD AUTO: 12.92 10*3/MM3 (ref 1.7–7)
NEUTROPHILS NFR BLD AUTO: 72.5 % (ref 42.7–76)
NEUTROPHILS NFR BLD AUTO: 80.4 % (ref 42.7–76)
NITRITE UR QL STRIP: NEGATIVE
NRBC BLD AUTO-RTO: 0 /100 WBC (ref 0–0.2)
NRBC BLD AUTO-RTO: 0 /100 WBC (ref 0–0.2)
PH UR STRIP.AUTO: 7.5 [PH] (ref 5–8)
PHOSPHATE SERPL-MCNC: 3.1 MG/DL (ref 2.5–4.5)
PLATELET # BLD AUTO: 385 10*3/MM3 (ref 140–450)
PLATELET # BLD AUTO: 392 10*3/MM3 (ref 140–450)
PMV BLD AUTO: 10 FL (ref 6–12)
PMV BLD AUTO: 9.4 FL (ref 6–12)
POTASSIUM SERPL-SCNC: 2.6 MMOL/L (ref 3.5–5.2)
POTASSIUM SERPL-SCNC: 3.7 MMOL/L (ref 3.5–5.2)
POTASSIUM SERPL-SCNC: 4.1 MMOL/L (ref 3.5–5.2)
PROCALCITONIN SERPL-MCNC: 0.04 NG/ML (ref 0–0.25)
PROT SERPL-MCNC: 3.7 G/DL (ref 6–8.5)
PROT UR QL STRIP: ABNORMAL
PTH-INTACT SERPL-MCNC: 11.1 PG/ML (ref 15–65)
RBC # BLD AUTO: 4.3 10*6/MM3 (ref 4.14–5.8)
RBC # BLD AUTO: 4.63 10*6/MM3 (ref 4.14–5.8)
RBC # UR STRIP: ABNORMAL /HPF
REF LAB TEST METHOD: ABNORMAL
SODIUM SERPL-SCNC: 134 MMOL/L (ref 136–145)
SODIUM SERPL-SCNC: 135 MMOL/L (ref 136–145)
SODIUM SERPL-SCNC: 137 MMOL/L (ref 136–145)
SP GR UR STRIP: 1.02 (ref 1–1.03)
SQUAMOUS #/AREA URNS HPF: ABNORMAL /HPF
TSH SERPL DL<=0.05 MIU/L-ACNC: 0.33 UIU/ML (ref 0.27–4.2)
UROBILINOGEN UR QL STRIP: ABNORMAL
WBC # UR STRIP: ABNORMAL /HPF
WBC NRBC COR # BLD: 14.37 10*3/MM3 (ref 3.4–10.8)
WBC NRBC COR # BLD: 16.09 10*3/MM3 (ref 3.4–10.8)
WHOLE BLOOD HOLD COAG: NORMAL
WHOLE BLOOD HOLD SPECIMEN: NORMAL

## 2023-03-14 PROCEDURE — 83735 ASSAY OF MAGNESIUM: CPT | Performed by: INTERNAL MEDICINE

## 2023-03-14 PROCEDURE — 82962 GLUCOSE BLOOD TEST: CPT

## 2023-03-14 PROCEDURE — G0378 HOSPITAL OBSERVATION PER HR: HCPCS

## 2023-03-14 PROCEDURE — 80053 COMPREHEN METABOLIC PANEL: CPT

## 2023-03-14 PROCEDURE — 81001 URINALYSIS AUTO W/SCOPE: CPT

## 2023-03-14 PROCEDURE — 25010000002 VANCOMYCIN 10 G RECONSTITUTED SOLUTION

## 2023-03-14 PROCEDURE — 96372 THER/PROPH/DIAG INJ SC/IM: CPT

## 2023-03-14 PROCEDURE — 36415 COLL VENOUS BLD VENIPUNCTURE: CPT

## 2023-03-14 PROCEDURE — 25010000002 HYDROMORPHONE PER 4 MG: Performed by: INTERNAL MEDICINE

## 2023-03-14 PROCEDURE — 96361 HYDRATE IV INFUSION ADD-ON: CPT

## 2023-03-14 PROCEDURE — 25010000002 HEPARIN (PORCINE) PER 1000 UNITS: Performed by: INTERNAL MEDICINE

## 2023-03-14 PROCEDURE — 82330 ASSAY OF CALCIUM: CPT | Performed by: NURSE PRACTITIONER

## 2023-03-14 PROCEDURE — 25010000002 DAPTOMYCIN PER 1 MG: Performed by: INTERNAL MEDICINE

## 2023-03-14 PROCEDURE — 96365 THER/PROPH/DIAG IV INF INIT: CPT

## 2023-03-14 PROCEDURE — 96367 TX/PROPH/DG ADDL SEQ IV INF: CPT

## 2023-03-14 PROCEDURE — 82652 VIT D 1 25-DIHYDROXY: CPT | Performed by: NURSE PRACTITIONER

## 2023-03-14 PROCEDURE — 71045 X-RAY EXAM CHEST 1 VIEW: CPT

## 2023-03-14 PROCEDURE — 84145 PROCALCITONIN (PCT): CPT

## 2023-03-14 PROCEDURE — 83970 ASSAY OF PARATHORMONE: CPT | Performed by: NURSE PRACTITIONER

## 2023-03-14 PROCEDURE — 86140 C-REACTIVE PROTEIN: CPT

## 2023-03-14 PROCEDURE — 84443 ASSAY THYROID STIM HORMONE: CPT | Performed by: INTERNAL MEDICINE

## 2023-03-14 PROCEDURE — 84100 ASSAY OF PHOSPHORUS: CPT | Performed by: NURSE PRACTITIONER

## 2023-03-14 PROCEDURE — 85025 COMPLETE CBC W/AUTO DIFF WBC: CPT | Performed by: INTERNAL MEDICINE

## 2023-03-14 PROCEDURE — 99223 1ST HOSP IP/OBS HIGH 75: CPT | Performed by: NURSE PRACTITIONER

## 2023-03-14 PROCEDURE — 82550 ASSAY OF CK (CPK): CPT | Performed by: NURSE PRACTITIONER

## 2023-03-14 PROCEDURE — 96375 TX/PRO/DX INJ NEW DRUG ADDON: CPT

## 2023-03-14 RX ORDER — HEPARIN SODIUM 5000 [USP'U]/ML
5000 INJECTION, SOLUTION INTRAVENOUS; SUBCUTANEOUS EVERY 12 HOURS SCHEDULED
Status: DISCONTINUED | OUTPATIENT
Start: 2023-03-14 | End: 2023-03-24 | Stop reason: HOSPADM

## 2023-03-14 RX ORDER — SODIUM CHLORIDE 0.9 % (FLUSH) 0.9 %
10 SYRINGE (ML) INJECTION AS NEEDED
Status: DISCONTINUED | OUTPATIENT
Start: 2023-03-14 | End: 2023-03-14 | Stop reason: SDUPTHER

## 2023-03-14 RX ORDER — SACCHAROMYCES BOULARDII 250 MG
250 CAPSULE ORAL DAILY
Status: DISCONTINUED | OUTPATIENT
Start: 2023-03-14 | End: 2023-03-24 | Stop reason: HOSPADM

## 2023-03-14 RX ORDER — SODIUM CHLORIDE 0.9 % (FLUSH) 0.9 %
10 SYRINGE (ML) INJECTION EVERY 12 HOURS SCHEDULED
Status: DISCONTINUED | OUTPATIENT
Start: 2023-03-14 | End: 2023-03-19

## 2023-03-14 RX ORDER — OXYCODONE AND ACETAMINOPHEN 10; 325 MG/1; MG/1
1 TABLET ORAL
Status: DISCONTINUED | OUTPATIENT
Start: 2023-03-14 | End: 2023-03-14

## 2023-03-14 RX ORDER — ONDANSETRON 4 MG/1
4 TABLET, FILM COATED ORAL EVERY 6 HOURS PRN
Status: DISCONTINUED | OUTPATIENT
Start: 2023-03-14 | End: 2023-03-24 | Stop reason: HOSPADM

## 2023-03-14 RX ORDER — DEXTROSE MONOHYDRATE 25 G/50ML
25 INJECTION, SOLUTION INTRAVENOUS
Status: DISCONTINUED | OUTPATIENT
Start: 2023-03-14 | End: 2023-03-24 | Stop reason: HOSPADM

## 2023-03-14 RX ORDER — HYDROMORPHONE HYDROCHLORIDE 1 MG/ML
0.5 INJECTION, SOLUTION INTRAMUSCULAR; INTRAVENOUS; SUBCUTANEOUS ONCE
Status: COMPLETED | OUTPATIENT
Start: 2023-03-14 | End: 2023-03-14

## 2023-03-14 RX ORDER — SODIUM CHLORIDE 450 MG/100ML
75 INJECTION, SOLUTION INTRAVENOUS CONTINUOUS
Status: ACTIVE | OUTPATIENT
Start: 2023-03-14 | End: 2023-03-14

## 2023-03-14 RX ORDER — ASPIRIN 81 MG/1
81 TABLET ORAL DAILY
Status: DISCONTINUED | OUTPATIENT
Start: 2023-03-14 | End: 2023-03-24 | Stop reason: HOSPADM

## 2023-03-14 RX ORDER — NALOXONE HCL 0.4 MG/ML
0.4 VIAL (ML) INJECTION
Status: DISCONTINUED | OUTPATIENT
Start: 2023-03-14 | End: 2023-03-24 | Stop reason: HOSPADM

## 2023-03-14 RX ORDER — ACETAMINOPHEN 325 MG/1
650 TABLET ORAL EVERY 4 HOURS PRN
Status: DISCONTINUED | OUTPATIENT
Start: 2023-03-14 | End: 2023-03-24 | Stop reason: HOSPADM

## 2023-03-14 RX ORDER — LANOLIN ALCOHOL/MO/W.PET/CERES
1000 CREAM (GRAM) TOPICAL DAILY
Status: DISCONTINUED | OUTPATIENT
Start: 2023-03-14 | End: 2023-03-24 | Stop reason: HOSPADM

## 2023-03-14 RX ORDER — SODIUM CHLORIDE 0.9 % (FLUSH) 0.9 %
20 SYRINGE (ML) INJECTION AS NEEDED
Status: DISCONTINUED | OUTPATIENT
Start: 2023-03-14 | End: 2023-03-19

## 2023-03-14 RX ORDER — MULTIPLE VITAMINS W/ MINERALS TAB 9MG-400MCG
1 TAB ORAL DAILY
Status: DISCONTINUED | OUTPATIENT
Start: 2023-03-14 | End: 2023-03-24 | Stop reason: HOSPADM

## 2023-03-14 RX ORDER — IBUPROFEN 600 MG/1
1 TABLET ORAL
Status: DISCONTINUED | OUTPATIENT
Start: 2023-03-14 | End: 2023-03-24 | Stop reason: HOSPADM

## 2023-03-14 RX ORDER — SENNA PLUS 8.6 MG/1
2 TABLET ORAL NIGHTLY
Status: DISCONTINUED | OUTPATIENT
Start: 2023-03-14 | End: 2023-03-17

## 2023-03-14 RX ORDER — METOPROLOL SUCCINATE 25 MG/1
25 TABLET, EXTENDED RELEASE ORAL
Status: DISCONTINUED | OUTPATIENT
Start: 2023-03-14 | End: 2023-03-24 | Stop reason: HOSPADM

## 2023-03-14 RX ORDER — FERROUS SULFATE 325(65) MG
325 TABLET ORAL
Status: DISCONTINUED | OUTPATIENT
Start: 2023-03-14 | End: 2023-03-24 | Stop reason: HOSPADM

## 2023-03-14 RX ORDER — GABAPENTIN 400 MG/1
800 CAPSULE ORAL 4 TIMES DAILY
Status: DISCONTINUED | OUTPATIENT
Start: 2023-03-14 | End: 2023-03-24 | Stop reason: HOSPADM

## 2023-03-14 RX ORDER — ACETAMINOPHEN 650 MG/1
650 SUPPOSITORY RECTAL EVERY 4 HOURS PRN
Status: DISCONTINUED | OUTPATIENT
Start: 2023-03-14 | End: 2023-03-19

## 2023-03-14 RX ORDER — HYDROMORPHONE HYDROCHLORIDE 1 MG/ML
0.5 INJECTION, SOLUTION INTRAMUSCULAR; INTRAVENOUS; SUBCUTANEOUS
Status: DISCONTINUED | OUTPATIENT
Start: 2023-03-14 | End: 2023-03-18

## 2023-03-14 RX ORDER — ONDANSETRON 2 MG/ML
4 INJECTION INTRAMUSCULAR; INTRAVENOUS EVERY 6 HOURS PRN
Status: DISCONTINUED | OUTPATIENT
Start: 2023-03-14 | End: 2023-03-24 | Stop reason: HOSPADM

## 2023-03-14 RX ORDER — ASCORBIC ACID 500 MG
500 TABLET ORAL DAILY
Status: DISCONTINUED | OUTPATIENT
Start: 2023-03-14 | End: 2023-03-24 | Stop reason: HOSPADM

## 2023-03-14 RX ORDER — SODIUM CHLORIDE 1000 MG
1 TABLET, SOLUBLE MISCELLANEOUS DAILY
Status: DISCONTINUED | OUTPATIENT
Start: 2023-03-14 | End: 2023-03-24 | Stop reason: HOSPADM

## 2023-03-14 RX ORDER — ACETAMINOPHEN 160 MG/5ML
650 SOLUTION ORAL EVERY 4 HOURS PRN
Status: DISCONTINUED | OUTPATIENT
Start: 2023-03-14 | End: 2023-03-19

## 2023-03-14 RX ORDER — POLYETHYLENE GLYCOL 3350 17 G/17G
17 POWDER, FOR SOLUTION ORAL DAILY
Status: DISCONTINUED | OUTPATIENT
Start: 2023-03-14 | End: 2023-03-24 | Stop reason: HOSPADM

## 2023-03-14 RX ORDER — CHOLECALCIFEROL (VITAMIN D3) 125 MCG
5 CAPSULE ORAL NIGHTLY PRN
Status: DISCONTINUED | OUTPATIENT
Start: 2023-03-14 | End: 2023-03-24 | Stop reason: HOSPADM

## 2023-03-14 RX ORDER — LISINOPRIL 40 MG/1
40 TABLET ORAL DAILY
Status: DISCONTINUED | OUTPATIENT
Start: 2023-03-14 | End: 2023-03-24 | Stop reason: HOSPADM

## 2023-03-14 RX ORDER — L.ACID,PARA/B.BIFIDUM/S.THERM 8B CELL
1 CAPSULE ORAL DAILY
Status: DISCONTINUED | OUTPATIENT
Start: 2023-03-14 | End: 2023-03-24 | Stop reason: HOSPADM

## 2023-03-14 RX ORDER — ROSUVASTATIN CALCIUM 20 MG/1
20 TABLET, COATED ORAL NIGHTLY
Status: DISCONTINUED | OUTPATIENT
Start: 2023-03-14 | End: 2023-03-24 | Stop reason: HOSPADM

## 2023-03-14 RX ORDER — DULOXETIN HYDROCHLORIDE 60 MG/1
60 CAPSULE, DELAYED RELEASE ORAL DAILY
Status: DISCONTINUED | OUTPATIENT
Start: 2023-03-14 | End: 2023-03-24 | Stop reason: HOSPADM

## 2023-03-14 RX ORDER — SODIUM CHLORIDE 0.9 % (FLUSH) 0.9 %
10 SYRINGE (ML) INJECTION AS NEEDED
Status: DISCONTINUED | OUTPATIENT
Start: 2023-03-14 | End: 2023-03-19

## 2023-03-14 RX ORDER — FLUCONAZOLE 200 MG/1
200 TABLET ORAL EVERY 24 HOURS
Status: DISCONTINUED | OUTPATIENT
Start: 2023-03-14 | End: 2023-03-14

## 2023-03-14 RX ORDER — SODIUM CHLORIDE 9 MG/ML
40 INJECTION, SOLUTION INTRAVENOUS AS NEEDED
Status: DISCONTINUED | OUTPATIENT
Start: 2023-03-14 | End: 2023-03-14 | Stop reason: SDUPTHER

## 2023-03-14 RX ORDER — SODIUM CHLORIDE 0.9 % (FLUSH) 0.9 %
10 SYRINGE (ML) INJECTION EVERY 12 HOURS SCHEDULED
Status: DISCONTINUED | OUTPATIENT
Start: 2023-03-14 | End: 2023-03-24 | Stop reason: HOSPADM

## 2023-03-14 RX ORDER — SODIUM CHLORIDE 9 MG/ML
40 INJECTION, SOLUTION INTRAVENOUS AS NEEDED
Status: DISCONTINUED | OUTPATIENT
Start: 2023-03-14 | End: 2023-03-19

## 2023-03-14 RX ORDER — NICOTINE POLACRILEX 4 MG
15 LOZENGE BUCCAL
Status: DISCONTINUED | OUTPATIENT
Start: 2023-03-14 | End: 2023-03-24 | Stop reason: HOSPADM

## 2023-03-14 RX ORDER — OXYCODONE AND ACETAMINOPHEN 10; 325 MG/1; MG/1
1 TABLET ORAL EVERY 6 HOURS PRN
Status: DISCONTINUED | OUTPATIENT
Start: 2023-03-14 | End: 2023-03-24 | Stop reason: HOSPADM

## 2023-03-14 RX ORDER — INSULIN LISPRO 100 [IU]/ML
0-7 INJECTION, SOLUTION INTRAVENOUS; SUBCUTANEOUS EVERY 6 HOURS
Status: DISCONTINUED | OUTPATIENT
Start: 2023-03-14 | End: 2023-03-19

## 2023-03-14 RX ORDER — SODIUM CHLORIDE 0.9 % (FLUSH) 0.9 %
10 SYRINGE (ML) INJECTION EVERY 12 HOURS SCHEDULED
Status: DISCONTINUED | OUTPATIENT
Start: 2023-03-14 | End: 2023-03-14 | Stop reason: SDUPTHER

## 2023-03-14 RX ORDER — METRONIDAZOLE 500 MG/1
500 TABLET ORAL EVERY 8 HOURS SCHEDULED
Status: DISCONTINUED | OUTPATIENT
Start: 2023-03-14 | End: 2023-03-18

## 2023-03-14 RX ADMIN — SODIUM CHLORIDE 2 G: 900 INJECTION INTRAVENOUS at 12:43

## 2023-03-14 RX ADMIN — OXYCODONE HYDROCHLORIDE AND ACETAMINOPHEN 500 MG: 500 TABLET ORAL at 09:58

## 2023-03-14 RX ADMIN — SODIUM CHLORIDE 75 ML/HR: 4.5 INJECTION, SOLUTION INTRAVENOUS at 06:54

## 2023-03-14 RX ADMIN — METRONIDAZOLE 500 MG: 500 TABLET ORAL at 08:06

## 2023-03-14 RX ADMIN — METRONIDAZOLE 500 MG: 500 TABLET ORAL at 16:30

## 2023-03-14 RX ADMIN — GABAPENTIN 800 MG: 400 CAPSULE ORAL at 18:17

## 2023-03-14 RX ADMIN — GABAPENTIN 800 MG: 400 CAPSULE ORAL at 08:06

## 2023-03-14 RX ADMIN — Medication 10 ML: at 10:06

## 2023-03-14 RX ADMIN — HEPARIN SODIUM 5000 UNITS: 5000 INJECTION INTRAVENOUS; SUBCUTANEOUS at 21:01

## 2023-03-14 RX ADMIN — HYDROMORPHONE HYDROCHLORIDE 0.5 MG: 1 INJECTION, SOLUTION INTRAMUSCULAR; INTRAVENOUS; SUBCUTANEOUS at 05:20

## 2023-03-14 RX ADMIN — GABAPENTIN 800 MG: 400 CAPSULE ORAL at 21:01

## 2023-03-14 RX ADMIN — OXYCODONE HYDROCHLORIDE AND ACETAMINOPHEN 1 TABLET: 10; 325 TABLET ORAL at 05:16

## 2023-03-14 RX ADMIN — THIAMINE HCL TAB 100 MG 100 MG: 100 TAB at 09:58

## 2023-03-14 RX ADMIN — MULTIPLE VITAMINS W/ MINERALS TAB 1 TABLET: TAB at 09:58

## 2023-03-14 RX ADMIN — METOPROLOL SUCCINATE 25 MG: 25 TABLET, EXTENDED RELEASE ORAL at 09:58

## 2023-03-14 RX ADMIN — FLUCONAZOLE 200 MG: 200 TABLET ORAL at 09:58

## 2023-03-14 RX ADMIN — SODIUM CHLORIDE 1 G: 1 TABLET ORAL at 09:58

## 2023-03-14 RX ADMIN — Medication 1 CAPSULE: at 09:58

## 2023-03-14 RX ADMIN — LISINOPRIL 40 MG: 40 TABLET ORAL at 08:06

## 2023-03-14 RX ADMIN — Medication 10 ML: at 10:07

## 2023-03-14 RX ADMIN — Medication 250 MG: at 09:58

## 2023-03-14 RX ADMIN — METRONIDAZOLE 500 MG: 500 TABLET ORAL at 21:00

## 2023-03-14 RX ADMIN — DULOXETINE 60 MG: 60 CAPSULE, DELAYED RELEASE ORAL at 10:46

## 2023-03-14 RX ADMIN — HEPARIN SODIUM 5000 UNITS: 5000 INJECTION INTRAVENOUS; SUBCUTANEOUS at 09:57

## 2023-03-14 RX ADMIN — SODIUM CHLORIDE 75 ML/HR: 4.5 INJECTION, SOLUTION INTRAVENOUS at 14:30

## 2023-03-14 RX ADMIN — Medication 5 MG: at 21:01

## 2023-03-14 RX ADMIN — CYANOCOBALAMIN TAB 1000 MCG 1000 MCG: 1000 TAB at 09:58

## 2023-03-14 RX ADMIN — FERROUS SULFATE TAB 325 MG (65 MG ELEMENTAL FE) 325 MG: 325 (65 FE) TAB at 10:46

## 2023-03-14 RX ADMIN — ASPIRIN 81 MG: 81 TABLET, COATED ORAL at 08:06

## 2023-03-14 RX ADMIN — VANCOMYCIN HYDROCHLORIDE 1250 MG: 10 INJECTION, POWDER, LYOPHILIZED, FOR SOLUTION INTRAVENOUS at 00:31

## 2023-03-14 RX ADMIN — POLYETHYLENE GLYCOL 3350 17 G: 17 POWDER, FOR SOLUTION ORAL at 09:58

## 2023-03-14 RX ADMIN — SENNOSIDES 2 TABLET: 8.6 TABLET, FILM COATED ORAL at 21:00

## 2023-03-14 RX ADMIN — ROSUVASTATIN 20 MG: 20 TABLET, FILM COATED ORAL at 21:00

## 2023-03-14 RX ADMIN — SODIUM CHLORIDE 2 G: 900 INJECTION INTRAVENOUS at 21:00

## 2023-03-14 RX ADMIN — SODIUM CHLORIDE 2 G: 900 INJECTION INTRAVENOUS at 03:39

## 2023-03-14 RX ADMIN — DAPTOMYCIN 500 MG: 500 INJECTION, POWDER, LYOPHILIZED, FOR SOLUTION INTRAVENOUS at 05:16

## 2023-03-14 RX ADMIN — Medication 10 ML: at 21:08

## 2023-03-14 NOTE — CASE MANAGEMENT/SOCIAL WORK
Discharge Planning Assessment  [facility]     Patient Name: Severino Vera  MRN: [id number]  Today's Date: 3/14/2023    Admit Date: 3/13/2023    Plan: Home   Discharge Needs Assessment     Row Name 03/14/23 1025       Living Environment    People in Home sibling(s);parent(s)    Current Living Arrangements home    Primary Care Provided by parent(s)    Provides Primary Care For no one, unable/limited ability to care for self    Family Caregiver if Needed sibling(s)    Family Caregiver Names Liliya Vera, sister; Tigist Marlon, sister    Quality of Family Relationships helpful;involved;supportive    Able to Return to Prior Arrangements yes       Transition Planning    Patient/Family Anticipates Transition to home with family;home with help/services;inpatient rehabilitation facility    Patient/Family Anticipated Services at Transition        Discharge Needs Assessment    Equipment Currently Used at Home wheelchair    Concerns to be Addressed denies needs/concerns at this time    Equipment Needed After Discharge none    Discharge Coordination/Progress Lives in [place] with his mother and sister, needs help with ADLs. Has a wheelchair.  States that he has had home health before, but they are no longer able to serve him and he will have to go through [facility].  Does not have an advanced directive.               Discharge Plan     Row Name 03/14/23 1027       Plan    Plan Home    Patient/Family in Agreement with Plan yes    Plan Comments I spoke with Mr Vera at bedside.  Mr Vera resides in a house in [place] with his mother and sister, and he needs help with ADLs.  He has a wheelchair that he uses.  He states that he has had home health before, however that company can no longer see him and he will have to go through [facility] for home health in the future.  He does not have an advanced directive.  His PCP is Anuel Rankin, and he gets his prescriptions filled at [facility] in  [place].  At this time, there are no discharge needs.    Final Discharge Disposition Code 01 - home or self-care              Continued Care and Services - Admitted Since 3/13/2023    Coordination has not been started for this encounter.     Selected Continued Care - Prior Encounters Includes continued care and service providers with selected services from prior encounters from 12/13/2022 to 3/14/2023    Discharged on 2/21/2023 Admission date: 1/25/2023 - Discharge disposition: Skilled Nursing Facility (DC - External)    Destination     Service Provider Selected Services Address Phone Fax Patient Preferred    [facility]  Skilled Nursing [address] [place] [id number] [phone] --                    Expected Discharge Date and Time     Expected Discharge Date Expected Discharge Time    Mar 17, 2023          Demographic Summary    No documentation.                Functional Status     Row Name 03/14/23 1025       Functional Status    Usual Activity Tolerance fair    Current Activity Tolerance fair       Functional Status, IADL    Medications assistive equipment and person    Meal Preparation assistive equipment and person    Housekeeping assistive equipment and person    Laundry assistive equipment and person    Shopping assistive equipment and person               Psychosocial    No documentation.                Abuse/Neglect    No documentation.                Legal    No documentation.                Substance Abuse    No documentation.                Patient Forms    No documentation.                   Lory Seay RN

## 2023-03-14 NOTE — NURSING NOTE
WOC Consulted for sacral ; assess for specialty bed    Patient well-known to WOC RN from previous 2 admissions.  Patient had multiple unstageable and stage IV pressure injuries to bilateral ischial tuberosities and sacrococcygeal region as well as wound to left greater trochanter on last 2 admissions.  Upon review of chart, general surgery was consulted early this morning regarding sacral osteomyelitis.  Given patient had I&D by general surgery of these wounds during last admission, WO RN will defer to general surgery for treatment of these wounds at this time, however will plan to follow-up tomorrow to review charting to determine dressing recommendations are needed.  When patient arrived on the floor, bedside RN called and requested specialty bed.  Dolphin mattress with low-air-loss topper ordered.  Provided recommendations to bedside RN to fill wounds with normal saline moistened gauze and cover with a silicone foam border dressing after cleansing with normal saline per policy/protocol.  This dressing should be changed twice daily.    Sensory Perception: 2-->very limited  Moisture: 2-->very moist  Activity: 1-->bedfast  Mobility: 2-->very limited  Nutrition: 2-->probably inadequate  Friction and Shear: 1-->problem  Jaspreet Score: 10 (03/14/23 1415)    Please implement pressure injury prevention interventions for patients with a Jaspreet score of 18 or less per protocol.    Thank you for the consult.  WO team will plan to follow-up tomorrow.  If alteration to skin integrity or change in wound bed presentation please consult the WOC team.

## 2023-03-14 NOTE — PROGRESS NOTES
[facility] Medicine Services  ADMISSION FOLLOW-UP NOTE          Patient admitted after midnight, H&P by my partner performed earlier on today's date reviewed.  Interim findings, labs, and charting also reviewed.        [facility] Problem List has been managed and updated to include any new diagnoses:  Active Hospital Problems    Diagnosis  POA   • **Sacral osteomyelitis (HCC) [M46.28]  Yes   • Hypoalbuminemia [E88.09]  Unknown   • Hypocalcemia [E83.51]  Unknown   • Hypokalemia [E87.6]  Unknown   • S/P diverting colostomy creation 2/13/2023 (HCC) [Z93.3]  Not Applicable   • Neurogenic bladder [N31.9]  Yes   • Type 2 diabetes mellitus, without long-term current use of insulin (HCC) [E11.9]  Yes   • Hyponatremia [E87.1]  Yes   • Essential (primary) hypertension [I10]  Yes   • Paraplegia, unspecified ([facility]) [G82.20]  Yes   • Gastro-esophageal reflux disease without esophagitis [K21.9]  Yes   • Peripheral arterial disease ([facility]) [I73.9]  Yes   • 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         ADDITIONAL PLAN:  - detailed assessment and plan from admission reviewed  - Chart reviewed and patient examined    Sacral osteomyelitis  Recent diverting colostomy  -continue IV antibitoics  -general surgery (prior debridement) consulted  -ID consulted  -wound care    PAD s/p B AKA    Paraplegia  Neurogenic bladder    DM  -SSI    Low K  Low Calcium  -electrolyte replacement      Expected Discharge 3/17  Expected Discharge Date and Time     Expected Discharge Date Expected Discharge Time    Mar 17, 2023            Sushant Bazan MD  03/14/23
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
## 2023-03-14 NOTE — ED PROVIDER NOTES
Subjective   History of Present Illness  Severino Vera is a 67-year-old male with a history of diabetes, bilateral lower extremity gangrene s/p amputation, hyperlipidemia, hypertension, osteomyelitis who presents to the ER for evaluation of a bone infection.  Patient's family member at bedside states the patient was recently discharged from a nursing home and was called by a doctor today informing them that they should come to the emergency department for further evaluation of a possible bone infection in his sacrum and worsening sacral wounds.  Patient endorses at the nursing home they were trying to turn him multiple times a day to help with the sacral wounds but they continue to worsen.  Patient denies any fevers, injury, nausea, vomiting, abdominal pain, weakness.      History provided by:  Relative and patient   used: No        Review of Systems   Constitutional: Negative for chills, fatigue and fever.   HENT: Negative for congestion and sore throat.    Respiratory: Negative for shortness of breath.    Cardiovascular: Negative for chest pain.   Gastrointestinal: Negative for abdominal pain.   Genitourinary: Negative for dysuria.   Musculoskeletal: Negative for back pain.   Skin: Positive for wound. Negative for rash.   Neurological: Negative for headaches.   Psychiatric/Behavioral: Negative for agitation and confusion.   All other systems reviewed and are negative.      Past Medical History:   Diagnosis Date   • Arthritis    • Diabetes mellitus (HCC)    • Gangrene (HCC)     BILATERAL FEET   • GERD (gastroesophageal reflux disease)    • Hyperlipidemia    • Hypertension    • Osteomyelitis (HCC)    • PAD (peripheral artery disease) (HCC)    • Paraplegia (HCC)    • Peripheral vascular disease (HCC)    • Self-catheterizes urinary bladder        Allergies   Allergen Reactions   • Cefepime Itching     Increased eosinophilia while on cefepime   • Doxycycline Other (See Comments)     Doxycycline  stopped at same time as Zosyn when patient developed rash, unclear if also caused rash.    • Zosyn [Piperacillin Sod-Tazobactam So] Rash       Past Surgical History:   Procedure Laterality Date   • ABOVE KNEE AMPUTATION Right 2022    Procedure: AMPUTATION ABOVE KNEE RIGHT;  Surgeon: Nemesio Haji MD;  Location:  [facility];  Service: Vascular;  Laterality: Right;   • ABOVE KNEE AMPUTATION Left 2023    Procedure: AMPUTATION ABOVE KNEE LEFT;  Surgeon: Nemesio Haji MD;  Location:  [facility];  Service: Vascular;  Laterality: Left;   • EXPLORATORY LAPAROTOMY N/A 2023    Procedure: LAPAROTOMY EXPLORATORY, SIGMOID RESECTION, COLOSTOMY;  Surgeon: Neville Berman MD;  Location:  [facility];  Service: General;  Laterality: N/A;   • OTHER SURGICAL HISTORY Right     stent placement - right groin   • PILONIDAL CYSTECTOMY N/A 2/10/2023    Procedure: DEBRIDEMENT SACRAL DECUBITUS ULCER;  Surgeon: Neville Berman MD;  Location:  [facility];  Service: General;  Laterality: N/A;   • SHOULDER SURGERY Left     ROTATOR CUFF REPAIR       Family History   Problem Relation Age of Onset   • Hypertension Mother    • Stroke Mother    • Breast cancer Mother    • Hypertension Father    • Heart attack Father        Social History     Socioeconomic History   • Marital status: Single   • Number of children: 0   Tobacco Use   • Smoking status: Former     Packs/day: 0.75     Years: 50.00     Pack years: 37.50     Types: Cigarettes     Quit date: 2023     Years since quittin.1   • Smokeless tobacco: Never   Vaping Use   • Vaping Use: Never used   Substance and Sexual Activity   • Alcohol use: Yes     Comment: 3-4 beers daily   • Drug use: Not Currently   • Sexual activity: Defer           Objective   Physical Exam  Vitals and nursing note reviewed.   Constitutional:       Appearance: Normal appearance.   HENT:      Head: Normocephalic and atraumatic.      Mouth/Throat:      Mouth: Mucous membranes are moist.       Pharynx: Oropharynx is clear.   Cardiovascular:      Rate and Rhythm: Normal rate and regular rhythm.      Pulses: Normal pulses.      Heart sounds: Normal heart sounds.   Pulmonary:      Effort: Pulmonary effort is normal.      Breath sounds: Normal breath sounds.   Abdominal:      Palpations: Abdomen is soft.      Tenderness: There is no abdominal tenderness.      Comments: Ostomy   Musculoskeletal:      Cervical back: Normal range of motion and neck supple.      Comments: Bilateral lower extremity amputation   Skin:     General: Skin is warm and dry.      Capillary Refill: Capillary refill takes less than 2 seconds.             Comments: Multiple sacral wounds with surrounding erythema and purulent drainage   Neurological:      General: No focal deficit present.      Mental Status: He is alert.   Psychiatric:         Mood and Affect: Mood normal.         Behavior: Behavior normal.         Procedures           ED Course  ED Course as of 03/14/23 0455   Tue Mar 14, 2023   0006 CT Pelvis Without Contrast  Similar findings suggestive of chronic osteomyelitis with osseous destruction of the sacrum at S1-S2 it, in the appropriate clinical setting.. Sacral decubitus ulcers noted inferiorly. Inflamed soft tissue extends into the scrotum bilaterally. Findings   are compatible with extensive bony and soft tissue infectious process.     Abnormal soft tissue surrounds the bilateral hip joints, similar to slightly increased compared to 2/6/2023. Findings could potentially reflect spread of infection to the joint space. Consider joint aspiration as indicated.     Sclerosis of the right ischium and inferior pubic ramus, nonspecific, similar to prior. [MA]   0031 WBC(!): 14.37 [MA]      ED Course User Index  [MA] María Mayen, TYE                                           Medical Decision Making    Severino Vera  is a 67 yrs old male  presents today for a wound.  Upon arrival to the ER patient was afebrile,  nontoxic-appearing, hemodynamically stable.  Physical exam revealed multiple sacral wounds with surrounding erythema and purulent drainage, ostomy in place with normal-appearing output, bilateral lower extremity amputation.    Based on his history and physical exam, my differential diagnosis included several life threatening conditions including cellulitis, abscess, osteomyelitis, sepsis, metabolic abnormality, electrolyte abnormality. Ruling out the most morbid conditions drove my clinical assessment.     In order to fully explore differential these tests and treatments were ordered.   Orders Placed This Encounter      Blood Culture - Blood,      Blood Culture - Blood,      Wound Culture - Wound, Coccyx      CT Pelvis Without Contrast      XR Chest 1 View      [place] Draw      Lactic Acid, Plasma      Procalcitonin      Comprehensive Metabolic Panel      Urinalysis With Microscopic If Indicated (No Culture) - Urine, Catheter      C-reactive Protein      Sedimentation Rate      CBC Auto Differential      Urinalysis, Microscopic Only - Urine, Clean Catch       Medications- vancomycin    Old records for this patient including previous inpatient admissions were reviewed and found to be pertinent. Records review indicates that the patient was recently discharged from the hospital on 2/21 after being admitted for sacral osteomyelitis.  The patient had his wounds debrided in the operating room and also had a ostomy operation at this time.  Patient was discharged on multiple antibiotics both IV and oral and sent to a outpatient rehabilitation center.    It should be noted that his chronic conditions includes diabetes, bilateral lower extremity amputation, hyperlipidemia, hypertension, which currently is at goal therapy.  This complicates his clinical picture because it increases risk of morbidity and may be contributing to patient's current presentation.  Additional history was provided by family member at bedside.    I  interpreted and clinically used the CT scan prior to its official reading.  Findings concerning for osteomyelitis as well as multiple sacral wounds on my read. Please see the interpretation for final read.     Lab results were reviewed independently and can be interpreted as leukocytosis at 14.3.  Given patient's current presentation, history, laboratory findings, CT scan findings, had a interactive discussion with Dr. Carrasco regarding the patient's current presentation and history.  After evaluating the patient Dr. Carrasco ultimately agreed to admit the patient for further management of his osteomyelitis and IV antibiotics.  Patient and family member at bedside were agreeable to care plan and patient was stable prior to transfer of care.  Ultimately, this patient was admitted.      Sacral wound, subsequent encounter: acute illness or injury  Subacute osteomyelitis of multiple sites (HCC): chronic illness or injury  Amount and/or Complexity of Data Reviewed  Labs: ordered. Decision-making details documented in ED Course.  Radiology: ordered. Decision-making details documented in ED Course.      Risk  Decision regarding hospitalization.          Final diagnoses:   Subacute osteomyelitis of multiple sites (HCC)   Sacral wound, subsequent encounter       ED Disposition  ED Disposition     ED Disposition   Decision to Admit    Condition   --    Comment   Level of Care: Telemetry [5]   Diagnosis: Subacute osteomyelitis of multiple sites (HCC) [607945]   Admitting Physician: GINA CARRASCO III [[id number]]   Attending Physician: GINA CARRASCO III [[id number]]   Bed Request Comments: tele               No follow-up provider specified.       Medication List      ASK your doctor about these medications    metroNIDAZOLE 500 MG tablet  Commonly known as: FLAGYL  Take 1 tablet by mouth Every 8 (Eight) Hours for 21 days. Through 3/14/23 (or as directed by ID)  Indications: Bone and/or Joint Infection, Infection of  the Skin and/or Soft Tissue  Ask about: Should I take this medication?             María Mayen PA-C  03/14/23 0451
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
## 2023-03-15 PROBLEM — M86.29: Status: ACTIVE | Noted: 2023-03-15

## 2023-03-15 LAB
ALBUMIN SERPL-MCNC: 2.4 G/DL (ref 3.5–5.2)
ALBUMIN/GLOB SERPL: 0.7 G/DL
ALP SERPL-CCNC: 91 U/L (ref 39–117)
ALT SERPL W P-5'-P-CCNC: 18 U/L (ref 1–41)
ANION GAP SERPL CALCULATED.3IONS-SCNC: 7 MMOL/L (ref 5–15)
AST SERPL-CCNC: 36 U/L (ref 1–40)
BILIRUB SERPL-MCNC: <0.2 MG/DL (ref 0–1.2)
BUN SERPL-MCNC: 12 MG/DL (ref 8–23)
BUN/CREAT SERPL: 31.6 (ref 7–25)
CALCIUM SPEC-SCNC: 8 MG/DL (ref 8.6–10.5)
CHLORIDE SERPL-SCNC: 98 MMOL/L (ref 98–107)
CO2 SERPL-SCNC: 28 MMOL/L (ref 22–29)
CREAT SERPL-MCNC: 0.38 MG/DL (ref 0.76–1.27)
DEPRECATED RDW RBC AUTO: 55.3 FL (ref 37–54)
EGFRCR SERPLBLD CKD-EPI 2021: 120.7 ML/MIN/1.73
ERYTHROCYTE [DISTWIDTH] IN BLOOD BY AUTOMATED COUNT: 18.3 % (ref 12.3–15.4)
GLOBULIN UR ELPH-MCNC: 3.5 GM/DL
GLUCOSE BLDC GLUCOMTR-MCNC: 160 MG/DL (ref 70–130)
GLUCOSE BLDC GLUCOMTR-MCNC: 161 MG/DL (ref 70–130)
GLUCOSE BLDC GLUCOMTR-MCNC: 179 MG/DL (ref 70–130)
GLUCOSE BLDC GLUCOMTR-MCNC: 218 MG/DL (ref 70–130)
GLUCOSE SERPL-MCNC: 140 MG/DL (ref 65–99)
HCT VFR BLD AUTO: 32 % (ref 37.5–51)
HGB BLD-MCNC: 10.3 G/DL (ref 13–17.7)
MAGNESIUM SERPL-MCNC: 1.2 MG/DL (ref 1.6–2.4)
MCH RBC QN AUTO: 26.9 PG (ref 26.6–33)
MCHC RBC AUTO-ENTMCNC: 32.2 G/DL (ref 31.5–35.7)
MCV RBC AUTO: 83.6 FL (ref 79–97)
PLATELET # BLD AUTO: 365 10*3/MM3 (ref 140–450)
PMV BLD AUTO: 9.5 FL (ref 6–12)
POTASSIUM SERPL-SCNC: 3.5 MMOL/L (ref 3.5–5.2)
PROT SERPL-MCNC: 5.9 G/DL (ref 6–8.5)
RBC # BLD AUTO: 3.83 10*6/MM3 (ref 4.14–5.8)
SODIUM SERPL-SCNC: 133 MMOL/L (ref 136–145)
WBC NRBC COR # BLD: 12.2 10*3/MM3 (ref 3.4–10.8)

## 2023-03-15 PROCEDURE — 96372 THER/PROPH/DIAG INJ SC/IM: CPT

## 2023-03-15 PROCEDURE — 25010000002 DAPTOMYCIN PER 1 MG: Performed by: INTERNAL MEDICINE

## 2023-03-15 PROCEDURE — 25010000002 HEPARIN (PORCINE) PER 1000 UNITS: Performed by: INTERNAL MEDICINE

## 2023-03-15 PROCEDURE — G0378 HOSPITAL OBSERVATION PER HR: HCPCS

## 2023-03-15 PROCEDURE — 63710000001 INSULIN LISPRO (HUMAN) PER 5 UNITS: Performed by: INTERNAL MEDICINE

## 2023-03-15 PROCEDURE — 80053 COMPREHEN METABOLIC PANEL: CPT | Performed by: HOSPITALIST

## 2023-03-15 PROCEDURE — 99233 SBSQ HOSP IP/OBS HIGH 50: CPT | Performed by: HOSPITALIST

## 2023-03-15 PROCEDURE — 82962 GLUCOSE BLOOD TEST: CPT

## 2023-03-15 PROCEDURE — 25010000002 MAGNESIUM SULFATE 2 GM/50ML SOLUTION: Performed by: HOSPITALIST

## 2023-03-15 PROCEDURE — 83735 ASSAY OF MAGNESIUM: CPT

## 2023-03-15 PROCEDURE — 85027 COMPLETE CBC AUTOMATED: CPT | Performed by: HOSPITALIST

## 2023-03-15 RX ORDER — MAGNESIUM SULFATE HEPTAHYDRATE 40 MG/ML
2 INJECTION, SOLUTION INTRAVENOUS
Status: COMPLETED | OUTPATIENT
Start: 2023-03-15 | End: 2023-03-15

## 2023-03-15 RX ORDER — CLOPIDOGREL BISULFATE 75 MG/1
75 TABLET ORAL DAILY
Status: DISCONTINUED | OUTPATIENT
Start: 2023-03-15 | End: 2023-03-24 | Stop reason: HOSPADM

## 2023-03-15 RX ORDER — SODIUM HYPOCHLORITE 1.25 MG/ML
SOLUTION TOPICAL 2 TIMES DAILY
Status: DISCONTINUED | OUTPATIENT
Start: 2023-03-15 | End: 2023-03-16

## 2023-03-15 RX ORDER — POTASSIUM CHLORIDE 750 MG/1
40 CAPSULE, EXTENDED RELEASE ORAL EVERY 4 HOURS
Status: COMPLETED | OUTPATIENT
Start: 2023-03-15 | End: 2023-03-15

## 2023-03-15 RX ADMIN — Medication 10 ML: at 09:24

## 2023-03-15 RX ADMIN — DAPTOMYCIN 500 MG: 500 INJECTION, POWDER, LYOPHILIZED, FOR SOLUTION INTRAVENOUS at 05:45

## 2023-03-15 RX ADMIN — OXYCODONE HYDROCHLORIDE AND ACETAMINOPHEN 1 TABLET: 10; 325 TABLET ORAL at 03:22

## 2023-03-15 RX ADMIN — MAGNESIUM SULFATE IN WATER 2 G: 40 INJECTION, SOLUTION INTRAVENOUS at 17:17

## 2023-03-15 RX ADMIN — POTASSIUM CHLORIDE 40 MEQ: 10 CAPSULE, COATED, EXTENDED RELEASE ORAL at 17:16

## 2023-03-15 RX ADMIN — OXYCODONE HYDROCHLORIDE AND ACETAMINOPHEN 500 MG: 500 TABLET ORAL at 09:18

## 2023-03-15 RX ADMIN — MULTIPLE VITAMINS W/ MINERALS TAB 1 TABLET: TAB at 09:18

## 2023-03-15 RX ADMIN — ROSUVASTATIN 20 MG: 20 TABLET, FILM COATED ORAL at 21:48

## 2023-03-15 RX ADMIN — METRONIDAZOLE 500 MG: 500 TABLET ORAL at 17:21

## 2023-03-15 RX ADMIN — SODIUM CHLORIDE 2 G: 900 INJECTION INTRAVENOUS at 04:45

## 2023-03-15 RX ADMIN — METRONIDAZOLE 500 MG: 500 TABLET ORAL at 05:44

## 2023-03-15 RX ADMIN — Medication 250 MG: at 09:18

## 2023-03-15 RX ADMIN — Medication 1 CAPSULE: at 09:18

## 2023-03-15 RX ADMIN — GABAPENTIN 800 MG: 400 CAPSULE ORAL at 09:17

## 2023-03-15 RX ADMIN — GABAPENTIN 800 MG: 400 CAPSULE ORAL at 21:48

## 2023-03-15 RX ADMIN — Medication 10 ML: at 21:49

## 2023-03-15 RX ADMIN — INSULIN LISPRO 2 UNITS: 100 INJECTION, SOLUTION INTRAVENOUS; SUBCUTANEOUS at 18:55

## 2023-03-15 RX ADMIN — DULOXETINE 60 MG: 60 CAPSULE, DELAYED RELEASE ORAL at 09:18

## 2023-03-15 RX ADMIN — SODIUM CHLORIDE 2 G: 900 INJECTION INTRAVENOUS at 12:53

## 2023-03-15 RX ADMIN — GABAPENTIN 800 MG: 400 CAPSULE ORAL at 18:55

## 2023-03-15 RX ADMIN — CYANOCOBALAMIN TAB 1000 MCG 1000 MCG: 1000 TAB at 09:18

## 2023-03-15 RX ADMIN — FERROUS SULFATE TAB 325 MG (65 MG ELEMENTAL FE) 325 MG: 325 (65 FE) TAB at 09:18

## 2023-03-15 RX ADMIN — GABAPENTIN 800 MG: 400 CAPSULE ORAL at 12:51

## 2023-03-15 RX ADMIN — METRONIDAZOLE 500 MG: 500 TABLET ORAL at 21:48

## 2023-03-15 RX ADMIN — SODIUM CHLORIDE 2 G: 900 INJECTION INTRAVENOUS at 19:55

## 2023-03-15 RX ADMIN — POTASSIUM CHLORIDE 40 MEQ: 10 CAPSULE, COATED, EXTENDED RELEASE ORAL at 12:51

## 2023-03-15 RX ADMIN — HEPARIN SODIUM 5000 UNITS: 5000 INJECTION INTRAVENOUS; SUBCUTANEOUS at 09:21

## 2023-03-15 RX ADMIN — THIAMINE HCL TAB 100 MG 100 MG: 100 TAB at 09:18

## 2023-03-15 RX ADMIN — INSULIN LISPRO 2 UNITS: 100 INJECTION, SOLUTION INTRAVENOUS; SUBCUTANEOUS at 12:57

## 2023-03-15 RX ADMIN — CLOPIDOGREL BISULFATE 75 MG: 75 TABLET ORAL at 12:51

## 2023-03-15 RX ADMIN — MAGNESIUM SULFATE IN WATER 2 G: 40 INJECTION, SOLUTION INTRAVENOUS at 19:53

## 2023-03-15 RX ADMIN — MAGNESIUM SULFATE IN WATER 2 G: 40 INJECTION, SOLUTION INTRAVENOUS at 15:58

## 2023-03-15 RX ADMIN — SODIUM CHLORIDE 1 G: 1 TABLET ORAL at 09:19

## 2023-03-15 RX ADMIN — HEPARIN SODIUM 5000 UNITS: 5000 INJECTION INTRAVENOUS; SUBCUTANEOUS at 21:48

## 2023-03-15 RX ADMIN — ASPIRIN 81 MG: 81 TABLET, COATED ORAL at 09:18

## 2023-03-15 RX ADMIN — INSULIN LISPRO 3 UNITS: 100 INJECTION, SOLUTION INTRAVENOUS; SUBCUTANEOUS at 00:53

## 2023-03-15 NOTE — PROGRESS NOTES
General Surgery Consultation Note    Date of Service: 3/15/2023  Severino Carrasco Chuy  [id number]  1955      Referring Provider: Sushant Bazan MD    Location of Consult: Inpatient     Reason for Consultation: Sacral decubiti       History of Present Illness:  I am seeing, Severino Vera, in consultation for Sushant Bazan MD regarding sacral decubiti.  67-year-old gentleman with longstanding paraplegia status post bilateral above-knee amputations returns to the hospital with sacral decubiti which were present prior.  His colostomy is assisting in decreasing contamination and is functioning well.  No new complaints otherwise.    Problems Addressed this Visit        Other    Subacute osteomyelitis of multiple sites (HCC) - Primary   Other Visit Diagnoses     Sacral wound, subsequent encounter          Diagnoses       Codes Comments    Subacute osteomyelitis of multiple sites (HCC)    -  Primary ICD-10-CM: M86.29  ICD-9-CM: 730.09     Sacral wound, subsequent encounter     ICD-10-CM: S31.000D  ICD-9-CM: V58.89, 879.6           Past Medical History:   Diagnosis Date   • Arthritis    • Diabetes mellitus (HCC)    • Gangrene (HCC)     BILATERAL FEET   • GERD (gastroesophageal reflux disease)    • Hyperlipidemia    • Hypertension    • Osteomyelitis (HCC)    • PAD (peripheral artery disease) (HCC)    • Paraplegia (HCC)    • Peripheral vascular disease (HCC)    • Self-catheterizes urinary bladder        Past Surgical History:   • ABOVE KNEE AMPUTATION    Procedure: AMPUTATION ABOVE KNEE RIGHT;  Surgeon: Nemesio Haji MD;  Location: [facility] OR;  Service: Vascular;  Laterality: Right;   • ABOVE KNEE AMPUTATION    Procedure: AMPUTATION ABOVE KNEE LEFT;  Surgeon: Nemesio Haji MD;  Location: [facility] OR;  Service: Vascular;  Laterality: Left;   • EXPLORATORY LAPAROTOMY    Procedure: LAPAROTOMY EXPLORATORY, SIGMOID RESECTION, COLOSTOMY;  Surgeon: Neville Berman MD;  Location: [facility] OR;  Service: General;   Laterality: N/A;   • OTHER SURGICAL HISTORY    stent placement - right groin   • PILONIDAL CYSTECTOMY    Procedure: DEBRIDEMENT SACRAL DECUBITUS ULCER;  Surgeon: Neville Berman MD;  Location: [facility] OR;  Service: General;  Laterality: N/A;   • SHOULDER SURGERY    ROTATOR CUFF REPAIR       Allergies   Allergen Reactions   • Cefepime Itching     Increased eosinophilia while on cefepime   • Doxycycline Other (See Comments)     Doxycycline stopped at same time as Zosyn when patient developed rash, unclear if also caused rash.    • Zosyn [Piperacillin Sod-Tazobactam So] Rash       No current facility-administered medications on file prior to encounter.     Current Outpatient Medications on File Prior to Encounter   Medication Sig Dispense Refill   • aspirin 81 MG EC tablet Take 1 tablet by mouth Daily. 30 tablet 5   • clopidogrel (PLAVIX) 75 MG tablet Take 1 tablet by mouth Daily.     • DULoxetine (CYMBALTA) 60 MG capsule Take 1 capsule by mouth Daily.     • ferrous sulfate 324 MG tablet delayed-release Take 1 tablet by mouth Daily With Breakfast. 30 tablet    • gabapentin (NEURONTIN) 800 MG tablet Take 1 tablet by mouth 4 (Four) Times a Day. 12 tablet 0   • Januvia 100 MG tablet Take 1 tablet by mouth Daily.     • lactobacillus acidophilus (RISAQUAD) capsule capsule Take 1 capsule by mouth Daily.     • lisinopril (PRINIVIL,ZESTRIL) 40 MG tablet Take 1 tablet by mouth Daily.     • metoprolol succinate XL (TOPROL-XL) 25 MG 24 hr tablet Take 1 tablet by mouth Daily. 30 tablet 3   • multivitamin with minerals tablet tablet Take 1 tablet by mouth Daily.     • NON FORMULARY ProMod Protein Supplement     • nystatin (MYCOSTATIN) 363078 UNIT/GM powder Apply  topically to the appropriate area as directed Every 12 (Twelve) Hours.     • oxyCODONE-acetaminophen (PERCOCET)  MG per tablet Take 1 tablet by mouth 5 (Five) Times a Day. 75 tablet 0   • polyethylene glycol (MIRALAX) 17 g packet Take 17 g by mouth Daily. Hold  for loose bowel movements     • saccharomyces boulardii (FLORASTOR) 250 MG capsule Take 1 capsule by mouth Daily.     • senna (SENOKOT) 8.6 MG tablet Take 2 tablets by mouth Every Night.     • sodium chloride 1 g tablet Take 1 tablet by mouth Daily.     • thiamine (VITAMIN B1) 100 MG tablet Take 1 tablet by mouth Daily.     • vitamin B-12 (VITAMIN B-12) 1000 MCG tablet Take 1 tablet by mouth Daily.     • vitamin C (ASCORBIC ACID) 500 MG tablet Take 1 tablet by mouth Daily.     • rosuvastatin (CRESTOR) 20 MG tablet Take 1 tablet by mouth Every Night. 90 tablet 3         Current Facility-Administered Medications:   •  acetaminophen (TYLENOL) tablet 650 mg, 650 mg, Oral, Q4H PRN **OR** acetaminophen (TYLENOL) 160 MG/5ML solution 650 mg, 650 mg, Oral, Q4H PRN **OR** acetaminophen (TYLENOL) suppository 650 mg, 650 mg, Rectal, Q4H PRN, Shaheed Carrasco III, DO  •  ascorbic acid (VITAMIN C) tablet 500 mg, 500 mg, Oral, Daily, Shaheed Carrasco III, DO, 500 mg at 03/15/23 0918  •  aspirin EC tablet 81 mg, 81 mg, Oral, Daily, Shaheed Carrasco III, DO, 81 mg at 03/15/23 0918  •  aztreonam (AZACTAM) 2 g/100 mL 0.9% NS (mbp), 2 g, Intravenous, Q8H, Shaheed Carrasco III, DO, 2 g at 03/15/23 1253  •  Calcium Replacement - Initiate Nurse / BPA Driven Protocol, , Does not apply, PRN, Sushant Bazan MD  •  clopidogrel (PLAVIX) tablet 75 mg, 75 mg, Oral, Daily, Sushant Bazan MD, 75 mg at 03/15/23 1251  •  DAPTOmycin (CUBICIN) 500 mg in sodium chloride 0.9 % 50 mL IVPB, 7.5 mg/kg, Intravenous, Q24H, Shaheed Carrasco III, DO, Last Rate: 100 mL/hr at 03/15/23 0545, 500 mg at 03/15/23 0545  •  dextrose (D50W) (25 g/50 mL) IV injection 25 g, 25 g, Intravenous, Q15 Min PRN, Shaheed Carrasco III, DO  •  dextrose (GLUTOSE) oral gel 15 g, 15 g, Oral, Q15 Min PRN, Shaheed Carrasco III, DO  •  DULoxetine (CYMBALTA) DR capsule 60 mg, 60 mg, Oral, Daily, Shaheed Carrasco III, DO, 60 mg  at 03/15/23 0918  •  ferrous sulfate tablet 325 mg, 325 mg, Oral, Daily With Breakfast, Shaheed Carrasco III, DO, 325 mg at 03/15/23 0918  •  gabapentin (NEURONTIN) capsule 800 mg, 800 mg, Oral, 4x Daily, Shaheed Carrasco III, DO, 800 mg at 03/15/23 1251  •  glucagon (GLUCAGEN) injection 1 mg, 1 mg, Intramuscular, Q15 Min PRN, Shaheed Carrasco III, DO  •  heparin (porcine) 5000 UNIT/ML injection 5,000 Units, 5,000 Units, Subcutaneous, Q12H, Shaheed Carrasco III, DO, 5,000 Units at 03/15/23 0921  •  HYDROmorphone (DILAUDID) injection 0.5 mg, 0.5 mg, Intravenous, Q2H PRN **AND** naloxone (NARCAN) injection 0.4 mg, 0.4 mg, Intravenous, Q5 Min PRN, Shaheed Carrasco III, DO  •  Insulin Lispro (humaLOG) injection 0-7 Units, 0-7 Units, Subcutaneous, Q6H, Shaheed Carrasco III, DO, 2 Units at 03/15/23 1257  •  lactobacillus acidophilus (RISAQUAD) capsule 1 capsule, 1 capsule, Oral, Daily, Shaheed Carrasco III, DO, 1 capsule at 03/15/23 0918  •  lisinopril (PRINIVIL,ZESTRIL) tablet 40 mg, 40 mg, Oral, Daily, Shaheed Carrasco III, DO, 40 mg at 03/14/23 0806  •  Magnesium Standard Dose Replacement - Initiate Nurse / BPA Driven Protocol, , Does not apply, PRN, Sushant Bazan MD  •  magnesium sulfate 2g/50 mL (PREMIX) infusion, 2 g, Intravenous, Q2H, Sushant Bazan MD  •  melatonin tablet 5 mg, 5 mg, Oral, Nightly PRN, Shaheed Carrasco III, DO, 5 mg at 03/14/23 2101  •  metoprolol succinate XL (TOPROL-XL) 24 hr tablet 25 mg, 25 mg, Oral, Q24H, Shaheed Carrasco III, DO, 25 mg at 03/14/23 0958  •  metroNIDAZOLE (FLAGYL) tablet 500 mg, 500 mg, Oral, Q8H, Dillon Dickey MD, 500 mg at 03/15/23 0523  •  multivitamin with minerals 1 tablet, 1 tablet, Oral, Daily, Shaheed Carrasco III, DO, 1 tablet at 03/15/23 0918  •  ondansetron (ZOFRAN) tablet 4 mg, 4 mg, Oral, Q6H PRN **OR** ondansetron (ZOFRAN) injection 4 mg, 4 mg, Intravenous, Q6H PRN, Luna  Shaheed Torres III, DO  •  oxyCODONE-acetaminophen (PERCOCET)  MG per tablet 1 tablet, 1 tablet, Oral, Q6H PRN, Ankita Woods MD, 1 tablet at 03/15/23 0322  •  Phosphorus Replacement - Initiate Nurse / BPA Driven Protocol, , Does not apply, MARIANELA, Sushant Bazan MD  •  polyethylene glycol (MIRALAX) packet 17 g, 17 g, Oral, Daily, Shaheed Carrasco III, DO, 17 g at 03/14/23 0958  •  potassium chloride (MICRO-K) CR capsule 40 mEq, 40 mEq, Oral, Q4H, Sushant Bazan MD, 40 mEq at 03/15/23 1251  •  Potassium Replacement - Initiate Nurse / BPA Driven Protocol, , Does not apply, PRN, Sushant Bazan MD  •  rosuvastatin (CRESTOR) tablet 20 mg, 20 mg, Oral, Nightly, Shaheed Carrasco III, DO, 20 mg at 03/14/23 2100  •  saccharomyces boulardii (FLORASTOR) capsule 250 mg, 250 mg, Oral, Daily, Shaheed Carrasco III, DO, 250 mg at 03/15/23 0918  •  senna (SENOKOT) tablet 2 tablet, 2 tablet, Oral, Nightly, Shaheed Carrasco III, DO, 2 tablet at 03/14/23 2100  •  sodium chloride 0.9 % flush 10 mL, 10 mL, Intravenous, PRN, Shaheed Carrasco III, DO  •  sodium chloride 0.9 % flush 10 mL, 10 mL, Intravenous, Q12H, Shaheed Carrasco III, DO, 10 mL at 03/14/23 2108  •  sodium chloride 0.9 % flush 10 mL, 10 mL, Intravenous, Q12H, Shaheed Carrasco III, DO, 10 mL at 03/14/23 1007  •  sodium chloride 0.9 % flush 10 mL, 10 mL, Intravenous, Q12H, Shaheed Carrasco III, DO, 10 mL at 03/15/23 0924  •  sodium chloride 0.9 % flush 10 mL, 10 mL, Intravenous, PRN, Shaheed Carrasco III, DO  •  sodium chloride 0.9 % flush 20 mL, 20 mL, Intravenous, PRN, Shhaeed Carrasco III, DO  •  sodium chloride 0.9 % infusion 40 mL, 40 mL, Intravenous, PRN, Shaheed Carrasco III, DO  •  sodium chloride tablet 1 g, 1 g, Oral, Daily, Shaheed Carrasco III, , 1 g at 03/15/23 0919  •  thiamine (VITAMIN B-1) tablet 100 mg, 100 mg, Oral, Daily, Shaheed Carrasco III, , 100 mg  "at 03/15/23 0918  •  vitamin B-12 (CYANOCOBALAMIN) tablet 1,000 mcg, 1,000 mcg, Oral, Daily, CarrascoShaheed III, DO, 1,000 mcg at 03/15/23 09    Family History   Problem Relation Age of Onset   • Hypertension Mother    • Stroke Mother    • Breast cancer Mother    • Hypertension Father    • Heart attack Father      Social History     Socioeconomic History   • Marital status: Single   • Number of children: 0   Tobacco Use   • Smoking status: Former     Packs/day: 0.75     Years: 50.00     Pack years: 37.50     Types: Cigarettes     Quit date: 2023     Years since quittin.2   • Smokeless tobacco: Never   Vaping Use   • Vaping Use: Never used   Substance and Sexual Activity   • Alcohol use: Yes     Comment: 3-4 beers daily   • Drug use: Not Currently   • Sexual activity: Defer       /78 (BP Location: Left arm, Patient Position: Lying)   Pulse 83   Temp 98.2 °F (36.8 °C) (Oral)   Resp 16   Ht 182.9 cm (72\")   Wt 65.8 kg (145 lb)   SpO2 98%   BMI 19.67 kg/m²   Body mass index is 19.67 kg/m².    General: No apparent distress  HEENT: PER, no icterus, normal sclerae  Cardiac: regular rhythm,  no audible rubs  Pulmonary: bilateral breath sounds, nonlabored  Abdominal: The incision appears well-healed.  Viable stoma with stool.  Neurologic: awake, alert, no obvious focal deficits  Sacrum: 3 separate ulcers appear about the same.  No gross purulence nor crepitus nor need for further operative debridement at this point.  Extremities: warm, no edema  Skin: no obvious rashes nor worrisome lesions seen     CBC  Results from last 7 days   Lab Units 03/15/23  0808   WBC 10*3/mm3 12.20*   HEMOGLOBIN g/dL 10.3*   HEMATOCRIT % 32.0*   PLATELETS 10*3/mm3 365       CMP  Results from last 7 days   Lab Units 03/15/23  0808   SODIUM mmol/L 133*   POTASSIUM mmol/L 3.5   CHLORIDE mmol/L 98   CO2 mmol/L 28.0   BUN mg/dL 12   CREATININE mg/dL 0.38*   CALCIUM mg/dL 8.0*   BILIRUBIN mg/dL <0.2   ALK PHOS U/L 91   ALT " (SGPT) U/L 18   AST (SGOT) U/L 36   GLUCOSE mg/dL 140*       Radiology  Imaging Results (Last 72 Hours)     Procedure Component Value Units Date/Time    XR Chest 1 View [[id number]] Collected: 03/14/23 0331     Updated: 03/14/23 0533    Narrative:      EXAMINATION: Chest one view.    DATE: 3/14/2023.    COMPARISON: 2/9/2023.    CLINICAL HISTORY: PICC line verification.    FINDINGS:    There is a right-sided PICC line with the catheter tip overlying the superior vena cava.    Trace right pleural effusion suspected. The lungs otherwise appear clear.    The cardiomediastinal silhouette and the pulmonary vessels are within normal limits.      Impression:        PICC line as above with trace right pleural effusion suspected.    Electronically signed by:  Avel Luo D.O.    3/14/2023 3:32 AM Mountain Time    CT Pelvis Without Contrast [[id number]] Collected: 03/13/23 2147     Updated: 03/14/23 0003    Narrative:      CT pelvis without contrast    COMPARISON: CT abdomen pelvis 2/6/2023    INDICATION: large sacral wounds concerning for osteomyelitis    TECHNIQUE: CT of the pelvis without IV contrast. Multiplanar reformats and bone algorithm windows provided.    FINDINGS:    Similar severe erosive changes of the sacrum at S1-S2, with anterolisthesis, sclerosis and erosive changes surrounding the endplate with bony proliferative changes, similar compared to 2/6/2023. Similar abnormal soft tissue between the 2 bone   fragment/vertebral bodies. Unchanged angulation of the coccyx, may reflect old trauma.    Severe bilateral facet arthropathy. Moderate bilateral hip degenerative changes. Moderate degenerative changes the pubic symphysis. Sclerosis of the right ischium and inferior pubic ramus, nonspecific, similar to prior.    Wilkerson catheter in the bladder. Marked bladder wall thickening. Air in the bladder likely related to instrumentation.    Left lower quadrant colostomy. Rectal pouch. A free fluid and fat stranding  surrounds the rectum. Rectal wall thickening. The rectum and anus extending posteriorly, into abnormal soft tissue thickening at the gluteal folds, compatible with decubitus   ulceration. Soft tissue edema extends into the scrotum bilaterally where there is fluid and inflammatory stranding. Anasarca noted bilaterally..    Bilateral abnormal soft tissue surrounds the bilateral hip joints, similar to slightly increased compared to 2/6/2023.    Small volume free fluid in the abdomen pelvis partially imaged. Extensive atherosclerotic vascular calcifications noted. Mildly enlarged bilateral inguinal lymph nodes, likely reactive. Normal appendix.      Impression:        Similar findings suggestive of chronic osteomyelitis with osseous destruction of the sacrum at S1-S2 it, in the appropriate clinical setting.. Sacral decubitus ulcers noted inferiorly. Inflamed soft tissue extends into the scrotum bilaterally. Findings   are compatible with extensive bony and soft tissue infectious process.    Abnormal soft tissue surrounds the bilateral hip joints, similar to slightly increased compared to 2/6/2023. Findings could potentially reflect spread of infection to the joint space. Consider joint aspiration as indicated.    Sclerosis of the right ischium and inferior pubic ramus, nonspecific, similar to prior.    Electronically signed by:  Kathryn Gil M.D.    3/13/2023 10:02 PM Mountain Time            Assessment:  Sacral decubiti    Plan:  Local wound care, wound care to see.  Recommend quarter strength Dakin's wet-to-dry dressing changes twice daily.  Will follow peripherally.  In order for these to heal he will likely need tissue to cover the exposed bone.  Recommend consult to plastics and reconstructive surgery.    Neville Berman MD  03/15/23  13:09 EDT
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
## 2023-03-15 NOTE — PLAN OF CARE
Goal Outcome Evaluation:  Plan of Care Reviewed With: patient            Pt VSS. RA. NSR. Pt turn 2Q. Wound care done. Will continue with POC.

## 2023-03-15 NOTE — NURSING NOTE
[facility] ENCOUNTER    Basic Information  Name: Paz Worthington Age: 83 year old Sex: female   MRN: [id number] Encounter: 11/5/2022, 4:14 PM  PCP: Lobo Dalal MD    Chief Complaint  Chief Complaint   Patient presents with   • Fall     History of Present Illness    Ms. Worthington is a very pleasant 82 yo F w/ pmh of ckd, congestive heart failure, depression, diabetes, hypertension, Parkinson's, hyperlipidemia, dementia, presenting to the emergency department for evaluation of a fall, patient states that she was walking, was not supposed to be up and about without her walker, felt lightheaded and fell, struck her head against the ground denies losing consciousness, currently denies any abdominal pain or chest pain, no fever chills, no shortness of breath cough or congestion, denies lightheadedness or dizziness currently.    Patient does state that she is having left knee pain after the fall    Health Status  Allergies:  ALLERGIES:   Allergen Reactions   • Bentyl Other (See Comments)     unknown   • Penicillins Other (See Comments)     unknown     Current Medications:  Medications Prior to Admission   Medication Sig Dispense Refill   • cloNIDine (CATAPRES) 0.1 MG tablet Take 0.1 mg by mouth in the morning and 0.1 mg in the evening.     • rivastigmine (EXELON) 4.6 MG/24HR patch Place 1 patch onto the skin daily.     • aluminum-magnesium hydroxide-simethicone (MAALOX) 200-200-20 MG/5ML Suspension Take 30 mLs by mouth every 4 hours as needed (gas/indigestion).     • metFORMIN (GLUCOPHAGE) 500 MG tablet Take 500 mg by mouth in the morning and 500 mg in the evening. Take with meals.     • magnesium hydroxide (MILK OF MAGNESIA) 400 MG/5ML suspension Take 30 mLs by mouth daily as needed for Constipation.     • polyethylene glycol (MIRALAX) 17 g packet Take 17 g by mouth daily as needed (constipation). Stir and dissolve powder in any 4 to 8 ounces of beverage, then drink.     • ondansetron (ZOFRAN ODT) 4 MG  [facility] team consulted for multiple pressure injuries    Patient well-known to WOC RN from previous 2 admissions.  Patient alert and oriented x4, agreeable to assessment, able to turn with minimal assistance.    Identified stage IV pressure injuries to patient's left greater trochanter and right ischial tuberosity, see photos and measurements below.  Wound beds are moist, red, white, bone palpable in wound bed and nonblanchable.  Periwound skin is red but blanchable.  Cleansed with normal saline, patted dry, gently and loosely filled wound bed with 1 piece iodoform packing strip per wound and covered with a silicone foam border dressing.      Left Hip/greater trochanter,  measurements 2.25 x 1 x 1 w/undermining 7 - 11 o'clock >0.75 cm      Right ischial tuberosity  Measures 8.75 x 5.5 x 1.75 cm with undermining from 10 - 1 o'clock > 1.75 cm            Identified unstageable pressure injuries to patient's sacrococcygeal region and left ischial tuberosity, see photos and measurements below.  Wound beds with slough, moist, bone palpable, nonblanchable.  Periwound skin is red and blanchable.  Cleanse with normal saline, patted dry, applied skin protectant to periwound skin, applied Thera honey sheet to wound bed and applied silicone foam border dressing to sacrococcygeal region.  Added 1 piece iodoform packing strip over Thera honey sheet in left ischial tuberosity wound bed and then covered with a silicone foam border dressing.     Left Ischial Tuberosity,Measures 7 x 8 x 1.5 cm with undermining from 7- 1 o'clock > 2 cm            Sacrococcygeal    Measures 7.25 x 10 x 1 cm with undermining from 12- 6 o'clock > 1 cm      Identified healing full-thickness pressure injury to patient's right greater trochanter, see measurements and photo below.  Wound bed is red/maroon and very slow to johan to nonblanchable in areas.  Periwound skin is intact, blanchable.  Cleanse with normal saline, patted dry, applied silicone foam border  disintegrating tablet Place 4 mg onto the tongue every 8 hours as needed for Nausea.     • aspirin (ECOTRIN) 81 MG EC tablet Take 81 mg by mouth daily.     • metoPROLOL succinate (TOPROL-XL) 25 MG 24 hr tablet Take 25 mg by mouth daily.     • QUEtiapine (SEROquel) 50 MG tablet Take 50 mg by mouth nightly.     • cholecalciferol (VITAMIN D) 25 mcg (1,000 units) tablet Take 25 mcg by mouth daily.     • levothyroxine 75 MCG tablet Take 75 mcg by mouth daily.     • Pimavanserin Tartrate 34 MG Cap Take 34 mg by mouth daily.     • PARoxetine (PAXIL) 40 MG tablet Take 40 mg by mouth every morning.     • furosemide (LASIX) 20 MG tablet Take 20 mg by mouth daily.      • lisinopril (ZESTRIL) 10 MG tablet Take 10 mg by mouth daily.      • simvastatin (ZOCOR) 40 MG tablet Take 40 mg by mouth nightly.      • hydrALAZINE (APRESOLINE) 10 MG tablet Take 10 mg by mouth 3 times daily as needed (SBP > 150).        Past Medical History    Past Medical History:   Diagnosis Date   • Anxiety    • Chronic kidney disease    • Congestive cardiac failure (CMS/HCC)    • Depression    • Diabetes mellitus (CMS/HCC)    • Essential (primary) hypertension    • High cholesterol    • Parkinson's disease (CMS/HCC)    • Personal history of schizophrenia    • Thyroid condition    • Uncomplicated senile dementia (CMS/HCC)    • Unsteady gait      Past Surgical History:   Procedure Laterality Date   • Cardiac catherization       No family history on file.    Social History     Tobacco Use   • Smoking status: Unknown If Ever Smoked   • Smokeless tobacco: Never Used   Vaping Use   • Vaping Use: never used   Substance Use Topics   • Alcohol use: Not Currently   • Drug use: Not Currently     Review of Systems  General: No fever chills negative except HPI  Integumentary problem(s):, Laceration of the forehead negative except HPI  Eye problem(s): Negative except HPI  ENT problem(s): Negative except HPI  Cardiovascular problem(s): No chest pain or palpitation  dressing.  Right greater trochanter, healing pressure injury, slow to johan, measures 0.75 x 0.75 x 0 cm      Identified incontinence associated/moisture associated skin damage with partial-thickness skin loss to patient's posterior scrotum, seeing measurements and photo below.  Wound bed is red, moist, blanchable and periwound skin is red and blanchable.  No drainage noted.  Cleanse with pH balance foaming cleanser and barrier wipes, patted dry, applied zinc barrier cream.    Posterior Scrotal IAD/MASD with partial thickness skin loss, 2 x 3 x 0.2 cm      Identified large growth to patient's penile shaft present on previous admission, see photo below.  Also noted copious thick, white and yellow drainage to perineum scrotum and penis.  Brought to the attention of the nurse who states that the patient is going to have a bath shortly and recommended that given satellite lesions to patient's perineal area/bilateral groin suspected yeast infection and left miconazole powder at bedside.    Penis, scrotum, perineum      Penile growth      Penis, scrotum      Penis, scrotum      Ostomy pouch over Colostomy to LLQ, intact, not leaking.  Patient responds pouch was changed yesterday.    Sensory Perception: 2-->very limited  Moisture: 2-->very moist  Activity: 1-->bedfast  Mobility: 2-->very limited  Nutrition: 2-->probably inadequate  Friction and Shear: 1-->problem  Jaspreet Score: 10 (03/16/23 0800)    Specialty Dolphin mattress with low-air-loss topper ordered and patient is currently on the bed,  responds that it is comfortable.    Thank you for the consult.  [facility] team will plan to follow-up.  If alteration to skin integrity or change in wound bed presentation or needs/concerns regarding stoma, please consult the WOC team.     negative except HPI  Respiratory problem(s):  no shortness of breath cough can negative except HPI  Gastrointestinal problem(s):, No abdominal pain nausea or vomiting negative except HPI  Musculoskeletal problem(s): Negative except HPI  Neurological problem(s): No loss of consciousness numbness or tingling, negative except HPI    All systems otherwise negative, ROS reviewed as documented in chart.    Physical Exam  ED Triage Vitals   BP    Pulse    Resp    Temp    SpO2       Vitals:    11/05/22 2101 11/05/22 2149 11/05/22 2358 11/06/22 0039   BP: (!) 187/64  118/55    BP Location:       Patient Position:       Pulse: (!) 56  74 (!) 122   Resp: 16  16    Temp: 98.2 °F (36.8 °C)      TempSrc: Oral      SpO2: 96%  96%    Weight: 56.2 kg (123 lb 14.4 oz)      Height: 5' 5\" (1.651 m) 5' 6\" (1.676 m)       General:  No acute distress. Alert.  Answering questions appropriately  Skin:  Warm. Dry.  Multiple small superficial lacerations of the forehead, longer laceration noted through scalp line on left forehead.   Head:  Normocephalic.  Eye:  PERRL. EOMI.   ENMT:  Oral mucosa moist.  Neck: supple, no adenopathy  Cardiovascular: Tachycardia, irregularly irregular rhythm.  Normal peripheral perfusion. No edema.  Respiratory:  Lungs CTA. Respirations are non-labored.   Gastrointestinal:  Soft. Nontender. Non distended.   Back: Nontender. Normal alignment.  Musculoskeletal:  Normal ROM. No deformities.  Neurological:  Alert. No focal neurological deficit observed. CN II-XII intact. Normal sensory. Normal motor.  Psychiatric: Cooperative.    Medical Decision Making  Rationale:  Multiple differential diagnoses were considered. The patient / caregivers were apprised of diagnostic / treatment options including alternate modes of care, in addition to the risks and benefits, for this medical condition. Based on this discussion the patient / caregiver agrees with this chosen diagnostic and treatment plan.    Orders:  Medications    LIDOCAINE-EPINEPHRINE 2 %-1:200000 IJ SOLN Pyxis Override (has no administration in time range)   dilTIAZem (CARDIZEM) 125 mg/125 mL in sodium chloride infusion Solution (10 mg/hr Intravenous New Bag 11/5/22 1951)   sodium chloride 0.9% infusion ( Intravenous New Bag 11/5/22 2255)   dextrose 50 % injection 25 g (has no administration in time range)   dextrose 50 % injection 12.5 g (has no administration in time range)   glucagon (GLUCAGEN) injection 1 mg (has no administration in time range)   dextrose (GLUTOSE) 40 % gel 15 g (has no administration in time range)   dextrose (GLUTOSE) 40 % gel 30 g (has no administration in time range)   insulin lispro (ADMELOG,HumaLOG) - Correction Dose (has no administration in time range)   aspirin (ECOTRIN) enteric coated tablet 81 mg (has no administration in time range)   cloNIDine (CATAPRES) tablet 0.1 mg (has no administration in time range)   furosemide (LASIX) tablet 20 mg (has no administration in time range)   levothyroxine (SYNTHROID, LEVOTHROID) tablet 75 mcg (has no administration in time range)   lisinopril (ZESTRIL) tablet 10 mg (has no administration in time range)   magnesium hydroxide (MILK OF MAGNESIA) 400 MG/5ML suspension 30 mL (has no administration in time range)   metoPROLOL succinate (TOPROL-XL) ER tablet 25 mg (has no administration in time range)   PARoxetine (PAXIL) tablet 40 mg (has no administration in time range)   polyethylene glycol (MIRALAX) packet 17 g (has no administration in time range)   QUEtiapine (SEROquel) tablet 50 mg (50 mg Oral Given 11/5/22 2246)   rivastigmine (EXELON) 4.6 MG/24HR patch 1 patch (has no administration in time range)   atorvastatin (LIPITOR) tablet 20 mg (has no administration in time range)   morphine injection 1 mg (has no administration in time range)   HYDROcodone-acetaminophen (NORCO) 5-325 MG per tablet 1 tablet (1 tablet Oral Given 11/5/22 2246)   ondansetron (ZOFRAN) injection 4 mg (has no administration in  time range)   acetaminophen (TYLENOL) tablet 650 mg (has no administration in time range)   hydrALAZINE (APRESOLINE) injection 10 mg (10 mg Intravenous Given 11/5/22 2250)   sodium chloride (NORMAL SALINE) 0.9 % bolus 1,000 mL (0 mLs Intravenous Completed 11/5/22 1757)   dilTIAZem (CARDIZEM) injection 10 mg (10 mg Intravenous Given 11/5/22 1718)     EKG:   Time: 1608  Rate: 147  Rhythm: atrial fibrillation   EKG Interpretation  A. fib RVR rate of 147, left ventricular hypertrophy and some widening and repolarization abnormality noted  EKG interpreted by ED physician    Laboratory Results:  Results for orders placed or performed during the hospital encounter of 11/05/22   Light Blue Top   Result Value Ref Range    Extra Tube Hold for Add Ons    Light Green Top   Result Value Ref Range    Extra Tube Hold for Add Ons    Lavender Top   Result Value Ref Range    Extra Tube Hold for Add Ons    Gold Top   Result Value Ref Range    Extra Tube Hold for Add Ons    Pink Top Tube   Result Value Ref Range    Extra Tube Hold for Add Ons    Grey Top Tube   Result Value Ref Range    Extra Tube Hold for Add Ons    Comprehensive Metabolic Panel   Result Value Ref Range    Fasting Status      Sodium 138 135 - 145 mmol/L    Potassium 3.4 3.4 - 5.1 mmol/L    Chloride 103 97 - 110 mmol/L    Carbon Dioxide 24 21 - 32 mmol/L    Anion Gap 14 7 - 19 mmol/L    Glucose 169 (H) 70 - 99 mg/dL    BUN 14 6 - 20 mg/dL    Creatinine 0.98 (H) 0.51 - 0.95 mg/dL    Glomerular Filtration Rate 57 (L) >=60    BUN/ Creatinine Ratio 14 7 - 25    Calcium 9.4 8.4 - 10.2 mg/dL    Bilirubin, Total 0.5 0.2 - 1.0 mg/dL    GOT/AST 15 <=37 Units/L    GPT/ALT 16 <64 Units/L    Alkaline Phosphatase 67 45 - 117 Units/L    Albumin 3.6 3.6 - 5.1 g/dL    Protein, Total 7.3 6.4 - 8.2 g/dL    Globulin 3.7 2.0 - 4.0 g/dL    A/G Ratio 1.0 1.0 - 2.4   TROPONIN I, HIGH SENSITIVITY   Result Value Ref Range    Troponin I, High Sensitivity 76 (HH) <52 ng/L   NT proBNP   Result  Value Ref Range    NT-proBNP 17,201 (H) <=450 pg/mL   Partial Thromboplastin Time   Result Value Ref Range    PTT 24 22 - 30 sec   Prothrombin Time   Result Value Ref Range    Prothrombin Time 11.6 9.7 - 11.8 sec    INR 1.0     Lactic Acid Venous With Reflex   Result Value Ref Range    Lactate, Venous 3.3 (HH) 0.0 - 2.0 mmol/L   Magnesium   Result Value Ref Range    Magnesium 1.6 (L) 1.7 - 2.4 mg/dL   Procalcitonin   Result Value Ref Range    Procalcitonin <0.05 <=0.09 ng/mL   Urinalysis & Reflex Microscopy With Culture If Indicated   Result Value Ref Range    COLOR, URINALYSIS Yellow     APPEARANCE, URINALYSIS Hazy     GLUCOSE, URINALYSIS 250  (A) Negative mg/dL    BILIRUBIN, URINALYSIS Negative Negative    KETONES, URINALYSIS 40  (A) Negative mg/dL    SPECIFIC GRAVITY, URINALYSIS >=1.030 1.005 - 1.030    OCCULT BLOOD, URINALYSIS Trace (A) Negative    PH, URINALYSIS 6.0 5.0 - 7.0    PROTEIN, URINALYSIS 100  (A) Negative mg/dL    UROBILINOGEN, URINALYSIS 1.0 0.2, 1.0 mg/dL    NITRITE, URINALYSIS Negative Negative    LEUKOCYTE ESTERASE, URINALYSIS Negative Negative    SQUAMOUS EPITHELIAL, URINALYSIS 1 to 5 None Seen, 1 to 5 /hpf    ERYTHROCYTES, URINALYSIS 1 to 2 None Seen, 1 to 2 /hpf    LEUKOCYTES, URINALYSIS 6 to 10 (A) None Seen, 1 to 5 /hpf    BACTERIA, URINALYSIS Few (A) None Seen /hpf    HYALINE CASTS, URINALYSIS 6 to 10 (A) None Seen, 1 to 5 /lpf    TRANSITIONAL EPITHELIALS 1 to 5 1 to 5, None Seen /hpf    MUCUS Present    CBC with Automated Differential (performable only)   Result Value Ref Range    WBC 10.5 4.2 - 11.0 K/mcL    RBC 4.65 4.00 - 5.20 mil/mcL    HGB 13.5 12.0 - 15.5 g/dL    HCT 40.3 36.0 - 46.5 %    MCV 86.7 78.0 - 100.0 fl    MCH 29.0 26.0 - 34.0 pg    MCHC 33.5 32.0 - 36.5 g/dL    RDW-CV 13.3 11.0 - 15.0 %    RDW-SD 41.1 39.0 - 50.0 fL     140 - 450 K/mcL    NRBC 0 <=0 /100 WBC    Neutrophil, Percent 64 %    Lymphocytes, Percent 29 %    Mono, Percent 7 %    Eosinophils, Percent 0 %     Basophils, Percent 0 %    Immature Granulocytes 0 %    Absolute Neutrophils 6.7 1.8 - 7.7 K/mcL    Absolute Lymphocytes 3.0 1.0 - 4.0 K/mcL    Absolute Monocytes 0.7 0.3 - 0.9 K/mcL    Absolute Eosinophils  0.0 0.0 - 0.5 K/mcL    Absolute Basophils 0.0 0.0 - 0.3 K/mcL    Absolute Immmature Granulocytes 0.0 0.0 - 0.2 K/mcL   Lactic Acid, Venous   Result Value Ref Range    Lactate, Venous 1.9 0.0 - 2.0 mmol/L   TROPONIN I, HIGH SENSITIVITY   Result Value Ref Range    Troponin I, High Sensitivity 73 (HH) <52 ng/L   Rapid SARS-CoV-2 by PCR    Specimen: Nasal, Mid-turbinate; Swab   Result Value Ref Range    Rapid SARS-COV-2 by PCR Not Detected Not Detected / Detected / Presumptive Positive / Inhibitors present    Isolation Guidelines      Procedural Comment     GLUCOSE, BEDSIDE - POINT OF CARE   Result Value Ref Range    GLUCOSE, BEDSIDE - POINT OF CARE 173 (H) 70 - 99 mg/dL       Radiology Results:  CT HEAD WO CONTRAST   Final Result   1. No acute intracranial abnormalities. No interval changes.   2. Chronic atrophy and small vessel ischemic disease.      Electronically Signed by: MARIA ANTONIA BARBOUR JR, M.D.    Signed on: 11/5/2022 4:57 PM          CT CERVICAL SPINE WO CONTRAST   Final Result   1.  No acute cervical spine fracture visualized.       **The absence of findings should not deter follow-up or further evaluation   of concerning clinical findings.       FOR PHYSICIAN USE ONLY: Please note that this report was generated using   voice recognition software.  If you require clarification or feel that   there is an error in this report, please contact me.      Electronically Signed by: ADRIANA ANGUIANO M.D.    Signed on: 11/5/2022 5:00 PM          XR CHEST PA OR AP 1 VIEW   Final Result   1.    No acute cardiopulmonary findings.      Electronically Signed by: GALEN GILLIS M.D.    Signed on: 11/5/2022 4:35 PM          XR PELVIS 1 VIEW   Final Result   1.    No acute osseous finding without fracture or dislocation of.        Electronically Signed by: GALEN GILLIS M.D.    Signed on: 11/5/2022 4:36 PM          XR KNEE MIN 4 VIEWS LEFT   Final Result   1.    Slightly displaced transverse fracture mid patella.      Electronically Signed by: GALEN GILLIS M.D.    Signed on: 11/5/2022 4:37 PM              MDM:     Update  Imaging ordered for further evaluation including CT imaging of the head, as well as chest x-ray, knee x-ray and pelvis.  Patient appears to be in A. fib RVR at a rapid rate.  No history of A. fib.  DNR is at the bedside  Yomi Sanchez MD    Update  Patient does have a DNR at bedside, I asked her if she would like treatment for her A. fib RVR and she said yes, I tried multiple times to reach the power of , her medical decision maker with no success  Yomi Sanchez MD  4:24 PM    Update  Discussed case with cardiology, agreed with diltiazem infusion, patient placed on diltiazem with improvement in her heart rate and blood pressure, agrees to consult.  Patient placed in a knee brace, at this time admitted to the medicine service for ongoing evaluation and management of her symptoms, lightheadedness and dizziness possibly brought about by her A. fib RVR.  Once wound irrigated, noted that patient had active bleeding, with irrigation and removal of clots small arteriolar bleed noted, with close approximation with sutures and staples bleeding well controlled, with good cosmetic result, bandage applied.   Yomi Sanchez MD    Laceration Repair    Date/Time: 11/18/2022 9:53 AM  Performed by: Yomi Sanchez MD  Authorized by: Yomi Sanchez MD     Consent:     Consent obtained:  Verbal    Consent given by:  Patient    Risks, benefits, and alternatives were discussed: yes      Risks discussed:  Poor wound healing, vascular damage, nerve damage, need for additional repair, pain and infection    Alternatives discussed:  No treatment  Universal protocol:     Procedure explained and questions  answered to patient or proxy's satisfaction: yes      Relevant documents present and verified: yes      Test results available: yes      Imaging studies available: yes      Site/side marked: yes      Patient identity confirmed:  Verbally with patient  Anesthesia:     Anesthesia method:  Local infiltration    Local anesthetic:  Lidocaine 1% w/o epi  Laceration details:     Location:  Scalp    Scalp location:  Frontal    Length (cm):  4    Depth (mm):  5  Pre-procedure details:     Preparation:  Patient was prepped and draped in usual sterile fashion  Exploration:     Hemostasis achieved with:  Direct pressure (sutures and staples)    Imaging outcome: foreign body not noted      Wound exploration: entire depth of wound visualized      Wound extent: no fascia violation noted, no foreign bodies/material noted and no underlying fracture noted      Contaminated: no    Treatment:     Area cleansed with:  Saline    Amount of cleaning:  Extensive    Irrigation solution:  Sterile saline    Irrigation volume:  1000    Irrigation method:  Pressure wash    Debridement:  None    Undermining:  None  Skin repair:     Repair method:  Sutures and staples    Suture size:  5-0    Suture material:  Prolene    Suture technique:  Simple interrupted    Number of sutures:  4    Number of staples:  4  Approximation:     Approximation:  Close  Repair type:     Repair type:  Intermediate  Post-procedure details:     Dressing:  Open (no dressing)    Procedure completion:  Tolerated well, no immediate complications          ED Course  Vitals:    11/05/22 2101 11/05/22 2149 11/05/22 2358 11/06/22 0039   BP: (!) 187/64  118/55    BP Location:       Patient Position:       Pulse: (!) 56  74 (!) 122   Resp: 16  16    Temp: 98.2 °F (36.8 °C)      TempSrc: Oral      SpO2: 96%  96%    Weight: 56.2 kg (123 lb 14.4 oz)      Height: 5' 5\" (1.651 m) 5' 6\" (1.676 m)            Impression and Plan  Diagnosis:  1. Atrial fibrillation with RVR (CMS/HCC)    2.  Closed nondisplaced fracture of patella, unspecified fracture morphology, unspecified laterality, initial encounter        Condition:  STABLE    Disposition:  The patient will be admitted to a Medical Bed by the Hospitalist Service.    Placed in INPATIENT STATUS WITH TELEMETRY  under Dr. Dalal .    Counseled:  Patient, Regarding diagnosis, Regarding diagnostic results, Regarding treatment and Patient understood    Critical Care Time:   Total critical care time I spent for this patient was 36 minutes.  This time was exclusive of separately billable procedures.  This total time includes my full attention to the patients care. This includes time at the bedside with the patient, discussion with medical staff and consultants involved in patients care, reviewing all applicable labs, imaging and other tests as noted. Reviewing old records where applicable and time spent documenting the encounter.  Multiple bedside re-evaluations were performed.  Failure to intervene on this patient may have resulted in significant deterioration in their condition.          Yomi Sanchez MD  11/18/22 [id number]
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
## 2023-03-15 NOTE — PROGRESS NOTES
Clinical Nutrition     Nutrition Support Assessment  Reason for Visit: Malnutrition Severity Assessment, Nonhealing wound or pressure ulcer    Patient Name: Severino Vera  YOB: 1955  MRN: [id number]  Date of Encounter: 03/15/23 12:51 EDT  Admission date: 3/13/2023    Pt doing okay eating- does not currently meet criteria malnutrition.  Education provided and will send protein supplement for wound healing.    Recommend starting MVI for increased micronutrient needs for wound healing.    Nutrition Assessment   Admission Diagnosis:  Subacute osteomyelitis of multiple sites (HCC) [M86.29]      Problem List:    Sacral osteomyelitis (HCC)    Peripheral arterial disease (HCC)    Gangrene of left foot s/p L AKA 1/27/2023 (HCC)    Gangrene of right foot s/p AKA 11/25/2022    Gastro-esophageal reflux disease without esophagitis    Essential (primary) hypertension    Paraplegia, unspecified (HCC)    Type 2 diabetes mellitus, without long-term current use of insulin (HCC)    Hyponatremia    S/P diverting colostomy creation 2/13/2023 (HCC)    Neurogenic bladder    Hypoalbuminemia    Hypocalcemia    Hypokalemia    Subacute osteomyelitis of multiple sites (HCC)        PMH:   He  has a past medical history of Arthritis, Diabetes mellitus (HCC), Gangrene (HCC), GERD (gastroesophageal reflux disease), Hyperlipidemia, Hypertension, Osteomyelitis (HCC), PAD (peripheral artery disease) (HCC), Paraplegia (HCC), Peripheral vascular disease (HCC), and Self-catheterizes urinary bladder.    PSH:  He  has a past surgical history that includes Shoulder surgery (Left); Other surgical history (Right); Leg amputation through knee (Right, 11/25/2022); Leg amputation through knee (Left, 1/27/2023); Pilonidal Cystectomy (N/A, 2/10/2023); and Exploratory Laparotomy (N/A, 2/13/2023).      Applicable Nutrition Concerns:   Skin:  Oral:  GI:      Applicable Interval History:         Reported/Observed/Food/Nutrition  Related History:     Pt lying in bed in NAD at time visit. Pt presents for access to care, he was unfortunately discharge from rehab without a plan in place for his IV abx for wounds. He presented to the hospital as he is unable to facilitate this. He reports eating well, regular meals and focusing on protein, does not endorse any change appetite. He is unsure UBW as he is difficult to weigh due to bilat AKA. This is difficult to determine from EMR as well. He does not feel he has lost any weight. He tells me he drinks protein supplements for his wound healing and open to doing so while in the hospital. He denies further dietary needs/preferences, NKFA.     Labs    Labs Reviewed: Yes     Results from last 7 days   Lab Units 03/15/23  0808 03/14/23  0540 03/14/23  0340 03/14/23  0203 03/14/23  0103   GLUCOSE mg/dL 140*  --  154* 169* 119*   BUN mg/dL 12  --  11 12 8   CREATININE mg/dL 0.38*  --  0.35* 0.32* 0.17*   SODIUM mmol/L 133*  --  134* 135* 137   CHLORIDE mmol/L 98  --  96* 98 111*   POTASSIUM mmol/L 3.5  --  3.7 4.1 2.6*   PHOSPHORUS mg/dL  --   --   --  3.1  --    MAGNESIUM mg/dL 1.2* 1.2*  --   --   --    ALT (SGPT) U/L 18  --   --   --  10       Results from last 7 days   Lab Units 03/15/23  0808 03/14/23  0103   ALBUMIN g/dL 2.4* 1.5*   CRP mg/dL  --  1.90*   IONIZED CALCIUM mmol/L  --  1.01*       Results from last 7 days   Lab Units 03/15/23  1149 03/15/23  0543 03/15/23  0030 03/14/23  1644 03/14/23  1234 03/14/23  0630   GLUCOSE mg/dL 161* 160* 218* 124 110 141*     Lab Results   Lab Value Date/Time    HGBA1C 5.20 03/13/2023 2359    HGBA1C 6.30 (H) 01/25/2023 2040               Medications    Medications Reviewed: Yes  Pertinent: vit c, iron, heparin, insulin, probiotic, abx, mvi, miralax, B1, B12  Infusion:  PRN: percocet    Intake/Ouptut 24 hrs (0701 - 0700)   I&O's Reviewed: Yes   Intake & Output (last day)       03/14 0701  03/15 0700 03/15 0701  03/16 0700    IV Piggyback      Total  "Intake(mL/kg)      Urine (mL/kg/hr) 200 (0.1)     Total Output 200     Net -200               Anthropometrics     Height: Height: 182.9 cm (72\")  Last Filed Weight: Weight: 65.8 kg (145 lb) (03/13/23 1932)  Method: Weight Method: Stated  BMI: BMI (Calculated): 19.7  BMI classification: Normal: 18.5-24.9kg/m2  IBW:      UBW: pt unable to estimate and conflicting data in EMR unable to determine  Last 15 Recorded Weights  View Complete Flowsheet  Weight Weight (kg) Weight (lbs) Weight Method VISIT REPORT   3/13/2023 65.772 kg 145 lb Stated -   2/23/2023 65.772 kg 145 lb - -   2/13/2023 65.772 kg 145 lb Stated -   1/26/2023 65.862 kg 145 lb 3.2 oz - -   1/25/2023 74.844 kg 165 lb Stated -   1/5/2023 77.111 kg 170 lb - Report   11/26/2022 69.4 kg 153 lb Lift/sling scale -   11/25/2022 77.1 kg 169 lb 15.6 oz - -       Weight change: unable to determine if pt has had any significant changes, he denies any known weight loss    Nutrition Focused Physical Exam     Date: 3/15    Mild-moderate upper body and dorsal muscle wasting evident, MAIKEL lower body due to patient body position    Current Nutrition Prescription     PO: Diet: Regular/House Diet; Texture: Regular Texture (IDDSI 7); Fluid Consistency: Thin (IDDSI 0)  Oral Nutrition Supplement:   Intake: 1 Day: 75% X 1 meal documented    Nutrition Diagnosis   Date:  Updated:   Problem Increased nutrient needs   Etiology Wound healing   Signs/Symptoms Multiple wounds, sacral decubiti   Status:     Goal:   General: Nutrition to support treatment  PO: Increase intake  EN/PN: N/A    Nutrition Intervention      Follow treatment progress, Care plan reviewed, Advise alternate selection, Advised available snacks, Interview for preferences, Menu provided, Encourage intake, Supplement provided, Education provided for wound healing, managing adequate intakes    Premier Protein all meals    Monitoring/Evaluation:   Per protocol, I&O, PO intake, Supplement intake, Pertinent labs, Skin " status      Laurita Burleson RD  Time Spent: 35m

## 2023-03-15 NOTE — PLAN OF CARE
Goal Outcome Evaluation:      Wounds cleaned and dressed by wound care, pt received full bath today, resting without complaint, vss cont to monitor

## 2023-03-16 LAB
1,25(OH)2D SERPL-MCNC: 40.5 PG/ML (ref 24.8–81.5)
GLUCOSE BLDC GLUCOMTR-MCNC: 150 MG/DL (ref 70–130)
GLUCOSE BLDC GLUCOMTR-MCNC: 164 MG/DL (ref 70–130)
GLUCOSE BLDC GLUCOMTR-MCNC: 173 MG/DL (ref 70–130)
GLUCOSE BLDC GLUCOMTR-MCNC: 234 MG/DL (ref 70–130)
MAGNESIUM SERPL-MCNC: 2 MG/DL (ref 1.6–2.4)
POTASSIUM SERPL-SCNC: 4.1 MMOL/L (ref 3.5–5.2)

## 2023-03-16 PROCEDURE — 97162 PT EVAL MOD COMPLEX 30 MIN: CPT

## 2023-03-16 PROCEDURE — 96376 TX/PRO/DX INJ SAME DRUG ADON: CPT

## 2023-03-16 PROCEDURE — 97166 OT EVAL MOD COMPLEX 45 MIN: CPT

## 2023-03-16 PROCEDURE — 83735 ASSAY OF MAGNESIUM: CPT | Performed by: HOSPITALIST

## 2023-03-16 PROCEDURE — 96372 THER/PROPH/DIAG INJ SC/IM: CPT

## 2023-03-16 PROCEDURE — 99232 SBSQ HOSP IP/OBS MODERATE 35: CPT | Performed by: HOSPITALIST

## 2023-03-16 PROCEDURE — 25010000002 HEPARIN (PORCINE) PER 1000 UNITS: Performed by: INTERNAL MEDICINE

## 2023-03-16 PROCEDURE — 96375 TX/PRO/DX INJ NEW DRUG ADDON: CPT

## 2023-03-16 PROCEDURE — 84132 ASSAY OF SERUM POTASSIUM: CPT | Performed by: HOSPITALIST

## 2023-03-16 PROCEDURE — 82962 GLUCOSE BLOOD TEST: CPT

## 2023-03-16 PROCEDURE — G0378 HOSPITAL OBSERVATION PER HR: HCPCS

## 2023-03-16 PROCEDURE — 63710000001 INSULIN LISPRO (HUMAN) PER 5 UNITS: Performed by: INTERNAL MEDICINE

## 2023-03-16 PROCEDURE — 25010000002 DAPTOMYCIN PER 1 MG: Performed by: INTERNAL MEDICINE

## 2023-03-16 PROCEDURE — 25010000002 DIPHENHYDRAMINE PER 50 MG: Performed by: HOSPITALIST

## 2023-03-16 PROCEDURE — 25010000002 HYDROMORPHONE PER 4 MG: Performed by: INTERNAL MEDICINE

## 2023-03-16 RX ORDER — DIPHENHYDRAMINE HYDROCHLORIDE, ZINC ACETATE 2; .1 G/100G; G/100G
1 CREAM TOPICAL 3 TIMES DAILY PRN
Status: DISCONTINUED | OUTPATIENT
Start: 2023-03-16 | End: 2023-03-24 | Stop reason: HOSPADM

## 2023-03-16 RX ORDER — DIPHENHYDRAMINE HYDROCHLORIDE 50 MG/ML
12.5 INJECTION INTRAMUSCULAR; INTRAVENOUS EVERY 6 HOURS PRN
Status: DISCONTINUED | OUTPATIENT
Start: 2023-03-16 | End: 2023-03-17

## 2023-03-16 RX ADMIN — GABAPENTIN 800 MG: 400 CAPSULE ORAL at 12:11

## 2023-03-16 RX ADMIN — Medication 10 ML: at 21:04

## 2023-03-16 RX ADMIN — HEPARIN SODIUM 5000 UNITS: 5000 INJECTION INTRAVENOUS; SUBCUTANEOUS at 09:38

## 2023-03-16 RX ADMIN — OXYCODONE HYDROCHLORIDE AND ACETAMINOPHEN 500 MG: 500 TABLET ORAL at 09:39

## 2023-03-16 RX ADMIN — HYDROMORPHONE HYDROCHLORIDE 0.5 MG: 1 INJECTION, SOLUTION INTRAMUSCULAR; INTRAVENOUS; SUBCUTANEOUS at 05:32

## 2023-03-16 RX ADMIN — Medication 1 CAPSULE: at 09:39

## 2023-03-16 RX ADMIN — METRONIDAZOLE 500 MG: 500 TABLET ORAL at 17:05

## 2023-03-16 RX ADMIN — MICONAZOLE NITRATE: 20 POWDER TOPICAL at 21:00

## 2023-03-16 RX ADMIN — DIPHENHYDRAMINE HYDROCHLORIDE, ZINC ACETATE 1 APPLICATION: 2; .1 CREAM TOPICAL at 17:05

## 2023-03-16 RX ADMIN — Medication 250 MG: at 09:37

## 2023-03-16 RX ADMIN — METRONIDAZOLE 500 MG: 500 TABLET ORAL at 21:03

## 2023-03-16 RX ADMIN — ASPIRIN 81 MG: 81 TABLET, COATED ORAL at 09:38

## 2023-03-16 RX ADMIN — GABAPENTIN 800 MG: 400 CAPSULE ORAL at 18:40

## 2023-03-16 RX ADMIN — OXYCODONE HYDROCHLORIDE AND ACETAMINOPHEN 1 TABLET: 10; 325 TABLET ORAL at 21:03

## 2023-03-16 RX ADMIN — Medication 10 ML: at 09:39

## 2023-03-16 RX ADMIN — SODIUM CHLORIDE 1 G: 1 TABLET ORAL at 09:38

## 2023-03-16 RX ADMIN — HEPARIN SODIUM 5000 UNITS: 5000 INJECTION INTRAVENOUS; SUBCUTANEOUS at 21:03

## 2023-03-16 RX ADMIN — DAPTOMYCIN 500 MG: 500 INJECTION, POWDER, LYOPHILIZED, FOR SOLUTION INTRAVENOUS at 05:33

## 2023-03-16 RX ADMIN — METOPROLOL SUCCINATE 25 MG: 25 TABLET, EXTENDED RELEASE ORAL at 09:38

## 2023-03-16 RX ADMIN — CYANOCOBALAMIN TAB 1000 MCG 1000 MCG: 1000 TAB at 09:38

## 2023-03-16 RX ADMIN — Medication 5 MG: at 22:14

## 2023-03-16 RX ADMIN — SODIUM CHLORIDE 2 G: 900 INJECTION INTRAVENOUS at 05:33

## 2023-03-16 RX ADMIN — DAKIN'S SOLUTION 0.125% (QUARTER STRENGTH): 0.12 SOLUTION at 09:39

## 2023-03-16 RX ADMIN — THIAMINE HCL TAB 100 MG 100 MG: 100 TAB at 09:38

## 2023-03-16 RX ADMIN — SODIUM CHLORIDE 2 G: 900 INJECTION INTRAVENOUS at 21:03

## 2023-03-16 RX ADMIN — DULOXETINE 60 MG: 60 CAPSULE, DELAYED RELEASE ORAL at 09:38

## 2023-03-16 RX ADMIN — GABAPENTIN 800 MG: 400 CAPSULE ORAL at 21:03

## 2023-03-16 RX ADMIN — MULTIPLE VITAMINS W/ MINERALS TAB 1 TABLET: TAB at 09:38

## 2023-03-16 RX ADMIN — HYDROMORPHONE HYDROCHLORIDE 0.5 MG: 1 INJECTION, SOLUTION INTRAMUSCULAR; INTRAVENOUS; SUBCUTANEOUS at 18:39

## 2023-03-16 RX ADMIN — FERROUS SULFATE TAB 325 MG (65 MG ELEMENTAL FE) 325 MG: 325 (65 FE) TAB at 09:37

## 2023-03-16 RX ADMIN — METRONIDAZOLE 500 MG: 500 TABLET ORAL at 05:33

## 2023-03-16 RX ADMIN — CLOPIDOGREL BISULFATE 75 MG: 75 TABLET ORAL at 09:39

## 2023-03-16 RX ADMIN — GABAPENTIN 800 MG: 400 CAPSULE ORAL at 09:38

## 2023-03-16 RX ADMIN — ROSUVASTATIN 20 MG: 20 TABLET, FILM COATED ORAL at 21:03

## 2023-03-16 RX ADMIN — LISINOPRIL 40 MG: 40 TABLET ORAL at 09:40

## 2023-03-16 RX ADMIN — INSULIN LISPRO 3 UNITS: 100 INJECTION, SOLUTION INTRAVENOUS; SUBCUTANEOUS at 18:39

## 2023-03-16 RX ADMIN — SODIUM CHLORIDE 2 G: 900 INJECTION INTRAVENOUS at 12:11

## 2023-03-16 RX ADMIN — DIPHENHYDRAMINE HYDROCHLORIDE 12.5 MG: 50 INJECTION INTRAMUSCULAR; INTRAVENOUS at 17:04

## 2023-03-16 RX ADMIN — INSULIN LISPRO 2 UNITS: 100 INJECTION, SOLUTION INTRAVENOUS; SUBCUTANEOUS at 12:11

## 2023-03-16 RX ADMIN — SENNOSIDES 2 TABLET: 8.6 TABLET, FILM COATED ORAL at 21:03
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
## 2023-03-16 NOTE — PLAN OF CARE
Goal Outcome Evaluation:   A&O x4, VSS. Patient started itching and developed a rash on his abdomen during IV antibiotic infusion. MD notified and Benadryl IV and topical PRN ordered and administered. Pressure injury dressings changed today per WOCN.

## 2023-03-16 NOTE — PLAN OF CARE
Goal Outcome Evaluation:  Plan of Care Reviewed With: patient           Outcome Evaluation: Pt presents below baseline function with self care/mobility d/t sacral wounds, weakness and decreased activity tolerance. Pt setup with grooming, CGA/ min A roll R/L. Mod A to sit up in bed from supine.  OT will follow 3days/wk to advance pt toward PLOF.  Recommend SNF upon d/c.

## 2023-03-16 NOTE — CASE MANAGEMENT/SOCIAL WORK
Continued Stay Note  [facility]     Patient Name: Severino Vera  MRN: [id number]  Today's Date: 3/16/2023    Admit Date: 3/13/2023    Plan: Home   Discharge Plan     Row Name 03/16/23 1533       Plan    Plan Comments Mr. Vera gave permission for case management to call his sister who he lives with Liliya Vera [phone]. He lives with his sister, mother and brother. His sister said that he left [facility] on Sunday because his insurance [employer] was going to begin to charge him $200 a day at that facility. He does have both [employer] Medicare and [employer] Medicaid. [facility] did not have a Medicaid bed. He also could not do home IV antibiotics according to his sister because the cost per week was $1000 according to his sister and he was not able to afford that. He also was supposed to have home health and the home health was not able to see him in [facility] and [place] in [facility].  Mr. Vera said he did not want to go to a rehab facility again because he was going to be charged $200 a day and case management explained that referrals could be made to try to see if another facility would be able to pre cert his [employer] and if the facility had a dual certified bed they may be able to have benefits bill [employer]. He is reluctant to have referrals made to facilities but he said referrals in [place] was what he wanted in the past. Called [facility] to review Mr. Vera for another [facility] that may have a dual certified bed if he were agreeable to placement. Case management will continue to follow for discharge planning needs.               Discharge Codes    No documentation.               Expected Discharge Date and Time     Expected Discharge Date Expected Discharge Time    Mar 20, 2023             JENIFER Lambert

## 2023-03-16 NOTE — PLAN OF CARE
Goal Outcome Evaluation:  Plan of Care Reviewed With: patient        Progress: no change  Outcome Evaluation: PT eval completed. Pt completed bed mobility CGA- Mod A x1 placing pt below baseline function. Pt demonstrated decreased independence with mobility warranting skilled IPPT POC to return to PLOF with bed mobility, transfers, and wheelchair propulsion. d/c rec SNF.

## 2023-03-16 NOTE — THERAPY EVALUATION
Patient Name: Severino Vera  : 1955    MRN: [id number]                              Today's Date: 3/16/2023       Admit Date: 3/13/2023    Visit Dx:     ICD-10-CM ICD-9-CM   1. Subacute osteomyelitis of multiple sites (HCC)  M86.29 730.09   2. 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)   • Gangrene of right foot s/p AKA 2022   • Osteomyelitis (HCC)   • Hyperlipidemia LDL goal <70   • Severe malnutrition (HCC)   • Anemia   • Gastro-esophageal reflux disease without esophagitis   • Essential (primary) hypertension   • Paraplegia, unspecified (HCC)   • Type 2 diabetes mellitus, without long-term current use of insulin (HCC)   • Tobacco abuse   • Hyponatremia   • Loculated pleural effusion   • Sacral osteomyelitis (HCC)   • S/P diverting colostomy creation 2023 (HCC)   • Pressure injury of sacral region, stage 4 (HCC)   • Redundant foreskin   • Neurogenic bladder   • Hypoalbuminemia   • Hypocalcemia   • Hypokalemia   • Subacute osteomyelitis of multiple sites (HCC)     Past Medical History:   Diagnosis Date   • Arthritis    • Diabetes mellitus (HCC)    • Gangrene (HCC)     BILATERAL FEET   • GERD (gastroesophageal reflux disease)    • Hyperlipidemia    • Hypertension    • Osteomyelitis (HCC)    • PAD (peripheral artery disease) (HCC)    • Paraplegia (HCC)    • Peripheral vascular disease (HCC)    • Self-catheterizes urinary bladder      Past Surgical History:   Procedure Laterality Date   • ABOVE KNEE AMPUTATION Right 2022    Procedure: AMPUTATION ABOVE KNEE RIGHT;  Surgeon: Nemesio Haji MD;  Location:  [facility];  Service: Vascular;  Laterality: Right;   • ABOVE KNEE AMPUTATION Left 2023    Procedure: AMPUTATION ABOVE KNEE LEFT;  Surgeon: Nemesio Haji MD;  Location:  [facility];  Service: Vascular;  Laterality: Left;   •  EXPLORATORY LAPAROTOMY N/A 2/13/2023    Procedure: LAPAROTOMY EXPLORATORY, SIGMOID RESECTION, COLOSTOMY;  Surgeon: Neville Berman MD;  Location: [facility];  Service: General;  Laterality: N/A;   • OTHER SURGICAL HISTORY Right     stent placement - right groin   • PILONIDAL CYSTECTOMY N/A 2/10/2023    Procedure: DEBRIDEMENT SACRAL DECUBITUS ULCER;  Surgeon: Neville Berman MD;  Location:  [facility];  Service: General;  Laterality: N/A;   • SHOULDER SURGERY Left     ROTATOR CUFF REPAIR      General Information     Row Name 03/16/23 0825          OT Time and Intention    Document Type evaluation  -     Mode of Treatment occupational therapy  -     Row Name 03/16/23 0825          General Information    Patient Profile Reviewed yes  -AC     Prior Level of Function independent:;bed mobility;transfer;feeding;grooming;min assist:;bathing;mod assist:;dressing  unsure of accuracy - pt reports he transferred to w/c a few days ago by himself completing a lateral transfer without sliding board.  pt sponge bathes  -     Existing Precautions/Restrictions fall   colostomy, BAKA, paraplegia, sacral wounds  -AC     Row Name 03/16/23 0825          Occupational Profile    Environmental Supports and Barriers (Occupational Profile) w/c, hospital bed  -     Row Name 03/16/23 0825          Living Environment    People in Home sibling(s);parent(s)  lives with mother, brother and sister  -     Row Name 03/16/23 0825          Home Main Entrance    Number of Stairs, Main Entrance other (see comments)  entry ramp  -AC     Stair Railings, Main Entrance none  -AC     Row Name 03/16/23 0825          Stairs Within Home, Primary    Number of Stairs, Within Home, Primary none  -AC     Stair Railings, Within Home, Primary none  -AC     Row Name 03/16/23 0825          Cognition    Orientation Status (Cognition) oriented x 4  -AC     Row Name 03/16/23 0825          Safety Issues, Functional Mobility    Safety Issues Affecting  Function (Mobility) friction/shear risk  -     Impairments Affecting Function (Mobility) endurance/activity tolerance;strength;balance;grasp;postural/trunk control  -           User Key  (r) = Recorded By, (t) = Taken By, (c) = Cosigned By    Initials Name Provider Type    Yin Alan, OT Occupational Therapist                 Mobility/ADL's     Row Name 03/16/23 1010          Bed Mobility    Bed Mobility rolling left;rolling right;supine-sit;sit-supine  -AC     Rolling Left [place] (Bed Mobility) verbal cues;minimum assist (75% patient effort)  -AC     Rolling Right [place] (Bed Mobility) verbal cues;contact guard  -AC     Supine-Sit [place] (Bed Mobility) verbal cues;moderate assist (50% patient effort)  pt sat up in bed from supine - did not transition to EOB d/t sacral wounds  -     Sit-Supine [place] (Bed Mobility) verbal cues;minimum assist (75% patient effort)  -     Bed Mobility, Safety Issues impaired trunk control for bed mobility;other (see comments)  BAKA  -     Assistive Device (Bed Mobility) bed rails  -     Row Name 03/16/23 1010          Transfers    Comment, (Transfers) pt deferred at this time, but reported he would like to work on transfer to /c with therapy  -     Row Name 03/16/23 1010          Activities of Daily Living    BADL Assessment/Intervention grooming;feeding  -     Row Name 03/16/23 1010          Grooming Assessment/Training    [place] Level (Grooming) hair care, combing/brushing;wash face, hands;set up;verbal cues  -     Position (Grooming) sitting up in bed  -     Row Name 03/16/23 1010          Self-Feeding Assessment/Training    Comment, (Feeding) pt reporting he fed himself breakfast independently  -           User Key  (r) = Recorded By, (t) = Taken By, (c) = Cosigned By    Initials Name Provider Type    Yin Alan, OT Occupational Therapist               Obj/Interventions     Row Name 03/16/23 1016          Sensory  Assessment (Somatosensory)    Sensory Assessment (Somatosensory) bilateral UE  -AC     Sensory Subjective Reports numbness;tingling  -AC     Row Name 03/16/23 1016          Vision Assessment/Intervention    Visual Impairment/Limitations WFL  -AC     Row Name 03/16/23 1016          Range of Motion Comprehensive    General Range of Motion bilateral upper extremity ROM WNL  -AC     Row Name 03/16/23 1016          Strength Comprehensive (MMT)    Comment, General Manual Muscle Testing (MMT) Assessment B atrophy thenar eminence,  BUE grossly 4-/5  -AC     Row Name 03/16/23 1016          Motor Skills    Therapeutic Exercise --  issued blue tband - pt reports he has been completing HEP - will follow up next tx  -AC           User Key  (r) = Recorded By, (t) = Taken By, (c) = Cosigned By    Initials Name Provider Type    AC Yin Arriaga, OT Occupational Therapist               Goals/Plan     Row Name 03/16/23 1028          Bed Mobility Goal 1 (OT)    Activity/Assistive Device (Bed Mobility Goal 1, OT) rolling to left;rolling to right;sit to supine;supine to sit  -AC     [place] Level/Cues Needed (Bed Mobility Goal 1, OT) verbal cues required;contact guard required  -AC     Time Frame (Bed Mobility Goal 1, OT) by discharge  -AC     Progress/Outcomes (Bed Mobility Goal 1, OT) goal ongoing  -     Row Name 03/16/23 1028          Transfer Goal 1 (OT)    Activity/Assistive Device (Transfer Goal 1, OT) bed-to-chair/chair-to-bed;wheelchair transfer  -AC     [place] Level/Cues Needed (Transfer Goal 1, OT) moderate assist (50-74% patient effort)  -AC     Time Frame (Transfer Goal 1, OT) by discharge  -AC     Strategies/Barriers (Transfers Goal 1, OT) pt requesting to work on this goal  -AC     Progress/Outcome (Transfer Goal 1, OT) goal ongoing  -     Row Name 03/16/23 1028          Bathing Goal 1 (OT)    Activity/Device (Bathing Goal 1, OT) upper body bathing  -AC     [place] Level/Cues Needed (Bathing Goal 1,  OT) set-up required;supervision required  -AC     Time Frame (Bathing Goal 1, OT) by discharge  -AC     Progress/Outcomes (Bathing Goal 1, OT) goal ongoing  -     Row Name 03/16/23 1028          Strength Goal 1 (OT)    Strength Goal 1 (OT) Pt will increase BUE strength 1/2 MMG as needed to increase ind with self care/mobility  -AC     Time Frame (Strength Goal 1, OT) by discharge  -AC     Progress/Outcome (Strength Goal 1, OT) goal ongoing  -     Row Name 03/16/23 1028          Therapy Assessment/Plan (OT)    Planned Therapy Interventions (OT) activity tolerance training;BADL retraining;functional balance retraining;occupation/activity based interventions;patient/caregiver education/training;transfer/mobility retraining  -           User Key  (r) = Recorded By, (t) = Taken By, (c) = Cosigned By    Initials Name Provider Type    AC Yin Arriaga, OT Occupational Therapist               Clinical Impression     Row Name 03/16/23 1018          Pain Assessment    Pretreatment Pain Rating 5/10  -AC     Posttreatment Pain Rating 5/10  -AC     Pain Location - Side/Orientation Bilateral  -AC     Pain Location - neck;shoulder  -AC     Pain Intervention(s) Repositioned;Ambulation/increased activity  -     Row Name 03/16/23 1018          Plan of Care Review    Plan of Care Reviewed With patient  -AC     Outcome Evaluation Pt presents below baseline function with self care/mobility d/t sacral wounds, weakness and decreased activity tolerance. Pt setup with grooming, CGA/ min A roll R/L. Mod A to sit up in bed from supine.  OT will follow 3days/wk to advance pt toward PLOF.  Recommend SNF upon d/c.  -     Row Name 03/16/23 1018          Therapy Assessment/Plan (OT)    Rehab Potential (OT) fair, will monitor progress closely  -     Criteria for Skilled Therapeutic Interventions Met (OT) yes;skilled treatment is necessary  -     Therapy Frequency (OT) 3 times/wk  -     Row Name 03/16/23 1018          Therapy Plan  Review/Discharge Plan (OT)    Anticipated Discharge Disposition (OT) skilled nursing facility  -     Row Name 03/16/23 1018          Vital Signs    Pre Systolic BP Rehab 139  -AC     Pre Treatment Diastolic BP 80  -AC     Pretreatment Heart Rate (beats/min) 100  -AC     Posttreatment Heart Rate (beats/min) 98  -AC     Pre SpO2 (%) 97  -AC     O2 Delivery Pre Treatment room air  -AC     O2 Delivery Intra Treatment room air  -AC     Post SpO2 (%) 96  -AC     O2 Delivery Post Treatment room air  -AC     Pre Patient Position Supine  -AC     Post Patient Position Side Lying  wedge positioned under L side  -AC     Row Name 03/16/23 1018          Positioning and Restraints    Pre-Treatment Position in bed  -AC     Post Treatment Position bed  -AC     In Bed notified nsg;side lying right;call light within reach;encouraged to call for assist;exit alarm on  wedge positioned under L side  -AC           User Key  (r) = Recorded By, (t) = Taken By, (c) = Cosigned By    Initials Name Provider Type    AC Yin Arriaga, OT Occupational Therapist               Outcome Measures     Row Name 03/16/23 1034          How much help from another is currently needed...    Putting on and taking off regular lower body clothing? 2  -AC     Bathing (including washing, rinsing, and drying) 2  -AC     Toileting (which includes using toilet bed pan or urinal) 1  -AC     Putting on and taking off regular upper body clothing 2  -AC     Taking care of personal grooming (such as brushing teeth) 3  -AC     Eating meals 4  -AC     AM-PAC 6 Clicks Score (OT) 14  -AC     Row Name 03/16/23 0800          How much help from another person do you currently need...    Turning from your back to your side while in flat bed without using bedrails? 3  -MC     Moving from lying on back to sitting on the side of a flat bed without bedrails? 3  -MC     Moving to and from a bed to a chair (including a wheelchair)? 1  -MC     Standing up from a chair using your arms  (e.g., wheelchair, bedside chair)? 1  -     Climbing 3-5 steps with a railing? 1  -     To walk in hospital room? 1  -     AM-PAC 6 Clicks Score (PT) 10  -     Highest level of mobility 4 --> Transferred to chair/commode  -     Row Name 03/16/23 1034          Functional Assessment    Outcome Measure Options AM-PAC 6 Clicks Daily Activity (OT)  -           User Key  (r) = Recorded By, (t) = Taken By, (c) = Cosigned By    Initials Name Provider Type    AC Yin Arriaga, OT Occupational Therapist     Madhu Pierre, RN Registered Nurse                Occupational Therapy Education     Title: PT OT SLP Therapies (Done)     Topic: Occupational Therapy (Done)     Point: ADL training (Done)     Description:   Instruct learner(s) on proper safety adaptation and remediation techniques during self care or transfers.   Instruct in proper use of assistive devices.              Learning Progress Summary           Patient Acceptance, E, VU by  at 3/16/2023 1035    Acceptance, E, VU by  at 3/15/2023 1228    Acceptance, E, VU by  at 3/14/2023 1922    Acceptance, E, VU by MG at 3/14/2023 1413                   Point: Home exercise program (Done)     Description:   Instruct learner(s) on appropriate technique for monitoring, assisting and/or progressing therapeutic exercises/activities.              Learning Progress Summary           Patient Acceptance, E, VU by MG at 3/15/2023 1228    Acceptance, E, VU by  at 3/14/2023 1922    Acceptance, E, VU by  at 3/14/2023 1413                   Point: Precautions (Done)     Description:   Instruct learner(s) on prescribed precautions during self-care and functional transfers.              Learning Progress Summary           Patient Acceptance, E, VU by MG at 3/15/2023 1228    Acceptance, E, VU by  at 3/14/2023 1922    Acceptance, E, VU by MG at 3/14/2023 1413                   Point: Body mechanics (Done)     Description:   Instruct learner(s) on proper  positioning and spine alignment during self-care, functional mobility activities and/or exercises.              Learning Progress Summary           Patient Acceptance, E, VU by  at 3/15/2023 1228    Acceptance, E, VU by  at 3/14/2023 1922    Acceptance, E, VU by  at 3/14/2023 1413                               User Key     Initials Effective Dates Name Provider Type Discipline    AC 02/03/23 -  Yin Arriaga OT Occupational Therapist OT     11/30/22 -  Madhu Pierre, RN Registered Nurse Nurse     01/23/23 -  Tanvi Escamilla RN Registered Nurse Nurse              OT Recommendation and Plan  Planned Therapy Interventions (OT): activity tolerance training, BADL retraining, functional balance retraining, occupation/activity based interventions, patient/caregiver education/training, transfer/mobility retraining  Therapy Frequency (OT): 3 times/wk  Plan of Care Review  Plan of Care Reviewed With: patient  Outcome Evaluation: Pt presents below baseline function with self care/mobility d/t sacral wounds, weakness and decreased activity tolerance. Pt setup with grooming, CGA/ min A roll R/L. Mod A to sit up in bed from supine.  OT will follow 3days/wk to advance pt toward PLOF.  Recommend SNF upon d/c.     Time Calculation:    Time Calculation- OT     Row Name 03/16/23 0825             Time Calculation- OT    OT Start Time 0825  -AC      OT Received On 03/16/23  -      OT Goal Re-Cert Due Date 03/26/23  -AC         Untimed Charges    OT Eval/Re-eval Minutes 50  -AC         Total Minutes    Untimed Charges Total Minutes 50  -AC       Total Minutes 50  -AC            User Key  (r) = Recorded By, (t) = Taken By, (c) = Cosigned By    Initials Name Provider Type    AC Yin Arriaga OT Occupational Therapist              Therapy Charges for Today     Code Description Service Date Service Provider Modifiers Qty    [id number] HC OT EVAL MOD COMPLEXITY 4 3/16/2023 Yin Arriaga OT GO 1                Yin Arriaga, OT  3/16/2023

## 2023-03-16 NOTE — THERAPY EVALUATION
Patient Name: Severino Vera  : 1955    MRN: [id number]                              Today's Date: 3/16/2023       Admit Date: 3/13/2023    Visit Dx:     ICD-10-CM ICD-9-CM   1. Subacute osteomyelitis of multiple sites (HCC)  M86.29 730.09   2. 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)   • Gangrene of right foot s/p AKA 2022   • Osteomyelitis (HCC)   • Hyperlipidemia LDL goal <70   • Severe malnutrition (HCC)   • Anemia   • Gastro-esophageal reflux disease without esophagitis   • Essential (primary) hypertension   • Paraplegia, unspecified (HCC)   • Type 2 diabetes mellitus, without long-term current use of insulin (HCC)   • Tobacco abuse   • Hyponatremia   • Loculated pleural effusion   • Sacral osteomyelitis (HCC)   • S/P diverting colostomy creation 2023 (HCC)   • Pressure injury of sacral region, stage 4 (HCC)   • Redundant foreskin   • Neurogenic bladder   • Hypoalbuminemia   • Hypocalcemia   • Hypokalemia   • Subacute osteomyelitis of multiple sites (HCC)     Past Medical History:   Diagnosis Date   • Arthritis    • Diabetes mellitus (HCC)    • Gangrene (HCC)     BILATERAL FEET   • GERD (gastroesophageal reflux disease)    • Hyperlipidemia    • Hypertension    • Osteomyelitis (HCC)    • PAD (peripheral artery disease) (HCC)    • Paraplegia (HCC)    • Peripheral vascular disease (HCC)    • Self-catheterizes urinary bladder      Past Surgical History:   Procedure Laterality Date   • ABOVE KNEE AMPUTATION Right 2022    Procedure: AMPUTATION ABOVE KNEE RIGHT;  Surgeon: Nemesio Haji MD;  Location:  [facility];  Service: Vascular;  Laterality: Right;   • ABOVE KNEE AMPUTATION Left 2023    Procedure: AMPUTATION ABOVE KNEE LEFT;  Surgeon: Nemesio Haji MD;  Location:  [facility];  Service: Vascular;  Laterality: Left;   •  EXPLORATORY LAPAROTOMY N/A 2/13/2023    Procedure: LAPAROTOMY EXPLORATORY, SIGMOID RESECTION, COLOSTOMY;  Surgeon: Neville Berman MD;  Location: [facility];  Service: General;  Laterality: N/A;   • OTHER SURGICAL HISTORY Right     stent placement - right groin   • PILONIDAL CYSTECTOMY N/A 2/10/2023    Procedure: DEBRIDEMENT SACRAL DECUBITUS ULCER;  Surgeon: Neville Berman MD;  Location: [facility];  Service: General;  Laterality: N/A;   • SHOULDER SURGERY Left     ROTATOR CUFF REPAIR      General Information     Row Name 03/16/23 1010          Physical Therapy Time and Intention    Document Type evaluation  -     Mode of Treatment physical therapy  -     Row Name 03/16/23 1010          General Information    Patient Profile Reviewed yes  -     Prior Level of Function --  per pt, pt is able to complete bed mobility in hospital bed at home with no assistance and able to transfer to wheelchair  -     Existing Precautions/Restrictions other (see comments);fall;non-weight bearing  remote BKA, colostomy, h/o paraplegia, sacral wounds  -     Barriers to Rehab medically complex;previous functional deficit;impaired sensation  -     Row Name 03/16/23 1010          Living Environment    People in Home sibling(s);parent(s)  -     Row Name 03/16/23 1010          Home Main Entrance    Number of Stairs, Main Entrance other (see comments)  entry ramp  -     Stair Railings, Main Entrance none  -     Row Name 03/16/23 1010          Stairs Within Home, Primary    Number of Stairs, Within Home, Primary none  -     Row Name 03/16/23 1010          Cognition    Orientation Status (Cognition) oriented x 4  -     Row Name 03/16/23 1010          Safety Issues, Functional Mobility    Safety Issues Affecting Function (Mobility) friction/shear risk;awareness of need for assistance  -     Impairments Affecting Function (Mobility) endurance/activity tolerance;strength;balance;grasp;postural/trunk  control;pain;sensation/sensory awareness  -           User Key  (r) = Recorded By, (t) = Taken By, (c) = Cosigned By    Initials Name Provider Type     Chrissie Stanley PT Physical Therapist               Mobility     Row Name 03/16/23 1012          Bed Mobility    Bed Mobility rolling left;rolling right;supine-sit;sit-supine  -HM     Rolling Left [place] (Bed Mobility) verbal cues;minimum assist (75% patient effort)  -HM     Rolling Right [place] (Bed Mobility) verbal cues;contact guard  -HM     Supine-Sit [place] (Bed Mobility) verbal cues;moderate assist (50% patient effort)  deferred assisting pt to EOB at this date d/t concern for increase friction on sacral wounds  -     Sit-Supine [place] (Bed Mobility) verbal cues;minimum assist (75% patient effort)  -     Assistive Device (Bed Mobility) bed rails  -     Row Name 03/16/23 1012          Gait/Stairs (Locomotion)    Comment, (Gait/Stairs) pt declined transfer to wheelchair at this date. further mobility not appropriate.  -           User Key  (r) = Recorded By, (t) = Taken By, (c) = Cosigned By    Initials Name Provider Type     Chrissie Stanley PT Physical Therapist               Obj/Interventions     Row Name 03/16/23 1048          Range of Motion Comprehensive    General Range of Motion other (see comments)  BLE hip flexion within functional limits, BUE testing deferred to OT  -HM     Row Name 03/16/23 1048          Strength Comprehensive (MMT)    General Manual Muscle Testing (MMT) Assessment lower extremity strength deficits identified;other (see comments)  BUE testing deferred to OT  -     Row Name 03/16/23 1048          Balance    Balance Assessment sitting static balance  -     Static Sitting Balance contact guard  sitting up in bed  -     Row Name 03/16/23 1048          Sensory Assessment (Somatosensory)    Sensory Assessment (Somatosensory) other (see comments)  pt states no sensation on BLE  -HM            User Key  (r) = Recorded By, (t) = Taken By, (c) = Cosigned By    Initials Name Provider Type     Chrissie Stanley, PT Physical Therapist               Goals/Plan     Row Name 03/16/23 1100          Bed Mobility Goal 1 (PT)    Activity/Assistive Device (Bed Mobility Goal 1, PT) bed mobility activities, all  -HM     [place] Level/Cues Needed (Bed Mobility Goal 1, PT) modified independence  -HM     Time Frame (Bed Mobility Goal 1, PT) long term goal (LTG);10 days  -HM     Progress/Outcomes (Bed Mobility Goal 1, PT) new goal  -     Row Name 03/16/23 1100          Transfer Goal 1 (PT)    Activity/Assistive Device (Transfer Goal 1, PT) wheelchair transfer  -HM     [place] Level/Cues Needed (Transfer Goal 1, PT) contact guard required  -HM     Time Frame (Transfer Goal 1, PT) long term goal (LTG);10 days  -HM     Progress/Outcome (Transfer Goal 1, PT) new goal  -     Row Name 03/16/23 1100          Problem Specific Goal 1 (PT)    Problem Specific Goal 1 (PT) Pt will be able to self-propel wheelchair 150 feet with CGA and complete 2 180 degree turns.  -HM     Time Frame (Problem Specific Goal 1, PT) long-term goal (LTG);2 weeks  -HM     Progress/Outcome (Problem Specific Goal 1, PT) new goal  -     Row Name 03/16/23 1100          Therapy Assessment/Plan (PT)    Planned Therapy Interventions (PT) balance training;bed mobility training;transfer training;wheelchair management/propulsion training;home exercise program;patient/family education;neuromuscular re-education;postural re-education;ROM (range of motion);strengthening  -HM           User Key  (r) = Recorded By, (t) = Taken By, (c) = Cosigned By    Initials Name Provider Type     Chrissie Stanley, PT Physical Therapist               Clinical Impression     Row Name 03/16/23 1049          Pain    Pretreatment Pain Rating 5/10  -HM     Posttreatment Pain Rating 5/10  -HM     Pain Location - Side/Orientation Bilateral  -     Pain Location posterior   -     Pain Location - neck;shoulder  -     Pain Intervention(s) Ambulation/increased activity;Repositioned  -     Row Name 03/16/23 1049          Plan of Care Review    Plan of Care Reviewed With patient  -     Progress no change  -     Outcome Evaluation PT eval completed. Pt completed bed mobility CGA- Mod A x1 placing pt below baseline function. Pt demonstrated decreased independence with mobility warranting skilled IPPT POC to return to PLOF with bed mobility, transfers, and wheelchair propulsion. d/c rec SNF.  -     Row Name 03/16/23 1049          Therapy Assessment/Plan (PT)    Rehab Potential (PT) fair, will monitor progress closely  -     Criteria for Skilled Interventions Met (PT) yes;meets criteria;skilled treatment is necessary  -     Therapy Frequency (PT) 3 times/wk  -     Row Name 03/16/23 1049          Vital Signs    Pre Systolic BP Rehab 139  -HM     Pre Treatment Diastolic BP 80  -     Pretreatment Heart Rate (beats/min) 101  -HM     Pre SpO2 (%) 96  -     O2 Delivery Pre Treatment room air  -     O2 Delivery Intra Treatment room air  -     O2 Delivery Post Treatment room air  -HM     Pre Patient Position Supine  -     Intra Patient Position Sitting  -     Post Patient Position Side Lying  wedge under L  -HM     Row Name 03/16/23 1049          Positioning and Restraints    Pre-Treatment Position in bed  -     Post Treatment Position bed  -HM     In Bed notified nsg;side lying right;encouraged to call for assist;call light within reach;exit alarm on  -           User Key  (r) = Recorded By, (t) = Taken By, (c) = Cosigned By    Initials Name Provider Type     Chrissie Stanley, PT Physical Therapist               Outcome Measures     Row Name 03/16/23 1101 03/16/23 0800       How much help from another person do you currently need...    Turning from your back to your side while in flat bed without using bedrails? 3  - 3  -MC    Moving from lying on back to sitting  on the side of a flat bed without bedrails? 3  - 3  -MC    Moving to and from a bed to a chair (including a wheelchair)? 1  - 1  -MC    Standing up from a chair using your arms (e.g., wheelchair, bedside chair)? 1  - 1  -MC    Climbing 3-5 steps with a railing? 1  - 1  -MC    To walk in hospital room? 1  - 1  -MC    AM-PAC 6 Clicks Score (PT) 10  - 10  -MC    Highest level of mobility 4 --> Transferred to chair/commode  - 4 --> Transferred to chair/commode  -    Row Name 03/16/23 1101 03/16/23 1034       Functional Assessment    Outcome Measure Options AM-PAC 6 Clicks Basic Mobility (PT)  - AM-PAC 6 Clicks Daily Activity (OT)  -          User Key  (r) = Recorded By, (t) = Taken By, (c) = Cosigned By    Initials Name Provider Type    AC Yin Arriaga, OT Occupational Therapist     Madhu Pierre, RN Registered Nurse     Chrissie Stanley, PT Physical Therapist                             Physical Therapy Education     Title: PT OT SLP Therapies (Done)     Topic: Physical Therapy (Done)     Point: Mobility training (Done)     Learning Progress Summary           Patient Acceptance, E,TB, VU,NR by  at 3/16/2023 1102    Acceptance, E, VU by  at 3/15/2023 1228    Acceptance, E, VU by  at 3/14/2023 1922    Acceptance, E, VU by MG at 3/14/2023 1413                   Point: Home exercise program (Done)     Learning Progress Summary           Patient Acceptance, E, VU by MG at 3/15/2023 1228    Acceptance, E, VU by  at 3/14/2023 1922    Acceptance, E, VU by MG at 3/14/2023 1413                   Point: Body mechanics (Done)     Learning Progress Summary           Patient Acceptance, E, VU by MG at 3/15/2023 1228    Acceptance, E, VU by  at 3/14/2023 1922    Acceptance, E, VU by  at 3/14/2023 1413                   Point: Precautions (Done)     Learning Progress Summary           Patient Acceptance, E, VU by MG at 3/15/2023 1228    Acceptance, E, VU by  at 3/14/2023 1922    Acceptance,  E, VU by  at 3/14/2023 1413                               User Key     Initials Effective Dates Name Provider Type Discipline     11/30/22 -  Madhu Pierre, RN Registered Nurse Nurse     01/23/23 -  Tanvi Escamilla RN Registered Nurse Nurse     09/22/22 -  Chrissie Stanley PT Physical Therapist PT              PT Recommendation and Plan  Planned Therapy Interventions (PT): balance training, bed mobility training, transfer training, wheelchair management/propulsion training, home exercise program, patient/family education, neuromuscular re-education, postural re-education, ROM (range of motion), strengthening  Plan of Care Reviewed With: patient  Progress: no change  Outcome Evaluation: PT eval completed. Pt completed bed mobility CGA- Mod A x1 placing pt below baseline function. Pt demonstrated decreased independence with mobility warranting skilled IPPT POC to return to PLOF with bed mobility, transfers, and wheelchair propulsion. d/c rec SNF.     Time Calculation:    PT Charges     Row Name 03/16/23 1104             Time Calculation    Start Time 0820  -HM      PT Received On 03/16/23  -      PT Goal Re-Cert Due Date 03/26/23  -         Untimed Charges    PT Eval/Re-eval Minutes 46  -HM         Total Minutes    Untimed Charges Total Minutes 46  -HM       Total Minutes 46  -HM            User Key  (r) = Recorded By, (t) = Taken By, (c) = Cosigned By    Initials Name Provider Type     Chrissie Stanley PT Physical Therapist              Therapy Charges for Today     Code Description Service Date Service Provider Modifiers Qty    [id number] HC PT EVAL MOD COMPLEXITY 4 3/16/2023 Chrissie Stanley, PT GP 1          PT G-Codes  Outcome Measure Options: AM-PAC 6 Clicks Basic Mobility (PT)  AM-PAC 6 Clicks Score (PT): 10  AM-PAC 6 Clicks Score (OT): 14  PT Discharge Summary  Anticipated Discharge Disposition (PT): skilled nursing facility    Chrissie Stanley PT  3/16/2023

## 2023-03-16 NOTE — NURSING NOTE
Follow-up phone call with bedside RN earlier today regarding discontinuing use of Dakin's solution to treat wound given bone present in wound beds.  RN stated that she was told in end of shift report that the dressings were changed by night shift.  Requested that she remove those dressings irrigate and reapply wounds per wound care orders this WOC RN placed yesterday.    RN had not yet changed dressings encouraged her to please do so at her earliest convenience provided her additional dressings and reviewed/educated her on dressing application as well as provided additional dressing supplies.    Encouraged her to call if she had additional questions.    Sensory Perception: 2-->very limited  Moisture: 2-->very moist  Activity: 1-->bedfast  Mobility: 2-->very limited  Nutrition: 2-->probably inadequate  Friction and Shear: 1-->problem  Jaspreet Score: 10 (03/16/23 0800)     patient ordered and is on Dolphin mattress with low-air-loss topper please continue to implement pressure injury prevention interventions for patients with a Jaspreet scale of 18 or less per protocol    See orders for recommendations for pressure injury prevention and wound care.    WOC team will continue to follow-up.  If alteration to skin integrity or change in wound bed presentation please consult the WOC team.

## 2023-03-17 LAB
GLUCOSE BLDC GLUCOMTR-MCNC: 162 MG/DL (ref 70–130)
GLUCOSE BLDC GLUCOMTR-MCNC: 195 MG/DL (ref 70–130)
GLUCOSE BLDC GLUCOMTR-MCNC: 203 MG/DL (ref 70–130)
GLUCOSE BLDC GLUCOMTR-MCNC: 219 MG/DL (ref 70–130)

## 2023-03-17 PROCEDURE — 63710000001 INSULIN LISPRO (HUMAN) PER 5 UNITS: Performed by: INTERNAL MEDICINE

## 2023-03-17 PROCEDURE — 82962 GLUCOSE BLOOD TEST: CPT

## 2023-03-17 PROCEDURE — 63710000001 INSULIN LISPRO (HUMAN) PER 5 UNITS: Performed by: PHYSICIAN ASSISTANT

## 2023-03-17 PROCEDURE — 25010000002 HEPARIN (PORCINE) PER 1000 UNITS: Performed by: INTERNAL MEDICINE

## 2023-03-17 PROCEDURE — 99232 SBSQ HOSP IP/OBS MODERATE 35: CPT | Performed by: PHYSICIAN ASSISTANT

## 2023-03-17 PROCEDURE — G0378 HOSPITAL OBSERVATION PER HR: HCPCS

## 2023-03-17 PROCEDURE — 25010000002 DAPTOMYCIN PER 1 MG: Performed by: INTERNAL MEDICINE

## 2023-03-17 PROCEDURE — 25010000002 DIPHENHYDRAMINE PER 50 MG: Performed by: PHYSICIAN ASSISTANT

## 2023-03-17 PROCEDURE — 96376 TX/PRO/DX INJ SAME DRUG ADON: CPT

## 2023-03-17 PROCEDURE — 96372 THER/PROPH/DIAG INJ SC/IM: CPT

## 2023-03-17 RX ORDER — SENNA PLUS 8.6 MG/1
2 TABLET ORAL EVERY OTHER DAY
Status: DISCONTINUED | OUTPATIENT
Start: 2023-03-18 | End: 2023-03-24 | Stop reason: HOSPADM

## 2023-03-17 RX ORDER — DIPHENHYDRAMINE HYDROCHLORIDE 50 MG/ML
12.5 INJECTION INTRAMUSCULAR; INTRAVENOUS EVERY 6 HOURS PRN
Status: DISCONTINUED | OUTPATIENT
Start: 2023-03-17 | End: 2023-03-24 | Stop reason: HOSPADM

## 2023-03-17 RX ORDER — INSULIN LISPRO 100 [IU]/ML
3 INJECTION, SOLUTION INTRAVENOUS; SUBCUTANEOUS
Status: DISCONTINUED | OUTPATIENT
Start: 2023-03-17 | End: 2023-03-19

## 2023-03-17 RX ADMIN — CLOPIDOGREL BISULFATE 75 MG: 75 TABLET ORAL at 09:36

## 2023-03-17 RX ADMIN — ROSUVASTATIN 20 MG: 20 TABLET, FILM COATED ORAL at 21:15

## 2023-03-17 RX ADMIN — GABAPENTIN 800 MG: 400 CAPSULE ORAL at 18:52

## 2023-03-17 RX ADMIN — SODIUM CHLORIDE 1 G: 1 TABLET ORAL at 09:36

## 2023-03-17 RX ADMIN — Medication 1 CAPSULE: at 09:36

## 2023-03-17 RX ADMIN — FERROUS SULFATE TAB 325 MG (65 MG ELEMENTAL FE) 325 MG: 325 (65 FE) TAB at 09:37

## 2023-03-17 RX ADMIN — OXYCODONE HYDROCHLORIDE AND ACETAMINOPHEN 1 TABLET: 10; 325 TABLET ORAL at 09:43

## 2023-03-17 RX ADMIN — Medication 250 MG: at 09:36

## 2023-03-17 RX ADMIN — MULTIPLE VITAMINS W/ MINERALS TAB 1 TABLET: TAB at 09:37

## 2023-03-17 RX ADMIN — Medication 10 ML: at 09:38

## 2023-03-17 RX ADMIN — SODIUM CHLORIDE 2 G: 900 INJECTION INTRAVENOUS at 21:14

## 2023-03-17 RX ADMIN — GABAPENTIN 800 MG: 400 CAPSULE ORAL at 09:37

## 2023-03-17 RX ADMIN — METRONIDAZOLE 500 MG: 500 TABLET ORAL at 13:44

## 2023-03-17 RX ADMIN — METRONIDAZOLE 500 MG: 500 TABLET ORAL at 06:10

## 2023-03-17 RX ADMIN — SODIUM CHLORIDE 2 G: 900 INJECTION INTRAVENOUS at 06:11

## 2023-03-17 RX ADMIN — MICONAZOLE NITRATE: 20 POWDER TOPICAL at 09:43

## 2023-03-17 RX ADMIN — HEPARIN SODIUM 5000 UNITS: 5000 INJECTION INTRAVENOUS; SUBCUTANEOUS at 09:36

## 2023-03-17 RX ADMIN — GABAPENTIN 800 MG: 400 CAPSULE ORAL at 21:15

## 2023-03-17 RX ADMIN — DIPHENHYDRAMINE HYDROCHLORIDE 12.5 MG: 50 INJECTION, SOLUTION INTRAMUSCULAR; INTRAVENOUS at 13:44

## 2023-03-17 RX ADMIN — HEPARIN SODIUM 5000 UNITS: 5000 INJECTION INTRAVENOUS; SUBCUTANEOUS at 21:15

## 2023-03-17 RX ADMIN — Medication 5 MG: at 22:40

## 2023-03-17 RX ADMIN — INSULIN LISPRO 3 UNITS: 100 INJECTION, SOLUTION INTRAVENOUS; SUBCUTANEOUS at 18:52

## 2023-03-17 RX ADMIN — OXYCODONE HYDROCHLORIDE AND ACETAMINOPHEN 500 MG: 500 TABLET ORAL at 09:37

## 2023-03-17 RX ADMIN — DULOXETINE 60 MG: 60 CAPSULE, DELAYED RELEASE ORAL at 09:36

## 2023-03-17 RX ADMIN — LISINOPRIL 40 MG: 40 TABLET ORAL at 09:36

## 2023-03-17 RX ADMIN — ASPIRIN 81 MG: 81 TABLET, COATED ORAL at 09:37

## 2023-03-17 RX ADMIN — METOPROLOL SUCCINATE 25 MG: 25 TABLET, EXTENDED RELEASE ORAL at 09:37

## 2023-03-17 RX ADMIN — DAPTOMYCIN 500 MG: 500 INJECTION, POWDER, LYOPHILIZED, FOR SOLUTION INTRAVENOUS at 06:10

## 2023-03-17 RX ADMIN — MICONAZOLE NITRATE: 20 POWDER TOPICAL at 21:17

## 2023-03-17 RX ADMIN — THIAMINE HCL TAB 100 MG 100 MG: 100 TAB at 09:37

## 2023-03-17 RX ADMIN — INSULIN LISPRO 2 UNITS: 100 INJECTION, SOLUTION INTRAVENOUS; SUBCUTANEOUS at 13:43

## 2023-03-17 RX ADMIN — Medication 10 ML: at 21:17

## 2023-03-17 RX ADMIN — METRONIDAZOLE 500 MG: 500 TABLET ORAL at 22:40

## 2023-03-17 RX ADMIN — GABAPENTIN 800 MG: 400 CAPSULE ORAL at 13:44

## 2023-03-17 RX ADMIN — SODIUM CHLORIDE 2 G: 900 INJECTION INTRAVENOUS at 13:44

## 2023-03-17 RX ADMIN — INSULIN LISPRO 3 UNITS: 100 INJECTION, SOLUTION INTRAVENOUS; SUBCUTANEOUS at 18:51

## 2023-03-17 RX ADMIN — CYANOCOBALAMIN TAB 1000 MCG 1000 MCG: 1000 TAB at 09:37
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
## 2023-03-17 NOTE — PLAN OF CARE
Goal Outcome Evaluation:   A&O x4, IV ABX continued today. No reports of reaction per patient. Pain controlled per PRN medications.

## 2023-03-17 NOTE — PROGRESS NOTES
Continued Stay Note  [facility]     Patient Name: Severino Vera  MRN: [id number]  Today's Date: 3/17/2023    Admit Date: 3/13/2023    Plan: Ongoing   Discharge Plan     Row Name 03/17/23 1130       Plan    Plan Ongoing    Plan Comments Contacted by [facility] rep, Daniella [phone], who states they can offer patient a dual certified bed at [facility] in [place], [place].  Spoke with patient and he is in agreement with this disposition plan.  Facility will begin insurance authorization today but not hopeful for decision until beginning of next week.  Case Management will continue to follow and assist with discharge planning arrangements.               Discharge Codes    No documentation.               Expected Discharge Date and Time     Expected Discharge Date Expected Discharge Time    Mar 20, 2023             Jahaira Zambrano RN

## 2023-03-18 LAB
GLUCOSE BLDC GLUCOMTR-MCNC: 112 MG/DL (ref 70–130)
GLUCOSE BLDC GLUCOMTR-MCNC: 155 MG/DL (ref 70–130)
GLUCOSE BLDC GLUCOMTR-MCNC: 167 MG/DL (ref 70–130)
GLUCOSE BLDC GLUCOMTR-MCNC: 196 MG/DL (ref 70–130)

## 2023-03-18 PROCEDURE — 25010000002 MEROPENEM PER 100 MG: Performed by: INTERNAL MEDICINE

## 2023-03-18 PROCEDURE — 25010000002 DIPHENHYDRAMINE PER 50 MG: Performed by: PHYSICIAN ASSISTANT

## 2023-03-18 PROCEDURE — 96376 TX/PRO/DX INJ SAME DRUG ADON: CPT

## 2023-03-18 PROCEDURE — 96367 TX/PROPH/DG ADDL SEQ IV INF: CPT

## 2023-03-18 PROCEDURE — 25010000002 HEPARIN (PORCINE) PER 1000 UNITS: Performed by: INTERNAL MEDICINE

## 2023-03-18 PROCEDURE — 96366 THER/PROPH/DIAG IV INF ADDON: CPT

## 2023-03-18 PROCEDURE — 25010000002 DAPTOMYCIN PER 1 MG: Performed by: HOSPITALIST

## 2023-03-18 PROCEDURE — 96372 THER/PROPH/DIAG INJ SC/IM: CPT

## 2023-03-18 PROCEDURE — 99232 SBSQ HOSP IP/OBS MODERATE 35: CPT | Performed by: PHYSICIAN ASSISTANT

## 2023-03-18 PROCEDURE — 63710000001 INSULIN LISPRO (HUMAN) PER 5 UNITS: Performed by: INTERNAL MEDICINE

## 2023-03-18 PROCEDURE — 25010000002 HYDROMORPHONE PER 4 MG: Performed by: INTERNAL MEDICINE

## 2023-03-18 PROCEDURE — 63710000001 INSULIN LISPRO (HUMAN) PER 5 UNITS: Performed by: PHYSICIAN ASSISTANT

## 2023-03-18 PROCEDURE — 82962 GLUCOSE BLOOD TEST: CPT

## 2023-03-18 PROCEDURE — G0378 HOSPITAL OBSERVATION PER HR: HCPCS

## 2023-03-18 RX ORDER — DIPHENHYDRAMINE HYDROCHLORIDE, ZINC ACETATE 2; .1 G/100G; G/100G
1 CREAM TOPICAL 3 TIMES DAILY PRN
Status: DISCONTINUED | OUTPATIENT
Start: 2023-03-18 | End: 2023-03-18

## 2023-03-18 RX ORDER — ECHINACEA PURPUREA EXTRACT 125 MG
2 TABLET ORAL AS NEEDED
Status: DISCONTINUED | OUTPATIENT
Start: 2023-03-18 | End: 2023-03-24 | Stop reason: HOSPADM

## 2023-03-18 RX ADMIN — MEROPENEM 1 G: 1 INJECTION, POWDER, FOR SOLUTION INTRAVENOUS at 14:01

## 2023-03-18 RX ADMIN — INSULIN LISPRO 2 UNITS: 100 INJECTION, SOLUTION INTRAVENOUS; SUBCUTANEOUS at 05:52

## 2023-03-18 RX ADMIN — GABAPENTIN 800 MG: 400 CAPSULE ORAL at 17:04

## 2023-03-18 RX ADMIN — DULOXETINE 60 MG: 60 CAPSULE, DELAYED RELEASE ORAL at 07:59

## 2023-03-18 RX ADMIN — Medication 10 ML: at 21:45

## 2023-03-18 RX ADMIN — INSULIN LISPRO 3 UNITS: 100 INJECTION, SOLUTION INTRAVENOUS; SUBCUTANEOUS at 17:05

## 2023-03-18 RX ADMIN — INSULIN LISPRO 2 UNITS: 100 INJECTION, SOLUTION INTRAVENOUS; SUBCUTANEOUS at 01:16

## 2023-03-18 RX ADMIN — INSULIN LISPRO 2 UNITS: 100 INJECTION, SOLUTION INTRAVENOUS; SUBCUTANEOUS at 17:05

## 2023-03-18 RX ADMIN — DAPTOMYCIN 500 MG: 500 INJECTION, POWDER, LYOPHILIZED, FOR SOLUTION INTRAVENOUS at 05:52

## 2023-03-18 RX ADMIN — Medication 10 ML: at 08:00

## 2023-03-18 RX ADMIN — MEROPENEM 1 G: 1 INJECTION, POWDER, FOR SOLUTION INTRAVENOUS at 18:31

## 2023-03-18 RX ADMIN — POLYETHYLENE GLYCOL 3350 17 G: 17 POWDER, FOR SOLUTION ORAL at 07:56

## 2023-03-18 RX ADMIN — DIPHENHYDRAMINE HYDROCHLORIDE 12.5 MG: 50 INJECTION, SOLUTION INTRAMUSCULAR; INTRAVENOUS at 07:56

## 2023-03-18 RX ADMIN — OXYCODONE HYDROCHLORIDE AND ACETAMINOPHEN 1 TABLET: 10; 325 TABLET ORAL at 19:39

## 2023-03-18 RX ADMIN — CLOPIDOGREL BISULFATE 75 MG: 75 TABLET ORAL at 07:59

## 2023-03-18 RX ADMIN — HEPARIN SODIUM 5000 UNITS: 5000 INJECTION INTRAVENOUS; SUBCUTANEOUS at 21:44

## 2023-03-18 RX ADMIN — CYANOCOBALAMIN TAB 1000 MCG 1000 MCG: 1000 TAB at 07:57

## 2023-03-18 RX ADMIN — GABAPENTIN 800 MG: 400 CAPSULE ORAL at 13:18

## 2023-03-18 RX ADMIN — SENNOSIDES 2 TABLET: 8.6 TABLET, FILM COATED ORAL at 21:44

## 2023-03-18 RX ADMIN — ROSUVASTATIN 20 MG: 20 TABLET, FILM COATED ORAL at 21:44

## 2023-03-18 RX ADMIN — SODIUM CHLORIDE 2 G: 900 INJECTION INTRAVENOUS at 04:25

## 2023-03-18 RX ADMIN — MICONAZOLE NITRATE: 20 POWDER TOPICAL at 21:45

## 2023-03-18 RX ADMIN — Medication 5 MG: at 21:44

## 2023-03-18 RX ADMIN — HEPARIN SODIUM 5000 UNITS: 5000 INJECTION INTRAVENOUS; SUBCUTANEOUS at 07:56

## 2023-03-18 RX ADMIN — INSULIN LISPRO 3 UNITS: 100 INJECTION, SOLUTION INTRAVENOUS; SUBCUTANEOUS at 13:18

## 2023-03-18 RX ADMIN — SODIUM CHLORIDE 1 G: 1 TABLET ORAL at 07:59

## 2023-03-18 RX ADMIN — Medication 10 ML: at 08:01

## 2023-03-18 RX ADMIN — DIPHENHYDRAMINE HYDROCHLORIDE 12.5 MG: 50 INJECTION, SOLUTION INTRAMUSCULAR; INTRAVENOUS at 19:39

## 2023-03-18 RX ADMIN — Medication 250 MG: at 07:59

## 2023-03-18 RX ADMIN — INSULIN LISPRO 3 UNITS: 100 INJECTION, SOLUTION INTRAVENOUS; SUBCUTANEOUS at 07:57

## 2023-03-18 RX ADMIN — Medication 1 CAPSULE: at 07:57

## 2023-03-18 RX ADMIN — GABAPENTIN 800 MG: 400 CAPSULE ORAL at 07:57

## 2023-03-18 RX ADMIN — HYDROMORPHONE HYDROCHLORIDE 0.5 MG: 1 INJECTION, SOLUTION INTRAMUSCULAR; INTRAVENOUS; SUBCUTANEOUS at 01:24

## 2023-03-18 RX ADMIN — OXYCODONE HYDROCHLORIDE AND ACETAMINOPHEN 500 MG: 500 TABLET ORAL at 07:58

## 2023-03-18 RX ADMIN — THIAMINE HCL TAB 100 MG 100 MG: 100 TAB at 08:00

## 2023-03-18 RX ADMIN — MICONAZOLE NITRATE 1 APPLICATION: 20 POWDER TOPICAL at 08:00

## 2023-03-18 RX ADMIN — METRONIDAZOLE 500 MG: 500 TABLET ORAL at 05:52

## 2023-03-18 RX ADMIN — MULTIPLE VITAMINS W/ MINERALS TAB 1 TABLET: TAB at 08:00

## 2023-03-18 RX ADMIN — FERROUS SULFATE TAB 325 MG (65 MG ELEMENTAL FE) 325 MG: 325 (65 FE) TAB at 07:58

## 2023-03-18 RX ADMIN — ASPIRIN 81 MG: 81 TABLET, COATED ORAL at 07:57

## 2023-03-18 RX ADMIN — GABAPENTIN 800 MG: 400 CAPSULE ORAL at 21:43

## 2023-03-18 RX ADMIN — SALINE NASAL SPRAY 2 SPRAY: 1.5 SOLUTION NASAL at 21:43

## 2023-03-18 NOTE — NURSING NOTE
Prior to central line removal, order for the removal of catheter was verified, patient was assessed, necessary materials were gathered and patient educated .    Patient was positioned trendelenburg to ensure that the insertion site was at or below the level of the heart.    Hands were washed, clean gloves were applied and central line dressing was gently removed. Catheter exit site was not cultured.     A new pair of clean gloves were then applied. Insertion site was cleansed with 2% Chlorhexidine swab using a circular motion beginning at the insertion site and moving outward for 30 seconds and allowed to dry.     Clamp on line present.     Patient valsalva.     The central line was grasped at the insertion site and slowly pulled outward parallel to the skin. Resistance not met.    After central line was completely removed, a sterile 4x4 gauze pad was used to apply light pressure until bleeding stopped. At that time, petroleum-based gauze and a sterile occlusive dressing was applied to exit site.     Patient was instructed to keep dressing in place for at least 24 hours and flat for 30 minutes post catheter removal.     Catheter was inspected after removal and intact. Tip of central line was not sent for culture. Patient tolerated procedure.
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
## 2023-03-18 NOTE — PLAN OF CARE
Problem: Adult Inpatient Plan of Care  Goal: Absence of Hospital-Acquired Illness or Injury  Outcome: Ongoing, Progressing  Intervention: Identify and Manage Fall Risk  Recent Flowsheet Documentation  Taken 3/18/2023 0233 by Jeanna Flores RN  Safety Promotion/Fall Prevention:   assistive device/personal items within reach   safety round/check completed  Taken 3/18/2023 0055 by Jeanna Flores RN  Safety Promotion/Fall Prevention:   assistive device/personal items within reach   clutter free environment maintained   safety round/check completed  Taken 3/17/2023 2205 by Jeanna Flores RN  Safety Promotion/Fall Prevention:   activity supervised   assistive device/personal items within reach   clutter free environment maintained   safety round/check completed   room organization consistent  Taken 3/17/2023 2105 by Jeanna Flores RN  Safety Promotion/Fall Prevention:   activity supervised   assistive device/personal items within reach   clutter free environment maintained   safety round/check completed  Intervention: Prevent Skin Injury  Recent Flowsheet Documentation  Taken 3/18/2023 0233 by Jeanna Flores RN  Body Position:   position changed independently   turned   left  Taken 3/18/2023 0055 by Jeanna Flores RN  Body Position: position changed independently  Taken 3/17/2023 2205 by Jeanna Flores RN  Body Position: position changed independently  Skin Protection:   adhesive use limited   incontinence pads utilized   tubing/devices free from skin contact   skin sealant/moisture barrier applied  Taken 3/17/2023 2105 by Jeanna Flores RN  Body Position:   position changed independently   right   turned  Skin Protection:   adhesive use limited   incontinence pads utilized   tubing/devices free from skin contact  Intervention: Prevent and Manage VTE (Venous Thromboembolism) Risk  Recent Flowsheet Documentation  Taken 3/18/2023 0233 by Jeanna Flores RN  Activity Management:  activity adjusted per tolerance  Taken 3/18/2023 0055 by Jeanna Flores RN  Activity Management: activity adjusted per tolerance  Taken 3/17/2023 2205 by Jeanna Flores RN  Activity Management: activity adjusted per tolerance  Taken 3/17/2023 2105 by Jeanna Flores RN  Activity Management: activity adjusted per tolerance  VTE Prevention/Management: (bilateral aka on heparin) other (see comments)  Range of Motion: active ROM (range of motion) encouraged  Intervention: Prevent Infection  Recent Flowsheet Documentation  Taken 3/17/2023 2105 by Jeanna Flores RN  Infection Prevention: environmental surveillance performed     Problem: Skin Injury Risk Increased  Goal: Skin Health and Integrity  Outcome: Ongoing, Progressing  Intervention: Optimize Skin Protection  Recent Flowsheet Documentation  Taken 3/18/2023 0233 by Jeanna Flores RN  Head of Bed (HOB) Positioning: HOB flat  Taken 3/18/2023 0055 by Jeanna Flores RN  Head of Bed (HOB) Positioning: HOB flat  Taken 3/17/2023 2205 by Jeanna Flores RN  Pressure Reduction Techniques:   frequent weight shift encouraged   pressure points protected   weight shift assistance provided  Head of Bed (HOB) Positioning: HOB elevated  Pressure Reduction Devices: specialty bed utilized  Skin Protection:   adhesive use limited   incontinence pads utilized   tubing/devices free from skin contact   skin sealant/moisture barrier applied  Taken 3/17/2023 2105 by Jeanna Flores RN  Pressure Reduction Techniques:   frequent weight shift encouraged   weight shift assistance provided  Head of Bed (HOB) Positioning: [facility] elevated  Pressure Reduction Devices: specialty bed utilized  Skin Protection:   adhesive use limited   incontinence pads utilized   tubing/devices free from skin contact     Problem: Fall Injury Risk  Goal: Absence of Fall and Fall-Related Injury  Outcome: Ongoing, Progressing  Intervention: Identify and Manage Contributors  Recent  Flowsheet Documentation  Taken 3/18/2023 0055 by Jeanna Flores RN  Medication Review/Management: medications reviewed  Taken 3/17/2023 2205 by Jeanna Flores RN  Medication Review/Management: medications reviewed  Self-Care Promotion:   independence encouraged   BADL personal objects within reach  Taken 3/17/2023 2105 by Jeanna Flores RN  Medication Review/Management: medications reviewed  Intervention: Promote Injury-Free Environment  Recent Flowsheet Documentation  Taken 3/18/2023 0233 by Jeanna Flores RN  Safety Promotion/Fall Prevention:   assistive device/personal items within reach   safety round/check completed  Taken 3/18/2023 0055 by Jeanna Flores RN  Safety Promotion/Fall Prevention:   assistive device/personal items within reach   clutter free environment maintained   safety round/check completed  Taken 3/17/2023 2205 by Jeanna Flores RN  Safety Promotion/Fall Prevention:   activity supervised   assistive device/personal items within reach   clutter free environment maintained   safety round/check completed   room organization consistent  Taken 3/17/2023 2105 by Jeanna Flores RN  Safety Promotion/Fall Prevention:   activity supervised   assistive device/personal items within reach   clutter free environment maintained   safety round/check completed   Goal Outcome Evaluation:

## 2023-03-18 NOTE — PLAN OF CARE
Goal Outcome Evaluation:  Plan of Care Reviewed With: patient        Progress: no change  Outcome Evaluation: Pt alert, oriented. VSS. RA. Dressings monitored. Due to be changed q3 days. Next change is tomorrow, the 19th. Patient transferred to a new specialty bed. Skin care to documented areas of concerned. PICC line removed per MD order. IV antibiotics adjusted. Plan of care discussed with patient. Verbalized understanding.

## 2023-03-18 NOTE — NURSING NOTE
Prior to central line removal, order for the removal of catheter was verified, patient was assessed, necessary materials were gathered and patient educated .    Patient was positioned in trendelenburg to ensure that the insertion site was at or below the level of the heart.    Hands were washed, clean gloves were applied and central line dressing was gently removed. Catheter exit site was not cultured.     A new pair of clean gloves were then applied. Insertion site was cleansed with 2% Chlorhexidine swab using a circular motion beginning at the insertion site and moving outward for 30 seconds and allowed to dry.     Clamp on line present.     Patient held his breath until catheter was removed.     The central line was grasped at the insertion site and slowly pulled outward parallel to the skin. Resistance not met.    After central line was completely removed, a sterile 4x4 gauze pad was used to apply light pressure until bleeding stopped. At that time, petroleum-based gauze and a sterile occlusive dressing was applied to exit site.     Patient was instructed to keep dressing in place for at least 24 hours and to lie flat for 30 minutes after catheter removed.     Catheter was inspected after removal and intact.
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
## 2023-03-19 LAB
ANION GAP SERPL CALCULATED.3IONS-SCNC: 9 MMOL/L (ref 5–15)
BACTERIA SPEC AEROBE CULT: NORMAL
BACTERIA SPEC AEROBE CULT: NORMAL
BUN SERPL-MCNC: 19 MG/DL (ref 8–23)
BUN/CREAT SERPL: 54.3 (ref 7–25)
CALCIUM SPEC-SCNC: 8.9 MG/DL (ref 8.6–10.5)
CHLORIDE SERPL-SCNC: 97 MMOL/L (ref 98–107)
CO2 SERPL-SCNC: 27 MMOL/L (ref 22–29)
CREAT SERPL-MCNC: 0.35 MG/DL (ref 0.76–1.27)
DEPRECATED RDW RBC AUTO: 55.5 FL (ref 37–54)
EGFRCR SERPLBLD CKD-EPI 2021: 123.7 ML/MIN/1.73
ERYTHROCYTE [DISTWIDTH] IN BLOOD BY AUTOMATED COUNT: 18.3 % (ref 12.3–15.4)
GLUCOSE BLDC GLUCOMTR-MCNC: 129 MG/DL (ref 70–130)
GLUCOSE BLDC GLUCOMTR-MCNC: 154 MG/DL (ref 70–130)
GLUCOSE BLDC GLUCOMTR-MCNC: 156 MG/DL (ref 70–130)
GLUCOSE BLDC GLUCOMTR-MCNC: 207 MG/DL (ref 70–130)
GLUCOSE BLDC GLUCOMTR-MCNC: 229 MG/DL (ref 70–130)
GLUCOSE SERPL-MCNC: 142 MG/DL (ref 65–99)
HCT VFR BLD AUTO: 30.6 % (ref 37.5–51)
HGB BLD-MCNC: 9.9 G/DL (ref 13–17.7)
MCH RBC QN AUTO: 26.9 PG (ref 26.6–33)
MCHC RBC AUTO-ENTMCNC: 32.4 G/DL (ref 31.5–35.7)
MCV RBC AUTO: 83.2 FL (ref 79–97)
PLATELET # BLD AUTO: 334 10*3/MM3 (ref 140–450)
PMV BLD AUTO: 10 FL (ref 6–12)
POTASSIUM SERPL-SCNC: 4.1 MMOL/L (ref 3.5–5.2)
RBC # BLD AUTO: 3.68 10*6/MM3 (ref 4.14–5.8)
SODIUM SERPL-SCNC: 133 MMOL/L (ref 136–145)
WBC NRBC COR # BLD: 13.05 10*3/MM3 (ref 3.4–10.8)

## 2023-03-19 PROCEDURE — 25010000002 HEPARIN (PORCINE) PER 1000 UNITS: Performed by: INTERNAL MEDICINE

## 2023-03-19 PROCEDURE — G0378 HOSPITAL OBSERVATION PER HR: HCPCS

## 2023-03-19 PROCEDURE — 80048 BASIC METABOLIC PNL TOTAL CA: CPT | Performed by: PHYSICIAN ASSISTANT

## 2023-03-19 PROCEDURE — 25010000002 DAPTOMYCIN PER 1 MG: Performed by: HOSPITALIST

## 2023-03-19 PROCEDURE — 82962 GLUCOSE BLOOD TEST: CPT

## 2023-03-19 PROCEDURE — 85027 COMPLETE CBC AUTOMATED: CPT | Performed by: PHYSICIAN ASSISTANT

## 2023-03-19 PROCEDURE — 63710000001 INSULIN LISPRO (HUMAN) PER 5 UNITS: Performed by: INTERNAL MEDICINE

## 2023-03-19 PROCEDURE — 63710000001 INSULIN LISPRO (HUMAN) PER 5 UNITS: Performed by: PHYSICIAN ASSISTANT

## 2023-03-19 PROCEDURE — 99231 SBSQ HOSP IP/OBS SF/LOW 25: CPT | Performed by: PHYSICIAN ASSISTANT

## 2023-03-19 PROCEDURE — 25010000002 MEROPENEM PER 100 MG: Performed by: INTERNAL MEDICINE

## 2023-03-19 PROCEDURE — 96376 TX/PRO/DX INJ SAME DRUG ADON: CPT

## 2023-03-19 PROCEDURE — 96372 THER/PROPH/DIAG INJ SC/IM: CPT

## 2023-03-19 PROCEDURE — 96366 THER/PROPH/DIAG IV INF ADDON: CPT

## 2023-03-19 PROCEDURE — 25010000002 DIPHENHYDRAMINE PER 50 MG: Performed by: PHYSICIAN ASSISTANT

## 2023-03-19 PROCEDURE — 97602 WOUND(S) CARE NON-SELECTIVE: CPT

## 2023-03-19 RX ORDER — INSULIN LISPRO 100 [IU]/ML
4 INJECTION, SOLUTION INTRAVENOUS; SUBCUTANEOUS
Status: DISCONTINUED | OUTPATIENT
Start: 2023-03-19 | End: 2023-03-24 | Stop reason: HOSPADM

## 2023-03-19 RX ORDER — HYDROXYZINE HYDROCHLORIDE 25 MG/1
25 TABLET, FILM COATED ORAL EVERY 6 HOURS PRN
Status: DISCONTINUED | OUTPATIENT
Start: 2023-03-19 | End: 2023-03-24 | Stop reason: HOSPADM

## 2023-03-19 RX ORDER — INSULIN LISPRO 100 [IU]/ML
0-7 INJECTION, SOLUTION INTRAVENOUS; SUBCUTANEOUS
Status: DISCONTINUED | OUTPATIENT
Start: 2023-03-19 | End: 2023-03-24 | Stop reason: HOSPADM

## 2023-03-19 RX ADMIN — MULTIPLE VITAMINS W/ MINERALS TAB 1 TABLET: TAB at 09:55

## 2023-03-19 RX ADMIN — THIAMINE HCL TAB 100 MG 100 MG: 100 TAB at 09:55

## 2023-03-19 RX ADMIN — DAPTOMYCIN 500 MG: 500 INJECTION, POWDER, LYOPHILIZED, FOR SOLUTION INTRAVENOUS at 06:02

## 2023-03-19 RX ADMIN — ASPIRIN 81 MG: 81 TABLET, COATED ORAL at 09:58

## 2023-03-19 RX ADMIN — LISINOPRIL 40 MG: 40 TABLET ORAL at 10:14

## 2023-03-19 RX ADMIN — DULOXETINE 60 MG: 60 CAPSULE, DELAYED RELEASE ORAL at 09:55

## 2023-03-19 RX ADMIN — GABAPENTIN 800 MG: 400 CAPSULE ORAL at 14:06

## 2023-03-19 RX ADMIN — MEROPENEM 1 G: 1 INJECTION, POWDER, FOR SOLUTION INTRAVENOUS at 02:54

## 2023-03-19 RX ADMIN — FERROUS SULFATE TAB 325 MG (65 MG ELEMENTAL FE) 325 MG: 325 (65 FE) TAB at 09:59

## 2023-03-19 RX ADMIN — INSULIN LISPRO 4 UNITS: 100 INJECTION, SOLUTION INTRAVENOUS; SUBCUTANEOUS at 14:06

## 2023-03-19 RX ADMIN — GABAPENTIN 800 MG: 400 CAPSULE ORAL at 18:10

## 2023-03-19 RX ADMIN — HEPARIN SODIUM 5000 UNITS: 5000 INJECTION INTRAVENOUS; SUBCUTANEOUS at 21:19

## 2023-03-19 RX ADMIN — OXYCODONE HYDROCHLORIDE AND ACETAMINOPHEN 500 MG: 500 TABLET ORAL at 09:56

## 2023-03-19 RX ADMIN — SODIUM CHLORIDE 1 G: 1 TABLET ORAL at 10:14

## 2023-03-19 RX ADMIN — INSULIN LISPRO 3 UNITS: 100 INJECTION, SOLUTION INTRAVENOUS; SUBCUTANEOUS at 09:54

## 2023-03-19 RX ADMIN — INSULIN LISPRO 4 UNITS: 100 INJECTION, SOLUTION INTRAVENOUS; SUBCUTANEOUS at 18:13

## 2023-03-19 RX ADMIN — Medication 250 MG: at 09:55

## 2023-03-19 RX ADMIN — DIPHENHYDRAMINE HYDROCHLORIDE 12.5 MG: 50 INJECTION, SOLUTION INTRAMUSCULAR; INTRAVENOUS at 03:28

## 2023-03-19 RX ADMIN — Medication 5 MG: at 21:20

## 2023-03-19 RX ADMIN — HYDROXYZINE HYDROCHLORIDE 25 MG: 25 TABLET, FILM COATED ORAL at 21:20

## 2023-03-19 RX ADMIN — HEPARIN SODIUM 5000 UNITS: 5000 INJECTION INTRAVENOUS; SUBCUTANEOUS at 09:54

## 2023-03-19 RX ADMIN — CLOPIDOGREL BISULFATE 75 MG: 75 TABLET ORAL at 09:55

## 2023-03-19 RX ADMIN — Medication 1 CAPSULE: at 09:56

## 2023-03-19 RX ADMIN — MEROPENEM 1 G: 1 INJECTION, POWDER, FOR SOLUTION INTRAVENOUS at 18:09

## 2023-03-19 RX ADMIN — POLYETHYLENE GLYCOL 3350 17 G: 17 POWDER, FOR SOLUTION ORAL at 09:55

## 2023-03-19 RX ADMIN — INSULIN LISPRO 3 UNITS: 100 INJECTION, SOLUTION INTRAVENOUS; SUBCUTANEOUS at 01:34

## 2023-03-19 RX ADMIN — Medication 10 ML: at 09:59

## 2023-03-19 RX ADMIN — ROSUVASTATIN 20 MG: 20 TABLET, FILM COATED ORAL at 21:20

## 2023-03-19 RX ADMIN — MICONAZOLE NITRATE: 20 POWDER TOPICAL at 09:59

## 2023-03-19 RX ADMIN — MICONAZOLE NITRATE: 20 POWDER TOPICAL at 21:21

## 2023-03-19 RX ADMIN — METOPROLOL SUCCINATE 25 MG: 25 TABLET, EXTENDED RELEASE ORAL at 09:56

## 2023-03-19 RX ADMIN — MEROPENEM 1 G: 1 INJECTION, POWDER, FOR SOLUTION INTRAVENOUS at 10:23

## 2023-03-19 RX ADMIN — HYDROXYZINE HYDROCHLORIDE 25 MG: 25 TABLET, FILM COATED ORAL at 12:05

## 2023-03-19 RX ADMIN — GABAPENTIN 800 MG: 400 CAPSULE ORAL at 09:56

## 2023-03-19 RX ADMIN — CYANOCOBALAMIN TAB 1000 MCG 1000 MCG: 1000 TAB at 09:57

## 2023-03-19 RX ADMIN — INSULIN LISPRO 3 UNITS: 100 INJECTION, SOLUTION INTRAVENOUS; SUBCUTANEOUS at 14:06

## 2023-03-19 RX ADMIN — OXYCODONE HYDROCHLORIDE AND ACETAMINOPHEN 1 TABLET: 10; 325 TABLET ORAL at 21:20

## 2023-03-19 RX ADMIN — INSULIN LISPRO 2 UNITS: 100 INJECTION, SOLUTION INTRAVENOUS; SUBCUTANEOUS at 18:09

## 2023-03-19 RX ADMIN — GABAPENTIN 800 MG: 400 CAPSULE ORAL at 21:20

## 2023-03-19 RX ADMIN — OXYCODONE HYDROCHLORIDE AND ACETAMINOPHEN 1 TABLET: 10; 325 TABLET ORAL at 09:55
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
## 2023-03-19 NOTE — PLAN OF CARE
Problem: Skin Injury Risk Increased  Goal: Skin Health and Integrity  Outcome: Ongoing, Progressing  Intervention: Optimize Skin Protection  Recent Flowsheet Documentation  Taken 3/19/2023 0220 by Jeanna Flores RN  Head of Bed (HOB) Positioning: HOB elevated  Taken 3/19/2023 0015 by Jeanna Flores RN  Pressure Reduction Techniques:   frequent weight shift encouraged   weight shift assistance provided  Head of Bed (HOB) Positioning: HOB lowered  Pressure Reduction Devices: specialty bed utilized  Skin Protection:   tubing/devices free from skin contact   adhesive use limited  Taken 3/18/2023 2210 by Jeanna Flores RN  Pressure Reduction Techniques:   frequent weight shift encouraged   weight shift assistance provided  Head of Bed (HOB) Positioning: HOB elevated  Pressure Reduction Devices:   specialty bed utilized   pressure-redistributing mattress utilized  Skin Protection:   adhesive use limited   tubing/devices free from skin contact   skin sealant/moisture barrier applied   incontinence pads utilized  Taken 3/18/2023 2005 by Jeanna Flores RN  Head of Bed (HOB) Positioning: HOB at 30 degrees   Goal Outcome Evaluation:
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
## 2023-03-20 LAB
GLUCOSE BLDC GLUCOMTR-MCNC: 119 MG/DL (ref 70–130)
GLUCOSE BLDC GLUCOMTR-MCNC: 136 MG/DL (ref 70–130)
GLUCOSE BLDC GLUCOMTR-MCNC: 157 MG/DL (ref 70–130)
GLUCOSE BLDC GLUCOMTR-MCNC: 162 MG/DL (ref 70–130)

## 2023-03-20 PROCEDURE — 99231 SBSQ HOSP IP/OBS SF/LOW 25: CPT | Performed by: PHYSICIAN ASSISTANT

## 2023-03-20 PROCEDURE — G0378 HOSPITAL OBSERVATION PER HR: HCPCS

## 2023-03-20 PROCEDURE — 25010000002 DIPHENHYDRAMINE PER 50 MG: Performed by: PHYSICIAN ASSISTANT

## 2023-03-20 PROCEDURE — 96366 THER/PROPH/DIAG IV INF ADDON: CPT

## 2023-03-20 PROCEDURE — 25010000002 DAPTOMYCIN PER 1 MG: Performed by: HOSPITALIST

## 2023-03-20 PROCEDURE — 63710000001 INSULIN LISPRO (HUMAN) PER 5 UNITS: Performed by: PHYSICIAN ASSISTANT

## 2023-03-20 PROCEDURE — 82962 GLUCOSE BLOOD TEST: CPT

## 2023-03-20 PROCEDURE — 96376 TX/PRO/DX INJ SAME DRUG ADON: CPT

## 2023-03-20 PROCEDURE — 97530 THERAPEUTIC ACTIVITIES: CPT

## 2023-03-20 PROCEDURE — 25010000002 MEROPENEM PER 100 MG: Performed by: INTERNAL MEDICINE

## 2023-03-20 PROCEDURE — 25010000002 HEPARIN (PORCINE) PER 1000 UNITS: Performed by: INTERNAL MEDICINE

## 2023-03-20 PROCEDURE — 96372 THER/PROPH/DIAG INJ SC/IM: CPT

## 2023-03-20 RX ADMIN — GABAPENTIN 800 MG: 400 CAPSULE ORAL at 20:35

## 2023-03-20 RX ADMIN — FERROUS SULFATE TAB 325 MG (65 MG ELEMENTAL FE) 325 MG: 325 (65 FE) TAB at 09:26

## 2023-03-20 RX ADMIN — Medication 250 MG: at 09:26

## 2023-03-20 RX ADMIN — OXYCODONE HYDROCHLORIDE AND ACETAMINOPHEN 1 TABLET: 10; 325 TABLET ORAL at 09:33

## 2023-03-20 RX ADMIN — MICONAZOLE NITRATE: 20 POWDER TOPICAL at 09:30

## 2023-03-20 RX ADMIN — Medication 10 ML: at 20:35

## 2023-03-20 RX ADMIN — ROSUVASTATIN 20 MG: 20 TABLET, FILM COATED ORAL at 20:35

## 2023-03-20 RX ADMIN — MULTIPLE VITAMINS W/ MINERALS TAB 1 TABLET: TAB at 09:26

## 2023-03-20 RX ADMIN — HEPARIN SODIUM 5000 UNITS: 5000 INJECTION INTRAVENOUS; SUBCUTANEOUS at 09:27

## 2023-03-20 RX ADMIN — MEROPENEM 1 G: 1 INJECTION, POWDER, FOR SOLUTION INTRAVENOUS at 03:06

## 2023-03-20 RX ADMIN — MICONAZOLE NITRATE: 20 POWDER TOPICAL at 20:35

## 2023-03-20 RX ADMIN — Medication 1 CAPSULE: at 09:27

## 2023-03-20 RX ADMIN — INSULIN LISPRO 4 UNITS: 100 INJECTION, SOLUTION INTRAVENOUS; SUBCUTANEOUS at 09:25

## 2023-03-20 RX ADMIN — ASPIRIN 81 MG: 81 TABLET, COATED ORAL at 09:26

## 2023-03-20 RX ADMIN — GABAPENTIN 800 MG: 400 CAPSULE ORAL at 09:26

## 2023-03-20 RX ADMIN — THIAMINE HCL TAB 100 MG 100 MG: 100 TAB at 09:26

## 2023-03-20 RX ADMIN — DAPTOMYCIN 500 MG: 500 INJECTION, POWDER, LYOPHILIZED, FOR SOLUTION INTRAVENOUS at 06:02

## 2023-03-20 RX ADMIN — SODIUM CHLORIDE 1 G: 1 TABLET ORAL at 09:33

## 2023-03-20 RX ADMIN — GABAPENTIN 800 MG: 400 CAPSULE ORAL at 14:32

## 2023-03-20 RX ADMIN — MEROPENEM 1 G: 1 INJECTION, POWDER, FOR SOLUTION INTRAVENOUS at 20:35

## 2023-03-20 RX ADMIN — INSULIN LISPRO 4 UNITS: 100 INJECTION, SOLUTION INTRAVENOUS; SUBCUTANEOUS at 12:54

## 2023-03-20 RX ADMIN — INSULIN LISPRO 2 UNITS: 100 INJECTION, SOLUTION INTRAVENOUS; SUBCUTANEOUS at 09:25

## 2023-03-20 RX ADMIN — CLOPIDOGREL BISULFATE 75 MG: 75 TABLET ORAL at 09:27

## 2023-03-20 RX ADMIN — CYANOCOBALAMIN TAB 1000 MCG 1000 MCG: 1000 TAB at 09:26

## 2023-03-20 RX ADMIN — DULOXETINE 60 MG: 60 CAPSULE, DELAYED RELEASE ORAL at 09:26

## 2023-03-20 RX ADMIN — Medication 10 ML: at 09:28

## 2023-03-20 RX ADMIN — HEPARIN SODIUM 5000 UNITS: 5000 INJECTION INTRAVENOUS; SUBCUTANEOUS at 20:35

## 2023-03-20 RX ADMIN — MEROPENEM 1 G: 1 INJECTION, POWDER, FOR SOLUTION INTRAVENOUS at 11:32

## 2023-03-20 RX ADMIN — POLYETHYLENE GLYCOL 3350 17 G: 17 POWDER, FOR SOLUTION ORAL at 09:27

## 2023-03-20 RX ADMIN — INSULIN LISPRO 4 UNITS: 100 INJECTION, SOLUTION INTRAVENOUS; SUBCUTANEOUS at 18:14

## 2023-03-20 RX ADMIN — DIPHENHYDRAMINE HYDROCHLORIDE 12.5 MG: 50 INJECTION, SOLUTION INTRAMUSCULAR; INTRAVENOUS at 09:34

## 2023-03-20 RX ADMIN — HYDROXYZINE HYDROCHLORIDE 25 MG: 25 TABLET, FILM COATED ORAL at 09:33

## 2023-03-20 RX ADMIN — METOPROLOL SUCCINATE 25 MG: 25 TABLET, EXTENDED RELEASE ORAL at 09:59

## 2023-03-20 RX ADMIN — LISINOPRIL 40 MG: 40 TABLET ORAL at 09:26

## 2023-03-20 RX ADMIN — OXYCODONE HYDROCHLORIDE AND ACETAMINOPHEN 1 TABLET: 10; 325 TABLET ORAL at 15:26

## 2023-03-20 RX ADMIN — OXYCODONE HYDROCHLORIDE AND ACETAMINOPHEN 500 MG: 500 TABLET ORAL at 09:26

## 2023-03-20 RX ADMIN — GABAPENTIN 800 MG: 400 CAPSULE ORAL at 18:14

## 2023-03-20 RX ADMIN — SENNOSIDES 2 TABLET: 8.6 TABLET, FILM COATED ORAL at 09:27
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
## 2023-03-20 NOTE — THERAPY TREATMENT NOTE
Patient Name: Severino Vera  : 1955    MRN: [id number]                              Today's Date: 3/20/2023       Admit Date: 3/13/2023    Visit Dx:     ICD-10-CM ICD-9-CM   1. Subacute osteomyelitis of multiple sites (HCC)  M86.29 730.09   2. 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)   • Gangrene of right foot s/p AKA 2022   • Osteomyelitis (HCC)   • Hyperlipidemia LDL goal <70   • Severe malnutrition (HCC)   • Anemia   • Gastro-esophageal reflux disease without esophagitis   • Essential (primary) hypertension   • Paraplegia, unspecified (HCC)   • Type 2 diabetes mellitus, without long-term current use of insulin (HCC)   • Tobacco abuse   • Hyponatremia   • Loculated pleural effusion   • Sacral osteomyelitis (HCC)   • S/P diverting colostomy creation 2023 (HCC)   • Pressure injury of sacral region, stage 4 (HCC)   • Redundant foreskin   • Neurogenic bladder   • Hypoalbuminemia   • Hypocalcemia   • Hypokalemia   • Subacute osteomyelitis of multiple sites (HCC)     Past Medical History:   Diagnosis Date   • Arthritis    • Diabetes mellitus (HCC)    • Gangrene (HCC)     BILATERAL FEET   • GERD (gastroesophageal reflux disease)    • Hyperlipidemia    • Hypertension    • Osteomyelitis (HCC)    • PAD (peripheral artery disease) (HCC)    • Paraplegia (HCC)    • Peripheral vascular disease (HCC)    • Self-catheterizes urinary bladder      Past Surgical History:   Procedure Laterality Date   • ABOVE KNEE AMPUTATION Right 2022    Procedure: AMPUTATION ABOVE KNEE RIGHT;  Surgeon: Nemesio Haji MD;  Location:  [facility];  Service: Vascular;  Laterality: Right;   • ABOVE KNEE AMPUTATION Left 2023    Procedure: AMPUTATION ABOVE KNEE LEFT;  Surgeon: Nemesio Haji MD;  Location:  [facility];  Service: Vascular;  Laterality: Left;   •  EXPLORATORY LAPAROTOMY N/A 2/13/2023    Procedure: LAPAROTOMY EXPLORATORY, SIGMOID RESECTION, COLOSTOMY;  Surgeon: Neville Berman MD;  Location: [facility] OR;  Service: General;  Laterality: N/A;   • OTHER SURGICAL HISTORY Right     stent placement - right groin   • PILONIDAL CYSTECTOMY N/A 2/10/2023    Procedure: DEBRIDEMENT SACRAL DECUBITUS ULCER;  Surgeon: Neville Berman MD;  Location: [facility] OR;  Service: General;  Laterality: N/A;   • SHOULDER SURGERY Left     ROTATOR CUFF REPAIR      General Information     Row Name 03/20/23 1352          OT Time and Intention    Document Type therapy note (daily note)  -     Mode of Treatment occupational therapy  -AC     Row Name 03/20/23 1352          General Information    Patient Profile Reviewed yes  -AC     Existing Precautions/Restrictions fall   B AKA, colostomy, h/o paraplegia, sacral wounds  -AC     Row Name 03/20/23 1352          Cognition    Orientation Status (Cognition) oriented x 3  -AC     Row Name 03/20/23 1352          Safety Issues, Functional Mobility    Safety Issues Affecting Function (Mobility) friction/shear risk;awareness of need for assistance  -     Impairments Affecting Function (Mobility) balance;postural/trunk control;sensation/sensory awareness;strength  -           User Key  (r) = Recorded By, (t) = Taken By, (c) = Cosigned By    Initials Name Provider Type     Yin Arriaga, OT Occupational Therapist                 Mobility/ADL's     Row Name 03/20/23 3709          Bed Mobility    Bed Mobility rolling left;rolling right;scooting/bridging  -     Rolling Left [place] (Bed Mobility) verbal cues;minimum assist (75% patient effort)  -     Rolling Right [place] (Bed Mobility) verbal cues;contact guard  -     Supine-Sit [place] (Bed Mobility) --  with HOB elevated, pt required min A to bring himself forward to midline sitting up in bed  -     Sit-Supine [place] (Bed Mobility) verbal cues;contact  guard  from seated up in bed, pt lowered himself to R elbow , held onto bedrail and guided himself slowly to his back  -     Bed Mobility, Safety Issues other (see comments)  B AKA, sacral wounds, paraplegia  -     Assistive Device (Bed Mobility) bed rails  -     Comment, (Bed Mobility) once sitting up in bed  without back support, pt completed 10 seated pushups, but unable to clear bottom from bed,  able to slightly scoot R and L,  but unable to laterally scoot enought to L to safely attempt lateral transfer to w/c  -AC     Row Name 03/20/23 1450          Activities of Daily Living    BADL Assessment/Intervention grooming  -     Row Name 03/20/23 1450          Grooming Assessment/Training    [place] (Grooming) hair care, combing/brushing;oral care regimen;wash face, hands;set up  -     Position (Grooming) sitting up in bed  -           User Key  (r) = Recorded By, (t) = Taken By, (c) = Cosigned By    Initials Name Provider Type    Yin Alan, OT Occupational Therapist               Obj/Interventions     Row Name 03/20/23 1457          Shoulder (Therapeutic Exercise)    Shoulder (Therapeutic Exercise) strengthening exercise  -     Shoulder Strengthening (Therapeutic Exercise) bilateral;flexion;extension;external rotation;internal rotation;10 repetitions;blue  -     Row Name 03/20/23 1457          Elbow/Forearm (Therapeutic Exercise)    Elbow/Forearm (Therapeutic Exercise) strengthening exercise  -     Elbow/Forearm Strengthening (Therapeutic Exercise) bilateral;flexion;extension;10 repetitions  -     Row Name 03/20/23 1457          Motor Skills    Therapeutic Exercise shoulder;elbow/forearm  -           User Key  (r) = Recorded By, (t) = Taken By, (c) = Cosigned By    Initials Name Provider Type    Yin Alan, OT Occupational Therapist               Goals/Plan    No documentation.                Clinical Impression     Row Name 03/20/23 1451          Pain Assessment     Pretreatment Pain Rating 4/10  -     Posttreatment Pain Rating 4/10  -     Pain Location - neck;shoulder  -     Pain Intervention(s) Repositioned;Ambulation/increased activity  -     Row Name 03/20/23 1456          Plan of Care Review    Plan of Care Reviewed With patient  -     Progress improving  -     Outcome Evaluation Pt with goood tolerance to complete 10 reps B  UE strengtening TE, setup to complete grooming.  Pt CGA/min A to roll R/L and to pull self forward to sit upright in bed,completed 10 reps seated pushups, but unable to clear bottom from bed,  able to slightly scoot laterally given min A while seated upright in bed. Pt continues with goal to attempt lateral transfer to w/c, but unsafe to attempt at this time d/t pt's limtied mobility and decreased skin intergrity.  Pt is progressing, but continues to function below baseline d/t weakness.  Recommend SNF upon d/c.  -     Row Name 03/20/23 1456          Vital Signs    Pre Systolic BP Rehab 123  -AC     Pre Treatment Diastolic BP 69  -AC     Posttreatment Heart Rate (beats/min) 88  -AC     O2 Delivery Pre Treatment room air  -AC     O2 Delivery Intra Treatment room air  -AC     Post SpO2 (%) 97  -AC     O2 Delivery Post Treatment room air  -AC     Pre Patient Position Supine  -AC     Post Patient Position Supine  -AC     Row Name 03/20/23 1456          Positioning and Restraints    Pre-Treatment Position in bed  -AC     Post Treatment Position bed  -AC     In Bed notified nsg;fowlers;call light within reach;encouraged to call for assist;exit alarm on  -AC           User Key  (r) = Recorded By, (t) = Taken By, (c) = Cosigned By    Initials Name Provider Type    AC Yin Arriaga, OT Occupational Therapist               Outcome Measures     Row Name 03/20/23 3663          How much help from another is currently needed...    Putting on and taking off regular lower body clothing? 2  -AC     Bathing (including washing, rinsing, and drying) 2  -AC      Toileting (which includes using toilet bed pan or urinal) 1  -AC     Putting on and taking off regular upper body clothing 2  -AC     Taking care of personal grooming (such as brushing teeth) 3  -AC     Eating meals 4  -AC     AM-PAC 6 Clicks Score (OT) 14  -AC     Row Name 03/20/23 0815          How much help from another person do you currently need...    Turning from your back to your side while in flat bed without using bedrails? 3  -SM     Moving from lying on back to sitting on the side of a flat bed without bedrails? 3  -SM     Moving to and from a bed to a chair (including a wheelchair)? 1  -SM     Standing up from a chair using your arms (e.g., wheelchair, bedside chair)? 1  -SM     Climbing 3-5 steps with a railing? 1  -SM     To walk in hospital room? 1  -SM     AM-PAC 6 Clicks Score (PT) 10  -SM     Highest level of mobility 4 --> Transferred to chair/commode  -     Row Name 03/20/23 1509          Functional Assessment    Outcome Measure Options AM-PAC 6 Clicks Daily Activity (OT)  -           User Key  (r) = Recorded By, (t) = Taken By, (c) = Cosigned By    Initials Name Provider Type     Yin Arriaga, OT Occupational Therapist    Jeannie Molina, RN Registered Nurse                Occupational Therapy Education     Title: PT OT SLP Therapies (Done)     Topic: Occupational Therapy (Done)     Point: ADL training (Done)     Description:   Instruct learner(s) on proper safety adaptation and remediation techniques during self care or transfers.   Instruct in proper use of assistive devices.              Learning Progress Summary           Patient Acceptance, E, VU by  at 3/20/2023 1510    Acceptance, E, VU by  at 3/17/2023 1602    Acceptance, E, VU by  at 3/16/2023 1902    Acceptance, E, VU by  at 3/16/2023 1035    Acceptance, E, VU by  at 3/15/2023 1228    Acceptance, E, VU by  at 3/14/2023 1922    Acceptance, E, VU by  at 3/14/2023 1413                   Point: Home exercise  program (Done)     Description:   Instruct learner(s) on appropriate technique for monitoring, assisting and/or progressing therapeutic exercises/activities.              Learning Progress Summary           Patient Acceptance, E, VU by  at 3/17/2023 1602    Acceptance, E, VU by  at 3/16/2023 1902    Acceptance, E, VU by MG at 3/15/2023 1228    Acceptance, E, VU by  at 3/14/2023 1922    Acceptance, E, VU by MG at 3/14/2023 1413                   Point: Precautions (Done)     Description:   Instruct learner(s) on prescribed precautions during self-care and functional transfers.              Learning Progress Summary           Patient Acceptance, E, VU by  at 3/17/2023 1602    Acceptance, E, VU by  at 3/16/2023 1902    Acceptance, E, VU by MG at 3/15/2023 1228    Acceptance, E, VU by  at 3/14/2023 1922    Acceptance, E, VU by MG at 3/14/2023 1413                   Point: Body mechanics (Done)     Description:   Instruct learner(s) on proper positioning and spine alignment during self-care, functional mobility activities and/or exercises.              Learning Progress Summary           Patient Acceptance, E, VU by  at 3/17/2023 1602    Acceptance, E, VU by  at 3/16/2023 1902    Acceptance, E, VU by  at 3/15/2023 1228    Acceptance, E, VU by  at 3/14/2023 1922    Acceptance, E, VU by MG at 3/14/2023 1413                               User Key     Initials Effective Dates Name Provider Type Discipline     02/03/23 -  Yin Arriaga, OT Occupational Therapist OT     11/30/22 -  Madhu Pierre, RN Registered Nurse Nurse     01/23/23 -  Tanvi Escamilla, RN Registered Nurse Nurse              OT Recommendation and Plan  Planned Therapy Interventions (OT): activity tolerance training, BADL retraining, functional balance retraining, occupation/activity based interventions, patient/caregiver education/training, transfer/mobility retraining  Therapy Frequency (OT): 3 times/wk  Plan of Care  Review  Plan of Care Reviewed With: patient  Progress: improving  Outcome Evaluation: Pt with goood tolerance to complete 10 reps B  UE strengtening TE, setup to complete grooming.  Pt CGA/min A to roll R/L and to pull self forward to sit upright in bed,completed 10 reps seated pushups, but unable to clear bottom from bed,  able to slightly scoot laterally given min A while seated upright in bed. Pt continues with goal to attempt lateral transfer to w/c, but unsafe to attempt at this time d/t pt's limtied mobility and decreased skin intergrity.  Pt is progressing, but continues to function below baseline d/t weakness.  Recommend SNF upon d/c.     Time Calculation:    Time Calculation- OT     Row Name 03/20/23 1352             Time Calculation- OT    OT Start Time 1352  -AC      OT Received On 03/20/23  -AC      OT Goal Re-Cert Due Date 03/26/23  -AC         Timed Charges    50507 - OT Therapeutic Exercise Minutes 5  -AC      64871 - OT Therapeutic Activity Minutes 8  -AC      27406 - OT Self Care/Mgmt Minutes 5  -AC         Total Minutes    Timed Charges Total Minutes 18  -AC       Total Minutes 18  -AC            User Key  (r) = Recorded By, (t) = Taken By, (c) = Cosigned By    Initials Name Provider Type    AC Yin Arriaga, OT Occupational Therapist              Therapy Charges for Today     Code Description Service Date Service Provider Modifiers Qty    [id number]  OT THERAPEUTIC ACT EA 15 MIN 3/20/2023 Yin Arriaga OT GO 1               Yin Arriaga OT  3/20/2023
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
## 2023-03-20 NOTE — THERAPY TREATMENT NOTE
Patient Name: Severino Vera  : 1955    MRN: [id number]                              Today's Date: 3/20/2023       Admit Date: 3/13/2023    Visit Dx:     ICD-10-CM ICD-9-CM   1. Subacute osteomyelitis of multiple sites (HCC)  M86.29 730.09   2. 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)   • Gangrene of right foot s/p AKA 2022   • Osteomyelitis (HCC)   • Hyperlipidemia LDL goal <70   • Severe malnutrition (HCC)   • Anemia   • Gastro-esophageal reflux disease without esophagitis   • Essential (primary) hypertension   • Paraplegia, unspecified (HCC)   • Type 2 diabetes mellitus, without long-term current use of insulin (HCC)   • Tobacco abuse   • Hyponatremia   • Loculated pleural effusion   • Sacral osteomyelitis (HCC)   • S/P diverting colostomy creation 2023 (HCC)   • Pressure injury of sacral region, stage 4 (HCC)   • Redundant foreskin   • Neurogenic bladder   • Hypoalbuminemia   • Hypocalcemia   • Hypokalemia   • Subacute osteomyelitis of multiple sites (HCC)     Past Medical History:   Diagnosis Date   • Arthritis    • Diabetes mellitus (HCC)    • Gangrene (HCC)     BILATERAL FEET   • GERD (gastroesophageal reflux disease)    • Hyperlipidemia    • Hypertension    • Osteomyelitis (HCC)    • PAD (peripheral artery disease) (HCC)    • Paraplegia (HCC)    • Peripheral vascular disease (HCC)    • Self-catheterizes urinary bladder      Past Surgical History:   Procedure Laterality Date   • ABOVE KNEE AMPUTATION Right 2022    Procedure: AMPUTATION ABOVE KNEE RIGHT;  Surgeon: Nemesio Haji MD;  Location:  [facility];  Service: Vascular;  Laterality: Right;   • ABOVE KNEE AMPUTATION Left 2023    Procedure: AMPUTATION ABOVE KNEE LEFT;  Surgeon: Nemesio Haji MD;  Location:  [facility];  Service: Vascular;  Laterality: Left;   •  EXPLORATORY LAPAROTOMY N/A 2/13/2023    Procedure: LAPAROTOMY EXPLORATORY, SIGMOID RESECTION, COLOSTOMY;  Surgeon: Neville Berman MD;  Location: [facility] OR;  Service: General;  Laterality: N/A;   • OTHER SURGICAL HISTORY Right     stent placement - right groin   • PILONIDAL CYSTECTOMY N/A 2/10/2023    Procedure: DEBRIDEMENT SACRAL DECUBITUS ULCER;  Surgeon: Neville Berman MD;  Location: [facility] OR;  Service: General;  Laterality: N/A;   • SHOULDER SURGERY Left     ROTATOR CUFF REPAIR      General Information     Row Name 03/20/23 1524          Physical Therapy Time and Intention    Document Type therapy note (daily note)  -     Mode of Treatment physical therapy  -     Row Name 03/20/23 1524          General Information    Patient Profile Reviewed yes  -     Existing Precautions/Restrictions other (see comments);fall  B AKA, colostomy, h/o paraplegia, sacral wounds  -     Barriers to Rehab medically complex;previous functional deficit;impaired sensation  -     Row Name 03/20/23 1524          Cognition    Orientation Status (Cognition) oriented x 3  -     Row Name 03/20/23 1524          Safety Issues, Functional Mobility    Safety Issues Affecting Function (Mobility) friction/shear risk;awareness of need for assistance  -     Impairments Affecting Function (Mobility) balance;postural/trunk control;sensation/sensory awareness;strength  -           User Key  (r) = Recorded By, (t) = Taken By, (c) = Cosigned By    Initials Name Provider Type     Chrissie Stanley PT Physical Therapist               Mobility     Row Name 03/20/23 1524          Bed Mobility    Bed Mobility rolling left;rolling right;scooting/bridging  -     Rolling Left [place] (Bed Mobility) verbal cues;minimum assist (75% patient effort)  -     Rolling Right [place] (Bed Mobility) verbal cues;contact guard  -     Supine-Sit [place] (Bed Mobility) minimum assist (75% patient effort);other (see  comments)  pt with head of bed elevated prior, pt sitting up in bed with BUE support on bed for balance  -     Sit-Supine [place] (Bed Mobility) verbal cues;contact guard;other (see comments)  pt slowly lowered self onto R side then returned to supine with utilization of bed rail  -     Assistive Device (Bed Mobility) bed rails  -     Comment, (Bed Mobility) bed to chair wheelchair transfer set up, pt attempted to utilize BUE on bed to lift self up with seated pushups and scoot hips laterally to scoot to EOB. Pt able to slightly scoot laterally to R and L with Min A at gait belt as pt was unable to clear hips fully. w/c transfer deferred d/t difficulty clearing hips and concern for increased friction to sacral wounds.  -     Row Name 03/20/23 1524          Gait/Stairs (Locomotion)    Comment, (Gait/Stairs) further mobility not appropriate  -           User Key  (r) = Recorded By, (t) = Taken By, (c) = Cosigned By    Initials Name Provider Type     Chrissie Stanley PT Physical Therapist               Obj/Interventions     Row Name 03/20/23 1539          Balance    Balance Assessment sitting static balance;sitting dynamic balance  -     Static Sitting Balance contact guard;other (see comments)  with BUE on bed for support  -     Dynamic Sitting Balance minimal assist;contact guard  -     Position, Sitting Balance --  with pt's BUE on bed for support sitting up in bed  -     Balance Interventions standing;weight shifting activity;dynamic  -     Comment, Balance Pt requiring Min A for lateral movements in bed, CGA with weight shifting forward/backward sitting in bed  -           User Key  (r) = Recorded By, (t) = Taken By, (c) = Cosigned By    Initials Name Provider Type     Chrissie Stanley PT Physical Therapist               Goals/Plan    No documentation.                Clinical Impression     Row Name 03/20/23 1541          Pain    Pretreatment Pain Rating 5/10  -     Posttreatment  Pain Rating 5/10  -     Pain Location - Side/Orientation Bilateral  -     Pain Location posterior  -     Pain Location - neck;shoulder  -     Pain Intervention(s) Ambulation/increased activity;Repositioned  -     Row Name 03/20/23 1541          Plan of Care Review    Plan of Care Reviewed With patient  -     Progress improving  -     Outcome Evaluation Pt improving with mobility as pt was able to initiate lateral scooting sitting upright in bed towards EOB with Min A x1, w/c transfer deferred at this time d/t concern for increased friction on skin with inability to fully clear hips. Pt continued to demonstrate decreased independence with mobility and strength deficits warranting continued IPPT POC. d/c rec SNF.  -     Row Name 03/20/23 1541          Therapy Assessment/Plan (PT)    Rehab Potential (PT) fair, will monitor progress closely  -     Criteria for Skilled Interventions Met (PT) yes;meets criteria;skilled treatment is necessary  -     Therapy Frequency (PT) 3 times/wk  -     Row Name 03/20/23 1541          Vital Signs    Pre Systolic BP Rehab 123  -HM     Pre Treatment Diastolic BP 69  -HM     O2 Delivery Pre Treatment room air  -HM     O2 Delivery Intra Treatment room air  -HM     O2 Delivery Post Treatment room air  -HM     Pre Patient Position Supine  -HM     Intra Patient Position Sitting  -     Post Patient Position Supine  -HM     Row Name 03/20/23 1541          Positioning and Restraints    Pre-Treatment Position in bed  -     Post Treatment Position bed  -HM     In Bed notified nsg;encouraged to call for assist;call light within reach;fowlers;exit alarm on  -           User Key  (r) = Recorded By, (t) = Taken By, (c) = Cosigned By    Initials Name Provider Type     Chrissie Stanley, PT Physical Therapist               Outcome Measures     Row Name 03/20/23 1546 03/20/23 0815       How much help from another person do you currently need...    Turning from your back to  your side while in flat bed without using bedrails? 3  - 3  -SM    Moving from lying on back to sitting on the side of a flat bed without bedrails? 3  - 3  -SM    Moving to and from a bed to a chair (including a wheelchair)? 1  - 1  -SM    Standing up from a chair using your arms (e.g., wheelchair, bedside chair)? 1  - 1  -SM    Climbing 3-5 steps with a railing? 1  - 1  -SM    To walk in hospital room? 1  - 1  -SM    AM-PAC 6 Clicks Score (PT) 10  - 10  -SM    Highest level of mobility 4 --> Transferred to chair/commode  - 4 --> Transferred to chair/commode  -    Row Name 03/20/23 1546 03/20/23 1509       Functional Assessment    Outcome Measure Options AM-PAC 6 Clicks Basic Mobility (PT)  - AM-PAC 6 Clicks Daily Activity (OT)  -          User Key  (r) = Recorded By, (t) = Taken By, (c) = Cosigned By    Initials Name Provider Type    AC Yin Arriaga, OT Occupational Therapist     Jeannie Song, RN Registered Nurse     Chrissie Stanley, PT Physical Therapist                             Physical Therapy Education     Title: PT OT SLP Therapies (Done)     Topic: Physical Therapy (Done)     Point: Mobility training (Done)     Learning Progress Summary           Patient Acceptance, TB,E, VU,NR by  at 3/20/2023 1547    Acceptance, E, VU by  at 3/17/2023 1602    Acceptance, E, VU by  at 3/16/2023 1902    Acceptance, E,TB, VU,NR by  at 3/16/2023 1102    Acceptance, E, VU by  at 3/15/2023 1228    Acceptance, E, VU by  at 3/14/2023 1922    Acceptance, E, VU by  at 3/14/2023 1413                   Point: Home exercise program (Done)     Learning Progress Summary           Patient Acceptance, E, VU by  at 3/17/2023 1602    Acceptance, E, VU by  at 3/16/2023 1902    Acceptance, E, VU by  at 3/15/2023 1228    Acceptance, E, VU by  at 3/14/2023 1922    Acceptance, E, VU by  at 3/14/2023 1413                   Point: Body mechanics (Done)     Learning Progress Summary            Patient Acceptance, TB,E, VU,NR by  at 3/20/2023 1547    Acceptance, E, VU by  at 3/17/2023 1602    Acceptance, E, VU by  at 3/16/2023 1902    Acceptance, E, VU by  at 3/15/2023 1228    Acceptance, E, VU by  at 3/14/2023 1922    Acceptance, E, VU by  at 3/14/2023 1413                   Point: Precautions (Done)     Learning Progress Summary           Patient Acceptance, E, VU by  at 3/17/2023 1602    Acceptance, E, VU by  at 3/16/2023 1902    Acceptance, E, VU by  at 3/15/2023 1228    Acceptance, E, VU by  at 3/14/2023 1922    Acceptance, E, VU by  at 3/14/2023 1413                               User Key     Initials Effective Dates Name Provider Type Discipline     11/30/22 -  Madhu Pierre RN Registered Nurse Nurse     01/23/23 -  Tanvi Escamilla RN Registered Nurse Nurse     09/22/22 -  Chrissie Stanley, PT Physical Therapist PT              PT Recommendation and Plan  Planned Therapy Interventions (PT): balance training, bed mobility training, transfer training, wheelchair management/propulsion training, home exercise program, patient/family education, neuromuscular re-education, postural re-education, ROM (range of motion), strengthening  Plan of Care Reviewed With: patient  Progress: improving  Outcome Evaluation: Pt improving with mobility as pt was able to initiate lateral scooting sitting upright in bed towards EOB with Min A x1, w/c transfer deferred at this time d/t concern for increased friction on skin with inability to fully clear hips. Pt continued to demonstrate decreased independence with mobility and strength deficits warranting continued IPPT POC. d/c rec SNF.     Time Calculation:    PT Charges     Row Name 03/20/23 1547             Time Calculation    Start Time 1355  -HM      PT Received On 03/20/23  -         Timed Charges    16357 - PT Therapeutic Activity Minutes 15  -HM         Total Minutes    Timed Charges Total Minutes 15  -HM       Total  Minutes 15  -HM            User Key  (r) = Recorded By, (t) = Taken By, (c) = Cosigned By    Initials Name Provider Type     Chrissie Stanley, SUSHIL Physical Therapist              Therapy Charges for Today     Code Description Service Date Service Provider Modifiers Qty    [id number]  PT THERAPEUTIC ACT EA 15 MIN 3/20/2023 Chrissie Stanley, SUSHIL GP 1          PT G-Codes  Outcome Measure Options: AM-PAC 6 Clicks Basic Mobility (PT)  AM-PAC 6 Clicks Score (PT): 10  AM-PAC 6 Clicks Score (OT): 14  PT Discharge Summary  Anticipated Discharge Disposition (PT): skilled nursing facility    Chrissie Stanley PT  3/20/2023

## 2023-03-20 NOTE — PLAN OF CARE
Goal Outcome Evaluation:  Plan of Care Reviewed With: patient        Progress: improving  Outcome Evaluation: Pt with goood tolerance to complete 10 reps B  UE strengtening TE, setup to complete grooming.  Pt CGA/min A to roll R/L and to pull self forward to sit upright in bed,completed 10 reps seated pushups, but unable to clear bottom from bed,  able to slightly scoot laterally given min A while seated upright in bed. Pt continues with goal to attempt lateral transfer to w/c, but unsafe to attempt at this time d/t pt's limtied mobility and decreased skin intergrity.  Pt is progressing, but continues to function below baseline d/t weakness.  Recommend SNF upon d/c.

## 2023-03-20 NOTE — PLAN OF CARE
Goal Outcome Evaluation:  Plan of Care Reviewed With: patient        Progress: improving  Outcome Evaluation: Pt improving with mobility as pt was able to initiate lateral scooting sitting upright in bed towards EOB with Min A x1, w/c transfer deferred at this time d/t concern for increased friction on skin with inability to fully clear hips. Pt continued to demonstrate decreased independence with mobility and strength deficits warranting continued IPPT POC. d/c rec SNF.

## 2023-03-20 NOTE — PLAN OF CARE
Problem: Adult Inpatient Plan of Care  Goal: Absence of Hospital-Acquired Illness or Injury  Outcome: Ongoing, Progressing  Intervention: Identify and Manage Fall Risk  Recent Flowsheet Documentation  Taken 3/20/2023 0431 by Jeanna Flores RN  Safety Promotion/Fall Prevention:   activity supervised   assistive device/personal items within reach   clutter free environment maintained   room organization consistent   safety round/check completed  Taken 3/20/2023 0204 by Jeanna Flores RN  Safety Promotion/Fall Prevention:   activity supervised   assistive device/personal items within reach   clutter free environment maintained   room organization consistent   safety round/check completed  Taken 3/19/2023 2139 by Jeanna Flores RN  Safety Promotion/Fall Prevention:   clutter free environment maintained   activity supervised   assistive device/personal items within reach   safety round/check completed   room organization consistent  Intervention: Prevent Skin Injury  Recent Flowsheet Documentation  Taken 3/20/2023 0431 by Jeanna Flores RN  Body Position:   right   turned   side-lying   position changed independently  Taken 3/20/2023 0204 by Jeanna Flores RN  Body Position:   left   turned   position changed independently   side-lying  Skin Protection:   adhesive use limited   incontinence pads utilized   tubing/devices free from skin contact  Taken 3/20/2023 0020 by Jeanna Flores RN  Skin Protection:   adhesive use limited   incontinence pads utilized   skin sealant/moisture barrier applied   tubing/devices free from skin contact  Taken 3/19/2023 2139 by Jeanna Flores RN  Body Position:   position changed independently   right   turned   side-lying  Skin Protection:   incontinence pads utilized   adhesive use limited   tubing/devices free from skin contact  Intervention: Prevent and Manage VTE (Venous Thromboembolism) Risk  Recent Flowsheet Documentation  Taken 3/20/2023 0431 by  Jeanna Flores RN  Activity Management: activity adjusted per tolerance  Taken 3/19/2023 2139 by Jeanna Flores RN  Activity Management: activity adjusted per tolerance  Intervention: Prevent Infection  Recent Flowsheet Documentation  Taken 3/20/2023 0204 by Jeanna Flores RN  Infection Prevention:   environmental surveillance performed   hand hygiene promoted  Taken 3/19/2023 2139 by Jeanna Flores RN  Infection Prevention:   environmental surveillance performed   hand hygiene promoted   rest/sleep promoted     Problem: Skin Injury Risk Increased  Goal: Skin Health and Integrity  Outcome: Ongoing, Progressing  Intervention: Optimize Skin Protection  Recent Flowsheet Documentation  Taken 3/20/2023 0431 by Jeanna Flores RN  Head of Bed (HOB) Positioning: \[facility]\"" elevated  Pressure Reduction Devices:   specialty bed utilized   pressure-redistributing mattress utilized  Taken 3/20/2023 0204 by Jeanna Flores RN  Pressure Reduction Techniques:   frequent weight shift encouraged   weight shift assistance provided  Head of Bed (HOB) Positioning: \[facility]\"" elevated  Pressure Reduction Devices:   pressure-redistributing mattress utilized   specialty bed utilized  Skin Protection:   adhesive use limited   incontinence pads utilized   tubing/devices free from skin contact  Taken 3/20/2023 0020 by Jeanna Flores RN  Pressure Reduction Techniques:   frequent weight shift encouraged   weight shift assistance provided  Pressure Reduction Devices:   specialty bed utilized   pressure-redistributing mattress utilized  Skin Protection:   adhesive use limited   incontinence pads utilized   skin sealant/moisture barrier applied   tubing/devices free from skin contact  Taken 3/19/2023 2139 by Jeanna Flores RN  Pressure Reduction Techniques:   frequent weight shift encouraged   weight shift assistance provided  Head of Bed (\[facility]\"") Positioning: \[facility]\"" elevated  Pressure Reduction Devices: specialty bed  utilized  Skin Protection:   incontinence pads utilized   adhesive use limited   tubing/devices free from skin contact     Problem: Fall Injury Risk  Goal: Absence of Fall and Fall-Related Injury  Outcome: Ongoing, Progressing  Intervention: Identify and Manage Contributors  Recent Flowsheet Documentation  Taken 3/19/2023 2139 by Jeanna Flores RN  Medication Review/Management: medications reviewed  Intervention: Promote Injury-Free Environment  Recent Flowsheet Documentation  Taken 3/20/2023 0431 by Jeanna Flores RN  Safety Promotion/Fall Prevention:   activity supervised   assistive device/personal items within reach   clutter free environment maintained   room organization consistent   safety round/check completed  Taken 3/20/2023 0204 by Jeanna Flores RN  Safety Promotion/Fall Prevention:   activity supervised   assistive device/personal items within reach   clutter free environment maintained   room organization consistent   safety round/check completed  Taken 3/19/2023 2139 by Jeanna Flores RN  Safety Promotion/Fall Prevention:   clutter free environment maintained   activity supervised   assistive device/personal items within reach   safety round/check completed   room organization consistent   Goal Outcome Evaluation:

## 2023-03-20 NOTE — CASE MANAGEMENT/SOCIAL WORK
Continued Stay Note  [facility]     Patient Name: Severino Vera  MRN: [id number]  Today's Date: 3/20/2023    Admit Date: 3/13/2023    Plan: Ongoing   Discharge Plan     Row Name 03/20/23 1007       Plan    Plan Comments Spoke with Daniella with [facility] in [place] and they can offer a dual certified bed. She said she needs updated physical and occupational therapy notes to begin a new precert.               Discharge Codes    No documentation.               Expected Discharge Date and Time     Expected Discharge Date Expected Discharge Time    Mar 20, 2023             JENIFER Lambert

## 2023-03-21 LAB
CK SERPL-CCNC: 23 U/L (ref 20–200)
GLUCOSE BLDC GLUCOMTR-MCNC: 135 MG/DL (ref 70–130)
GLUCOSE BLDC GLUCOMTR-MCNC: 155 MG/DL (ref 70–130)
GLUCOSE BLDC GLUCOMTR-MCNC: 216 MG/DL (ref 70–130)
GLUCOSE BLDC GLUCOMTR-MCNC: 219 MG/DL (ref 70–130)

## 2023-03-21 PROCEDURE — 96372 THER/PROPH/DIAG INJ SC/IM: CPT

## 2023-03-21 PROCEDURE — 25010000002 HEPARIN (PORCINE) PER 1000 UNITS: Performed by: INTERNAL MEDICINE

## 2023-03-21 PROCEDURE — 99231 SBSQ HOSP IP/OBS SF/LOW 25: CPT | Performed by: PHYSICIAN ASSISTANT

## 2023-03-21 PROCEDURE — 25010000002 MEROPENEM PER 100 MG: Performed by: INTERNAL MEDICINE

## 2023-03-21 PROCEDURE — 25010000002 DAPTOMYCIN PER 1 MG

## 2023-03-21 PROCEDURE — 82550 ASSAY OF CK (CPK): CPT

## 2023-03-21 PROCEDURE — 82962 GLUCOSE BLOOD TEST: CPT

## 2023-03-21 PROCEDURE — G0378 HOSPITAL OBSERVATION PER HR: HCPCS

## 2023-03-21 PROCEDURE — 96366 THER/PROPH/DIAG IV INF ADDON: CPT

## 2023-03-21 PROCEDURE — 63710000001 INSULIN LISPRO (HUMAN) PER 5 UNITS: Performed by: PHYSICIAN ASSISTANT

## 2023-03-21 RX ORDER — METOPROLOL SUCCINATE 25 MG/1
TABLET, EXTENDED RELEASE ORAL
Qty: 30 TABLET | Refills: 3 | OUTPATIENT
Start: 2023-03-21

## 2023-03-21 RX ADMIN — Medication 10 ML: at 22:27

## 2023-03-21 RX ADMIN — FERROUS SULFATE TAB 325 MG (65 MG ELEMENTAL FE) 325 MG: 325 (65 FE) TAB at 08:35

## 2023-03-21 RX ADMIN — GABAPENTIN 800 MG: 400 CAPSULE ORAL at 22:29

## 2023-03-21 RX ADMIN — HEPARIN SODIUM 5000 UNITS: 5000 INJECTION INTRAVENOUS; SUBCUTANEOUS at 08:35

## 2023-03-21 RX ADMIN — MEROPENEM 1 G: 1 INJECTION, POWDER, FOR SOLUTION INTRAVENOUS at 11:04

## 2023-03-21 RX ADMIN — WHITE PETROLATUM 1 APPLICATION: 1.75 OINTMENT TOPICAL at 22:27

## 2023-03-21 RX ADMIN — Medication 250 MG: at 08:36

## 2023-03-21 RX ADMIN — HEPARIN SODIUM 5000 UNITS: 5000 INJECTION INTRAVENOUS; SUBCUTANEOUS at 22:27

## 2023-03-21 RX ADMIN — WHITE PETROLATUM 1 APPLICATION: 1.75 OINTMENT TOPICAL at 08:35

## 2023-03-21 RX ADMIN — DAPTOMYCIN 500 MG: 500 INJECTION, POWDER, LYOPHILIZED, FOR SOLUTION INTRAVENOUS at 12:22

## 2023-03-21 RX ADMIN — GABAPENTIN 800 MG: 400 CAPSULE ORAL at 12:22

## 2023-03-21 RX ADMIN — GABAPENTIN 800 MG: 400 CAPSULE ORAL at 17:28

## 2023-03-21 RX ADMIN — ASPIRIN 81 MG: 81 TABLET, COATED ORAL at 08:36

## 2023-03-21 RX ADMIN — MICONAZOLE NITRATE: 20 POWDER TOPICAL at 22:27

## 2023-03-21 RX ADMIN — CYANOCOBALAMIN TAB 1000 MCG 1000 MCG: 1000 TAB at 08:36

## 2023-03-21 RX ADMIN — OXYCODONE HYDROCHLORIDE AND ACETAMINOPHEN 1 TABLET: 10; 325 TABLET ORAL at 08:36

## 2023-03-21 RX ADMIN — MEROPENEM 1 G: 1 INJECTION, POWDER, FOR SOLUTION INTRAVENOUS at 03:27

## 2023-03-21 RX ADMIN — INSULIN LISPRO 4 UNITS: 100 INJECTION, SOLUTION INTRAVENOUS; SUBCUTANEOUS at 12:22

## 2023-03-21 RX ADMIN — SODIUM CHLORIDE 1 G: 1 TABLET ORAL at 08:41

## 2023-03-21 RX ADMIN — OXYCODONE HYDROCHLORIDE AND ACETAMINOPHEN 500 MG: 500 TABLET ORAL at 09:00

## 2023-03-21 RX ADMIN — INSULIN LISPRO 2 UNITS: 100 INJECTION, SOLUTION INTRAVENOUS; SUBCUTANEOUS at 17:31

## 2023-03-21 RX ADMIN — HYDROXYZINE HYDROCHLORIDE 25 MG: 25 TABLET, FILM COATED ORAL at 22:33

## 2023-03-21 RX ADMIN — INSULIN LISPRO 3 UNITS: 100 INJECTION, SOLUTION INTRAVENOUS; SUBCUTANEOUS at 08:34

## 2023-03-21 RX ADMIN — LISINOPRIL 40 MG: 40 TABLET ORAL at 08:36

## 2023-03-21 RX ADMIN — THIAMINE HCL TAB 100 MG 100 MG: 100 TAB at 08:36

## 2023-03-21 RX ADMIN — CLOPIDOGREL BISULFATE 75 MG: 75 TABLET ORAL at 08:35

## 2023-03-21 RX ADMIN — POLYETHYLENE GLYCOL 3350 17 G: 17 POWDER, FOR SOLUTION ORAL at 08:34

## 2023-03-21 RX ADMIN — WHITE PETROLATUM 1 APPLICATION: 1.75 OINTMENT TOPICAL at 00:25

## 2023-03-21 RX ADMIN — MICONAZOLE NITRATE: 20 POWDER TOPICAL at 08:35

## 2023-03-21 RX ADMIN — Medication 1 CAPSULE: at 08:35

## 2023-03-21 RX ADMIN — INSULIN LISPRO 4 UNITS: 100 INJECTION, SOLUTION INTRAVENOUS; SUBCUTANEOUS at 17:31

## 2023-03-21 RX ADMIN — MULTIPLE VITAMINS W/ MINERALS TAB 1 TABLET: TAB at 08:35

## 2023-03-21 RX ADMIN — Medication 10 ML: at 08:40

## 2023-03-21 RX ADMIN — INSULIN LISPRO 4 UNITS: 100 INJECTION, SOLUTION INTRAVENOUS; SUBCUTANEOUS at 08:34

## 2023-03-21 RX ADMIN — MEROPENEM 1 G: 1 INJECTION, POWDER, FOR SOLUTION INTRAVENOUS at 18:18

## 2023-03-21 RX ADMIN — METOPROLOL SUCCINATE 25 MG: 25 TABLET, EXTENDED RELEASE ORAL at 08:35

## 2023-03-21 RX ADMIN — ROSUVASTATIN 20 MG: 20 TABLET, FILM COATED ORAL at 22:26

## 2023-03-21 RX ADMIN — GABAPENTIN 800 MG: 400 CAPSULE ORAL at 08:35

## 2023-03-21 RX ADMIN — DULOXETINE 60 MG: 60 CAPSULE, DELAYED RELEASE ORAL at 08:35

## 2023-03-21 NOTE — PLAN OF CARE
Goal Outcome Evaluation:  Pt admitted with sacral osteomyelitis, antibiotics being administered as ordered.     No c/o pain this shift.     Sinus rhythm per telemetry.
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
## 2023-03-21 NOTE — CASE MANAGEMENT/SOCIAL WORK
Continued Stay Note  [facility]     Patient Name: Severino Vera  MRN: [id number]  Today's Date: 3/21/2023    Admit Date: 3/13/2023    Plan: Ongoing   Discharge Plan     Row Name 03/21/23 0955       Plan    Plan Comments Case management spoke with the [employer] representative and let her know that physical and occupational therapy notes are in Epic for Mr. Vera for him to have pre cert for [facility] in [place]. He is agreeable to this discharge plan.               Discharge Codes    No documentation.               Expected Discharge Date and Time     Expected Discharge Date Expected Discharge Time    Mar 24, 2023             JENIFER Lambert

## 2023-03-22 LAB
GLUCOSE BLDC GLUCOMTR-MCNC: 158 MG/DL (ref 70–130)
GLUCOSE BLDC GLUCOMTR-MCNC: 175 MG/DL (ref 70–130)
GLUCOSE BLDC GLUCOMTR-MCNC: 181 MG/DL (ref 70–130)

## 2023-03-22 PROCEDURE — 96376 TX/PRO/DX INJ SAME DRUG ADON: CPT

## 2023-03-22 PROCEDURE — 63710000001 INSULIN LISPRO (HUMAN) PER 5 UNITS: Performed by: PHYSICIAN ASSISTANT

## 2023-03-22 PROCEDURE — 25010000002 DIPHENHYDRAMINE PER 50 MG: Performed by: PHYSICIAN ASSISTANT

## 2023-03-22 PROCEDURE — 25010000002 MEROPENEM PER 100 MG: Performed by: INTERNAL MEDICINE

## 2023-03-22 PROCEDURE — G0378 HOSPITAL OBSERVATION PER HR: HCPCS

## 2023-03-22 PROCEDURE — 96366 THER/PROPH/DIAG IV INF ADDON: CPT

## 2023-03-22 PROCEDURE — 96372 THER/PROPH/DIAG INJ SC/IM: CPT

## 2023-03-22 PROCEDURE — 99231 SBSQ HOSP IP/OBS SF/LOW 25: CPT | Performed by: PHYSICIAN ASSISTANT

## 2023-03-22 PROCEDURE — 25010000002 DAPTOMYCIN PER 1 MG

## 2023-03-22 PROCEDURE — 25010000002 HEPARIN (PORCINE) PER 1000 UNITS: Performed by: INTERNAL MEDICINE

## 2023-03-22 PROCEDURE — 25010000002 HYDROMORPHONE PER 4 MG: Performed by: HOSPITALIST

## 2023-03-22 PROCEDURE — 82962 GLUCOSE BLOOD TEST: CPT

## 2023-03-22 RX ORDER — HYDROMORPHONE HYDROCHLORIDE 1 MG/ML
0.5 INJECTION, SOLUTION INTRAMUSCULAR; INTRAVENOUS; SUBCUTANEOUS ONCE
Status: COMPLETED | OUTPATIENT
Start: 2023-03-22 | End: 2023-03-22

## 2023-03-22 RX ADMIN — DULOXETINE 60 MG: 60 CAPSULE, DELAYED RELEASE ORAL at 09:14

## 2023-03-22 RX ADMIN — INSULIN LISPRO 4 UNITS: 100 INJECTION, SOLUTION INTRAVENOUS; SUBCUTANEOUS at 12:48

## 2023-03-22 RX ADMIN — CLOPIDOGREL BISULFATE 75 MG: 75 TABLET ORAL at 09:15

## 2023-03-22 RX ADMIN — Medication 10 ML: at 21:30

## 2023-03-22 RX ADMIN — HEPARIN SODIUM 5000 UNITS: 5000 INJECTION INTRAVENOUS; SUBCUTANEOUS at 21:19

## 2023-03-22 RX ADMIN — MEROPENEM 1 G: 1 INJECTION, POWDER, FOR SOLUTION INTRAVENOUS at 04:05

## 2023-03-22 RX ADMIN — LISINOPRIL 40 MG: 40 TABLET ORAL at 09:15

## 2023-03-22 RX ADMIN — THIAMINE HCL TAB 100 MG 100 MG: 100 TAB at 09:14

## 2023-03-22 RX ADMIN — INSULIN LISPRO 2 UNITS: 100 INJECTION, SOLUTION INTRAVENOUS; SUBCUTANEOUS at 09:25

## 2023-03-22 RX ADMIN — SENNOSIDES 2 TABLET: 8.6 TABLET, FILM COATED ORAL at 09:15

## 2023-03-22 RX ADMIN — ASPIRIN 81 MG: 81 TABLET, COATED ORAL at 09:14

## 2023-03-22 RX ADMIN — GABAPENTIN 800 MG: 400 CAPSULE ORAL at 21:19

## 2023-03-22 RX ADMIN — Medication 250 MG: at 09:15

## 2023-03-22 RX ADMIN — MEROPENEM 1 G: 1 INJECTION, POWDER, FOR SOLUTION INTRAVENOUS at 12:42

## 2023-03-22 RX ADMIN — MULTIPLE VITAMINS W/ MINERALS TAB 1 TABLET: TAB at 09:14

## 2023-03-22 RX ADMIN — WHITE PETROLATUM 1 APPLICATION: 1.75 OINTMENT TOPICAL at 21:31

## 2023-03-22 RX ADMIN — DAPTOMYCIN 500 MG: 500 INJECTION, POWDER, LYOPHILIZED, FOR SOLUTION INTRAVENOUS at 10:45

## 2023-03-22 RX ADMIN — MICONAZOLE NITRATE: 20 POWDER TOPICAL at 09:23

## 2023-03-22 RX ADMIN — Medication 10 ML: at 10:48

## 2023-03-22 RX ADMIN — Medication 5 MG: at 21:20

## 2023-03-22 RX ADMIN — HEPARIN SODIUM 5000 UNITS: 5000 INJECTION INTRAVENOUS; SUBCUTANEOUS at 09:14

## 2023-03-22 RX ADMIN — SODIUM CHLORIDE 1 G: 1 TABLET ORAL at 09:15

## 2023-03-22 RX ADMIN — GABAPENTIN 800 MG: 400 CAPSULE ORAL at 09:14

## 2023-03-22 RX ADMIN — DIPHENHYDRAMINE HYDROCHLORIDE 12.5 MG: 50 INJECTION, SOLUTION INTRAMUSCULAR; INTRAVENOUS at 01:52

## 2023-03-22 RX ADMIN — Medication 1 CAPSULE: at 09:14

## 2023-03-22 RX ADMIN — ROSUVASTATIN 20 MG: 20 TABLET, FILM COATED ORAL at 21:20

## 2023-03-22 RX ADMIN — GABAPENTIN 800 MG: 400 CAPSULE ORAL at 12:49

## 2023-03-22 RX ADMIN — INSULIN LISPRO 4 UNITS: 100 INJECTION, SOLUTION INTRAVENOUS; SUBCUTANEOUS at 17:28

## 2023-03-22 RX ADMIN — HYDROXYZINE HYDROCHLORIDE 25 MG: 25 TABLET, FILM COATED ORAL at 21:19

## 2023-03-22 RX ADMIN — METOPROLOL SUCCINATE 25 MG: 25 TABLET, EXTENDED RELEASE ORAL at 09:14

## 2023-03-22 RX ADMIN — OXYCODONE HYDROCHLORIDE AND ACETAMINOPHEN 1 TABLET: 10; 325 TABLET ORAL at 21:20

## 2023-03-22 RX ADMIN — MEROPENEM 1 G: 1 INJECTION, POWDER, FOR SOLUTION INTRAVENOUS at 18:30

## 2023-03-22 RX ADMIN — INSULIN LISPRO 2 UNITS: 100 INJECTION, SOLUTION INTRAVENOUS; SUBCUTANEOUS at 17:29

## 2023-03-22 RX ADMIN — POLYETHYLENE GLYCOL 3350 17 G: 17 POWDER, FOR SOLUTION ORAL at 09:13

## 2023-03-22 RX ADMIN — HYDROMORPHONE HYDROCHLORIDE 0.5 MG: 1 INJECTION, SOLUTION INTRAMUSCULAR; INTRAVENOUS; SUBCUTANEOUS at 12:49

## 2023-03-22 RX ADMIN — MICONAZOLE NITRATE: 20 POWDER TOPICAL at 21:31

## 2023-03-22 RX ADMIN — OXYCODONE HYDROCHLORIDE AND ACETAMINOPHEN 1 TABLET: 10; 325 TABLET ORAL at 10:45

## 2023-03-22 RX ADMIN — INSULIN LISPRO 2 UNITS: 100 INJECTION, SOLUTION INTRAVENOUS; SUBCUTANEOUS at 12:49

## 2023-03-22 RX ADMIN — CYANOCOBALAMIN TAB 1000 MCG 1000 MCG: 1000 TAB at 09:14

## 2023-03-22 RX ADMIN — GABAPENTIN 800 MG: 400 CAPSULE ORAL at 17:28

## 2023-03-22 RX ADMIN — INSULIN LISPRO 4 UNITS: 100 INJECTION, SOLUTION INTRAVENOUS; SUBCUTANEOUS at 09:26

## 2023-03-22 RX ADMIN — OXYCODONE HYDROCHLORIDE AND ACETAMINOPHEN 500 MG: 500 TABLET ORAL at 09:15

## 2023-03-22 RX ADMIN — FERROUS SULFATE TAB 325 MG (65 MG ELEMENTAL FE) 325 MG: 325 (65 FE) TAB at 09:14

## 2023-03-22 RX ADMIN — WHITE PETROLATUM 1 APPLICATION: 1.75 OINTMENT TOPICAL at 10:48

## 2023-03-22 NOTE — CASE MANAGEMENT/SOCIAL WORK
Continued Stay Note  [facility]     Patient Name: Severino Vera  MRN: [id number]  Today's Date: 3/22/2023    Admit Date: 3/13/2023    Plan: Ongoing   Discharge Plan     Row Name 03/22/23 1005       Plan    Plan Comments Social work is working on setting up home health in [facility] throught one of the two agencies in that area [facility]   [phone], [phone] (fax) or [facility] [phone], [phone] (fax). The patient has a plan to be discharged to his home on Friday 3/24/2023.               Discharge Codes    No documentation.               Expected Discharge Date and Time     Expected Discharge Date Expected Discharge Time    Mar 24, 2023             JENIFER Lambert

## 2023-03-22 NOTE — NURSING NOTE
[facility] team follow-up for multiple pressure injuries     Patient well-known to [facility] RN from previous 2 admissions.  Patient alert and oriented x4, agreeable to assessment, able to turn with minimal assistance.  Patient care tech followed by RN at bedside to assist, which is greatly appreciated.  Patient family member entered room as [facility] was leaving and was educating patient on importance of drinking his boost and educated his nephew on protein to heal his wounds.  Suggested the family bring the patient some ice cream as he likes ice cream and he could possibly mix the boost with the ice cream and drink it like a shake.     Identified stage IV pressure injuries to patient's left greater trochanter (patient states that this is an incision from a previous surgery that never healed) x and right ischial tuberosity, see photos and measurements below.  Wound beds are moist, red, white, bone palpable in wound bed and nonblanchable.  Periwound skin is red but blanchable.  Cleansed with normal saline, patted dry, applied SkinTegrity Hydrogel to wound beds,  gently and loosely filled wound bed with 1 piece iodoform packing strip per wound, covered iodoform packing strip with Hydrofiber AG/Opticell AG patch over right ischial tuberosity wound only, and covered both wounds with a silicone foam border dressings.      Left greater trochanter measures 3 x 2 x 1 cm      Right ischial tuberosity measures 9 x 4 x 1.75 cm with tunnel at 12 o'clock greater than 1.25 cm            Identified unstageable pressure injuries to patient's sacrococcygeal region and left ischial tuberosity, see photos and measurements below.  Wound beds with slough, moist, bone palpable/visible, nonblanchable.  Periwound skin is red and blanchable.  Cleanse with normal saline, patted dry, applied skin protectant to periwound skin, applied SkinTegrity to wound bed, covered with cut to fit 1 piece of skin Therahoney sheet to wound bed, gently and loosely filled  balance of  wound bed with 1 piece of iodoform gauze silicone foam border dressing.      Sacrococcygeal measures 9 x 10 x 1 cm with undermining from 8-1 o'clock greater than 0.75 cm      Left ischial tuberosity measures 9 x 6 x 1.25 cm with undermining from 8-1 o'clock greater than 2.25 cm      Identified healing/healed pressure injury to patient's right greater trochanter.  Wound is now red, blanchable and periwound skin is in tact, blanchable.    I skin WOC    Identified MASD with full-thickness skin loss to patient's posterior scrotum.  Wound is red/pink, moist, blanchable.  Cleanse with normal saline, patted dry, applied miconazole powder and dusted off excess and applied zinc barrier cream while educating patient care tech and RN on this technique to get zinc barrier cream to stick to moist areas.          Identified macular rash to patient's trunk, abdomen, flanks, bilateral lower residual limbs and bilateral upper extremities.  Rash is raised, linear, red, with indistinct borders and greater than 0.1 cm possibly due to allergic reaction to antibiotics.  Rash is pruritic per patient.                          Ostomy pouch in place, intact not leaking, soft/mushy greenish-brown stool present in pouch.    See orders for recommendations.  Dressing supplies left at bedside.  Educated RN that all supplies utilized are available from materials management except for Thera honey sheet which is available from WOC team    Sensory Perception: 2-->very limited  Moisture: 3-->occasionally moist  Activity: 1-->bedfast  Mobility: 2-->very limited  Nutrition: 3-->adequate  Friction and Shear: 1-->problem  Jaspreet Score: 12 (03/21/23 2130)    Patient is on a low-air-loss bed.  Educated patient care tech and nurse on importance of keeping only 1 incontinence pad on the bed as well as no draw sheet just the flat sheet as the fitted sheet.    WOC team will plan to follow-up.  If alteration to skin integrity or change in wound bed  presentation please reconsult the WOC team.

## 2023-03-22 NOTE — PLAN OF CARE
Problem: Adult Inpatient Plan of Care  Goal: Plan of Care Review  Outcome: Ongoing, Progressing  Goal: Patient-Specific Goal (Individualized)  Outcome: Ongoing, Progressing  Goal: Absence of Hospital-Acquired Illness or Injury  Outcome: Ongoing, Progressing  Intervention: Identify and Manage Fall Risk  Intervention: Prevent Skin Injury  Intervention: Prevent and Manage VTE (Venous Thromboembolism) Risk  Intervention: Prevent Infection  Goal: Optimal Comfort and Wellbeing  Outcome: Ongoing, Progressing  Intervention: Provide Person-Centered Care  Goal: Readiness for Transition of Care  Outcome: Ongoing, Progressing   Goal Outcome Evaluation:   Patient had no acute events during shift.   Continues to complain of itching. Has red striations and patches on skin applied benadryl cream, benadryl IV, and hydroxizine  Wounds that are open to air were cleaned w NS  Covered wounds MAIKEL none needed changing due to output  Turned patient every 2 hours  BP WNL - not on tele

## 2023-03-22 NOTE — CASE MANAGEMENT/SOCIAL WORK
Continued Stay Note  [facility]     Patient Name: Severino Vera  MRN: [id number]  Today's Date: 3/22/2023    Admit Date: 3/13/2023    Plan: Ongoing   Discharge Plan     Row Name 03/22/23 1622       Plan    Plan Comments Met with Mr. Vera and he states he does not want to go to [facility]. He was accepted to the facility in [place] and he is saying that he does not want to go to [facility] and he wants to go home instead. [facility] is not able to accept Mr. Vera for  home health and case management is sending a referral to [facility].               Discharge Codes    No documentation.               Expected Discharge Date and Time     Expected Discharge Date Expected Discharge Time    Mar 24, 2023             JENIFER Lambert

## 2023-03-22 NOTE — PROGRESS NOTES
Clinical Nutrition     Patient Name: Severino Vera  YOB: 1955  MRN: [id number]  Date of Encounter: 23 13:43 EDT  Admission date: 3/13/2023    Reason for Visit   Follow up    EMR reviewed    Yes    Diet Nutrition Related History     3/22  Patient reports a good appetite/intake. Patient had a collection (around 6) of unopened [employer] on his bedside tray. Reiterated the importance of protein in wound healing and asked patient if we should send less than the TID he is receiving. Patient stated he would start drinking more of them and to keep sending them. NKFA, no issues with chew/swallow and no N/V/D. Patient last daily stool output was 900ml.    3/15  Previous RD note:  Pt lying in bed in NAD at time visit. Pt presents for access to care, he was unfortunately discharge from rehab without a plan in place for his IV abx for wounds. He presented to the hospital as he is unable to facilitate this. He reports eating well, regular meals and focusing on protein, does not endorse any change appetite. He is unsure UBW as he is difficult to weigh due to bilat AKA. This is difficult to determine from EMR as well. He does not feel he has lost any weight. He tells me he drinks protein supplements for his wound healing and open to doing so while in the hospital. He denies further dietary needs/preferences, NKFA.     Current Nutrition Prescription    Diet: Regular/House Diet; Texture: Regular Texture (IDDSI 7); Fluid Consistency: Thin (IDDSI 0)    Average Intake from [employer]% x 8    Actions:    Follow treatment progress, Care plan reviewed, Interview for preferences, Menu provided, Encourage intake    Monitor Per Protocol    Rissa Botello,   Time Spent: 20 minutes
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
## 2023-03-22 NOTE — PLAN OF CARE
Problem: Adult Inpatient Plan of Care  Goal: Plan of Care Review  3/22/2023 0546 by Demario Paz RN  Outcome: Ongoing, Progressing  3/22/2023 0540 by Demario Paz RN  Outcome: Ongoing, Progressing  Goal: Patient-Specific Goal (Individualized)  3/22/2023 0546 by Demario Paz RN  Outcome: Ongoing, Progressing  3/22/2023 0540 by Demario Paz RN  Outcome: Ongoing, Progressing  Goal: Absence of Hospital-Acquired Illness or Injury  3/22/2023 0546 by Demario Paz RN  Outcome: Ongoing, Progressing  3/22/2023 0540 by Demario Paz RN  Outcome: Ongoing, Progressing  Intervention: Identify and Manage Fall Risk  Intervention: Prevent Skin Injury  Intervention: Prevent and Manage VTE (Venous Thromboembolism) Risk  Intervention: Prevent Infection  Goal: Optimal Comfort and Wellbeing  3/22/2023 0546 by Demario Paz RN  Outcome: Ongoing, Progressing  3/22/2023 0540 by Demario Paz RN  Outcome: Ongoing, Progressing  Intervention: Provide Person-Centered Care  Goal: Readiness for Transition of Care  3/22/2023 0546 by Demario Paz RN  Outcome: Ongoing, Progressing  3/22/2023 0540 by Demario Paz RN  Outcome: Ongoing, Progressing   Goal Outcome Evaluation:
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
## 2023-03-23 LAB
GLUCOSE BLDC GLUCOMTR-MCNC: 103 MG/DL (ref 70–130)
GLUCOSE BLDC GLUCOMTR-MCNC: 137 MG/DL (ref 70–130)
GLUCOSE BLDC GLUCOMTR-MCNC: 202 MG/DL (ref 70–130)
GLUCOSE BLDC GLUCOMTR-MCNC: 208 MG/DL (ref 70–130)
GLUCOSE BLDC GLUCOMTR-MCNC: 209 MG/DL (ref 70–130)
GLUCOSE BLDC GLUCOMTR-MCNC: 309 MG/DL (ref 70–130)

## 2023-03-23 PROCEDURE — 82962 GLUCOSE BLOOD TEST: CPT

## 2023-03-23 PROCEDURE — G0378 HOSPITAL OBSERVATION PER HR: HCPCS

## 2023-03-23 PROCEDURE — 25010000002 HEPARIN (PORCINE) PER 1000 UNITS: Performed by: INTERNAL MEDICINE

## 2023-03-23 PROCEDURE — 25010000002 DIPHENHYDRAMINE PER 50 MG: Performed by: PHYSICIAN ASSISTANT

## 2023-03-23 PROCEDURE — 96372 THER/PROPH/DIAG INJ SC/IM: CPT

## 2023-03-23 PROCEDURE — 99231 SBSQ HOSP IP/OBS SF/LOW 25: CPT | Performed by: HOSPITALIST

## 2023-03-23 PROCEDURE — 96376 TX/PRO/DX INJ SAME DRUG ADON: CPT

## 2023-03-23 PROCEDURE — 25010000002 DAPTOMYCIN PER 1 MG

## 2023-03-23 PROCEDURE — 63710000001 INSULIN LISPRO (HUMAN) PER 5 UNITS: Performed by: PHYSICIAN ASSISTANT

## 2023-03-23 PROCEDURE — 25010000002 MEROPENEM PER 100 MG: Performed by: INTERNAL MEDICINE

## 2023-03-23 RX ADMIN — DIPHENHYDRAMINE HYDROCHLORIDE 12.5 MG: 50 INJECTION, SOLUTION INTRAMUSCULAR; INTRAVENOUS at 11:28

## 2023-03-23 RX ADMIN — MICONAZOLE NITRATE: 20 POWDER TOPICAL at 13:21

## 2023-03-23 RX ADMIN — CLOPIDOGREL BISULFATE 75 MG: 75 TABLET ORAL at 08:29

## 2023-03-23 RX ADMIN — MEROPENEM 1 G: 1 INJECTION, POWDER, FOR SOLUTION INTRAVENOUS at 02:49

## 2023-03-23 RX ADMIN — OXYCODONE HYDROCHLORIDE AND ACETAMINOPHEN 500 MG: 500 TABLET ORAL at 08:31

## 2023-03-23 RX ADMIN — OXYCODONE HYDROCHLORIDE AND ACETAMINOPHEN 1 TABLET: 10; 325 TABLET ORAL at 17:57

## 2023-03-23 RX ADMIN — INSULIN LISPRO 4 UNITS: 100 INJECTION, SOLUTION INTRAVENOUS; SUBCUTANEOUS at 17:57

## 2023-03-23 RX ADMIN — DULOXETINE 60 MG: 60 CAPSULE, DELAYED RELEASE ORAL at 08:30

## 2023-03-23 RX ADMIN — METOPROLOL SUCCINATE 25 MG: 25 TABLET, EXTENDED RELEASE ORAL at 08:29

## 2023-03-23 RX ADMIN — HYDROXYZINE HYDROCHLORIDE 25 MG: 25 TABLET, FILM COATED ORAL at 22:02

## 2023-03-23 RX ADMIN — MEROPENEM 1 G: 1 INJECTION, POWDER, FOR SOLUTION INTRAVENOUS at 22:01

## 2023-03-23 RX ADMIN — WHITE PETROLATUM 1 APPLICATION: 1.75 OINTMENT TOPICAL at 22:02

## 2023-03-23 RX ADMIN — INSULIN LISPRO 3 UNITS: 100 INJECTION, SOLUTION INTRAVENOUS; SUBCUTANEOUS at 09:37

## 2023-03-23 RX ADMIN — MEROPENEM 1 G: 1 INJECTION, POWDER, FOR SOLUTION INTRAVENOUS at 12:58

## 2023-03-23 RX ADMIN — MULTIPLE VITAMINS W/ MINERALS TAB 1 TABLET: TAB at 08:29

## 2023-03-23 RX ADMIN — GABAPENTIN 800 MG: 400 CAPSULE ORAL at 17:12

## 2023-03-23 RX ADMIN — INSULIN LISPRO 4 UNITS: 100 INJECTION, SOLUTION INTRAVENOUS; SUBCUTANEOUS at 09:37

## 2023-03-23 RX ADMIN — Medication 10 ML: at 08:31

## 2023-03-23 RX ADMIN — HEPARIN SODIUM 5000 UNITS: 5000 INJECTION INTRAVENOUS; SUBCUTANEOUS at 08:28

## 2023-03-23 RX ADMIN — DAPTOMYCIN 500 MG: 500 INJECTION, POWDER, LYOPHILIZED, FOR SOLUTION INTRAVENOUS at 11:29

## 2023-03-23 RX ADMIN — Medication 10 ML: at 22:02

## 2023-03-23 RX ADMIN — MICONAZOLE NITRATE: 20 POWDER TOPICAL at 22:02

## 2023-03-23 RX ADMIN — THIAMINE HCL TAB 100 MG 100 MG: 100 TAB at 08:30

## 2023-03-23 RX ADMIN — ASPIRIN 81 MG: 81 TABLET, COATED ORAL at 08:29

## 2023-03-23 RX ADMIN — ROSUVASTATIN 20 MG: 20 TABLET, FILM COATED ORAL at 22:02

## 2023-03-23 RX ADMIN — HEPARIN SODIUM 5000 UNITS: 5000 INJECTION INTRAVENOUS; SUBCUTANEOUS at 22:01

## 2023-03-23 RX ADMIN — CYANOCOBALAMIN TAB 1000 MCG 1000 MCG: 1000 TAB at 08:29

## 2023-03-23 RX ADMIN — FERROUS SULFATE TAB 325 MG (65 MG ELEMENTAL FE) 325 MG: 325 (65 FE) TAB at 08:29

## 2023-03-23 RX ADMIN — Medication 1 CAPSULE: at 08:32

## 2023-03-23 RX ADMIN — GABAPENTIN 800 MG: 400 CAPSULE ORAL at 22:10

## 2023-03-23 RX ADMIN — Medication 250 MG: at 08:30

## 2023-03-23 RX ADMIN — WHITE PETROLATUM 1 APPLICATION: 1.75 OINTMENT TOPICAL at 13:22

## 2023-03-23 RX ADMIN — GABAPENTIN 800 MG: 400 CAPSULE ORAL at 08:30

## 2023-03-23 RX ADMIN — SODIUM CHLORIDE 1 G: 1 TABLET ORAL at 08:28

## 2023-03-23 RX ADMIN — LISINOPRIL 40 MG: 40 TABLET ORAL at 08:29

## 2023-03-23 RX ADMIN — INSULIN LISPRO 3 UNITS: 100 INJECTION, SOLUTION INTRAVENOUS; SUBCUTANEOUS at 17:57

## 2023-03-23 NOTE — CASE MANAGEMENT/SOCIAL WORK
Continued Stay Note  [facility]     Patient Name: Severino Vera  MRN: [id number]  Today's Date: 3/23/2023    Admit Date: 3/13/2023    Plan: Wound Care   Discharge Plan     Row Name 03/23/23 0327       Plan    Plan Wound Care    Patient/Family in Agreement with Plan yes    Plan Comments The pt still wants to DC to home and is aware HH is not an option. Called [facility] and they have no outpt wound care. Spoke with Aleshia at [facility] in [place]. They have outpt wound care there. Faxed a referral to her at [phone].    Final Discharge Disposition Code 01 - home or self-care    Row Name 03/23/23 9773       Plan    Plan Comments Both [facility] [phone], [phone] (fax) and  [facility] [phone], [phone] (fax) are not agreeable to accepting Chuy Baker due to medical complexity. Case management is checking on options for outpatient wound care. Mr. Vera was not agreeable to going to a inpatient rehab facility.               Discharge Codes    No documentation.               Expected Discharge Date and Time     Expected Discharge Date Expected Discharge Time    Mar 24, 2023             Alanna Montiel RN
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
## 2023-03-23 NOTE — PLAN OF CARE
Problem: Adult Inpatient Plan of Care  Goal: Plan of Care Review  Outcome: Ongoing, Progressing  Goal: Patient-Specific Goal (Individualized)  Outcome: Ongoing, Progressing  Goal: Absence of Hospital-Acquired Illness or Injury  Outcome: Ongoing, Progressing  Intervention: Identify and Manage Fall Risk  Intervention: Prevent Skin Injury  Intervention: Prevent and Manage VTE (Venous Thromboembolism) Risk  Intervention: Prevent Infection  Goal: Optimal Comfort and Wellbeing  Outcome: Ongoing, Progressing  Intervention: Provide Person-Centered Care  Goal: Readiness for Transition of Care  Outcome: Ongoing, Progressing   Goal Outcome Evaluation:      No acute events during shift  Slept most of evening  NSR

## 2023-03-23 NOTE — DISCHARGE PLACEMENT REQUEST
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 Level/Cues Needed (Bed Mobility Goal 1, PT) modified independence  -HM     Time Frame (Bed Mobility Goal 1, PT) long term goal (LTG);10 days  -HM     Progress/Outcomes (Bed Mobility Goal 1, PT) new goal  -HM     Row Name 03/16/23 1100          Transfer Goal 1 (PT)    Activity/Assistive Device (Transfer Goal 1, PT) wheelchair transfer  -HM     [place] Level/Cues Needed (Transfer Goal 1, PT) contact guard required  -HM     Time Frame (Transfer Goal 1, PT) long term goal (LTG);10 days  -HM     Progress/Outcome (Transfer Goal 1, PT) new goal  -HM     Row Name 03/16/23 1100          Problem Specific Goal 1 (PT)    Problem Specific Goal 1 (PT) Pt will be able to self-propel wheelchair 150 feet with CGA and complete 2 180 degree turns.  -HM     Time Frame (Problem Specific Goal 1, PT) long-term goal (LTG);2 weeks  -HM     Progress/Outcome (Problem Specific Goal 1, PT) new goal  -HM     Row Name 03/16/23 1100          Therapy Assessment/Plan (PT)    Planned Therapy Interventions (PT)  balance training;bed mobility training;transfer training;wheelchair management/propulsion training;home exercise program;patient/family education;neuromuscular re-education;postural re-education;ROM (range of motion);strengthening  -           User Key  (r) = Recorded By, (t) = Taken By, (c) = Cosigned By    Initials Name Provider Type     Chrissie Stanley, PT Physical Therapist               Clinical Impression     Row Name 03/16/23 1049          Pain    Pretreatment Pain Rating 5/10  -     Posttreatment Pain Rating 5/10  -     Pain Location - Side/Orientation Bilateral  -     Pain Location posterior  -     Pain Location - neck;shoulder  -     Pain Intervention(s) Ambulation/increased activity;Repositioned  -     Row Name 03/16/23 1049          Plan of Care Review    Plan of Care Reviewed With patient  -     Progress no change  -     Outcome Evaluation PT eval completed. Pt completed bed mobility CGA- Mod A x1 placing pt below baseline function. Pt demonstrated decreased independence with mobility warranting skilled IPPT POC to return to PLOF with bed mobility, transfers, and wheelchair propulsion. d/c rec SNF.  -     Row Name 03/16/23 1049          Therapy Assessment/Plan (PT)    Rehab Potential (PT) fair, will monitor progress closely  -     Criteria for Skilled Interventions Met (PT) yes;meets criteria;skilled treatment is necessary  -     Therapy Frequency (PT) 3 times/wk  -     Row Name 03/16/23 1049          Vital Signs    Pre Systolic BP Rehab 139  -HM     Pre Treatment Diastolic BP 80  -HM     Pretreatment Heart Rate (beats/min) 101  -HM     Pre SpO2 (%) 96  -HM     O2 Delivery Pre Treatment room air  -     O2 Delivery Intra Treatment room air  -     O2 Delivery Post Treatment room air  -     Pre Patient Position Supine  -HM     Intra Patient Position Sitting  -HM     Post Patient Position Side Lying  wedge under L  -     Row Name 03/16/23 1049          Positioning and  Restraints    Pre-Treatment Position in bed  -HM     Post Treatment Position bed  -HM     In Bed notified nsg;side lying right;encouraged to call for assist;call light within reach;exit alarm on  -           User Key  (r) = Recorded By, (t) = Taken By, (c) = Cosigned By    Initials Name Provider Type     Chrissie Stanley PT Physical Therapist               Outcome Measures     Row Name 03/16/23 1101 03/16/23 0800       How much help from another person do you currently need...    Turning from your back to your side while in flat bed without using bedrails? 3  - 3  -MC    Moving from lying on back to sitting on the side of a flat bed without bedrails? 3  - 3  -MC    Moving to and from a bed to a chair (including a wheelchair)? 1  - 1  -MC    Standing up from a chair using your arms (e.g., wheelchair, bedside chair)? 1  - 1  -    Climbing 3-5 steps with a railing? 1  - 1  -    To walk in hospital room? 1  - 1  -MC    AM-PAC 6 Clicks Score (PT) 10  - 10  -    Highest level of mobility 4 --> Transferred to chair/commode  - 4 --> Transferred to chair/commode  -    Row Name 03/16/23 1101 03/16/23 1034       Functional Assessment    Outcome Measure Options AM-PAC 6 Clicks Basic Mobility (PT)  - AM-PAC 6 Clicks Daily Activity (OT)  -          User Key  (r) = Recorded By, (t) = Taken By, (c) = Cosigned By    Initials Name Provider Type    Yin Alan OT Occupational Therapist    Madhu Wing, RN Registered Nurse     Chrissie Stanley PT Physical Therapist                             Physical Therapy Education     Title: PT OT SLP Therapies (Done)     Topic: Physical Therapy (Done)     Point: Mobility training (Done)     Learning Progress Summary           Patient Acceptance, E,TB, VU,NR by  at 3/16/2023 1102    Acceptance, E, VU by  at 3/15/2023 1228    Acceptance, E, VU by  at 3/14/2023 1922    Acceptance, E, VU by MG at 3/14/2023 1413                   Point: Home  exercise program (Done)     Learning Progress Summary           Patient Acceptance, E, VU by  at 3/15/2023 1228    Acceptance, E, VU by  at 3/14/2023 1922    Acceptance, E, VU by  at 3/14/2023 1413                   Point: Body mechanics (Done)     Learning Progress Summary           Patient Acceptance, E, VU by  at 3/15/2023 1228    Acceptance, E, VU by  at 3/14/2023 1922    Acceptance, E, VU by  at 3/14/2023 1413                   Point: Precautions (Done)     Learning Progress Summary           Patient Acceptance, E, VU by  at 3/15/2023 1228    Acceptance, E, VU by  at 3/14/2023 1922    Acceptance, E, VU by  at 3/14/2023 1413                               User Key     Initials Effective Dates Name Provider Type Discipline     11/30/22 -  Madhu Pierre, RN Registered Nurse Nurse     01/23/23 -  Tanvi Escamilla RN Registered Nurse Nurse     09/22/22 -  Chrissie Stanley, SUSHIL Physical Therapist PT              PT Recommendation and Plan  Planned Therapy Interventions (PT): balance training, bed mobility training, transfer training, wheelchair management/propulsion training, home exercise program, patient/family education, neuromuscular re-education, postural re-education, ROM (range of motion), strengthening  Plan of Care Reviewed With: patient  Progress: no change  Outcome Evaluation: PT eval completed. Pt completed bed mobility CGA- Mod A x1 placing pt below baseline function. Pt demonstrated decreased independence with mobility warranting skilled IPPT POC to return to PLOF with bed mobility, transfers, and wheelchair propulsion. d/c rec SNF.     Time Calculation:    PT Charges     Row Name 03/16/23 1104             Time Calculation    Start Time 0820  -HM      PT Received On 03/16/23  -      PT Goal Re-Cert Due Date 03/26/23  -         Untimed Charges    PT Eval/Re-eval Minutes 46  -HM         Total Minutes    Untimed Charges Total Minutes 46  -HM       Total Minutes 46   -            User Key  (r) = Recorded By, (t) = Taken By, (c) = Cosigned By    Initials Name Provider Type     Chrissie Stanley, PT Physical Therapist              Therapy Charges for Today     Code Description Service Date Service Provider Modifiers Qty    29141724170 HC PT EVAL MOD COMPLEXITY 4 3/16/2023 Chrissie Stanley, PT GP 1          PT G-Codes  Outcome Measure Options: AM-PAC 6 Clicks Basic Mobility (PT)  AM-PAC 6 Clicks Score (PT): 10  AM-PAC 6 Clicks Score (OT): 14  PT Discharge Summary  Anticipated Discharge Disposition (PT): skilled nursing facility    Chrissie Stanley PT  3/16/2023      Electronically signed by Chrissie Stanley PT at 23 1105     Chrissie Stanley PT at 23 135  Version 1        Goal Outcome Evaluation:  Plan of Care Reviewed With: patient        Progress: improving  Outcome Evaluation: Pt improving with mobility as pt was able to initiate lateral scooting sitting upright in bed towards EOB with Min A x1, w/c transfer deferred at this time d/t concern for increased friction on skin with inability to fully clear hips. Pt continued to demonstrate decreased independence with mobility and strength deficits warranting continued IPPT POC. d/c rec SNF.    Electronically signed by Chrissie Stanley PT at 23 1547     Chrissie Stanley PT at 238  Version 1          Patient Name: Severino Vera  : 1955    MRN: [id number]                              Today's Date: 3/20/2023       Admit Date: 3/13/2023    Visit Dx:     ICD-10-CM ICD-9-CM   1. Subacute osteomyelitis of multiple sites (HCC)  M86.29 730.09   2. 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)   • Gangrene of right foot s/p AKA 2022   • Osteomyelitis (HCC)   • Hyperlipidemia LDL goal <70   • Severe malnutrition (HCC)   • Anemia   •  Gastro-esophageal reflux disease without esophagitis   • Essential (primary) hypertension   • Paraplegia, unspecified (HCC)   • Type 2 diabetes mellitus, without long-term current use of insulin (HCC)   • Tobacco abuse   • Hyponatremia   • Loculated pleural effusion   • Sacral osteomyelitis (HCC)   • S/P diverting colostomy creation 2/13/2023 (HCC)   • Pressure injury of sacral region, stage 4 (HCC)   • Redundant foreskin   • Neurogenic bladder   • Hypoalbuminemia   • Hypocalcemia   • Hypokalemia   • Subacute osteomyelitis of multiple sites (HCC)     Past Medical History:   Diagnosis Date   • Arthritis    • Diabetes mellitus (HCC)    • Gangrene (HCC)     BILATERAL FEET   • GERD (gastroesophageal reflux disease)    • Hyperlipidemia    • Hypertension    • Osteomyelitis (HCC)    • PAD (peripheral artery disease) (HCC)    • Paraplegia (HCC)    • Peripheral vascular disease (HCC)    • Self-catheterizes urinary bladder      Past Surgical History:   Procedure Laterality Date   • ABOVE KNEE AMPUTATION Right 11/25/2022    Procedure: AMPUTATION ABOVE KNEE RIGHT;  Surgeon: Nemesio Haji MD;  Location:  [facility];  Service: Vascular;  Laterality: Right;   • ABOVE KNEE AMPUTATION Left 1/27/2023    Procedure: AMPUTATION ABOVE KNEE LEFT;  Surgeon: Nemesio Haji MD;  Location:  [facility];  Service: Vascular;  Laterality: Left;   • EXPLORATORY LAPAROTOMY N/A 2/13/2023    Procedure: LAPAROTOMY EXPLORATORY, SIGMOID RESECTION, COLOSTOMY;  Surgeon: Neville Berman MD;  Location:  [facility];  Service: General;  Laterality: N/A;   • OTHER SURGICAL HISTORY Right     stent placement - right groin   • PILONIDAL CYSTECTOMY N/A 2/10/2023    Procedure: DEBRIDEMENT SACRAL DECUBITUS ULCER;  Surgeon: Neville Berman MD;  Location:  [facility];  Service: General;  Laterality: N/A;   • SHOULDER SURGERY Left     ROTATOR CUFF REPAIR      General Information     Row Name 03/20/23 1524          Physical Therapy Time and Intention     Document Type therapy note (daily note)  -     Mode of Treatment physical therapy  -     Row Name 03/20/23 1524          General Information    Patient Profile Reviewed yes  -     Existing Precautions/Restrictions other (see comments);fall  B AKA, colostomy, h/o paraplegia, sacral wounds  -     Barriers to Rehab medically complex;previous functional deficit;impaired sensation  -     Row Name 03/20/23 1524          Cognition    Orientation Status (Cognition) oriented x 3  -     Row Name 03/20/23 1524          Safety Issues, Functional Mobility    Safety Issues Affecting Function (Mobility) friction/shear risk;awareness of need for assistance  -     Impairments Affecting Function (Mobility) balance;postural/trunk control;sensation/sensory awareness;strength  -           User Key  (r) = Recorded By, (t) = Taken By, (c) = Cosigned By    Initials Name Provider Type     Chrissie Stanley PT Physical Therapist               Mobility     Row Name 03/20/23 1524          Bed Mobility    Bed Mobility rolling left;rolling right;scooting/bridging  -     Rolling Left [place] (Bed Mobility) verbal cues;minimum assist (75% patient effort)  -     Rolling Right [place] (Bed Mobility) verbal cues;contact guard  -     Supine-Sit [place] (Bed Mobility) minimum assist (75% patient effort);other (see comments)  pt with head of bed elevated prior, pt sitting up in bed with BUE support on bed for balance  -     Sit-Supine [place] (Bed Mobility) verbal cues;contact guard;other (see comments)  pt slowly lowered self onto R side then returned to supine with utilization of bed rail  -     Assistive Device (Bed Mobility) bed rails  -     Comment, (Bed Mobility) bed to chair wheelchair transfer set up, pt attempted to utilize BUE on bed to lift self up with seated pushups and scoot hips laterally to scoot to EOB. Pt able to slightly scoot laterally to R and L with Min A at gait belt as pt was  unable to clear hips fully. w/c transfer deferred d/t difficulty clearing hips and concern for increased friction to sacral wounds.  -     Row Name 03/20/23 1524          Gait/Stairs (Locomotion)    Comment, (Gait/Stairs) further mobility not appropriate  -           User Key  (r) = Recorded By, (t) = Taken By, (c) = Cosigned By    Initials Name Provider Type     Chrissie Stanley PT Physical Therapist               Obj/Interventions     Row Name 03/20/23 1539          Balance    Balance Assessment sitting static balance;sitting dynamic balance  -     Static Sitting Balance contact guard;other (see comments)  with BUE on bed for support  -     Dynamic Sitting Balance minimal assist;contact guard  -     Position, Sitting Balance --  with pt's BUE on bed for support sitting up in bed  -     Balance Interventions standing;weight shifting activity;dynamic  -     Comment, Balance Pt requiring Min A for lateral movements in bed, CGA with weight shifting forward/backward sitting in bed  -           User Key  (r) = Recorded By, (t) = Taken By, (c) = Cosigned By    Initials Name Provider Type     Chrissie Stanley PT Physical Therapist               Goals/Plan    No documentation.                Clinical Impression     Row Name 03/20/23 1541          Pain    Pretreatment Pain Rating 5/10  -     Posttreatment Pain Rating 5/10  -     Pain Location - Side/Orientation Bilateral  -     Pain Location posterior  -     Pain Location - neck;shoulder  -     Pain Intervention(s) Ambulation/increased activity;Repositioned  -     Row Name 03/20/23 1541          Plan of Care Review    Plan of Care Reviewed With patient  -     Progress improving  -     Outcome Evaluation Pt improving with mobility as pt was able to initiate lateral scooting sitting upright in bed towards EOB with Min A x1, w/c transfer deferred at this time d/t concern for increased friction on skin with inability to fully clear hips. Pt  continued to demonstrate decreased independence with mobility and strength deficits warranting continued IPPT POC. d/c rec SNF.  -     Row Name 03/20/23 1541          Therapy Assessment/Plan (PT)    Rehab Potential (PT) fair, will monitor progress closely  -     Criteria for Skilled Interventions Met (PT) yes;meets criteria;skilled treatment is necessary  -     Therapy Frequency (PT) 3 times/wk  -     Row Name 03/20/23 1541          Vital Signs    Pre Systolic BP Rehab 123  -HM     Pre Treatment Diastolic BP 69  -HM     O2 Delivery Pre Treatment room air  -HM     O2 Delivery Intra Treatment room air  -HM     O2 Delivery Post Treatment room air  -HM     Pre Patient Position Supine  -HM     Intra Patient Position Sitting  -HM     Post Patient Position Supine  -HM     Row Name 03/20/23 1541          Positioning and Restraints    Pre-Treatment Position in bed  -HM     Post Treatment Position bed  -HM     In Bed notified nsg;encouraged to call for assist;call light within reach;fowlers;exit alarm on  -           User Key  (r) = Recorded By, (t) = Taken By, (c) = Cosigned By    Initials Name Provider Type     Chrissie Stanley, PT Physical Therapist               Outcome Measures     Row Name 03/20/23 1546 03/20/23 0815       How much help from another person do you currently need...    Turning from your back to your side while in flat bed without using bedrails? 3  - 3  -SM    Moving from lying on back to sitting on the side of a flat bed without bedrails? 3  - 3  -SM    Moving to and from a bed to a chair (including a wheelchair)? 1  - 1  -SM    Standing up from a chair using your arms (e.g., wheelchair, bedside chair)? 1  - 1  -SM    Climbing 3-5 steps with a railing? 1  - 1  -SM    To walk in hospital room? 1  - 1  -SM    AM-PAC 6 Clicks Score (PT) 10  - 10  -SM    Highest level of mobility 4 --> Transferred to chair/commode  - 4 --> Transferred to chair/commode  -SM    Row Name 03/20/23  1546 03/20/23 1509       Functional Assessment    Outcome Measure Options AM-PAC 6 Clicks Basic Mobility (PT)  - AM-PAC 6 Clicks Daily Activity (OT)  -          User Key  (r) = Recorded By, (t) = Taken By, (c) = Cosigned By    Initials Name Provider Type    AC Yin Arriaga, OT Occupational Therapist    Jeannie Molina, RN Registered Nurse     Chrissie Stanley, PT Physical Therapist                             Physical Therapy Education     Title: PT OT SLP Therapies (Done)     Topic: Physical Therapy (Done)     Point: Mobility training (Done)     Learning Progress Summary           Patient Acceptance, TB,E, VU,NR by  at 3/20/2023 1547    Acceptance, E, VU by  at 3/17/2023 1602    Acceptance, E, VU by  at 3/16/2023 1902    Acceptance, E,TB, VU,NR by  at 3/16/2023 1102    Acceptance, E, VU by  at 3/15/2023 1228    Acceptance, E, VU by  at 3/14/2023 1922    Acceptance, E, VU by MG at 3/14/2023 1413                   Point: Home exercise program (Done)     Learning Progress Summary           Patient Acceptance, E, VU by  at 3/17/2023 1602    Acceptance, E, VU by  at 3/16/2023 1902    Acceptance, E, VU by  at 3/15/2023 1228    Acceptance, E, VU by  at 3/14/2023 1922    Acceptance, E, VU by MG at 3/14/2023 1413                   Point: Body mechanics (Done)     Learning Progress Summary           Patient Acceptance, TB,E, VU,NR by  at 3/20/2023 1547    Acceptance, E, VU by  at 3/17/2023 1602    Acceptance, E, VU by  at 3/16/2023 1902    Acceptance, E, VU by  at 3/15/2023 1228    Acceptance, E, VU by  at 3/14/2023 1922    Acceptance, E, VU by  at 3/14/2023 1413                   Point: Precautions (Done)     Learning Progress Summary           Patient Acceptance, E, VU by  at 3/17/2023 1602    Acceptance, E, VU by  at 3/16/2023 1902    Acceptance, E, VU by MG at 3/15/2023 1228    Acceptance, E, VU by MC at 3/14/2023 1922    Acceptance, E, VU by MG at 3/14/2023 1413                                User Key     Initials Effective Dates Name Provider Type Discipline     11/30/22 -  Madhu Pierre RN Registered Nurse Nurse     01/23/23 -  Tanvi Escamilla RN Registered Nurse Nurse     09/22/22 -  Chrissie Stanley PT Physical Therapist PT              PT Recommendation and Plan  Planned Therapy Interventions (PT): balance training, bed mobility training, transfer training, wheelchair management/propulsion training, home exercise program, patient/family education, neuromuscular re-education, postural re-education, ROM (range of motion), strengthening  Plan of Care Reviewed With: patient  Progress: improving  Outcome Evaluation: Pt improving with mobility as pt was able to initiate lateral scooting sitting upright in bed towards EOB with Min A x1, w/c transfer deferred at this time d/t concern for increased friction on skin with inability to fully clear hips. Pt continued to demonstrate decreased independence with mobility and strength deficits warranting continued IPPT POC. d/c rec SNF.     Time Calculation:    PT Charges     Row Name 03/20/23 1547             Time Calculation    Start Time 1355  -HM      PT Received On 03/20/23  -HM         Timed Charges    36573 - PT Therapeutic Activity Minutes 15  -HM         Total Minutes    Timed Charges Total Minutes 15  -HM       Total Minutes 15  -HM            User Key  (r) = Recorded By, (t) = Taken By, (c) = Cosigned By    Initials Name Provider Type     Chrissie Stanley PT Physical Therapist              Therapy Charges for Today     Code Description Service Date Service Provider Modifiers Qty    32593172093  PT THERAPEUTIC ACT EA 15 MIN 3/20/2023 Chrissie Stanley, PT GP 1          PT G-Codes  Outcome Measure Options: AM-PAC 6 Clicks Basic Mobility (PT)  AM-PAC 6 Clicks Score (PT): 10  AM-PAC 6 Clicks Score (OT): 14  PT Discharge Summary  Anticipated Discharge Disposition (PT): skilled nursing facility    Chrissie Stanley  PT  3/20/2023      Electronically signed by Chrissie Stanley, PT at 03/20/23 1550       {Occupational Therapy Notes:[id number]}

## 2023-03-23 NOTE — CASE MANAGEMENT/SOCIAL WORK
Discharge Planning Assessment  [facility]     Patient Name: Severino Vera  MRN: [id number]  Today's Date: 3/23/2023    Admit Date: 3/13/2023    Plan: Ongoing   Discharge Needs Assessment    No documentation.                Discharge Plan     Row Name 03/23/23 1401       Plan    Plan Comments Both [facility] [phone], [phone] (fax) and  [facility] [phone], [phone] (fax) are not agreeable to accepting Chuy Baker due to medical complexity. Case management is checking on options for outpatient wound care. Mr. Vera was not agreeable to going to a inpatient rehab facility.              Continued Care and Services - Admitted Since 3/13/2023    Coordination has not been started for this encounter.     Selected Continued Care - Prior Encounters Includes continued care and service providers with selected services from prior encounters from 12/13/2022 to 3/23/2023    Discharged on 2/21/2023 Admission date: 1/25/2023 - Discharge disposition: Skilled Nursing Facility (DC - External)    Destination     Service Provider Selected Services Address Phone Fax Patient Preferred    [facility]  Skilled Nursing [address] [place] [id number] [phone] --                    Expected Discharge Date and Time     Expected Discharge Date Expected Discharge Time    Mar 24, 2023          Demographic Summary    No documentation.                Functional Status    No documentation.                Psychosocial    No documentation.                Abuse/Neglect    No documentation.                Legal    No documentation.                Substance Abuse    No documentation.                Patient Forms    No documentation.                   JENIFER Lambert

## 2023-03-23 NOTE — DISCHARGE PLACEMENT REQUEST
ChuySeverino (67 y.o. Male)   To Wound Clinic Attn Aleshia  From [place] LAWS [phone]       [address]  [address] [facility] [address]  Phone:  [phone]  Fax:  [phone] Date: Mar 23, 2023      Ambulatory Referral to Wound Clinic     Patient:  Severino Vera MRN:  [id number]   [address] [facility] [address] :  1955  SSN:    Phone: [phone] Sex:  M      INSURANCE PAYOR PLAN GROUP # SUBSCRIBER ID   Primary:  Secondary:    AETNA MEDICARE REPLACEMENT  AETNA [facility] [place] [id number]  [id number]    [id number]  [id number]      Referring Provider Information:  CONRAD TAVARES Phone: [phone] Fax: [phone]       Referral Information:   # Visits:  1 Referral Type: Consultation [3]   Urgency:  Routine Referral Reason: Specialty Services Required   Start Date: Mar 23, 2023 End Date:  To be determined by Insurer   Diagnosis: Sacral wound, subsequent encounter (S31.000D [ICD-10-CM] V58.89,879.6 [ICD-9-CM])  Neurogenic bladder (N31.9 [ICD-10-CM] 596.54 [ICD-9-CM])  Pressure injury of sacral region, stage 4 (HCC) (L89.154 [ICD-10-CM] 707.03,707.24 [ICD-9-CM])  S/P colostomy (HCC) (Z93.3 [ICD-10-CM] V44.3 [ICD-9-CM])  Sacral osteomyelitis (HCC) (M46.28 [ICD-10-CM] 730.28 [ICD-9-CM])  Paraplegia, unspecified (HCC) (G82.20 [ICD-10-CM] 344.1 [ICD-9-CM])  Severe malnutrition (HCC) (E43 [ICD-10-CM] 261 [ICD-9-CM])  Osteomyelitis of left foot, unspecified type (HCC) (M86.9 [ICD-10-CM] 730.27 [ICD-9-CM])      Refer to Dept:   Refer to Provider:   Refer to Provider Phone:   Refer to Facility:    Per wound care recomendations        This document serves as a request of services and does not constitute Insurance authorization or approval of services.  To determine eligibility, please contact the members Insurance carrier to verify and review coverage.     If you have medical questions regarding this request for services. Please contact  "[facility] at [phone] during normal business hours.        Authorizing Provider:Debra Rocha MD  Authorizing Provider's NPI: [id number]  Order Entered By: Alanna Montiel RN 3/23/2023  4:16 PM     Electronically signed by: Debra Rocha MD 3/23/2023  4:16 PM         Date of Birth   1955    Social Security Number       Address   [address] [place] [address]    Home Phone   [phone]    MRN   [id number]       [employer]   [employer]    Marital Status   Single                            Admission Date   3/13/23    Admission Type   Emergency    Admitting Provider   Debra Rocha MD    Attending Provider   Debra Rocha MD    Department, Room/Bed   [address], S523/1       Discharge Date       Discharge Disposition       Discharge Destination                               Attending Provider: Debra Rocha MD    Allergies: Aztreonam, Cefepime, Doxycycline, Zosyn [Piperacillin Sod-tazobactam So]    Isolation: None   Infection: None   Code Status: CPR    Ht: 182.9 cm (72\")   Wt: 65.8 kg (145 lb)    Admission Cmt: None   Principal Problem: Sacral osteomyelitis (HCC) [M46.28]                 Active Insurance as of 3/13/2023     Primary Coverage     Payor Plan Insurance Group Employer/Plan Group    AETNA MEDICARE REPLACEMENT AETNA MEDICARE REPLACEMENT [id number]     Payor Plan Address Payor Plan Phone Number Payor Plan Fax Number Effective Dates    [address] [phone]  1/1/2022 - None Entered    [facility] [place] [address]       Subscriber Name Subscriber Birth Date Member ID       ANNABELLE PORTER 1955 [id number]           Secondary Coverage     Payor Plan Insurance Group Employer/Plan Group    [place]      Payor Plan Address Payor Plan Phone Number Payor Plan Fax Number Effective Dates    [address]   1/1/2014 - None Entered    [place] [address]       Subscriber Name Subscriber Birth Date Member " ID       ANNABELLE PORTER 1955 [id number]                 Emergency Contacts      (Rel.) Home Phone Work Phone Mobile Phone    Liliya Porter (Sister) [phone] -- --    STEF MASON (Sister) [phone] -- --    Nguyễn Mason (Relative) -- -- [phone]         Ya Blum, RN   Registered Nurse  Wound Care  Nursing Note     Signed  Date of Service:  03/22/23 1500  Creation Time:  03/22/23 1559     Signed          [facility] team follow-up for multiple pressure injuries     Patient well-known to [facility] RN from previous 2 admissions.  Patient alert and oriented x4, agreeable to assessment, able to turn with minimal assistance.  Patient care tech followed by RN at bedside to assist, which is greatly appreciated.  Patient family member entered room as [facility] was leaving and was educating patient on importance of drinking his boost and educated his nephew on protein to heal his wounds.  Suggested the family bring the patient some ice cream as he likes ice cream and he could possibly mix the boost with the ice cream and drink it like a shake.     Identified stage IV pressure injuries to patient's left greater trochanter (patient states that this is an incision from a previous surgery that never healed) x and right ischial tuberosity, see photos and measurements below.  Wound beds are moist, red, white, bone palpable in wound bed and nonblanchable.  Periwound skin is red but blanchable.  Cleansed with normal saline, patted dry, applied SkinTegrity Hydrogel to wound beds,  gently and loosely filled wound bed with 1 piece iodoform packing strip per wound, covered iodoform packing strip with Hydrofiber AG/Opticell AG patch over right ischial tuberosity wound only, and covered both wounds with a silicone foam border dressings.       Left greater trochanter measures 3 x 2 x 1 cm       Right ischial tuberosity measures 9 x 4 x 1.75 cm with tunnel at 12 o'clock greater than 1.25 cm               Identified  unstageable pressure injuries to patient's sacrococcygeal region and left ischial tuberosity, see photos and measurements below.  Wound beds with slough, moist, bone palpable/visible, nonblanchable.  Periwound skin is red and blanchable.  Cleanse with normal saline, patted dry, applied skin protectant to periwound skin, applied SkinTegrity to wound bed, covered with cut to fit 1 piece of skin Therahoney sheet to wound bed, gently and loosely filled balance of  wound bed with 1 piece of iodoform gauze silicone foam border dressing.       Sacrococcygeal measures 9 x 10 x 1 cm with undermining from 8-1 o'clock greater than 0.75 cm       Left ischial tuberosity measures 9 x 6 x 1.25 cm with undermining from 8-1 o'clock greater than 2.25 cm       Identified healing/healed pressure injury to patient's right greater trochanter.  Wound is now red, blanchable and periwound skin is in tact, blanchable.     I skin WOC     Identified MASD with full-thickness skin loss to patient's posterior scrotum.  Wound is red/pink, moist, blanchable.  Cleanse with normal saline, patted dry, applied miconazole powder and dusted off excess and applied zinc barrier cream while educating patient care tech and RN on this technique to get zinc barrier cream to stick to moist areas.             Identified macular rash to patient's trunk, abdomen, flanks, bilateral lower residual limbs and bilateral upper extremities.  Rash is raised, linear, red, with indistinct borders and greater than 0.1 cm possibly due to allergic reaction to antibiotics.  Rash is pruritic per patient.                                Ostomy pouch in place, intact not leaking, soft/mushy greenish-brown stool present in pouch.     See orders for recommendations.  Dressing supplies left at bedside.  Educated RN that all supplies utilized are available from materials management except for Thera honey sheet which is available from WOC team     Sensory Perception: 2-->very  limited  Moisture: 3-->occasionally moist  Activity: 1-->bedfast  Mobility: 2-->very limited  Nutrition: 3-->adequate  Friction and Shear: 1-->problem  Jaspreet Score: 12 (03/21/23 2130)     Patient is on a low-air-loss bed.  Educated patient care tech and nurse on importance of keeping only 1 incontinence pad on the bed as well as no draw sheet just the flat sheet as the fitted sheet.     WOC team will plan to follow-up.  If alteration to skin integrity or change in wound bed presentation please reconsult the WOC team.                    Insurance Information                AETNA MEDICARE REPLACEMENT/AETNA MEDICARE REPLACEMENT Phone: [phone]    Subscriber: Severino Vera Subscriber#: [id number]    Group#: [id number] Precert#: --        [place] Phone: --    Subscriber: Severino Vera Subscriber#: [id number]    Group#: -- Precert#: --

## 2023-03-24 ENCOUNTER — READMISSION MANAGEMENT (OUTPATIENT)
Dept: CALL CENTER | Facility: HOSPITAL | Age: 68
End: 2023-03-24
Payer: MEDICARE

## 2023-03-24 VITALS
TEMPERATURE: 98 F | DIASTOLIC BLOOD PRESSURE: 69 MMHG | HEART RATE: 96 BPM | HEIGHT: 72 IN | SYSTOLIC BLOOD PRESSURE: 123 MMHG | RESPIRATION RATE: 16 BRPM | OXYGEN SATURATION: 97 % | BODY MASS INDEX: 19.64 KG/M2 | WEIGHT: 145 LBS

## 2023-03-24 LAB
GLUCOSE BLDC GLUCOMTR-MCNC: 188 MG/DL (ref 70–130)
GLUCOSE BLDC GLUCOMTR-MCNC: 218 MG/DL (ref 70–130)
GLUCOSE BLDC GLUCOMTR-MCNC: 259 MG/DL (ref 70–130)
GLUCOSE BLDC GLUCOMTR-MCNC: 266 MG/DL (ref 70–130)

## 2023-03-24 PROCEDURE — 25010000002 ONDANSETRON PER 1 MG: Performed by: INTERNAL MEDICINE

## 2023-03-24 PROCEDURE — 63710000001 INSULIN LISPRO (HUMAN) PER 5 UNITS: Performed by: PHYSICIAN ASSISTANT

## 2023-03-24 PROCEDURE — 96375 TX/PRO/DX INJ NEW DRUG ADDON: CPT

## 2023-03-24 PROCEDURE — 82962 GLUCOSE BLOOD TEST: CPT

## 2023-03-24 PROCEDURE — 99239 HOSP IP/OBS DSCHRG MGMT >30: CPT | Performed by: NURSE PRACTITIONER

## 2023-03-24 PROCEDURE — 25010000002 MEROPENEM PER 100 MG: Performed by: INTERNAL MEDICINE

## 2023-03-24 PROCEDURE — G0378 HOSPITAL OBSERVATION PER HR: HCPCS

## 2023-03-24 PROCEDURE — 25010000002 DAPTOMYCIN PER 1 MG

## 2023-03-24 PROCEDURE — 96372 THER/PROPH/DIAG INJ SC/IM: CPT

## 2023-03-24 PROCEDURE — 25010000002 HEPARIN (PORCINE) PER 1000 UNITS: Performed by: INTERNAL MEDICINE

## 2023-03-24 RX ADMIN — LISINOPRIL 40 MG: 40 TABLET ORAL at 08:04

## 2023-03-24 RX ADMIN — MULTIPLE VITAMINS W/ MINERALS TAB 1 TABLET: TAB at 08:10

## 2023-03-24 RX ADMIN — THIAMINE HCL TAB 100 MG 100 MG: 100 TAB at 08:11

## 2023-03-24 RX ADMIN — CLOPIDOGREL BISULFATE 75 MG: 75 TABLET ORAL at 08:04

## 2023-03-24 RX ADMIN — DULOXETINE 60 MG: 60 CAPSULE, DELAYED RELEASE ORAL at 08:04

## 2023-03-24 RX ADMIN — Medication 10 ML: at 08:05

## 2023-03-24 RX ADMIN — DAPTOMYCIN 500 MG: 500 INJECTION, POWDER, LYOPHILIZED, FOR SOLUTION INTRAVENOUS at 10:24

## 2023-03-24 RX ADMIN — HEPARIN SODIUM 5000 UNITS: 5000 INJECTION INTRAVENOUS; SUBCUTANEOUS at 08:02

## 2023-03-24 RX ADMIN — INSULIN LISPRO 7 UNITS: 100 INJECTION, SOLUTION INTRAVENOUS; SUBCUTANEOUS at 13:21

## 2023-03-24 RX ADMIN — Medication 1 CAPSULE: at 08:04

## 2023-03-24 RX ADMIN — INSULIN LISPRO 4 UNITS: 100 INJECTION, SOLUTION INTRAVENOUS; SUBCUTANEOUS at 17:30

## 2023-03-24 RX ADMIN — GABAPENTIN 800 MG: 400 CAPSULE ORAL at 13:21

## 2023-03-24 RX ADMIN — WHITE PETROLATUM 1 APPLICATION: 1.75 OINTMENT TOPICAL at 08:06

## 2023-03-24 RX ADMIN — METOPROLOL SUCCINATE 25 MG: 25 TABLET, EXTENDED RELEASE ORAL at 08:03

## 2023-03-24 RX ADMIN — OXYCODONE HYDROCHLORIDE AND ACETAMINOPHEN 500 MG: 500 TABLET ORAL at 08:03

## 2023-03-24 RX ADMIN — MEROPENEM 1 G: 1 INJECTION, POWDER, FOR SOLUTION INTRAVENOUS at 04:04

## 2023-03-24 RX ADMIN — FERROUS SULFATE TAB 325 MG (65 MG ELEMENTAL FE) 325 MG: 325 (65 FE) TAB at 08:03

## 2023-03-24 RX ADMIN — GABAPENTIN 800 MG: 400 CAPSULE ORAL at 08:04

## 2023-03-24 RX ADMIN — SODIUM CHLORIDE 1 G: 1 TABLET ORAL at 08:05

## 2023-03-24 RX ADMIN — INSULIN LISPRO 4 UNITS: 100 INJECTION, SOLUTION INTRAVENOUS; SUBCUTANEOUS at 10:25

## 2023-03-24 RX ADMIN — INSULIN LISPRO 4 UNITS: 100 INJECTION, SOLUTION INTRAVENOUS; SUBCUTANEOUS at 10:24

## 2023-03-24 RX ADMIN — MEROPENEM 1 G: 1 INJECTION, POWDER, FOR SOLUTION INTRAVENOUS at 13:00

## 2023-03-24 RX ADMIN — INSULIN LISPRO 2 UNITS: 100 INJECTION, SOLUTION INTRAVENOUS; SUBCUTANEOUS at 17:30

## 2023-03-24 RX ADMIN — CYANOCOBALAMIN TAB 1000 MCG 1000 MCG: 1000 TAB at 08:16

## 2023-03-24 RX ADMIN — ASPIRIN 81 MG: 81 TABLET, COATED ORAL at 08:04

## 2023-03-24 RX ADMIN — MICONAZOLE NITRATE: 20 POWDER TOPICAL at 08:05

## 2023-03-24 RX ADMIN — ONDANSETRON 4 MG: 2 INJECTION INTRAMUSCULAR; INTRAVENOUS at 09:02

## 2023-03-24 RX ADMIN — Medication 250 MG: at 08:11

## 2023-03-24 RX ADMIN — SENNOSIDES 2 TABLET: 8.6 TABLET, FILM COATED ORAL at 08:11

## 2023-03-24 RX ADMIN — POLYETHYLENE GLYCOL 3350 17 G: 17 POWDER, FOR SOLUTION ORAL at 08:10
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
## 2023-03-24 NOTE — CASE MANAGEMENT/SOCIAL WORK
Discharge Planning Assessment  [facility]     Patient Name: Severino Vera  MRN: [id number]  Today's Date: 3/24/2023    Admit Date: 3/13/2023    Plan: Wound Care   Discharge Needs Assessment    No documentation.                Discharge Plan     Row Name 03/24/23 1005       Plan    Plan Comments There is not a home health agency that is willing to accept Mr. Vera for home health. Bother agencies in [facility] have denied him [facility] and [place]. A referral was sent to [facility] for outpatient wound care but he would have to travel to get wound care and he is not certain he would have transportation. He would not be eligible for Medicaid Transportation.He had been agreeable to going to a inpatient rehab and he was accepted to [facility] to a dual certified bed and he has declined to go to a facility. Case management is following him for discharge planning.              Continued Care and Services - Admitted Since 3/13/2023    Coordination has not been started for this encounter.     Selected Continued Care - Prior Encounters Includes continued care and service providers with selected services from prior encounters from 12/13/2022 to 3/24/2023    Discharged on 2/21/2023 Admission date: 1/25/2023 - Discharge disposition: Skilled Nursing Facility (DC - External)    Destination     Service Provider Selected Services Address Phone Fax Patient Preferred    [facility] [address] [place] [id number] [phone] --                    Expected Discharge Date and Time     Expected Discharge Date Expected Discharge Time    Mar 24, 2023          Demographic Summary    No documentation.                Functional Status    No documentation.                Psychosocial    No documentation.                Abuse/Neglect    No documentation.                Legal    No documentation.                Substance Abuse     No documentation.                Patient Forms    No documentation.                   JENIFER Lambert

## 2023-03-24 NOTE — NURSING NOTE
Patient had no acute events during shift  Slept most of evening  NSR  Room air  Wounds improving - barrier cream applied as needed

## 2023-03-24 NOTE — CASE MANAGEMENT/SOCIAL WORK
Case Management Discharge Note      Final Note: Spoke with the patient sister and she said she already knows how to do the wound care for Mr. Vera. The patients sister said that [facility] will be able to see the patient for home health at his home. Case management called [facility] said they will take the patient for wound care. He will be discharged home today by [facility] stretcher from  5 to 5:30 pm today.         Selected Continued Care - Admitted Since 3/13/2023     Destination    No services have been selected for the patient.              Durable Medical Equipment    No services have been selected for the patient.              Dialysis/Infusion    No services have been selected for the patient.              Home Medical Care     Service Provider Selected Services Address Phone Fax Patient Preferred    [facility] [address] [place] [phone] --                      Final Discharge Disposition Code: 06 - home with home health care

## 2023-03-24 NOTE — NURSING NOTE
Pt's dressing to bottom reinforced with mepilex border.  Pt is being discharged home to Home Health.  Pt and family with verbal understanding. All other dressings are intact at this time.
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
## 2023-03-25 NOTE — OUTREACH NOTE
Prep Survey    Flowsheet Row Responses   [facility] discharged from? [place]   Is LACE score < 7 ? No   Eligibility Readm Mgmt   Discharge diagnosis Sacral / gluteal decubitus ulcer, Sacral osteomyelitis, Paraplegia   Does the patient have one of the following disease processes/diagnoses(primary or secondary)? Other   Does the patient have Home health ordered? Yes   What is the [facility] health [facility]   Is there a DME ordered? No   Prep survey completed? Yes          Celeste NOLASCO - Registered Nurse

## 2023-03-28 ENCOUNTER — READMISSION MANAGEMENT (OUTPATIENT)
Dept: CALL CENTER | Facility: HOSPITAL | Age: 68
End: 2023-03-28
Payer: MEDICARE

## 2023-03-28 NOTE — OUTREACH NOTE
Medical Week 1 Survey    Flowsheet Row Responses   [facility] patient discharged from? [place]   Does the patient have one of the following disease processes/diagnoses(primary or secondary)? Other   Week 1 attempt successful? Yes   Call start time 1338   Call end time 1343   Discharge diagnosis Sacral / gluteal decubitus ulcer, Sacral osteomyelitis, Paraplegia   Person spoke with today (if not patient) and relationship pt   Meds reviewed with patient/caregiver? Yes   Is the patient having any side effects they believe may be caused by any medication additions or changes? No   Does the patient have all medications ordered at discharge? No  [Waiting on delivery service]   Nursing Interventions No intervention needed   Is the patient taking all medications as directed (includes completed medication regime)? N/A   Does the patient have a primary care provider?  Yes   Does the patient have an appointment with their PCP within 7 days of discharge? No   Nursing Interventions Educated patient on importance of making appointment, Advised patient to make appointment   Has the patient kept scheduled appointments due by today? N/A   What [facility]   Has home health visited the patient within 72 hours of discharge? Yes   Psychosocial issues? No   Did the patient receive a copy of their discharge instructions? Yes   Nursing interventions Reviewed instructions with patient   What is the patient's perception of their health status since discharge? Improving   Is the patient/caregiver able to teach back signs and symptoms related to disease process for when to call PCP? Yes   Is the patient/caregiver able to teach back signs and symptoms related to disease process for when to call 911? Yes   Is the patient/caregiver able to teach back the hierarchy of who to call/visit for symptoms/problems? PCP, Specialist, Home health nurse, Urgent Care, ED, 911 Yes   Week 1 call completed? Yes    Is the patient interested in additional calls from an ambulatory ?  NOTE:  applies to high risk patients requiring additional follow-up. No   Wrap up additional comments Spouse states pt is doing ok. Reviewed AVS/medication/educated on new medication. Spouse advised to make an appt for pt with PCP/Plastic Surgery.          Lena NOLASCO - Registered Nurse

## 2023-04-05 ENCOUNTER — READMISSION MANAGEMENT (OUTPATIENT)
Dept: CALL CENTER | Facility: HOSPITAL | Age: 68
End: 2023-04-05
Payer: MEDICARE

## 2023-04-05 NOTE — OUTREACH NOTE
Medical Week 2 Survey    Flowsheet Row Responses   [facility] patient discharged from? [place]   Does the patient have one of the following disease processes/diagnoses(primary or secondary)? Other   Week 2 attempt successful? Yes   Call start time 1144   Call end time 1146   Person spoke with today (if not patient) and relationship spouse   Meds reviewed with patient/caregiver? Yes   Is the patient having any side effects they believe may be caused by any medication additions or changes? No   Does the patient have all medications ordered at discharge? Yes   Is the patient taking all medications as directed (includes completed medication regime)? Yes   Does the patient have a primary care provider?  Yes   Does the patient have an appointment with their PCP within 7 days of discharge? Greater than 7 days   Comments regarding PCP 4/7/23   Nursing Interventions Verified appointment date/time/provider   Has the patient kept scheduled appointments due by today? N/A   What is the Home health agency?  [facility]  for wound care   Has home health visited the patient within 72 hours of discharge? Yes   Psychosocial issues? No   What is the patient's perception of their health status since discharge? Improving   Is the patient/caregiver able to teach back signs and symptoms related to disease process for when to call PCP? Yes   Is the patient/caregiver able to teach back signs and symptoms related to disease process for when to call 911? Yes   Is the patient/caregiver able to teach back the hierarchy of who to call/visit for symptoms/problems? PCP, Specialist, Home health nurse, Urgent Care, ED, 911 Yes   Week 2 Call Completed? Yes   Is the patient interested in additional calls from an ambulatory ?  NOTE:  applies to high risk patients requiring additional follow-up. No   Wrap up additional comments Spouse states pt is doing better. [facility]  coming for wound care. Pt did receive all his medication from  pharmacy delivery/no issues with medications.           Lena NOLASCO - Registered Nurse

## 2023-04-14 ENCOUNTER — READMISSION MANAGEMENT (OUTPATIENT)
Dept: CALL CENTER | Facility: HOSPITAL | Age: 68
End: 2023-04-14
Payer: MEDICARE

## 2023-04-14 NOTE — OUTREACH NOTE
Medical Week 3 Survey    Flowsheet Row Responses   [facility] patient discharged from? [place]   Does the patient have one of the following disease processes/diagnoses(primary or secondary)? Other   Week 3 attempt successful? Yes   Call start time 0829   Call end time 0832   Discharge diagnosis Sacral / gluteal decubitus ulcer, Sacral osteomyelitis, Paraplegia   Is patient permission given to speak with other caregiver? Yes   List who call center can speak with Sister   Person spoke with today (if not patient) and relationship Sister   Meds reviewed with patient/caregiver? Yes   Is the patient having any side effects they believe may be caused by any medication additions or changes? No   Does the patient have all medications ordered at discharge? Yes   Is the patient taking all medications as directed (includes completed medication regime)? Yes   Does the patient have a primary care provider?  Yes   Has the patient kept scheduled appointments due by today? Yes   Has home health visited the patient within 72 hours of discharge? Yes   Psychosocial issues? No   What is the patient's perception of their health status since discharge? Improving   If the patient is a current smoker, are they able to teach back resources for cessation? Not a smoker   Week 3 Call Completed? Yes   Is the patient interested in additional calls from an ambulatory ?  NOTE:  applies to high risk patients requiring additional follow-up. No          Paula COBB - Registered Nurse

## 2023-06-06 RX ORDER — METOPROLOL SUCCINATE 25 MG/1
TABLET, EXTENDED RELEASE ORAL
Qty: 30 TABLET | Refills: 3 | OUTPATIENT
Start: 2023-06-06

## 2023-08-21 RX ORDER — ROSUVASTATIN CALCIUM 20 MG/1
20 TABLET, COATED ORAL NIGHTLY
Qty: 90 TABLET | Refills: 3 | OUTPATIENT
Start: 2023-08-21

## (undated) DEVICE — DRAIN JACKSON PRATT ROUND 15FR: Brand: CARDINAL HEALTH

## (undated) DEVICE — DRSNG ELAS NET STRCH SZ8 31IN 25YD LF

## (undated) DEVICE — SUT SILK 2/0 PS 18IN 1588H

## (undated) DEVICE — PATIENT RETURN ELECTRODE, SINGLE-USE, CONTACT QUALITY MONITORING, ADULT, WITH 9FT CORD, FOR PATIENTS WEIGING OVER 33LBS. (15KG): Brand: MEGADYNE

## (undated) DEVICE — DRAPE,T,LIMB,BILATERAL,STERILE: Brand: MEDLINE

## (undated) DEVICE — STCKNT IMPERV 12IN STRL

## (undated) DEVICE — PETROLATUM DRESSING. FINE MESH GAUZE IMPREGNATED WITH 3% BISMUTH TRIBROMOPHENATE  IN A PETROLATUM BLEND.: Brand: XEROFORM PETROLATUM

## (undated) DEVICE — GLV SURG SENSICARE PI MIC PF SZ9 LF STRL

## (undated) DEVICE — ANTIBACTERIAL UNDYED BRAIDED (POLYGLACTIN 910), SYNTHETIC ABSORBABLE SUTURE: Brand: COATED VICRYL

## (undated) DEVICE — GOWN,REINF,POLY,ECL,PP SLV,3XL,XLONG: Brand: MEDLINE

## (undated) DEVICE — SUT SILK 2/0 SH 30IN K833H

## (undated) DEVICE — SUT VIC 2/0 TIES 18IN J111T

## (undated) DEVICE — CLTH CLENS READYCLEANSE PERI CARE PK/5

## (undated) DEVICE — SAFESECURE,SECUREMENT,FOLEY CATH,STERILE: Brand: MEDLINE

## (undated) DEVICE — TOTAL TRAY, 16FR 10ML SIL FOLEY, URN: Brand: MEDLINE

## (undated) DEVICE — JACKSON-PRATT 100CC BULB RESERVOIR: Brand: CARDINAL HEALTH

## (undated) DEVICE — PENCL ROCKRSWCH MEGADYNE W/HOLSTR 10FT SS

## (undated) DEVICE — SUT PDS 4/0 RB1 27IN VIL PDP304H

## (undated) DEVICE — GLV SURG GRN DERMASSURE LF PF 7.5

## (undated) DEVICE — GAUZE FLUFF 1 PLY: Brand: DEROYAL

## (undated) DEVICE — STRYKER PERFORMANCE SERIES SAGITTAL BLADE: Brand: STRYKER PERFORMANCE SERIES

## (undated) DEVICE — PCH DRN BRST RECONSTRUCT BRA LXF

## (undated) DEVICE — DRAPE,TOP,102X53,STERILE: Brand: MEDLINE

## (undated) DEVICE — SPNG GZ WOVN 4X4IN 12PLY 10/BX STRL

## (undated) DEVICE — DRSNG SURESITE WNDW 4X4.5

## (undated) DEVICE — BNDG ELAS W/CLIP 6IN 10YD LF STRL

## (undated) DEVICE — ENSEAL 20 CM SHAFT, LARGE JAW: Brand: ENSEAL X1

## (undated) DEVICE — PK MINOR SPLT 10

## (undated) DEVICE — TBG PENCL TELESCP MEGADYNE SMOKE EVAC 10FT

## (undated) DEVICE — INTENDED FOR TISSUE SEPARATION, AND OTHER PROCEDURES THAT REQUIRE A SHARP SURGICAL BLADE TO PUNCTURE OR CUT.: Brand: BARD-PARKER ® STAINLESS STEEL BLADES

## (undated) DEVICE — BANDAGE,GAUZE,BULKEE II,4.5"X4.1YD,STRL: Brand: MEDLINE

## (undated) DEVICE — GOWN,NON-REINFORCED,SIRUS,SET IN SLV,XL: Brand: MEDLINE

## (undated) DEVICE — SST TWIST DRILL, STANDARD, 2MM DIA. X 127MM: Brand: MICROAIRE®

## (undated) DEVICE — BNDG ELAS CO-FLEX SLF ADHR 4IN5YD LF STRL

## (undated) DEVICE — SUT PDS LP 1 TP1 96IN VIO PDP880GA

## (undated) DEVICE — HANDPIECE SET WITH HIGH FLOW TIP AND SUCTION TUBE: Brand: INTERPULSE

## (undated) DEVICE — 3M™ IOBAN™ 2 ANTIMICROBIAL INCISE DRAPE 6651EZ: Brand: IOBAN™ 2

## (undated) DEVICE — 3M™ MEDIPORE™ H SOFT CLOTH SURGICAL TAPE, 2863, 3 IN X 10 YD, 12/CASE: Brand: 3M™ MEDIPORE™

## (undated) DEVICE — UNDERCAST PADDING: Brand: DEROYAL

## (undated) DEVICE — GLV SURG PREMIERPRO MIC LTX PF SZ8 BRN

## (undated) DEVICE — SUT ETHLN 2/0 PS 18IN 585H

## (undated) DEVICE — SUT VIC 0 CT1 CR8 18IN J840D

## (undated) DEVICE — 450 ML BOTTLE OF 0.05% CHLORHEXIDINE GLUCONATE IN 99.95% STERILE WATER FOR IRRIGATION, USP AND APPLICATOR.: Brand: IRRISEPT ANTIMICROBIAL WOUND LAVAGE

## (undated) DEVICE — SUT GUT CHRM 0 STD TIES 54IN S114H

## (undated) DEVICE — GLV SURG PREMIERPRO MIC LTX PF SZ7.5 BRN

## (undated) DEVICE — TRAP FLD MINIVAC MEGADYNE 100ML

## (undated) DEVICE — SUT VIC 3/0 TIES J104T

## (undated) DEVICE — DRSNG PAD ABD 8X10IN STRL

## (undated) DEVICE — LEX GENERAL ABDOMINAL SPLIT: Brand: MEDLINE INDUSTRIES, INC.

## (undated) DEVICE — SOL BETADINE 8OZ

## (undated) DEVICE — 3M™ STERI-STRIP™ REINFORCED ADHESIVE SKIN CLOSURES, R1547, 1/2 IN X 4 IN (12 MM X 100 MM), 6 STRIPS/ENVELOPE: Brand: 3M™ STERI-STRIP™

## (undated) DEVICE — APPL CHLORAPREP TINTED 26ML TEAL

## (undated) DEVICE — BLD SCLPL SS NO24 STRL

## (undated) DEVICE — KNEE IMMOBILIZER: Brand: DEROYAL